# Patient Record
Sex: FEMALE | Race: BLACK OR AFRICAN AMERICAN | Employment: UNEMPLOYED | URBAN - METROPOLITAN AREA
[De-identification: names, ages, dates, MRNs, and addresses within clinical notes are randomized per-mention and may not be internally consistent; named-entity substitution may affect disease eponyms.]

---

## 2017-01-19 ENCOUNTER — CLINICAL SUPPORT (OUTPATIENT)
Dept: PULMONOLOGY | Age: 57
End: 2017-01-19

## 2017-01-19 VITALS — HEIGHT: 62 IN | BODY MASS INDEX: 26.68 KG/M2 | WEIGHT: 145 LBS

## 2017-01-19 DIAGNOSIS — R06.00 DYSPNEA, UNSPECIFIED TYPE: Primary | ICD-10-CM

## 2017-01-20 ENCOUNTER — OFFICE VISIT (OUTPATIENT)
Dept: CARDIOLOGY CLINIC | Age: 57
End: 2017-01-20

## 2017-01-20 VITALS
BODY MASS INDEX: 26.5 KG/M2 | WEIGHT: 144 LBS | HEART RATE: 58 BPM | SYSTOLIC BLOOD PRESSURE: 120 MMHG | HEIGHT: 62 IN | DIASTOLIC BLOOD PRESSURE: 80 MMHG | OXYGEN SATURATION: 98 %

## 2017-01-20 DIAGNOSIS — R00.2 PALPITATIONS: Primary | ICD-10-CM

## 2017-01-20 DIAGNOSIS — I11.9 BENIGN HYPERTENSIVE HEART DISEASE WITHOUT HEART FAILURE: ICD-10-CM

## 2017-01-20 DIAGNOSIS — R06.02 SOB (SHORTNESS OF BREATH): ICD-10-CM

## 2017-01-20 DIAGNOSIS — E78.00 PURE HYPERCHOLESTEROLEMIA: ICD-10-CM

## 2017-01-20 RX ORDER — POTASSIUM CHLORIDE 20MEQ/15ML
LIQUID (ML) ORAL
COMMUNITY
Start: 2017-01-14 | End: 2021-10-01 | Stop reason: SDUPTHER

## 2017-01-20 NOTE — MR AVS SNAPSHOT
Visit Information Date & Time Provider Department Dept. Phone Encounter #  
 1/20/2017  2:20 PM [facility] [id number] Follow-up Instructions Return in about 6 months (around 7/20/2017), or if symptoms worsen or fail to improve. Your Appointments 1/26/2017  1:30 PM  
New Patient with Sebastien Anthony MD  
[address] ([facility]) Appt Note: DR Jr Oakley for [address] [place] [address], [address] [address]  
[phone]  
  
   
 [address] [place] [address], [address] [facility] [place] [address]  
  
    
 1/27/2017 11:20 AM  
Follow Up with Austin Villa DO [facility] ([facility]) Appt Note: pt requested appt for SOB. [place] [id number] [address] [address] [phone] [address] [place] [address] [address] Upcoming Health Maintenance Date Due DTaP/Tdap/Td series (1 - Tdap) 4/5/1983 PAP AKA CERVICAL CYTOLOGY 2/4/2016 BREAST CANCER SCRN MAMMOGRAM 11/22/2017 COLONOSCOPY 11/14/2019 Allergies as of 1/20/2017  Review Complete On: 1/20/2017 By: Austin Villa DO Severity Noted Reaction Type Reactions Ibuprofen High 11/10/2016    Other (comments) Nsaids (Non-steroidal Anti-inflammatory Drug) High 11/10/2016    Other (comments) Amoxicillin  12/12/2011    Unable to Obtain Lidocaine  01/08/2013    Swelling Oral Solution Lipitor [Atorvastatin]  07/15/2014    Myalgia Lopressor [Metoprolol Tartrate]  02/28/2012    Other (comments) Lightheaded and cp Percocet [Oxycodone-acetaminophen]  12/12/2011    Unable to Obtain Vicodin [Hydrocodone-acetaminophen]  12/12/2011    Unable to Obtain Current Immunizations  Reviewed on 11/13/2013 Name Date  
 TB Skin Test (PPD) Intradermal 11/13/2013 Not reviewed this visit You Were Diagnosed With   
  
 Codes Comments Palpitations    -  Primary ICD-10-CM: R00.2 ICD-9-CM: 785.1 Benign hypertensive heart disease without heart failure     ICD-10-CM: I11.9 ICD-9-CM: 402.10 Pure hypercholesterolemia     ICD-10-CM: E78.00 ICD-9-CM: 272.0 Vitals BP Pulse Height(growth percentile) Weight(growth percentile) SpO2 BMI  
 120/80 (!) 58 5' 2\" (1.575 m) 144 lb (65.3 kg) 98% 26.34 kg/m2 OB Status Smoking Status Postmenopausal Former Smoker Vitals History BMI and BSA Data Body Mass Index Body Surface Area  
 26.34 kg/m 2 1.69 m 2 Preferred Pharmacy Pharmacy Name Phone Cara Pizarro [address], [address] [place] [address] Road [phone] Your Updated Medication List  
  
   
This list is accurate as of: 1/20/17  4:07 PM.  Always use your most recent med list. amLODIPine 5 mg tablet Commonly known as:  Arlyss Jefferson TAKE ONE TABLET BY MOUTH DAILY  
  
 atenolol 25 mg tablet Commonly known as:  TENORMIN Take 1 Tab by mouth daily. CARAFATE 1 gram tablet Generic drug:  sucralfate Take 1 g by mouth daily as needed. CENTRUM SILVER PO Take 1 Tab by mouth daily. co-enzyme Q-10 100 mg capsule Commonly known as:  CO Q-10 Take 100 mg by mouth two (2) times a day. DEXILANT 60 mg Cpdb Generic drug:  Dexlansoprazole Take 1 Cap by mouth every Monday, Wednesday, Friday. ECOTRIN LOW STRENGTH 81 mg tablet Generic drug:  aspirin delayed-release Take 81 mg by mouth daily. fexofenadine 180 mg tablet Commonly known as:  Luke Jose Take 1 Tab by mouth daily. FISH OIL 1,000 mg Cap Generic drug:  omega-3 fatty acids-vitamin e Take 2 Caps by mouth daily. flunisolide 25 mcg (0.025 %) Spry Commonly known as:  NASAREL  
2 Sprays by Both Nostrils route two (2) times a day. Indications: ALLERGIC RHINITIS  
  
 GARLIC PO Take 2 Caps by mouth daily. glucose blood VI test strips strip Commonly known as:  blood glucose test  
Use to check glucose daily  
  
 melatonin 3 mg tablet Take 3 mg by mouth nightly. OTHER  
sample Biofreeze cold therapy pain relief  
  
 potassium chloride 20 mEq/15 mL solution Commonly known as:  KAON 10% Patient states she take 1 tablespoon daily  
  
 pravastatin 80 mg tablet Commonly known as:  PRAVACHOL Take 1 Tab by mouth nightly. spironolactone 25 mg tablet Commonly known as:  ALDACTONE Take 1 Tab by mouth daily. We Performed the Following AMB POC EKG ROUTINE W/ 12 LEADS, INTER & REP [51912 CPT(R)] Follow-up Instructions Return in about 6 months (around 7/20/2017), or if symptoms worsen or fail to improve. To-Do List   
 01/20/2017 ECG:  ECG HOLTER MONITOR, UP TO 48 HRS   
  
 01/24/2017 3:30 PM  
  Appointment with [facility] HOLTER TECH at [facility] NON-INVASIVE CARD ([phone]) Age Limit for ALL Heart procedures @ [facility] is 18 yrs and older only. Under the age of 25, refer to Nhung Schwartz [phone]). PATIENTS REPORT TO: Nabor Wells ([facility]) - 2nd Floor [address]  This study requires a physician's written prescription. Patients may bring the order or it may also be faxed to Central Scheduling at [phone]. Please wear a shirt with buttons down the front. Bring list of medications. Do not have a bath/shower during your 24 hour recording. Please remind patient they will need to return 24 hours, 48 hours, or 72 hours after monitor is in place to have it removed by the technician.  (example: If patient is scheduled for a 24 hour holter, return 24 hours after monitor is in place to have it removed. If patient is scheduled for a 48 hour holter monitor, then they would return 48 hours after holter is in place, etc.)  Staff is NOT available on Saturdays to remove holter monitor.   IMPORTANT:  Once patient has been fitted with a Lozano Nj, they should not have any other exams performed until the Holter has been removed. Introducing [facility] & HEALTH SERVICES! Yanet Roberts introduces [employer] patient portal. Now you can access parts of your medical record, email your doctor's office, and request medication refills online. 1. In your internet browser, go to [email or web address] 2. Click on the First Time User? Click Here link in the Sign In box. You will see the New Member Sign Up page. 3. Enter your [employer] Access Code exactly as it appears below. You will not need to use this code after youve completed the sign-up process. If you do not sign up before the expiration date, you must request a new code. · [employer] Access Code: [email or web address] Expires: 2/16/2017  9:42 AM 
 
4. Enter the last four digits of your Social Security Number (xxxx) and Date of Birth (mm/dd/yyyy) as indicated and click Submit. You will be taken to the next sign-up page. 5. Create a [employer] ID. This will be your [employer] login ID and cannot be changed, so think of one that is secure and easy to remember. 6. Create a [employer] password. You can change your password at any time. 7. Enter your Password Reset Question and Answer. This can be used at a later time if you forget your password. 8. Enter your e-mail address. You will receive e-mail notification when new information is available in [address] 9. Click Sign Up. You can now view and download portions of your medical record. 10. Click the Download Summary menu link to download a portable copy of your medical information. If you have questions, please visit the Frequently Asked Questions section of the [employer] website. Remember, [employer] is NOT to be used for urgent needs. For medical emergencies, dial 911. Now available from your iPhone and Android! Please provide this summary of care documentation to your next provider. Your primary care clinician is listed as Silver Casas. If you have any questions after today's visit, please call [phone].

## 2017-01-20 NOTE — PROGRESS NOTES
Review of Systems   Constitutional: Negative. Respiratory: Positive for shortness of breath. Cardiovascular: Positive for chest pain and palpitations. Gastrointestinal: Positive for abdominal pain, constipation and heartburn. Musculoskeletal: Negative.

## 2017-01-20 NOTE — PROGRESS NOTES
1. Have you been to the ER, urgent care clinic since your last visit? Hospitalized since your last visit? NO  2. Have you seen or consulted any other health care providers outside of the [address] [place] [address] since your last visit? Include any pap smears or colon screening.   No

## 2017-01-20 NOTE — PROGRESS NOTES
HPI:  I saw Oz Kirkpatrick in my office today in cardiovascular evaluation regarding her past problems with palpitations. Ms. Lindsay Barrett is a very pleasant, but anxious 53-year-old -American female with history of non-anginal sounding chest pain and palpitations over the years. She has had Holter's in the past which have shown no significant ectopy except for some rare PACs but due to symptoms of palpitations she has been on atenolol 25 mg daily or as needed and Norvasc 5 mg along with Aldactone 25 mg daily for blood pressure. He comes in today and again is complaining of palpitations. In discussion with her however her palpitations seem to occur with minimal activity, but her heart rate only goes to a little over 100 and I suspect that this is just normal sinus rhythm. She has been having some shortness of breath issues and had some recent pulmonary testing and will be following up with Dr. Jaxon Payne that regard. He continues to have her episodic noncardiac chest discomfort. Encounter Diagnoses   Name Primary?  Palpitations Yes    Hypertensive heart disease      Pure hypercholesterolemia     SOB (shortness of breath)        Discussion: This lady is very pleasant but quite anxious about her heart though clinically she has never had any significant cardiac issues. She is quite concerned about her palpitations and although I doubt that there is any serious problem in that regard I am going to go ahead and do a 24-hour Holter monitor study see if we can document what she is feeling when she feels her palpitations and to be sure that this is just sinus rhythm or sinus tachycardia.   She did have a coronary calcium score back in 2014 of 0 and I do not think there is any likelihood that any of her chest discomforts are cardiac and with completely normal EKG today I am simply going plan to follow her up in several months or as needed if any new cardiovascular symptoms surface.     PCP: Urbano Castrejon Leyla Zapata MD       Past Medical History   Diagnosis Date    Cardiac Agatston CAC score, <100 04/30/2014     Coronary calcium score 0.    Cardiac Holter monitoring 05/07/2014     Normal Holter study w/patient's symptoms correlating w/sinus tachycardia, rates 100-135 bpm.    Cardiac nuclear imaging test 01/11/2013     No convincing evidence for ischemia or infarction in the setting of significant chest wall artifact. EF 62%. No RWMA. Neg EKG on max EST. Ex time 9 min 50 sec.  Cardiac stress echo, normal 05/05/2016     Normal maximal stress echo w/reproduction of atypical chest discomfort. Ex time 11 min 3 sec. EF 55%.  Cardiovascular LLE venous duplex 09/28/2016     Left leg:  No DVT.  Chest pain     GERD (gastroesophageal reflux disease)     Heel pain, bilateral     Hiatal hernia      denies this    HTN (hypertension)     Hyperlipidemia     Insulin resistance      follows with endocrinology for this    Night sweats     PVC (premature ventricular contraction)     Right low back pain     S/P colonoscopy 2009, 2014     normal per patient    Unsteady gait      due to heel pain         Past Surgical History   Procedure Laterality Date    Hx other surgical  2014     colonscopy    Hx other surgical       endoscopy    Hx other surgical       wisdom teeth removal x1    D/c suction  2011         Current Outpatient Rx   Name  Route  Sig  Dispense  Refill    OTHER        sample Biofreeze cold therapy pain relief              flunisolide (NASAREL) 25 mcg (0.025 %) spry    Both Nostrils    2 Sprays by Both Nostrils route two (2) times a day. Indications: ALLERGIC RHINITIS    25 mL    1      fexofenadine (ALLEGRA) 180 mg tablet    Oral    Take 1 Tab by mouth daily. 30 Tab    3      spironolactone (ALDACTONE) 25 mg tablet    Oral    Take 1 Tab by mouth daily. 30 Tab    3      amLODIPine (NORVASC) 5 mg tablet    Oral    Take 1 Tab by mouth daily.     30 Tab    6      glucose blood VI test strips (BLOOD GLUCOSE TEST) strip        Use to check glucose daily    1 Package    11      atenolol (TENORMIN) 25 mg tablet    Oral    Take 1 tablet by mouth daily. 90 tablet    3      co-enzyme Q-10 (CO Q-10) 100 mg capsule    Oral    Take 100 mg by mouth two (2) times a day.  pravastatin (PRAVACHOL) 80 mg tablet    Oral    Take 1 tablet by mouth nightly. 90 tablet    3      potassium chloride (KAON 10%) 20 mEq/15 mL solution    Oral    Take 15 mL by mouth two (2) times a day. 1 mL    0       Being followed by Dr. Katia Colin 2 Caps by mouth daily.  aspirin delayed-release (ECOTRIN LOW STRENGTH) 81 mg tablet    Oral    Take 81 mg by mouth daily.  omega-3 fatty acids-vitamin e (FISH OIL) 1,000 mg cap    Oral    Take 1 Cap by mouth daily.  melatonin 3 mg tablet    Oral    Take 3 mg by mouth nightly.  Dexlansoprazole (DEXILANT) 60 mg CpDM    Oral    Take 1 capsule by mouth as needed.  sucralfate (CARAFATE) 1 gram tablet    Oral    Take 1 g by mouth daily.  MULTIVITAMIN W-MINERALS/LUTEIN (CENTRUM SILVER PO)    Oral    Take 1 Tab by mouth daily.                    Allergies   Allergen Reactions    Ibuprofen Other (comments)    Nsaids (Non-Steroidal Anti-Inflammatory Drug) Other (comments)    Amoxicillin Unable to Obtain    Lidocaine Swelling     Oral Solution    Lipitor [Atorvastatin] Myalgia           Lopressor [Metoprolol Tartrate] Other (comments)     Lightheaded and cp    Percocet [Oxycodone-Acetaminophen] Unable to Obtain    Vicodin [Hydrocodone-Acetaminophen] Unable to Obtain         Social History :  Social History   Substance Use Topics    Smoking status: Former Smoker     Packs/day: 0.50     Years: 11.00     Types: Cigarettes     Quit date: 12/31/2006    Smokeless tobacco: Former User     Quit date: 1/10/1993      Comment: 6-7 cigs per day    Alcohol use No        Family History: family history includes Asthma in her father; Heart Attack in her maternal uncle, maternal uncle, and mother; Heart Disease in her brother; Heart defect in her mother; Hypertension in her brother and mother. Review of Systems:   Constitutional: Negative. Respiratory: Positive for shortness of breath. Cardiovascular: Positive for chest pain and palpitations. Gastrointestinal: Positive for abdominal pain, constipation and heartburn. Musculoskeletal: Negative. Physical Exam:    The patient is a cooperative, alert, well developed, well nourished 49 y.o.  female who is in no acute distress at the time of the examination. Visit Vitals    /80    Pulse (!) 58    Ht 5' 2\" (1.575 m)    Wt 65.3 kg (144 lb)    SpO2 98%    BMI 26.34 kg/m2     HEENT: Conjuctiva white, mucosa moist, no pallor or cyanosis. NECK: Supple without masses, tenderness or thyromegaly. There was no jugular venous distention. Carotid are full bilaterally without bruits. CHEST: Symmetrical with good excursion. LUNGS: Clear to auscultation in all fields. HEART: The apex is not displaced. There were no lifts, thrills or heaves. There is a normal S1 and S2 without appreciable murmurs rubs clicks or gallops auscultated. ABDOMEN: Soft without masses or tenderness. The liver is palpated 2-3 finger breadths below the right costal margin. EXTREMITIES: Full peripheral pulses without peripheral edema.     Review of Data: See PMH and Cardiology and Imaging sections for cardiac testing  Lab Results   Component Value Date/Time    Cholesterol, total 138 08/15/2016 04:43 PM    HDL Cholesterol 65 08/15/2016 04:43 PM    LDL, calculated 64.8 08/15/2016 04:43 PM    Triglyceride 41 08/15/2016 04:43 PM    CHOL/HDL Ratio 2.1 08/15/2016 04:43 PM         Results for orders placed or performed in visit on 01/20/17   AMB POC EKG ROUTINE W/ 12 LEADS, INTER & REP     Status: None    Narrative    Sinus bradycardia rate 57. This EKG is within normal limits and similar to the EKG of December 1, 2016. Dick Smith D.O., F.TYREE.C.C. [facility]  [facility] [place]  [address] [place] [address]. [address]    PLEASE NOTE:  This document has been produced using voice recognition software. Unrecognized errors in transcription may be present.

## 2017-01-24 ENCOUNTER — HOSPITAL ENCOUNTER (OUTPATIENT)
Dept: NON INVASIVE DIAGNOSTICS | Age: 57
Discharge: HOME | End: 2017-01-24
Attending: INTERNAL MEDICINE
Payer: COMMERCIAL

## 2017-01-24 DIAGNOSIS — I11.9 BENIGN HYPERTENSIVE HEART DISEASE WITHOUT HEART FAILURE: ICD-10-CM

## 2017-01-24 DIAGNOSIS — R00.2 PALPITATIONS: ICD-10-CM

## 2017-01-24 PROCEDURE — 93225 XTRNL ECG REC<48 HRS REC: CPT

## 2017-01-25 ENCOUNTER — HOSPITAL ENCOUNTER (OUTPATIENT)
Dept: LAB | Age: 57
Discharge: HOME | End: 2017-01-25
Payer: SUBSIDIZED

## 2017-01-25 LAB
25(OH)D3 SERPL-MCNC: 28.4 NG/ML (ref 30–100)
ALBUMIN SERPL BCP-MCNC: 3.5 G/DL (ref 3.4–5)
ALBUMIN/GLOB SERPL: 1.1 {RATIO} (ref 0.8–1.7)
ALP SERPL-CCNC: 34 U/L (ref 45–117)
ALT SERPL-CCNC: 26 U/L (ref 13–56)
AST SERPL W P-5'-P-CCNC: 16 U/L (ref 15–37)
BILIRUB DIRECT SERPL-MCNC: 0.1 MG/DL (ref 0–0.2)
BILIRUB SERPL-MCNC: 0.3 MG/DL (ref 0.2–1)
CHOLEST SERPL-MCNC: 163 MG/DL
GLOBULIN SER CALC-MCNC: 3.2 G/DL (ref 2–4)
HDLC SERPL-MCNC: 79 MG/DL (ref 40–60)
HDLC SERPL: 2.1 {RATIO} (ref 0–5)
LDLC SERPL CALC-MCNC: 77.6 MG/DL (ref 0–100)
LIPID PROFILE,FLP: ABNORMAL
PROT SERPL-MCNC: 6.7 G/DL (ref 6.4–8.2)
TRIGL SERPL-MCNC: 32 MG/DL (ref ?–150)
VLDLC SERPL CALC-MCNC: 6.4 MG/DL

## 2017-01-25 PROCEDURE — 82306 VITAMIN D 25 HYDROXY: CPT | Performed by: INTERNAL MEDICINE

## 2017-01-25 PROCEDURE — 80076 HEPATIC FUNCTION PANEL: CPT | Performed by: INTERNAL MEDICINE

## 2017-01-25 PROCEDURE — 80061 LIPID PANEL: CPT | Performed by: INTERNAL MEDICINE

## 2017-01-25 PROCEDURE — 36415 COLL VENOUS BLD VENIPUNCTURE: CPT | Performed by: INTERNAL MEDICINE

## 2017-01-26 ENCOUNTER — OFFICE VISIT (OUTPATIENT)
Dept: PULMONOLOGY | Age: 57
End: 2017-01-26

## 2017-01-26 VITALS
SYSTOLIC BLOOD PRESSURE: 88 MMHG | HEART RATE: 71 BPM | RESPIRATION RATE: 14 BRPM | HEIGHT: 62 IN | DIASTOLIC BLOOD PRESSURE: 56 MMHG | BODY MASS INDEX: 27.23 KG/M2 | WEIGHT: 148 LBS | TEMPERATURE: 98.5 F | OXYGEN SATURATION: 99 %

## 2017-01-26 DIAGNOSIS — R41.3 MEMORY DIFFICULTIES: ICD-10-CM

## 2017-01-26 DIAGNOSIS — R07.9 CHEST PAIN, UNSPECIFIED TYPE: ICD-10-CM

## 2017-01-26 DIAGNOSIS — G47.00 INSOMNIA, UNSPECIFIED TYPE: Primary | ICD-10-CM

## 2017-01-26 DIAGNOSIS — R06.02 SOB (SHORTNESS OF BREATH): ICD-10-CM

## 2017-01-26 NOTE — PROGRESS NOTES
PATIENT'S O2 SATS:   99% Room Air Rest, 62 Heart Rate                                          99% Room Air Activity, 58 Heart Rate - Patient Walked 660 Ft

## 2017-01-26 NOTE — MR AVS SNAPSHOT
Visit Information Date & Time Provider Department Dept. Phone Encounter #  
 1/26/2017  1:30 PM Bon Vieira MD [facility] Tammi Blum [id number] Follow-up Instructions Routing History Follow-up and Disposition History Upcoming Health Maintenance Date Due DTaP/Tdap/Td series (1 - Tdap) 4/5/1983 PAP AKA CERVICAL CYTOLOGY 2/4/2016 BREAST CANCER SCRN MAMMOGRAM 11/22/2017 COLONOSCOPY 11/14/2019 Allergies as of 1/26/2017  Review Complete On: 1/26/2017 By: Bon Vieira MD  
  
 Severity Noted Reaction Type Reactions Ibuprofen High 11/10/2016    Other (comments) Nsaids (Non-steroidal Anti-inflammatory Drug) High 11/10/2016    Other (comments) Amoxicillin  12/12/2011    Unable to Obtain Lidocaine  01/08/2013    Swelling Oral Solution Lipitor [Atorvastatin]  07/15/2014    Myalgia Lopressor [Metoprolol Tartrate]  02/28/2012    Other (comments) Lightheaded and cp Percocet [Oxycodone-acetaminophen]  12/12/2011    Unable to Obtain Vicodin [Hydrocodone-acetaminophen]  12/12/2011    Unable to Obtain Current Immunizations  Reviewed on 11/13/2013 Name Date  
 TB Skin Test (PPD) Intradermal 11/13/2013 Not reviewed this visit You Were Diagnosed With   
  
 Codes Comments Insomnia, unspecified type    -  Primary ICD-10-CM: G47.00 ICD-9-CM: 780.52 SOB (shortness of breath)     ICD-10-CM: R06.02 
ICD-9-CM: 786.05 Chest pain, unspecified type     ICD-10-CM: R07.9 ICD-9-CM: 786.50 Memory difficulties     ICD-10-CM: R41.3 ICD-9-CM: 780.93 Vitals BP Pulse Temp Resp Height(growth percentile) Weight(growth percentile) (!) 88/56 (BP 1 Location: Left arm, BP Patient Position: Sitting) 71 98.5 °F (36.9 °C) (Oral) 14 5' 2\" (1.575 m) 148 lb (67.1 kg) SpO2 BMI OB Status Smoking Status 99% 27.07 kg/m2 Postmenopausal Former Smoker Vitals History BMI and BSA Data Body Mass Index Body Surface Area  
 27.07 kg/m 2 1.71 m 2 Preferred Pharmacy Pharmacy Name Phone Lakshmi Dean, [address] [phone] Your Updated Medication List  
  
   
This list is accurate as of: 1/26/17  2:30 PM.  Always use your most recent med list. amLODIPine 5 mg tablet Commonly known as:  Mumford Blade TAKE ONE TABLET BY MOUTH DAILY  
  
 atenolol 25 mg tablet Commonly known as:  TENORMIN Take 1 Tab by mouth daily. CARAFATE 1 gram tablet Generic drug:  sucralfate Take 1 g by mouth daily as needed. CENTRUM SILVER PO Take 1 Tab by mouth daily. co-enzyme Q-10 100 mg capsule Commonly known as:  CO Q-10 Take 100 mg by mouth two (2) times a day. DEXILANT 60 mg Cpdb Generic drug:  Dexlansoprazole Take 1 Cap by mouth every Monday, Wednesday, Friday. ECOTRIN LOW STRENGTH 81 mg tablet Generic drug:  aspirin delayed-release Take 81 mg by mouth daily. fexofenadine 180 mg tablet Commonly known as:  Lizbeth Neighbors Take 1 Tab by mouth daily. FISH OIL 1,000 mg Cap Generic drug:  omega-3 fatty acids-vitamin e Take 2 Caps by mouth daily. flunisolide 25 mcg (0.025 %) Spry Commonly known as:  NASAREL  
2 Sprays by Both Nostrils route two (2) times a day. Indications: ALLERGIC RHINITIS  
  
 GARLIC PO Take 2 Caps by mouth daily. glucose blood VI test strips strip Commonly known as:  blood glucose test  
Use to check glucose daily  
  
 melatonin 3 mg tablet Take 3 mg by mouth nightly. OTHER  
sample Biofreeze cold therapy pain relief  
  
 potassium chloride 20 mEq/15 mL solution Commonly known as:  KAON 10% Patient states she take 1 tablespoon daily  
  
 pravastatin 80 mg tablet Commonly known as:  PRAVACHOL Take 1 Tab by mouth nightly. spironolactone 25 mg tablet Commonly known as:  ALDACTONE Take 1 Tab by mouth daily. To-Do List   
 01/27/2017 Respiratory Care:  RT--METHACHOLINE [facility] CHALLENGE   
  
 01/27/2017 Sleep Center:  SPLIT CPAP/PSG Introducing \[facility]\"" & [facility] SERVICES! [employer] introduces [employer] patient portal. Now you can access parts of your medical record, email your doctor's office, and request medication refills online. 1. In your internet browser, go to [email or web address] 2. Click on the First Time User? Click Here link in the Sign In box. You will see the New Member Sign Up page. 3. Enter your [employer] Access Code exactly as it appears below. You will not need to use this code after youve completed the sign-up process. If you do not sign up before the expiration date, you must request a new code. · [employer] Access Code: [email or web address] Expires: 2/16/2017  9:42 AM 
 
4. Enter the last four digits of your Social Security Number (xxxx) and Date of Birth (mm/dd/yyyy) as indicated and click Submit. You will be taken to the next sign-up page. 5. Create a [employer] ID. This will be your [employer] login ID and cannot be changed, so think of one that is secure and easy to remember. 6. Create a [employer] password. You can change your password at any time. 7. Enter your Password Reset Question and Answer. This can be used at a later time if you forget your password. 8. Enter your e-mail address. You will receive e-mail notification when new information is available in [address] 9. Click Sign Up. You can now view and download portions of your medical record. 10. Click the Download Summary menu link to download a portable copy of your medical information. If you have questions, please visit the Frequently Asked Questions section of the [employer] website. Remember, [employer] is NOT to be used for urgent needs. For medical emergencies, dial 911. Now available from your iPhone and Android! Please provide this summary of care documentation to your next provider. Your primary care clinician is listed as Elizabeth Chung. If you have any questions after today's visit, please call [phone].

## 2017-01-26 NOTE — PROGRESS NOTES
HISTORY OF PRESENT ILLNESS  Jimbo Onofre is a 49 y.o. female. HPI Comments: Referred by Dr. Jennifer Seth for episodic shortness of breath. Pt does not recall any consistent triggers as she notes SOB sometimes walking up stairs but would have no problem with exercising at the gym the next day. Pt had a stress Echo which was reportedly within normal limits. Pt diagnosed with LUCY in 2012 but did not have CPAP titration done. Reports her fitness monitor shows several episodes of restlessness and awakening at night. Pt reports she wakes to use the BR 4-5-x per night. In 2014, pt had PSG which showed decreased sleep efficiency but no significant LUCY. ? RLS. Shortness of Breath   The history is provided by the patient. This is a chronic problem. The problem occurs intermittently. Episode onset: several months. The problem has not changed since onset. Associated symptoms include chest pain. Pertinent negatives include no fever, no headaches, no coryza, no rhinorrhea, no sore throat, no swollen glands, no ear pain, no neck pain, no cough, no sputum production, no hemoptysis, no wheezing, no PND, no orthopnea, no syncope, no vomiting, no abdominal pain, no rash, no leg pain, no leg swelling and no claudication. It is unknown what precipitated the problem. She has tried nothing for the symptoms. Chest Pain    The history is provided by the patient. This is a recurrent problem. Episode onset: months. The problem has not changed since onset. The problem occurs every several days. The pain is associated with exertion. The pain is present in the substernal region. The pain is at a severity of 4/10. The pain is moderate. The quality of the pain is described as sharp. The pain does not radiate. The symptoms are aggravated by exertion. Associated symptoms include palpitations and shortness of breath.  Pertinent negatives include no abdominal pain, no back pain, no claudication, no cough, no diaphoresis, no dizziness, no fever, no headaches, no hemoptysis, no irregular heartbeat, no leg pain, no lower extremity edema, no malaise/fatigue, no nausea, no near-syncope, no numbness, no orthopnea, no PND, no sputum production, no syncope, no vomiting and no weakness. She has tried nothing for the symptoms. Procedural history includes stress echo. Review of Systems   Constitutional: Negative for chills, diaphoresis, fever, malaise/fatigue and weight loss. HENT: Negative for congestion, ear discharge, ear pain, hearing loss, nosebleeds, rhinorrhea, sore throat and tinnitus. Eyes: Negative for blurred vision, double vision, photophobia, pain, discharge and redness. Respiratory: Positive for shortness of breath. Negative for cough, hemoptysis, sputum production, wheezing and stridor. Cardiovascular: Positive for chest pain and palpitations. Negative for orthopnea, claudication, leg swelling, syncope, PND and near-syncope. Gastrointestinal: Negative for abdominal pain, blood in stool, constipation, diarrhea, heartburn, nausea and vomiting. Genitourinary: Negative for dysuria, flank pain, frequency, hematuria and urgency. Musculoskeletal: Negative for back pain, falls, joint pain, myalgias and neck pain. Skin: Negative for itching and rash. Neurological: Negative for dizziness, tingling, tremors, sensory change, speech change, focal weakness, seizures, loss of consciousness, weakness, numbness and headaches. Endo/Heme/Allergies: Negative for environmental allergies and polydipsia. Does not bruise/bleed easily. Psychiatric/Behavioral: Positive for memory loss. Negative for depression, hallucinations, substance abuse and suicidal ideas. The patient has insomnia. The patient is not nervous/anxious.       Past Medical History   Diagnosis Date    Cardiac Agatston CAC score, <100 04/30/2014     Coronary calcium score 0.    Cardiac Holter monitoring 05/07/2014     Normal Holter study w/patient's symptoms correlating w/sinus tachycardia, rates 100-135 bpm.    Cardiac nuclear imaging test 01/11/2013     No convincing evidence for ischemia or infarction in the setting of significant chest wall artifact. EF 62%. No RWMA. Neg EKG on max EST. Ex time 9 min 50 sec.  Cardiac stress echo, normal 05/05/2016     Normal maximal stress echo w/reproduction of atypical chest discomfort. Ex time 11 min 3 sec. EF 55%.  Cardiovascular LLE venous duplex 09/28/2016     Left leg:  No DVT.  Chest pain     GERD (gastroesophageal reflux disease)     Heel pain, bilateral     Hiatal hernia      denies this    HTN (hypertension)     Hyperlipidemia     Insulin resistance      follows with endocrinology for this    Night sweats     PVC (premature ventricular contraction)     Right low back pain     S/P colonoscopy 2009, 2014     normal per patient    Unsteady gait      due to heel pain     Past Surgical History   Procedure Laterality Date    Hx other surgical  2014     colonscopy    Hx other surgical       endoscopy    Hx other surgical       wisdom teeth removal x1    D/c suction  2011     Current Outpatient Prescriptions on File Prior to Visit   Medication Sig Dispense Refill    potassium chloride (KAON 10%) 20 mEq/15 mL solution Patient states she take 1 tablespoon daily      spironolactone (ALDACTONE) 25 mg tablet Take 1 Tab by mouth daily. 30 Tab 3    amLODIPine (NORVASC) 5 mg tablet TAKE ONE TABLET BY MOUTH DAILY (Patient taking differently: TAKE 0.5 TABLET BY MOUTH DAILY) 30 Tab 5    atenolol (TENORMIN) 25 mg tablet Take 1 Tab by mouth daily. (Patient taking differently: Take 12.5 mg by mouth daily.) 90 Tab 3    pravastatin (PRAVACHOL) 80 mg tablet Take 1 Tab by mouth nightly. 30 Tab 6    flunisolide (NASAREL) 25 mcg (0.025 %) spry 2 Sprays by Both Nostrils route two (2) times a day. Indications: ALLERGIC RHINITIS 25 mL 1    fexofenadine (ALLEGRA) 180 mg tablet Take 1 Tab by mouth daily.  30 Tab 3    co-enzyme Q-10 (CO Q-10) 100 mg capsule Take 100 mg by mouth two (2) times a day.  GARLIC PO Take 2 Caps by mouth daily.  aspirin delayed-release (ECOTRIN LOW STRENGTH) 81 mg tablet Take 81 mg by mouth daily.  omega-3 fatty acids-vitamin e (FISH OIL) 1,000 mg cap Take 2 Caps by mouth daily.  melatonin 3 mg tablet Take 3 mg by mouth nightly.  Dexlansoprazole (DEXILANT) 60 mg CpDM Take 1 Cap by mouth every Monday, Wednesday, Friday.  sucralfate (CARAFATE) 1 gram tablet Take 1 g by mouth daily as needed.  MULTIVITAMIN W-MINERALS/LUTEIN (CENTRUM SILVER PO) Take 1 Tab by mouth daily.  OTHER sample Biofreeze cold therapy pain relief      glucose blood VI test strips (BLOOD GLUCOSE TEST) strip Use to check glucose daily 1 Package 11     No current facility-administered medications on file prior to visit.       Allergies   Allergen Reactions    Ibuprofen Other (comments)    Nsaids (Non-Steroidal Anti-Inflammatory Drug) Other (comments)    Amoxicillin Unable to Obtain    Lidocaine Swelling     Oral Solution    Lipitor [Atorvastatin] Myalgia           Lopressor [Metoprolol Tartrate] Other (comments)     Lightheaded and cp    Percocet [Oxycodone-Acetaminophen] Unable to Obtain    Vicodin [Hydrocodone-Acetaminophen] Unable to Obtain     Family History   Problem Relation Age of Onset    Hypertension Mother     Heart defect Mother      anuerysm    Heart Attack Mother     Asthma Father     Heart Attack Maternal Uncle     Heart Attack Maternal Uncle     Heart Disease Brother     Hypertension Brother      Social History     Social History    Marital status: SINGLE     Spouse name: N/A    Number of children: N/A    Years of education: N/A     Occupational History    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 Blood pressure (!) 88/56, pulse 71, temperature 98.5 °F (36.9 °C), temperature source Oral, resp. rate 14, height 5' 2\" (1.575 m), weight 67.1 kg (148 lb), SpO2 99 %. Physical Exam   Constitutional: She is oriented to person, place, and time. She appears well-developed and well-nourished. No distress. HENT:   Head: Normocephalic. Nose: Nose normal.   Mouth/Throat: No oropharyngeal exudate. Old and well healed left temporal scar   Eyes: Conjunctivae and EOM are normal. Pupils are equal, round, and reactive to light. Right eye exhibits no discharge. Left eye exhibits no discharge. No scleral icterus. Neck: Normal range of motion. No JVD present. No tracheal deviation present. No thyromegaly present. Cardiovascular: Normal rate, regular rhythm, normal heart sounds and intact distal pulses. Exam reveals no gallop. No murmur heard. Pulmonary/Chest: Effort normal and breath sounds normal. No stridor. No respiratory distress. She has no wheezes. She has no rales. She exhibits no tenderness. Abdominal: Soft. She exhibits no mass. There is no tenderness. Musculoskeletal: She exhibits no edema or tenderness. Lymphadenopathy:     She has no cervical adenopathy. Neurological: She is alert and oriented to person, place, and time. Skin: Skin is warm and dry. No rash noted. She is not diaphoretic. No erythema. Psychiatric: She has a normal mood and affect.  Her behavior is normal. Judgment and thought content normal.     Spirometry: normal flows    XR Results (most recent):    Results from Hospital Encounter encounter on 12/20/16   XR CHEST AP LAT   Narrative EXAM: Chest Radiographs    INDICATION: Cough and shortness of breath    TECHNIQUE: PA and lateral views of the chest    COMPARISON: 4/23/2016, 2/4/2016 and 5/10/2015    FINDINGS: No pneumothorax identified. The lungs are clear. No infiltrates  identified. No effusions appreciated. The cardiomediastinal silhouette is  unremarkable. The pulmonary vascularity is unremarkable. The osseous structures  are unremarkable. Impression IMPRESSION:  1. No acute cardiopulmonary process. ASSESSMENT and PLAN  Encounter Diagnoses   Name Primary?  Insomnia, unspecified type Yes    SOB (shortness of breath)     Chest pain, unspecified type     Memory difficulties      Shortness of breath of unknown origin, ? Related to sleep disturbance vs undiagnosed Asthma makayla as pt with family history of this. Will schedule pt for split night study and Metacholine challenge testing. Continue current medications. Pt to return after above tests.

## 2017-01-27 ENCOUNTER — TELEPHONE (OUTPATIENT)
Dept: SLEEP MEDICINE | Age: 57
End: 2017-01-27

## 2017-01-28 NOTE — PROGRESS NOTES
Her HDL is higher than her LDL at 79 versus 77, so even though her total cholesterol is 163 and everything is higher than 5 months ago I would still leave her on the same dose of pravastatin and just encourage diet. She does have a low vitamin D. I do not know if I really ordered this or she just asked for but clearly she needs to be taking vitamin D supplementation at least 2000 mg per day and Dr. Rain Keller may wish to have a lawnmower. She should check with Dr. Rain Keller for recommendations in that regard.  ES

## 2017-01-30 NOTE — PROGRESS NOTES
This was a very benign Holter monitor study demonstrating very few early beats from the top of the heart and very few from the bottom of the heart with a normal overall heart rate of 73 bpm and no pauses. This looks very good and there is nothing to do at this time. Please let the patient know.  ES

## 2017-02-02 ENCOUNTER — TELEPHONE (OUTPATIENT)
Dept: SLEEP MEDICINE | Age: 57
End: 2017-02-02

## 2017-02-02 NOTE — TELEPHONE ENCOUNTER
Called pt line rings busshruthi called patient contact Venecia Ventura [phone] lmom to return call to [facility] sleep study

## 2017-02-09 ENCOUNTER — TELEPHONE (OUTPATIENT)
Dept: SLEEP MEDICINE | Age: 57
End: 2017-02-09

## 2017-02-09 ENCOUNTER — HOSPITAL ENCOUNTER (OUTPATIENT)
Dept: SLEEP MEDICINE | Age: 57
Discharge: HOME | End: 2017-02-09
Payer: COMMERCIAL

## 2017-02-09 DIAGNOSIS — G47.00 INSOMNIA, UNSPECIFIED TYPE: ICD-10-CM

## 2017-02-09 PROCEDURE — 95810 POLYSOM 6/> YRS 4/> PARAM: CPT

## 2017-02-10 NOTE — PROGRESS NOTES
· Patient arrived for sleep study. · Physician orders were reviewed. · Patient education to include initiation of PAP therapy per protocol. · Patient questions were answered. · The patient demonstrated good understanding of the positive airway pressure device.

## 2017-02-10 NOTE — PROGRESS NOTES
 Sleep study completed per physician order.  Patient discharged from sleep center.  Patient awake, alert and able to leave the facility under their own power. Instructed to follow up with their sleep physician for results.

## 2017-02-13 ENCOUNTER — DOCUMENTATION ONLY (OUTPATIENT)
Dept: SLEEP MEDICINE | Age: 57
End: 2017-02-13

## 2017-02-13 NOTE — PROGRESS NOTES
Sleep study scored per the American Academy of Sleep Medicine (AASM) Manual for the Scoring of Sleep and Associated Events, Rules, Terminology and Technical Specifications Visual and Cardiac Rules, 2007. V2.2    Preliminary diagnostic sleep study report scanned in to patient's chart and routed to Dr. Gardenia Villavicencio for review.

## 2017-02-20 RX ORDER — PRAVASTATIN SODIUM 80 MG/1
TABLET ORAL
Qty: 30 TAB | Refills: 5 | Status: SHIPPED | OUTPATIENT
Start: 2017-02-20 | End: 2017-10-25 | Stop reason: SDUPTHER

## 2017-02-22 NOTE — PROCEDURES
[facility] -POLYSOMNOGRAM    Name:  Yanely Tristan  MR#:  [id number]  :  1960  Account #:  [de-identified]  Date of Adm:  2017  Date of Service:  2017      REFERRING PHYSICIAN: Nazario Menjivar MD.    INTERPRETING PHYSICIAN: Jessica Gray. Junior Neha MD.    MEDICATIONS: The patient reports being on  1. Allegra. 2. Aldactone. 3. Norvasc. 4. Tenormin. 5. Pravachol. 6. Allegra. 7. Aspirin. 8. Melatonin. 9. Dexilant. 10. Carafate. 11. Multivitamin. RESULTS: Blood pressure prior to study was 117/69, post study  130/85. She reports no sedation prior to study, 6 hours of sleep the  night prior to study, no sleep hygiene protocol violations. Ama  sleepiness score of 17, Stop-BANG score of 3/8. The patient reports  sleeping worse than usual. No EKG or EEG abnormalities were noted. The sleep study was performed with standard parameter monitoring. Total recording time was 399.5 minutes. Total sleep time only 284.5  minutes for a decreased sleep efficiency at 71%. Arousal index was  elevated at 15, primary due to nonspecific arousals. On review of sleep  architecture, the patient entered all stages of sleep. She did have  prolonged wake time after sleep onset of 2 hours and sleep latency  was normal at 14.5 minutes. REM latency was prolonged at 148.5  minutes. On respiratory review, she had 2 central apneas, 3  hypopneas for overall apnea/hypopnea index in the normal range of 1. RDI was 1.1. REM RDI was 0. REM apnea/hypopnea index was 0. Supine apnea/hypopnea index was 1.2. Mild snoring was noted  primarily supine. Mean oxygen saturation in both REM and non-REM  was 94%, minimum was 85% in non-REM, 90 in REM. She spent only  2.2 minutes below 88% O2 saturation. Mean heart rate was 56 in non-  REM and 60 in REM. Minimum was 49 in non-REM and 54 in REM. Maximum was 76 in non-REM, 71 in REM. No EKG or EEG  abnormalities noted.  Periodic movements of sleep were noted with an  overall index of 11.4, however, periodic movements of sleep with  arousal index was normal at 1. CONCLUSION: The patient polysomnographic study is remarkable for  poor sleep efficiency, but neither obstructive sleep apnea or respiratory  disturbances or periodic movements of sleep to account for this  finding. She should not drive when drowsy.         MD Roque Marcus / Larissa Carreon  D:  02/22/2017   11:54  T:  02/22/2017   12:44  Job #:  [id number]

## 2017-02-28 ENCOUNTER — OFFICE VISIT (OUTPATIENT)
Dept: INTERNAL MEDICINE CLINIC | Age: 57
End: 2017-02-28

## 2017-02-28 VITALS
TEMPERATURE: 98.2 F | HEART RATE: 60 BPM | OXYGEN SATURATION: 98 % | HEIGHT: 62 IN | BODY MASS INDEX: 27.02 KG/M2 | DIASTOLIC BLOOD PRESSURE: 72 MMHG | RESPIRATION RATE: 12 BRPM | SYSTOLIC BLOOD PRESSURE: 107 MMHG | WEIGHT: 146.8 LBS

## 2017-02-28 DIAGNOSIS — R22.2 CHEST WALL MASS: Primary | ICD-10-CM

## 2017-02-28 DIAGNOSIS — R00.2 PALPITATIONS: ICD-10-CM

## 2017-02-28 NOTE — MR AVS SNAPSHOT
Visit Information Date & Time Provider Department Dept. Phone Encounter #  
 2/28/2017 11:30 AM Sharmila Mas MD Internist of [address] [id number] Upcoming Health Maintenance Date Due DTaP/Tdap/Td series (1 - Tdap) 4/5/1983 PAP AKA CERVICAL CYTOLOGY 2/4/2016 BREAST CANCER SCRN MAMMOGRAM 11/22/2017 COLONOSCOPY 11/14/2019 Allergies as of 2/28/2017  Review Complete On: 2/28/2017 By: Lori George Severity Noted Reaction Type Reactions Ibuprofen High 11/10/2016    Other (comments) Nsaids (Non-steroidal Anti-inflammatory Drug) High 11/10/2016    Other (comments) Amoxicillin  12/12/2011    Unable to Obtain Lidocaine  01/08/2013    Swelling Oral Solution Lipitor [Atorvastatin]  07/15/2014    Myalgia Lopressor [Metoprolol Tartrate]  02/28/2012    Other (comments) Lightheaded and cp Percocet [Oxycodone-acetaminophen]  12/12/2011    Unable to Obtain Vicodin [Hydrocodone-acetaminophen]  12/12/2011    Unable to Obtain Current Immunizations  Reviewed on 11/13/2013 Name Date  
 TB Skin Test (PPD) Intradermal 11/13/2013 Not reviewed this visit You Were Diagnosed With   
  
 Codes Comments Chest wall mass    -  Primary ICD-10-CM: R22.2 ICD-9-CM: 786.6 Palpitations     ICD-10-CM: R00.2 ICD-9-CM: 785.1 Vitals BP  
  
  
  
  
  
 107/72 (BP 1 Location: Left arm, BP Patient Position: Sitting) BMI and BSA Data Body Mass Index Body Surface Area  
 26.85 kg/m 2 1.71 m 2 Preferred Pharmacy Pharmacy Name Phone Poonam Reynoso [address], [address] [place] [address] [phone] Your Updated Medication List  
  
   
This list is accurate as of: 2/28/17 12:20 PM.  Always use your most recent med list. amLODIPine 5 mg tablet Commonly known as:  Melvina Johnston TAKE ONE TABLET BY MOUTH DAILY  
  
 atenolol 25 mg tablet Commonly known as:  TENORMIN  
 Take 1 Tab by mouth daily. CARAFATE 1 gram tablet Generic drug:  sucralfate Take 1 g by mouth daily as needed. CENTRUM SILVER PO Take 1 Tab by mouth daily. co-enzyme Q-10 100 mg capsule Commonly known as:  CO Q-10 Take 100 mg by mouth two (2) times a day. DEXILANT 60 mg Cpdb Generic drug:  Dexlansoprazole Take 1 Cap by mouth every Monday, Wednesday, Friday. ECOTRIN LOW STRENGTH 81 mg tablet Generic drug:  aspirin delayed-release Take 81 mg by mouth daily. fexofenadine 180 mg tablet Commonly known as:  [place] Take 1 Tab by mouth daily. FISH OIL 1,000 mg Cap Generic drug:  omega-3 fatty acids-vitamin e Take 2 Caps by mouth daily. flunisolide 25 mcg (0.025 %) Spry Commonly known as:  NASAREL  
2 Sprays by Both Nostrils route two (2) times a day. Indications: ALLERGIC RHINITIS  
  
 GARLIC PO Take 2 Caps by mouth daily. glucose blood VI test strips strip Commonly known as:  blood glucose test  
Use to check glucose daily  
  
 melatonin 3 mg tablet Take 3 mg by mouth nightly. OTHER  
sample Biofreeze cold therapy pain relief  
  
 potassium chloride 20 mEq/15 mL solution Commonly known as:  KAON 10% Patient states she take 1 tablespoon daily  
  
 pravastatin 80 mg tablet Commonly known as:  PRAVACHOL  
TAKE ONE TABLET BY MOUTH EVERY NIGHT AT BEDTIME  
  
 spironolactone 25 mg tablet Commonly known as:  ALDACTONE Take 1 Tab by mouth daily. We Performed the Following REFERRAL TO CARDIOLOGY [JWK32 Custom] REFERRAL TO GENERAL SURGERY [REF27 Custom] Referral Information Referral ID Referred By Referred To [id number] Juan Daniel Quigley Not Available Visits Status Start Date End Date 1 New Request 2/28/17 2/28/18 If your referral has a status of pending review or denied, additional information will be sent to support the outcome of this decision. Referral ID Referred By Referred To [id number] [facility] [address] Janneth Munoz Phone: [phone] Fax: [phone] Visits Status Start Date End Date 1 New Request 2/28/17 2/28/18 If your referral has a status of pending review or denied, additional information will be sent to support the outcome of this decision. Patient Instructions Health Maintenance Due Topic Date Due  
 DTaP/Tdap/Td series (1 - Tdap) 04/05/1983  PAP AKA CERVICAL CYTOLOGY  02/04/2016 PLAN: 
Key points we discussed today: 1. Call our office if symptoms worsen or if you have any questions 2. Referral to Cardiology team (Kaitlynn Flower) for a second opinion 3. Referral to General Surgery team for evaluation of that left chest wall mass. Dr. Wood Ards [facility] [address] [place] [address], [address] [place] [address] [address] Phone: [phone] Fax: [phone] Thank you for choosing [facility] for all of your health care needs. Cardiac Arrhythmia: Care Instructions Your Care Instructions A cardiac arrhythmia is a change in the normal rhythm of the heart. Your heart may beat too fast or too slow or beat with an irregular or skipping rhythm. A change in the heart's rhythm may feel like a really strong heartbeat or a fluttering in your chest. A severe heart rhythm problem can keep the body from getting the blood it needs. This can result in shortness of breath, lightheadedness, and fainting. You may take medicine to treat your condition. Your doctor may recommend a pacemaker or recommend catheter ablation to destroy small parts of the heart that are causing a rhythm problem. Another possible treatment is an implantable cardioverter-defibrillator (ICD). An ICD is a device that gives the heart a shock to return the heart to a normal rhythm. Follow-up care is a key part of your treatment and safety. Be sure to make and go to all appointments, and call your doctor if you are having problems. It's also a good idea to know your test results and keep a list of the medicines you take. How can you care for yourself at home? General care · Be safe with medicines. Take your medicines exactly as prescribed. Call your doctor if you think you are having a problem with your medicine. You will get more details on the specific medicines your doctor prescribes. · If you received a pacemaker or an ICD, you will get a fact sheet about it. · Wear medical alert jewelry that says you have an abnormal heart rhythm. You can buy this at most drugstores. Lifestyle changes · Eat a heart-healthy diet. · Stay at a healthy weight. Lose weight if you need to. · Avoid nicotine, too much alcohol, and illegal drugs (meth, speed, and cocaine). Also, get enough sleep and do not overeat. · Ask your doctor whether you can take over-the-counter medicines (such as decongestants). These can make your heart beat fast. 
· Talk to your doctor about any limits to activities, such as driving, or tasks where you use power tools or ladders. Activity · Start light exercise if your doctor says you can. Even a small amount will help you get stronger, have more energy, and manage your stress. · If your doctor recommends it, get more exercise. Walking is a good choice. Bit by bit, increase the amount you walk every day. Try for at least 30 minutes on most days of the week. You also may want to swim, bike, or do other activities. · When you exercise, watch for signs that your heart is working too hard. You are pushing too hard if you cannot talk while you exercise. If you become short of breath or dizzy or have chest pain, sit down and rest. 
· Check your pulse daily. Place two fingers on the artery at the palm side of your wrist, in line with your thumb.  If your heartbeat seems uneven, talk to your doctor. When should you call for help? Call 911 anytime you think you may need emergency care. For example, call if: 
· You passed out (lost consciousness). · You have symptoms of a heart attack. These may include: ¨ Chest pain or pressure, or a strange feeling in the chest. 
¨ Sweating. ¨ Shortness of breath. ¨ Nausea or vomiting. ¨ Pain, pressure, or a strange feeling in the back, neck, jaw, or upper belly or in one or both shoulders or arms. ¨ Lightheadedness or sudden weakness. ¨ A fast or irregular heartbeat. After you call 911, the  may tell you to chew 1 adult-strength or 2 to 4 low-dose aspirin. Wait for an ambulance. Do not try to drive yourself. · You have signs of a stroke. These include: 
¨ Sudden numbness, paralysis, or weakness in your face, arm, or leg, especially on only one side of your body. ¨ New problems with walking or balance. ¨ Sudden vision changes. ¨ Drooling or slurred speech. ¨ New problems speaking or understanding simple statements, or feeling confused. ¨ A sudden, severe headache that is different from past headaches. Call your doctor now or seek immediate medical care if: 
· You are dizzy or lightheaded, or you feel like you may faint. · You have new or increased shortness of breath. · You had surgery and you have signs of infection, such as: 
¨ Increased pain, swelling, warmth, or redness. ¨ Red streaks leading from the cut (incision). ¨ Pus draining from the incision. ¨ A fever. Watch closely for changes in your health, and be sure to contact your doctor if: 
· You do not get better as expected. Where can you learn more? Go to http://diana-pam.info/. Enter J305 in the search box to learn more about \"Cardiac Arrhythmia: Care Instructions. \" Current as of: May 5, 2016 Content Version: 11.1 © 7753-7997 [employer].  Care instructions adapted under license by [address] (which disclaims liability or warranty for this information). If you have questions about a medical condition or this instruction, always ask your healthcare professional. [address] any warranty or liability for your use of this information. Introducing [facility]! Ammon Carrillo introduces Rapid7 patient portal. Now you can access parts of your medical record, email your doctor's office, and request medication refills online. 1. In your internet browser, go to [email or web address] 2. Click on the First Time User? Click Here link in the Sign In box. You will see the New Member Sign Up page. 3. Enter your Rapid7 Access Code exactly as it appears below. You will not need to use this code after youve completed the sign-up process. If you do not sign up before the expiration date, you must request a new code. · Rapid7 Access Code: [email or web address] Expires: 5/29/2017 12:20 PM 
 
4. Enter the last four digits of your Social Security Number (xxxx) and Date of Birth (mm/dd/yyyy) as indicated and click Submit. You will be taken to the next sign-up page. 5. Create a Rapid7 ID. This will be your Rapid7 login ID and cannot be changed, so think of one that is secure and easy to remember. 6. Create a Rapid7 password. You can change your password at any time. 7. Enter your Password Reset Question and Answer. This can be used at a later time if you forget your password. 8. Enter your e-mail address. You will receive e-mail notification when new information is available in [address] 9. Click Sign Up. You can now view and download portions of your medical record. 10. Click the Download Summary menu link to download a portable copy of your medical information. If you have questions, please visit the Frequently Asked Questions section of the Rapid7 website.  Remember, Rapid7 is NOT to be used for urgent needs. For medical emergencies, dial 911. Now available from your iPhone and Android! Please provide this summary of care documentation to your next provider. Your primary care clinician is listed as Hyacinth Nichols. If you have any questions after today's visit, please call [phone].

## 2017-02-28 NOTE — PROGRESS NOTES
Chief Complaint   Patient presents with    Mass     Lump on Left Side of Upper Rib Cage     1. Have you been to the ER, urgent care clinic since your last visit? Hospitalized since your last visit? No    2. Have you seen or consulted any other health care providers outside of the [address] [place] [address] since your last visit? Include any pap smears or colon screening.  No

## 2017-02-28 NOTE — PROGRESS NOTES
INTERNISTS OF [facility]:  3/1/2017, MRN: [id number]      Neris Short is a 49 y.o. female and presents to clinic for Mass (Lump on Left Side of Upper Rib Cage)    Subjective:   Pt is a 50yo AAF with h/o chronic bronchitis, LUCY, porphyria (per EHR), HTN, HLD, constipation,gallbladder polyps (suggested by CT scan 10/13/16), and GERD. 1. Chest Wall Mass:   - Present for several yrs   - Unchanged in size   - Pt is concerned about this area and how this area is not symmetric with the left side of her chest   - She is up to date on her mammogram and her last mammogram is unremarkable for suspicious findings. - She had a CXR 12/20/16 which showed no masses or osseous abnormalities. - Pt had a CT scan of her abdomen/pelvis 10/13/16; her spleen was noted as being \"normal\" in appearance. - Area is not associated with pain    2. Palpitations:   - Has h/o Holter monitor and stress testing.   - Seen by [facility]   - Pt has palpitations off/on x >3 months   - She denies recent palpitations but is concerned that no etiology was found for her underlying palpitations   - Pt is requesting a referral to [facility] Cardiology team for a second opinion    Lab Results   Component Value Date/Time    TSH 1.15 08/15/2016 04:43 PM       Patient Active Problem List    Diagnosis Date Noted    Simple chronic bronchitis 12/22/2016    Porphyria ([address] 08/24/2015    Hypersomnia with sleep apnea 03/13/2014    Hypertensive heart disease  03/05/2012    Constipation 01/16/2012    Memory difficulties 01/16/2012    Hyperlipidemia     GERD (gastroesophageal reflux disease)        Current Outpatient Prescriptions   Medication Sig Dispense Refill    pravastatin (PRAVACHOL) 80 mg tablet TAKE ONE TABLET BY MOUTH EVERY NIGHT AT BEDTIME 30 Tab 5    potassium chloride (KAON 10%) 20 mEq/15 mL solution Patient states she take 1 tablespoon daily      spironolactone (ALDACTONE) 25 mg tablet Take 1 Tab by mouth daily.  30 Tab 3    amLODIPine (NORVASC) 5 mg tablet TAKE ONE TABLET BY MOUTH DAILY (Patient taking differently: TAKE 0.5 TABLET BY MOUTH DAILY) 30 Tab 5    atenolol (TENORMIN) 25 mg tablet Take 1 Tab by mouth daily. (Patient taking differently: Take 12.5 mg by mouth daily.) 90 Tab 3    OTHER sample Biofreeze cold therapy pain relief      flunisolide (NASAREL) 25 mcg (0.025 %) spry 2 Sprays by Both Nostrils route two (2) times a day. Indications: ALLERGIC RHINITIS 25 mL 1    glucose blood VI test strips (BLOOD GLUCOSE TEST) strip Use to check glucose daily 1 Package 11    co-enzyme Q-10 (CO Q-10) 100 mg capsule Take 100 mg by mouth two (2) times a day.  GARLIC PO Take 2 Caps by mouth daily.  aspirin delayed-release (ECOTRIN LOW STRENGTH) 81 mg tablet Take 81 mg by mouth daily.  omega-3 fatty acids-vitamin e (FISH OIL) 1,000 mg cap Take 2 Caps by mouth daily.  melatonin 3 mg tablet Take 3 mg by mouth nightly.  Dexlansoprazole (DEXILANT) 60 mg CpDM Take 1 Cap by mouth every Monday, Wednesday, Friday.  sucralfate (CARAFATE) 1 gram tablet Take 1 g by mouth daily as needed.  MULTIVITAMIN W-MINERALS/LUTEIN (CENTRUM SILVER PO) Take 1 Tab by mouth daily.          Allergies   Allergen Reactions    Ibuprofen Other (comments)    Nsaids (Non-Steroidal Anti-Inflammatory Drug) Other (comments)    Amoxicillin Unable to Obtain    Lidocaine Swelling     Oral Solution    Lipitor [Atorvastatin] Myalgia           Lopressor [Metoprolol Tartrate] Other (comments)     Lightheaded and cp    Percocet [Oxycodone-Acetaminophen] Unable to Obtain    Vicodin [Hydrocodone-Acetaminophen] Unable to Obtain       Past Medical History:   Diagnosis Date    Cardiac Agatston CAC score, <100 04/30/2014    Coronary calcium score 0.    Cardiac Holter monitoring 05/07/2014    Normal Holter study w/patient's symptoms correlating w/sinus tachycardia, rates 100-135 bpm.    Cardiac nuclear imaging test 01/11/2013    No convincing evidence for ischemia or infarction in the setting of significant chest wall artifact. EF 62%. No RWMA. Neg EKG on max EST. Ex time 9 min 50 sec.  Cardiac stress echo, normal 05/05/2016    Normal maximal stress echo w/reproduction of atypical chest discomfort. Ex time 11 min 3 sec. EF 55%.  Cardiovascular LLE venous duplex 09/28/2016    Left leg:  No DVT.  Chest pain     GERD (gastroesophageal reflux disease)     Heel pain, bilateral     Hiatal hernia     denies this    HTN (hypertension)     Hyperlipidemia     Insulin resistance     follows with endocrinology for this    Night sweats     PVC (premature ventricular contraction)     Right low back pain     S/P colonoscopy 2009, 2014    normal per patient    Unsteady gait     due to heel pain       Past Surgical History:   Procedure Laterality Date    D/C SUCTION  2011    HX OTHER SURGICAL  2014    colonscopy    HX OTHER SURGICAL      endoscopy    HX OTHER SURGICAL      wisdom teeth removal x1       Family History   Problem Relation Age of Onset    Hypertension Mother     Heart defect Mother      anuerysm    Heart Attack Mother     Asthma Father     Heart Attack Maternal Uncle     Heart Attack Maternal Uncle     Heart Disease Brother     Hypertension Brother        Social History   Substance Use Topics    Smoking status: Former Smoker     Packs/day: 0.50     Years: 11.00     Types: Cigarettes     Quit date: 12/31/2006    Smokeless tobacco: Former User     Quit date: 1/10/1993      Comment: 6-7 cigs per day    Alcohol use No       ROS   Review of Systems   Constitutional: Negative for chills and fever. HENT: Negative for ear pain and sore throat. Eyes: Negative for blurred vision and pain. Respiratory: Negative for cough and shortness of breath. Cardiovascular: Negative for chest pain. Gastrointestinal: Negative for abdominal pain. Genitourinary: Negative for dysuria.    Musculoskeletal: Negative for joint pain and myalgias. Skin: Negative for rash. Neurological: Negative for tingling, focal weakness and headaches. Endo/Heme/Allergies: Positive for environmental allergies (see allergy list to medications). Psychiatric/Behavioral: Negative for substance abuse. Objective     Vitals:    02/28/17 1156   BP: 107/72   Pulse: 60   Resp: 12   Temp: 98.2 °F (36.8 °C)   TempSrc: Oral   SpO2: 98%   Weight: 146 lb 12.8 oz (66.6 kg)   Height: 5' 2\" (1.575 m)   PainSc:   0 - No pain       Physical Exam   Constitutional: She is oriented to person, place, and time and well-developed, well-nourished, and in no distress. HENT:   Head: Normocephalic and atraumatic. Right Ear: External ear normal.   Left Ear: External ear normal.   Nose: Nose normal.   Mouth/Throat: Oropharynx is clear and moist. No oropharyngeal exudate. Eyes: Conjunctivae and EOM are normal. Pupils are equal, round, and reactive to light. Right eye exhibits no discharge. Left eye exhibits no discharge. No scleral icterus. Neck: Neck supple. Cardiovascular: Normal rate, regular rhythm, normal heart sounds and intact distal pulses. Exam reveals no gallop and no friction rub. No murmur heard. Pulmonary/Chest: Effort normal and breath sounds normal. No respiratory distress. She has no wheezes. She has no rales. She exhibits no tenderness. Left anterolateral rib margin is more prominent when compared to her corresponding right anterolateral rib margin. Abdominal: Soft. Bowel sounds are normal. She exhibits no distension and no mass. There is no tenderness. There is no rebound and no guarding. No hepatosplenomegaly   Musculoskeletal: She exhibits no edema or tenderness (BUE are NTTP). Lymphadenopathy:     She has no cervical adenopathy. Neurological: She is alert and oriented to person, place, and time. She exhibits normal muscle tone. Gait normal.   Skin: Skin is warm and dry. No erythema.    Psychiatric: Affect normal.   Nursing note and vitals reviewed. LABS   Data Review:   Lab Results   Component Value Date/Time    WBC 5.8 08/15/2016 04:43 PM    Hemoglobin (POC) 12.2 01/09/2013 08:19 PM    HGB 13.9 08/15/2016 04:43 PM    Hematocrit (POC) 36 01/09/2013 08:19 PM    HCT 41.9 08/15/2016 04:43 PM    PLATELET 699 77/95/0968 04:43 PM    MCV 88.2 08/15/2016 04:43 PM       Lab Results   Component Value Date/Time    Sodium 146 11/10/2016 01:00 PM    Potassium 4.3 11/10/2016 01:00 PM    Chloride 107 11/10/2016 01:00 PM    CO2 31 11/10/2016 01:00 PM    Anion gap 8 11/10/2016 01:00 PM    Glucose 85 11/10/2016 01:00 PM    BUN 19 11/10/2016 01:00 PM    Creatinine 1.17 11/10/2016 01:00 PM    BUN/Creatinine ratio 16 11/10/2016 01:00 PM    GFR est AA 58 11/10/2016 01:00 PM    GFR est non-AA 48 11/10/2016 01:00 PM    Calcium 9.6 11/10/2016 01:00 PM       Lab Results   Component Value Date/Time    Cholesterol, total 163 01/25/2017 11:20 AM    HDL Cholesterol 79 01/25/2017 11:20 AM    LDL, calculated 77.6 01/25/2017 11:20 AM    VLDL, calculated 6.4 01/25/2017 11:20 AM    Triglyceride 32 01/25/2017 11:20 AM    CHOL/HDL Ratio 2.1 01/25/2017 11:20 AM       Lab Results   Component Value Date/Time    Hemoglobin A1c (POC) 5.6 09/19/2013 02:30 PM    Hemoglobin A1c, External 5.8 10/08/2015       Assessment/Plan:   1. Chest wall mass: Given that this has remained unchanged for years and pt has had unremarkable last mammogram, CT of the abdomen/pelvis from 10/13/16, and unremarkable CXR from 12/20/16, would not recommend additional studies.    - Referral to General Surgery team for a second opinion regarding left sided chest wall mass - more prominent rib margin    ORDERS:  - REFERRAL TO GENERAL SURGERY    2. Palpitations  - Referral to [id number] [place] [address] DR APOLLO BARBER [facility]) per pt request for a second opinion regarding her sx    ORDERS:  - REFERRAL TO [address] Maintenance Due   Topic Date Due    DTaP/Tdap/Td series (1 - Tdap) 04/05/1983    PAP AKA CERVICAL CYTOLOGY 02/04/2016       Lab review: orders written for new lab studies as appropriate; see orders    I have discussed the diagnosis with the patient and the intended plan as seen in the above orders. The patient has received an after-visit summary and questions were answered concerning future plans. I have discussed medication side effects and warnings with the patient as well. I have reviewed the plan of care with the patient, accepted their input and they are in agreement with the treatment goals. All questions were answered. The patient understands the plan of care. Handouts provided today with above information. Pt instructed if symptoms worsen to call the office or report to the ED for continued care. Greater than 50% of the visit time was spent in counseling and/or coordination of care. Follow-up Disposition:  Return in about 6 months (around 8/28/2017), or if symptoms worsen or fail to improve, for BP check.     María Freitas MD

## 2017-03-01 ENCOUNTER — TELEPHONE (OUTPATIENT)
Dept: INTERNAL MEDICINE CLINIC | Age: 57
End: 2017-03-01

## 2017-03-01 PROBLEM — K82.4 GALLBLADDER POLYP: Status: ACTIVE | Noted: 2017-03-01

## 2017-03-01 NOTE — TELEPHONE ENCOUNTER
Pt states she is already aware of cardiology appt on March 22, 2017 @ 11:00am with Dr Edvin Richter.

## 2017-03-15 NOTE — PROGRESS NOTES
Tried to call patient with results and no answer. No answering machine to leave message.     Weston Feliciano, JESSICAN

## 2017-03-20 ENCOUNTER — TELEPHONE (OUTPATIENT)
Dept: PULMONOLOGY | Age: 57
End: 2017-03-20

## 2017-03-20 NOTE — TELEPHONE ENCOUNTER
Called pt, no answer, no machine for voice mail. pt needs to schedule f/u w/ LZ and methacholine challenge.      Angeline Osborn LPN

## 2017-03-22 ENCOUNTER — OFFICE VISIT (OUTPATIENT)
Dept: SURGERY | Age: 57
End: 2017-03-22

## 2017-03-22 VITALS
DIASTOLIC BLOOD PRESSURE: 70 MMHG | OXYGEN SATURATION: 99 % | TEMPERATURE: 97.7 F | HEIGHT: 62 IN | SYSTOLIC BLOOD PRESSURE: 112 MMHG | BODY MASS INDEX: 27.42 KG/M2 | WEIGHT: 149 LBS | RESPIRATION RATE: 16 BRPM | HEART RATE: 66 BPM

## 2017-03-22 DIAGNOSIS — D17.9 INTRAMUSCULAR LIPOMA: Primary | ICD-10-CM

## 2017-03-22 NOTE — PROGRESS NOTES
General Surgery Consult    Subjective:      HPI:  Ms. Jadon Ruffin is an extremely pleasant 50 yo AA female who was sent to us by her PCP, Dr. Stevenson Montero, for evaluation of a mass on her left chest wall. She reports first noticing an asymmetry of her lower left rib cage area about 6 months ago. She has never had any pain from this and does not remember any significant \"incident\" resulting in trauma or injury to the area. She had a CT of the abdomen and pelvis done in Oct of 2016 and a chest xray done in Dec 2016. She continues to deny pain, but has become more concerned about it with time. We reviewed her CT scans from 2012 and 2016 and determined that there was an unchanged lipoma in the intraabdominal muscles. We discussed this finding and the implications of treating with surgery vs non treatment at this time. 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    Cardiac Holter monitoring 05/07/2014    Normal Holter study w/patient's symptoms correlating w/sinus tachycardia, rates 100-135 bpm.    Cardiac nuclear imaging test 01/11/2013    No convincing evidence for ischemia or infarction in the setting of significant chest wall artifact. EF 62%. No RWMA. Neg EKG on max EST. Ex time 9 min 50 sec.  Cardiac stress echo, normal 05/05/2016    Normal maximal stress echo w/reproduction of atypical chest discomfort. Ex time 11 min 3 sec. EF 55%.  Cardiovascular LLE venous duplex 09/28/2016    Left leg:  No DVT.     Chest pain     GERD (gastroesophageal reflux disease)     Heel pain, bilateral     Hiatal hernia     denies this    HTN (hypertension)     Hyperlipidemia     Insulin resistance     follows with endocrinology for this    Night sweats     PVC (premature ventricular contraction)     Right low back pain     S/P colonoscopy 2009, 2014    normal per patient    Unsteady gait     due to heel pain      Past Surgical History:   Procedure Laterality Date    D/C SUCTION  2011    HX OTHER SURGICAL  2014    colonscopy    HX OTHER SURGICAL      endoscopy    HX OTHER SURGICAL      wisdom teeth removal x1      Family History   Problem Relation Age of Onset    Hypertension Mother     Heart defect Mother      anuerysm    Heart Attack Mother     Asthma Father     Heart Attack Maternal Uncle     Heart Attack Maternal Uncle     Heart Disease Brother     Hypertension Brother       Social History   Substance Use Topics    Smoking status: Former Smoker     Packs/day: 0.50     Years: 11.00     Types: Cigarettes     Quit date: 12/31/2006    Smokeless tobacco: Former User     Quit date: 1/10/1993      Comment: 6-7 cigs per day    Alcohol use No      Allergies   Allergen Reactions    Ibuprofen Other (comments)    Nsaids (Non-Steroidal Anti-Inflammatory Drug) Other (comments)    Amoxicillin Unable to Obtain    Lidocaine Swelling     Oral Solution    Lipitor [Atorvastatin] Myalgia           Lopressor [Metoprolol Tartrate] Other (comments)     Lightheaded and cp    Percocet [Oxycodone-Acetaminophen] Unable to Obtain    Vicodin [Hydrocodone-Acetaminophen] Unable to Obtain       Prior to Admission medications    Medication Sig Start Date End Date Taking?  Authorizing Provider   pravastatin (PRAVACHOL) 80 mg tablet TAKE ONE TABLET BY MOUTH EVERY NIGHT AT BEDTIME 2/20/17  Yes Raza Ferguson, DO   potassium chloride (KAON 10%) 20 mEq/15 mL solution Patient states she take 1 tablespoon daily 1/14/17  Yes Historical Provider spironolactone (ALDACTONE) 25 mg tablet Take 1 Tab by mouth daily. 11/10/16  Yes Roseanne Marshall,    amLODIPine (NORVASC) 5 mg tablet TAKE ONE TABLET BY MOUTH DAILY  Patient taking differently: TAKE 0.5 TABLET BY MOUTH DAILY 7/7/16  Yes Elroy Ball NP   atenolol (TENORMIN) 25 mg tablet Take 1 Tab by mouth daily. Patient taking differently: Take 12.5 mg by mouth daily. 4/25/16  Yes Elroy Ball NP   flunisolide (NASAREL) 25 mcg (0.025 %) spry 2 Sprays by Both Nostrils route two (2) times a day. Indications: ALLERGIC RHINITIS 9/24/15  Yes Hank Mccormack, DO   glucose blood VI test strips (BLOOD GLUCOSE TEST) strip Use to check glucose daily 4/20/15  Yes Hank Mccormack,    co-enzyme Q-10 (CO Q-10) 100 mg capsule Take 100 mg by mouth two (2) times a day. Yes Historical Provider   GARLIC PO Take 2 Caps by mouth daily. Yes Historical Provider   aspirin delayed-release (ECOTRIN LOW STRENGTH) 81 mg tablet Take 81 mg by mouth daily. Yes Historical Provider   omega-3 fatty acids-vitamin e (FISH OIL) 1,000 mg cap Take 2 Caps by mouth daily. Yes Historical Provider   melatonin 3 mg tablet Take 3 mg by mouth nightly. Yes Historical Provider   Dexlansoprazole (DEXILANT) 60 mg CpDM Take 1 Cap by mouth every Monday, Wednesday, Friday. Yes Historical Provider   sucralfate (CARAFATE) 1 gram tablet Take 1 g by mouth daily as needed. Yes Historical Provider   MULTIVITAMIN W-MINERALS/LUTEIN (CENTRUM SILVER PO) Take 1 Tab by mouth daily. Yes Historical Provider   OTHER sample Biofreeze cold therapy pain relief    Historical Provider       Review of Systems:    14 systems were reviewed. The results are as above in the HPI and otherwise negative.      Objective:     Vitals:    03/22/17 1431   BP: 112/70   Pulse: 66   Resp: 16   Temp: 97.7 °F (36.5 °C)   TempSrc: Oral   SpO2: 99%   Weight: 67.6 kg (149 lb)   Height: 5' 2\" (1.575 m)       Physical Exam:  GENERAL: alert, cooperative, no distress, appears stated age,   EYE: conjunctivae/corneas clear. PERRL, EOM's intact. THROAT & NECK: normal and no erythema or exudates noted. ,    LYMPHATIC: Cervical, supraclavicular, and axillary nodes normal. ,   LUNG: clear to auscultation bilaterally,   HEART: regular rate and rhythm  ABDOMEN: soft, non-tender. Bowel sounds normal. No masses,  no organomegaly. Left lower rib cage protrudes more than the right side when standing. Able to palpate a soft mass in this area. No pain with palpation. BACK: spine is straight and back/rib cage is symmetrical    EXTREMITIES:  extremities normal, atraumatic, no cyanosis or edema,   SKIN: Normal.,   NEUROLOGIC: AOx3. Cranial nerves 2-12 and sensation grossly intact. Data Review:  None at this time    Ms. Lindsay Barrett has a reminder for a \"due or due soon\" health maintenance. I have asked that she contact her primary care provider for follow-up on this health maintenance. Impression:     · Intramuscular lipoma    Plan:     · No plans made at this time. Surgical options were discussed and she agreed to call us if she decides to proceed.     Signed By: Celina Kirk NP     March 22, 2017

## 2017-03-22 NOTE — PATIENT INSTRUCTIONS
Surgical options discussed. Please call us if you decide to proceed with surgery. Lipoma: Care Instructions  Your Care Instructions  A lipoma is a growth of fat just below the skin. It may feel soft and rubbery. Lipomas can occur anywhere on the body. But they are most common on the torso, neck, upper thighs, upper arms, and armpits. A lipoma does not turn into cancer. Lipomas usually are not treated, because most of them don't hurt or cause problems. But your doctor may remove a lipoma if it is painful, gets infected, or bothers you. Follow-up care is a key part of your treatment and safety. Be sure to make and go to all appointments, and call your doctor if you are having problems. It's also a good idea to know your test results and keep a list of the medicines you take. How can you care for yourself at home? · Lipomas usually need no care at home. · If your doctor removes a lipoma, follow directions for taking care of the cut (incision). When should you call for help? Call your doctor now or seek immediate medical care if:  · You have signs of infection, such as:  ¨ Increased pain, swelling, warmth, or redness. ¨ Red streaks leading from the lipoma. ¨ Pus draining from the lipoma. ¨ A fever. Watch closely for changes in your health, and be sure to contact your doctor if:  · You want to discuss having a lipoma removed. Where can you learn more? Go to http://diana-pam.info/. Enter Y754 in the search box to learn more about \"Lipoma: Care Instructions. \"  Current as of: July 1, 2016  Content Version: 11.1  © 4302-2666 [employer]. Care instructions adapted under license by [employer] (which disclaims liability or warranty for this information). If you have questions about a medical condition or this instruction, always ask your healthcare professional. [address] any warranty or liability for your use of this information.

## 2017-04-12 ENCOUNTER — TELEPHONE (OUTPATIENT)
Dept: CARDIOLOGY CLINIC | Age: 57
End: 2017-04-12

## 2017-04-12 DIAGNOSIS — E78.00 PURE HYPERCHOLESTEROLEMIA: Primary | ICD-10-CM

## 2017-04-26 ENCOUNTER — HOSPITAL ENCOUNTER (OUTPATIENT)
Dept: LAB | Age: 57
Discharge: HOME | End: 2017-04-26
Payer: COMMERCIAL

## 2017-04-26 DIAGNOSIS — E78.00 PURE HYPERCHOLESTEROLEMIA: ICD-10-CM

## 2017-04-26 LAB
ANION GAP BLD CALC-SCNC: 5 MMOL/L (ref 3–18)
BUN SERPL-MCNC: 21 MG/DL (ref 7–18)
BUN/CREAT SERPL: 19 (ref 12–20)
CALCIUM SERPL-MCNC: 9.2 MG/DL (ref 8.5–10.1)
CHLORIDE SERPL-SCNC: 106 MMOL/L (ref 100–108)
CHOLEST SERPL-MCNC: 160 MG/DL
CO2 SERPL-SCNC: 29 MMOL/L (ref 21–32)
CREAT SERPL-MCNC: 1.12 MG/DL (ref 0.6–1.3)
GLUCOSE SERPL-MCNC: 89 MG/DL (ref 74–99)
HDLC SERPL-MCNC: 78 MG/DL (ref 40–60)
HDLC SERPL: 2.1 {RATIO} (ref 0–5)
LDLC SERPL CALC-MCNC: 70.2 MG/DL (ref 0–100)
LIPID PROFILE,FLP: ABNORMAL
POTASSIUM SERPL-SCNC: 4.1 MMOL/L (ref 3.5–5.5)
SODIUM SERPL-SCNC: 140 MMOL/L (ref 136–145)
TRIGL SERPL-MCNC: 59 MG/DL (ref ?–150)
VLDLC SERPL CALC-MCNC: 11.8 MG/DL

## 2017-04-26 PROCEDURE — 80061 LIPID PANEL: CPT | Performed by: INTERNAL MEDICINE

## 2017-04-26 PROCEDURE — 36415 COLL VENOUS BLD VENIPUNCTURE: CPT | Performed by: INTERNAL MEDICINE

## 2017-04-26 PROCEDURE — 80048 BASIC METABOLIC PNL TOTAL CA: CPT | Performed by: INTERNAL MEDICINE

## 2017-04-26 NOTE — LETTER
5/10/2017 10:34 AM 
 
Ms. Jc Magallanes [address] [address] Dear Jc Magallanes: 
 
Please find your most recent results below. Resulted Orders LIPID PANEL Result Value Ref Range LIPID PROFILE Cholesterol, total 160 <200 MG/DL Triglyceride 59 <150 MG/DL Comment: HDL Cholesterol 78 (H) 40 - 60 MG/DL  
 LDL, calculated 70.2 0 - 100 MG/DL VLDL, calculated 11.8 MG/DL  
 CHOL/HDL Ratio 2.1 0 - 5.0 METABOLIC PANEL, BASIC Result Value Ref Range Sodium 140 136 - 145 mmol/L Potassium 4.1 3.5 - 5.5 mmol/L Chloride 106 100 - 108 mmol/L  
 CO2 29 21 - 32 mmol/L Anion gap 5 3.0 - 18 mmol/L Glucose 89 74 - 99 mg/dL BUN 21 (H) 7.0 - 18 MG/DL Creatinine 1.12 0.6 - 1.3 MG/DL  
 BUN/Creatinine ratio 19 12 - 20 GFR est AA >60 >60 ml/min/1.73m2 GFR est non-AA 51 (L) >60 ml/min/1.73m2 Comment:  
   (NOTE) Estimated GFR is calculated using the Modification of Diet in Renal  
Disease (MDRD) Study equation, reported for both  Americans [facility] [place]) and non- Americans (GFRNA), and normalized to 1.73m2  
body surface area. The physician must decide which value applies to  
the patient. The MDRD study equation should only be used in  
individuals age 25 or older. It has not been validated for the  
following: pregnant women, patients with serious comorbid conditions,  
or on certain medications, or persons with extremes of body size,  
muscle mass, or nutritional status. Calcium 9.2 8.5 - 10.1 MG/DL  
 
 
RECOMMENDATIONS: 
Continue with current  Medications. Recheck labs in 6 months. Please call me if you have any questions: [phone] Sincerely, [facility] LAB/EKG/XRAY TESTING

## 2017-04-28 ENCOUNTER — HOSPITAL ENCOUNTER (OUTPATIENT)
Dept: RESPIRATORY THERAPY | Age: 57
Discharge: HOME | End: 2017-04-28
Attending: INTERNAL MEDICINE
Payer: COMMERCIAL

## 2017-04-28 DIAGNOSIS — R06.02 SOB (SHORTNESS OF BREATH): ICD-10-CM

## 2017-04-28 PROCEDURE — 94070 EVALUATION OF WHEEZING: CPT

## 2017-04-28 PROCEDURE — 74011250636 HC RX REV CODE- 250/636: Performed by: INTERNAL MEDICINE

## 2017-04-28 RX ADMIN — METHACHOLINE CHLORIDE 10 MG: 100 POWDER, FOR SOLUTION RESPIRATORY (INHALATION) at 10:32

## 2017-04-28 RX ADMIN — METHACHOLINE CHLORIDE 0.03 MG: 100 POWDER, FOR SOLUTION RESPIRATORY (INHALATION) at 10:17

## 2017-04-28 RX ADMIN — METHACHOLINE CHLORIDE 25 MG: 100 POWDER, FOR SOLUTION RESPIRATORY (INHALATION) at 10:36

## 2017-04-28 RX ADMIN — METHACHOLINE CHLORIDE 2.5 MG: 100 POWDER, FOR SOLUTION RESPIRATORY (INHALATION) at 10:27

## 2017-04-28 RX ADMIN — METHACHOLINE CHLORIDE 0.25 MG: 100 POWDER, FOR SOLUTION RESPIRATORY (INHALATION) at 10:23

## 2017-05-02 ENCOUNTER — TELEPHONE (OUTPATIENT)
Dept: CARDIOLOGY CLINIC | Age: 57
End: 2017-05-02

## 2017-05-02 DIAGNOSIS — E78.00 PURE HYPERCHOLESTEROLEMIA: Primary | ICD-10-CM

## 2017-05-02 DIAGNOSIS — I11.9 BENIGN HYPERTENSIVE HEART DISEASE WITHOUT HEART FAILURE: ICD-10-CM

## 2017-05-02 NOTE — PROGRESS NOTES
Per your last office note, \"Her latest lipid profile which was completed on August 52,016 showed total cholesterol 138 with an HDL of 65 and an LDL of 64.8 which I think is very good control of her cholesterol on Pravachol 80 mg daily. \"

## 2017-05-07 NOTE — PROGRESS NOTES
Her HDL at 78 is higher than her LDL at 70.2 so I think this is very good control and similar to 3 months ago. It was somewhat better several months ago, but is still adequate on Pravachol 80 mg daily.  ES

## 2017-05-10 ENCOUNTER — TELEPHONE (OUTPATIENT)
Dept: CARDIOLOGY CLINIC | Age: 57
End: 2017-05-10

## 2017-05-10 DIAGNOSIS — E78.5 HYPERLIPIDEMIA, UNSPECIFIED HYPERLIPIDEMIA TYPE: Primary | ICD-10-CM

## 2017-05-10 DIAGNOSIS — I11.9 BENIGN HYPERTENSIVE HEART DISEASE WITHOUT HEART FAILURE: ICD-10-CM

## 2017-05-10 NOTE — TELEPHONE ENCOUNTER
----- Message from Mustapha Slaughter DO sent at 5/7/2017  2:52 PM EDT -----  Her HDL at 78 is higher than her LDL at 70.2 so I think this is very good control and similar to 3 months ago. It was somewhat better several months ago, but is still adequate on Pravachol 80 mg daily.  ES

## 2017-05-10 NOTE — TELEPHONE ENCOUNTER
Called pt, informed pt of recent lab results. Pt wants to know if Dr. Altaf Andrade could give her an range that she should stay between do to family history of CAD. Pt verbalize understanding.

## 2017-06-01 ENCOUNTER — TELEPHONE (OUTPATIENT)
Dept: PULMONOLOGY | Age: 57
End: 2017-06-01

## 2017-06-01 NOTE — TELEPHONE ENCOUNTER
Pt phone busy ([phone]), unable to leave message. Pt had methacholine challenge 4/12/2017 and sleep study done 2/09/2017. She will need a f/u with Dr. Silvana Martinez.      Valdemar Silverman LPN

## 2017-06-02 ENCOUNTER — OFFICE VISIT (OUTPATIENT)
Dept: CARDIOLOGY CLINIC | Age: 57
End: 2017-06-02

## 2017-06-02 VITALS
DIASTOLIC BLOOD PRESSURE: 80 MMHG | BODY MASS INDEX: 26.87 KG/M2 | HEIGHT: 62 IN | SYSTOLIC BLOOD PRESSURE: 132 MMHG | HEART RATE: 63 BPM | OXYGEN SATURATION: 98 % | WEIGHT: 146 LBS

## 2017-06-02 DIAGNOSIS — I11.9 BENIGN HYPERTENSIVE HEART DISEASE WITHOUT HEART FAILURE: ICD-10-CM

## 2017-06-02 DIAGNOSIS — E78.5 HYPERLIPIDEMIA, UNSPECIFIED HYPERLIPIDEMIA TYPE: ICD-10-CM

## 2017-06-02 DIAGNOSIS — R00.2 PALPITATIONS: ICD-10-CM

## 2017-06-02 DIAGNOSIS — R06.02 SOB (SHORTNESS OF BREATH): Primary | ICD-10-CM

## 2017-06-02 NOTE — MR AVS SNAPSHOT
Visit Information Date & Time Provider Department Dept. Phone Encounter #  
 6/2/2017 12:40 PM Keyla Borjas, [address] [facility] [address] [id number] Follow-up Instructions Return in about 6 months (around 12/2/2017), or if symptoms worsen or fail to improve. Upcoming Health Maintenance Date Due DTaP/Tdap/Td series (1 - Tdap) 4/5/1983 PAP AKA CERVICAL CYTOLOGY 2/4/2016 BREAST CANCER SCRN MAMMOGRAM 11/22/2017 COLONOSCOPY 11/14/2019 Allergies as of 6/2/2017  Review Complete On: 6/2/2017 By: Keyla Borjas DO Severity Noted Reaction Type Reactions Ibuprofen High 11/10/2016    Other (comments) Nsaids (Non-steroidal Anti-inflammatory Drug) High 11/10/2016    Other (comments) Amoxicillin  12/12/2011    Unable to Obtain Lidocaine  01/08/2013    Swelling Oral Solution Lipitor [Atorvastatin]  07/15/2014    Myalgia Lopressor [Metoprolol Tartrate]  02/28/2012    Other (comments) Lightheaded and cp Percocet [Oxycodone-acetaminophen]  12/12/2011    Unable to Obtain Vicodin [Hydrocodone-acetaminophen]  12/12/2011    Unable to Obtain Current Immunizations  Reviewed on 11/13/2013 Name Date  
 TB Skin Test (PPD) Intradermal 11/13/2013 Not reviewed this visit You Were Diagnosed With   
  
 Codes Comments SOB (shortness of breath)    -  Primary ICD-10-CM: R06.02 
ICD-9-CM: 786.05 Benign hypertensive heart disease without heart failure     ICD-10-CM: I11.9 ICD-9-CM: 402.10 Hyperlipidemia, unspecified hyperlipidemia type     ICD-10-CM: E78.5 ICD-9-CM: 272.4 Vitals BP Pulse Height(growth percentile) Weight(growth percentile) SpO2 BMI  
 132/80 63 5' 2\" (1.575 m) 146 lb (66.2 kg) 98% 26.7 kg/m2 OB Status Smoking Status Postmenopausal Former Smoker Vitals History BMI and BSA Data  Body Mass Index Body Surface Area  
 26.7 kg/m 2 1.7 m 2  
  
  
 Preferred Pharmacy Pharmacy Name Phone [facility] [address], [address] [phone] Your Updated Medication List  
  
   
This list is accurate as of: 6/2/17  1:52 PM.  Always use your most recent med list. amLODIPine 5 mg tablet Commonly known as:  Will Ledesma TAKE ONE TABLET BY MOUTH DAILY  
  
 atenolol 25 mg tablet Commonly known as:  TENORMIN Take 1 Tab by mouth daily. CARAFATE 1 gram tablet Generic drug:  sucralfate Take 1 g by mouth daily as needed. CENTRUM SILVER PO Take 1 Tab by mouth daily. co-enzyme Q-10 100 mg capsule Commonly known as:  CO Q-10 Take 100 mg by mouth two (2) times a day. DEXILANT 60 mg Cpdb Generic drug:  Dexlansoprazole Take 1 Cap by mouth every Monday, Wednesday, Friday. ECOTRIN LOW STRENGTH 81 mg tablet Generic drug:  aspirin delayed-release Take 81 mg by mouth daily. FISH OIL 1,000 mg Cap Generic drug:  omega-3 fatty acids-vitamin e Take 2 Caps by mouth daily. flunisolide 25 mcg (0.025 %) Spry Commonly known as:  NASAREL  
2 Sprays by Both Nostrils route two (2) times a day. Indications: ALLERGIC RHINITIS  
  
 GARLIC PO Take 2 Caps by mouth daily. glucose blood VI test strips strip Commonly known as:  blood glucose test  
Use to check glucose daily  
  
 melatonin 3 mg tablet Take 3 mg by mouth nightly. OTHER  
sample Biofreeze cold therapy pain relief  
  
 potassium chloride 20 mEq/15 mL solution Commonly known as:  KAON 10% Patient states she take 1 tablespoon daily  
  
 pravastatin 80 mg tablet Commonly known as:  PRAVACHOL  
TAKE ONE TABLET BY MOUTH EVERY NIGHT AT BEDTIME  
  
 spironolactone 25 mg tablet Commonly known as:  ALDACTONE Take 1 Tab by mouth daily. We Performed the Following AMB POC EKG ROUTINE W/ 12 LEADS, INTER & REP [53805 CPT(R)] Follow-up Instructions Return in about 6 months (around 12/2/2017), or if symptoms worsen or fail to improve. Introducing [facility] [place] [facility]! [facility] introduces Thumbplay patient portal. Now you can access parts of your medical record, email your doctor's office, and request medication refills online. 1. In your internet browser, go to [email or web address] 2. Click on the First Time User? Click Here link in the Sign In box. You will see the New Member Sign Up page. 3. Enter your Thumbplay Access Code exactly as it appears below. You will not need to use this code after youve completed the sign-up process. If you do not sign up before the expiration date, you must request a new code. · Thumbplay Access Code: [email or web address] Expires: 8/31/2017 12:49 PM 
 
4. Enter the last four digits of your Social Security Number (xxxx) and Date of Birth (mm/dd/yyyy) as indicated and click Submit. You will be taken to the next sign-up page. 5. Create a Thumbplay ID. This will be your Thumbplay login ID and cannot be changed, so think of one that is secure and easy to remember. 6. Create a Thumbplay password. You can change your password at any time. 7. Enter your Password Reset Question and Answer. This can be used at a later time if you forget your password. 8. Enter your e-mail address. You will receive e-mail notification when new information is available in [address] 9. Click Sign Up. You can now view and download portions of your medical record. 10. Click the Download Summary menu link to download a portable copy of your medical information. If you have questions, please visit the Frequently Asked Questions section of the Thumbplay website. Remember, Thumbplay is NOT to be used for urgent needs. For medical emergencies, dial 911. Now available from your iPhone and Android! Please provide this summary of care documentation to your next provider. Your primary care clinician is listed as Thais Dick. If you have any questions after today's visit, please call [phone].

## 2017-06-02 NOTE — PROGRESS NOTES
Review of Systems   Constitutional: Negative. Respiratory: Positive for shortness of breath. Negative for cough, hemoptysis, sputum production and wheezing. Cardiovascular: Positive for chest pain, palpitations and claudication. Negative for orthopnea, leg swelling and PND. Gastrointestinal: Positive for abdominal pain and heartburn. Negative for blood in stool, constipation, diarrhea, melena, nausea and vomiting. Musculoskeletal: Negative.

## 2017-06-02 NOTE — PROGRESS NOTES
HPI: I saw Eddi Gomez in my office today in cardiovascular evaluation regarding her problems with palpitations. Ms. OMRAN [facility] is a very pleasant, but anxious 7-year-old -American female with history of non-anginal sounding chest pain and palpitations over the years. She has had Holter's in the past which have shown no significant ectopy except for some rare PAC's, but due to symptoms of palpitations she has been on atenolol 25 mg daily or as needed and Norvasc 5 mg along with Aldactone 25 mg daily for blood pressure. She comes into the office today and continues to complain of palpitations. We actually did an event monitor on her which ran from April 11, 2017 to May 10, 2017 during which she complained of a lot of problems with chest pain or pressure during which time she had predominantly sinus rhythm with a PAC may be half the time. She at times complained of rapid heart beating into her shortness of breath at which time she usually was in a sinus tachycardia although there was one episode of a tachycardia in the 155 range which could not clearly be demonstrated to be sinus and possibility of a supraventricular tachycardia cannot be excluded. Nevertheless it should be noted that most of the episodes she was complaining of chest pain or pressure associated with there is no arrhythmias or maybe one PAC. Should be noted that she is complaining of some shortness of breath but has paucity of findings to suggest any cardiac etiology. Encounter Diagnoses   Name Primary?  Palpitations     SOB (shortness of breath), multifactorial Yes    Hyperlipidemia, unspecified hyperlipidemia type     Hypertensive heart disease         Discussion: This very nice lady is very anxious about her health and has multiple complaints with a paucity of objective findings to warrant any further evaluation or change in her management other than continued to blocker therapy.   She tells me that she is taking her Tenormin 25 mg only a half a tablet a day and usually is taking that at night. I suggested that she switch to taking it in the morning and if she has palpitations occurring at night she could take the Tenormin 25 mg a half a tablet twice daily to see if that would help. Her latest lipid profile which was completed on April 26, 2017 showed total cholesterol of 160 with triglycerides 59, HDL 78, LDL of 70.2, and VLDL of 11.8 which I think is good control on Pravachol 80 mg daily. Her blood pressure appears to be well-controlled and her EKG is completely normal except for one PAC noted today and so I will simply plan to see her again in several months or as needed if any new symptoms surface in the interim.      PCP: Trae Lui MD       Past Medical History:   Diagnosis Date    Cardiac Agatston CAC score, <100 04/30/2014    Coronary calcium score 0.    Cardiac Holter monitoring 01/25/2017    Sinus rhythm, avg HR 73 bpm (range ). Benign Holter study.  Cardiac nuclear imaging test 01/11/2013    No convincing evidence for ischemia or infarction in the setting of significant chest wall artifact. EF 62%. No RWMA. Neg EKG on max EST. Ex time 9 min 50 sec.  Cardiac stress echo, normal 05/05/2016    Normal maximal stress echo w/reproduction of atypical chest discomfort. Ex time 11 min 3 sec. EF 55%.  Cardiovascular LLE venous duplex 09/28/2016    Left leg:  No DVT.     Chest pain     GERD (gastroesophageal reflux disease)     Heel pain, bilateral     Hiatal hernia     denies this    HTN (hypertension)     Hyperlipidemia     Insulin resistance     follows with endocrinology for this    Night sweats     PVC (premature ventricular contraction)     Right low back pain     S/P colonoscopy 2009, 2014    normal per patient    Unsteady gait     due to heel pain         Past Surgical History:   Procedure Laterality Date    D/C SUCTION  2011    HX OTHER SURGICAL  2014    colonscopy    HX OTHER SURGICAL      endoscopy    HX OTHER SURGICAL      wisdom teeth removal x1         Current Outpatient Rx   Name  Route  Sig  Dispense  Refill    OTHER        sample Biofreeze cold therapy pain relief              flunisolide (NASAREL) 25 mcg (0.025 %) spry    Both Nostrils    2 Sprays by Both Nostrils route two (2) times a day. Indications: ALLERGIC RHINITIS    25 mL    1      fexofenadine (ALLEGRA) 180 mg tablet    Oral    Take 1 Tab by mouth daily. 30 Tab    3      spironolactone (ALDACTONE) 25 mg tablet    Oral    Take 1 Tab by mouth daily. 30 Tab    3      amLODIPine (NORVASC) 5 mg tablet    Oral    Take 1 Tab by mouth daily. 30 Tab    6      glucose blood VI test strips (BLOOD GLUCOSE TEST) strip        Use to check glucose daily    1 Package    11      atenolol (TENORMIN) 25 mg tablet    Oral    Take 1 tablet by mouth daily. 90 tablet    3      co-enzyme Q-10 (CO Q-10) 100 mg capsule    Oral    Take 100 mg by mouth two (2) times a day.  pravastatin (PRAVACHOL) 80 mg tablet    Oral    Take 1 tablet by mouth nightly. 90 tablet    3      potassium chloride (KAON 10%) 20 mEq/15 mL solution    Oral    Take 15 mL by mouth two (2) times a day. 1 mL    0       Being followed by Dr. Radames Arriaga 2 Caps by mouth daily.  aspirin delayed-release (ECOTRIN LOW STRENGTH) 81 mg tablet    Oral    Take 81 mg by mouth daily.  omega-3 fatty acids-vitamin e (FISH OIL) 1,000 mg cap    Oral    Take 1 Cap by mouth daily.  melatonin 3 mg tablet    Oral    Take 3 mg by mouth nightly.  Dexlansoprazole (DEXILANT) 60 mg CpDM    Oral    Take 1 capsule by mouth as needed.  sucralfate (CARAFATE) 1 gram tablet    Oral    Take 1 g by mouth daily.  MULTIVITAMIN W-MINERALS/LUTEIN (CENTRUM SILVER PO)    Oral    Take 1 Tab by mouth daily.                    Allergies   Allergen Reactions    Ibuprofen Other (comments)    Nsaids (Non-Steroidal Anti-Inflammatory Drug) Other (comments)    Amoxicillin Unable to Obtain    Lidocaine Swelling     Oral Solution    Lipitor [Atorvastatin] Myalgia           Lopressor [Metoprolol Tartrate] Other (comments)     Lightheaded and cp    Percocet [Oxycodone-Acetaminophen] Unable to Obtain    Vicodin [Hydrocodone-Acetaminophen] Unable to Obtain         Social History :  Social History   Substance Use Topics    Smoking status: Former Smoker     Packs/day: 0.50     Years: 11.00     Types: Cigarettes     Quit date: 12/31/2006    Smokeless tobacco: Former User     Quit date: 1/10/1993      Comment: 6-7 cigs per day    Alcohol use No        Family History: family history includes Asthma in her father; Heart Attack in her maternal uncle, maternal uncle, and mother; Heart Disease in her brother; Heart defect in her mother; Hypertension in her brother and mother. Review of Systems:  Constitutional: Negative. Respiratory: Positive for shortness of breath. Negative for cough, hemoptysis, sputum production and wheezing. Cardiovascular: Positive for chest pain, palpitations and claudication. Negative for orthopnea, leg swelling and PND. Gastrointestinal: Positive for abdominal pain and heartburn. Negative for blood in stool, constipation, diarrhea, melena, nausea and vomiting. Musculoskeletal: Negative. Physical Exam:    The patient is a cooperative, alert, well developed, well nourished 56 y.o.  female who is in no acute distress at the time of the examination. Visit Vitals    /80    Pulse 63    Ht 5' 2\" (1.575 m)    Wt 66.2 kg (146 lb)    SpO2 98%    BMI 26.7 kg/m2     HEENT: Conjuctiva white, mucosa moist, no pallor or cyanosis. NECK: Supple without masses, tenderness or thyromegaly. There was no jugular venous distention. Carotid are full bilaterally without bruits. CHEST: Symmetrical with good excursion.   LUNGS: Clear to auscultation in all fields. HEART: The apex is not displaced. There were no lifts, thrills or heaves. There is a normal S1 and S2 without appreciable murmurs rubs clicks or gallops auscultated. ABDOMEN: Soft without masses or tenderness. The liver is palpated 2-3 finger breadths below the right costal margin. EXTREMITIES: Full peripheral pulses without peripheral edema. Review of Data: See PMH and Cardiology and Imaging sections for cardiac testing  Lab Results   Component Value Date/Time    Cholesterol, total 160 04/26/2017 08:20 AM    HDL Cholesterol 78 04/26/2017 08:20 AM    LDL, calculated 70.2 04/26/2017 08:20 AM    Triglyceride 59 04/26/2017 08:20 AM    CHOL/HDL Ratio 2.1 04/26/2017 08:20 AM         Results for orders placed or performed in visit on 06/02/17   AMB POC EKG ROUTINE W/ 12 LEADS, INTER & REP     Status: None    Narrative    Normal sinus rhythm rate 63 with one PAC on the tracing. This tracing is otherwise within normal limit and similar to the EKG of January 20, 2017 except at the [facility] is new. Luda Toney D.O., F.A.C.C. [facility]  [facility]  [address] [facility]. [address]    PLEASE NOTE:  This document has been produced using voice recognition software. Unrecognized errors in transcription may be present.

## 2017-06-02 NOTE — PROGRESS NOTES
1. Have you been to the ER, urgent care clinic since your last visit? Hospitalized since your last visit? No     2. Have you seen or consulted any other health care providers outside of the [address] [place] [address] since your last visit? Include any pap smears or colon screening.  No

## 2017-06-09 RX ORDER — ATENOLOL 25 MG/1
12.5 TABLET ORAL DAILY
Qty: 30 TAB | Refills: 3 | Status: SHIPPED | OUTPATIENT
Start: 2017-06-09 | End: 2018-01-23 | Stop reason: SDUPTHER

## 2017-06-27 ENCOUNTER — TELEPHONE (OUTPATIENT)
Dept: INTERNAL MEDICINE CLINIC | Age: 57
End: 2017-06-27

## 2017-06-28 RX ORDER — AMLODIPINE BESYLATE 2.5 MG/1
2.5 TABLET ORAL DAILY
Qty: 30 TAB | Refills: 6 | Status: SHIPPED | OUTPATIENT
Start: 2017-06-28 | End: 2018-02-26 | Stop reason: SDUPTHER

## 2017-08-02 ENCOUNTER — OFFICE VISIT (OUTPATIENT)
Dept: INTERNAL MEDICINE CLINIC | Age: 57
End: 2017-08-02

## 2017-08-02 VITALS
RESPIRATION RATE: 16 BRPM | BODY MASS INDEX: 27.2 KG/M2 | WEIGHT: 147.8 LBS | TEMPERATURE: 98 F | SYSTOLIC BLOOD PRESSURE: 113 MMHG | OXYGEN SATURATION: 98 % | DIASTOLIC BLOOD PRESSURE: 76 MMHG | HEART RATE: 60 BPM | HEIGHT: 62 IN

## 2017-08-02 DIAGNOSIS — R23.3 EASY BRUISING: Primary | ICD-10-CM

## 2017-08-02 NOTE — MR AVS SNAPSHOT
Visit Information Date & Time Provider Department Dept. Phone Encounter #  
 8/2/2017  3:45 PM Josephine Bateman MD [facility] [address] [id number] Upcoming Health Maintenance Date Due DTaP/Tdap/Td series (1 - Tdap) 4/5/1983 PAP AKA CERVICAL CYTOLOGY 2/4/2016 BREAST CANCER SCRN MAMMOGRAM 11/22/2017 COLONOSCOPY 11/14/2019 Allergies as of 8/2/2017  Review Complete On: 8/2/2017 By: Dell Knowles Severity Noted Reaction Type Reactions Ibuprofen High 11/10/2016    Other (comments) Nsaids (Non-steroidal Anti-inflammatory Drug) High 11/10/2016    Other (comments) Amoxicillin  12/12/2011    Unable to Obtain Lidocaine  01/08/2013    Swelling Oral Solution Lipitor [Atorvastatin]  07/15/2014    Myalgia Lopressor [Metoprolol Tartrate]  02/28/2012    Other (comments) Lightheaded and cp Percocet [Oxycodone-acetaminophen]  12/12/2011    Unable to Obtain Vicodin [Hydrocodone-acetaminophen]  12/12/2011    Unable to Obtain Current Immunizations  Reviewed on 11/13/2013 Name Date  
 TB Skin Test (PPD) Intradermal 11/13/2013 Not reviewed this visit You Were Diagnosed With   
  
 Codes Comments Easy bruising    -  Primary ICD-10-CM: R23.8 ICD-9-CM: 714. 9 Vitals BP Pulse Temp Resp Height(growth percentile) Weight(growth percentile) 113/76 (BP 1 Location: Right arm, BP Patient Position: Sitting) 60 98 °F (36.7 °C) (Oral) 16 5' 2\" (1.575 m) 147 lb 12.8 oz (67 kg) SpO2 BMI OB Status Smoking Status 98% 27.03 kg/m2 Postmenopausal Former Smoker Vitals History BMI and BSA Data Body Mass Index Body Surface Area  
 27.03 kg/m 2 1.71 m 2 Preferred Pharmacy Pharmacy Name Phone [facility] [address], [address] [place] [address] Road [phone] Your Updated Medication List  
  
   
This list is accurate as of: 8/2/17  4:27 PM.  Always use your most recent med list. amLODIPine 2.5 mg tablet Commonly known as:  Erenest Oliver Take 1 Tab by mouth daily. atenolol 25 mg tablet Commonly known as:  TENORMIN Take 0.5 Tabs by mouth daily. CARAFATE 1 gram tablet Generic drug:  sucralfate Take 1 g by mouth daily as needed. CENTRUM SILVER PO Take 1 Tab by mouth daily. co-enzyme Q-10 100 mg capsule Commonly known as:  CO Q-10 Take 100 mg by mouth two (2) times a day. DEXILANT 60 mg Cpdb Generic drug:  Dexlansoprazole Take 1 Cap by mouth every Monday, Wednesday, Friday. ECOTRIN LOW STRENGTH 81 mg tablet Generic drug:  aspirin delayed-release Take 81 mg by mouth daily. FISH OIL 1,000 mg Cap Generic drug:  omega-3 fatty acids-vitamin e Take 2 Caps by mouth daily. flunisolide 25 mcg (0.025 %) Spry Commonly known as:  NASAREL  
2 Sprays by Both Nostrils route two (2) times a day. Indications: ALLERGIC RHINITIS  
  
 GARLIC PO Take 2 Caps by mouth daily. glucose blood VI test strips strip Commonly known as:  blood glucose test  
Use to check glucose daily  
  
 melatonin 3 mg tablet Take 3 mg by mouth nightly. OTHER  
sample Biofreeze cold therapy pain relief  
  
 potassium chloride 20 mEq/15 mL solution Commonly known as:  KAON 10% Patient states she take 1 tablespoon daily  
  
 pravastatin 80 mg tablet Commonly known as:  PRAVACHOL  
TAKE ONE TABLET BY MOUTH EVERY NIGHT AT BEDTIME  
  
 spironolactone 25 mg tablet Commonly known as:  ALDACTONE Take 1 Tab by mouth daily. To-Do List   
 08/02/2017 Lab:  CBC WITH AUTOMATED DIFF Patient Instructions Body Mass Index: Care Instructions Your Care Instructions Body mass index (BMI) can help you see if your weight is raising your risk for health problems. It uses a formula to compare how much you weigh with how tall you are. · A BMI lower than 18.5 is considered underweight. · A BMI between 18.5 and 24.9 is considered healthy. · A BMI between 25 and 29.9 is considered overweight. A BMI of 30 or higher is considered obese. If your BMI is in the normal range, it means that you have a lower risk for weight-related health problems. If your BMI is in the overweight or obese range, you may be at increased risk for weight-related health problems, such as high blood pressure, heart disease, stroke, arthritis or joint pain, and diabetes. If your BMI is in the underweight range, you may be at increased risk for health problems such as fatigue, lower protection (immunity) against illness, muscle loss, bone loss, hair loss, and hormone problems. BMI is just one measure of your risk for weight-related health problems. You may be at higher risk for health problems if you are not active, you eat an unhealthy diet, or you drink too much alcohol or use tobacco products. Follow-up care is a key part of your treatment and safety. Be sure to make and go to all appointments, and call your doctor if you are having problems. It's also a good idea to know your test results and keep a list of the medicines you take. How can you care for yourself at home? · Practice healthy eating habits. This includes eating plenty of fruits, vegetables, whole grains, lean protein, and low-fat dairy. · If your doctor recommends it, get more exercise. Walking is a good choice. Bit by bit, increase the amount you walk every day. Try for at least 30 minutes on most days of the week. · Do not smoke. Smoking can increase your risk for health problems. If you need help quitting, talk to your doctor about stop-smoking programs and medicines. These can increase your chances of quitting for good. · Limit alcohol to 2 drinks a day for men and 1 drink a day for women. Too much alcohol can cause health problems. If you have a BMI higher than 25 · Your doctor may do other tests to check your risk for weight-related health problems. This may include measuring the distance around your waist. A waist measurement of more than 40 inches in men or 35 inches in women can increase the risk of weight-related health problems. · Talk with your doctor about steps you can take to stay healthy or improve your health. You may need to make lifestyle changes to lose weight and stay healthy, such as changing your diet and getting regular exercise. If you have a BMI lower than 18.5 · Your doctor may do other tests to check your risk for health problems. · Talk with your doctor about steps you can take to stay healthy or improve your health. You may need to make lifestyle changes to gain or maintain weight and stay healthy, such as getting more healthy foods in your diet and doing exercises to build muscle. Where can you learn more? Go to http://diana-pam.info/. Enter S176 in the search box to learn more about \"Body Mass Index: Care Instructions. \" Current as of: January 23, 2017 Content Version: 11.3 © 8114-5200 [employer]. Care instructions adapted under license by [employer] (which disclaims liability or warranty for this information). If you have questions about a medical condition or this instruction, always ask your healthcare professional. [place] [address] any warranty or liability for your use of this information. Introducing [facility]! Mela Cote introduces [employer]ria patient portal. Now you can access parts of your medical record, email your doctor's office, and request medication refills online. 1. In your internet browser, go to [email or web address] 2. Click on the First Time User? Click Here link in the Sign In box. You will see the New Member Sign Up page. 3. Enter your [employer] Access Code exactly as it appears below. You will not need to use this code after youve completed the sign-up process.  If you do not sign up before the expiration date, you must request a new code. · A[employer]on Access Code: [email or web address] Expires: 8/31/2017 12:49 PM 
 
4. Enter the last four digits of your Social Security Number (xxxx) and Date of Birth (mm/dd/yyyy) as indicated and click Submit. You will be taken to the next sign-up page. 5. Create a Aethon ID. This will be your Aethon login ID and cannot be changed, so think of one that is secure and easy to remember. 6. Create a Aethon password. You can change your password at any time. 7. Enter your Password Reset Question and Answer. This can be used at a later time if you forget your password. 8. Enter your e-mail address. You will receive e-mail notification when new information is available in [address]. 9. Click Sign Up. You can now view and download portions of your medical record. 10. Click the Download Summary menu link to download a portable copy of your medical information. If you have questions, please visit the Frequently Asked Questions section of the [employer] website. Remember, [employer] is NOT to be used for urgent needs. For medical emergencies, dial 911. Now available from your iPhone and Android! Please provide this summary of care documentation to your next provider. Your primary care clinician is listed as Jocelyn Corral. If you have any questions after today's visit, please call [phone].

## 2017-08-02 NOTE — PROGRESS NOTES
INTERNISTS OF [facility]:  8/2/2017, MRN: [id number]      Saeid Cabrera is a 56 y.o. female and presents to clinic for Mass (patient stated she is here for a follow up for a lump on the left side and bruising on bilateral legs and right thigh)    Subjective:   Pt is a 51yo AAF with h/o chronic bronchitis, LUCY, porphyria (per EHR), HTN, HLD, constipation, gallbladder polyps (suggested by CT scan 10/13/16), and GERD. 1. Chest Wall Mass: Present x several yrs. She has previous imaging that does not show any suspicious findings along the affected area along her left rib margin. She was sent to the General Surgery team for a second opinion after no suspicious findings along the affected area were detectable on physical exam.Per their exam, the pt does not have any significant abnormality along the affected area and no additional evaluation is warranted. 2. Increased Bruising: No h/o trauma. No gingival bleeding. No hematuria. She has a h/o rectal bleeding but had a thorough evaluation per the GI team including colonoscopy - that was unremarkable for significant pathology. She has no changes in her symptoms. She has 2 regions along her right thigh and shin bone. She had a bruise along her left lower extremity that has healed. The bruises along her right lower extremity has been present for at least 2 weeks. She has no epistaxis. Patient Active Problem List    Diagnosis Date Noted    Gallbladder polyp - suggested by findings on CT scan from 10/13/16 03/01/2017    Simple chronic bronchitis 12/22/2016    Porphyria ([facility] [address] 08/24/2015    Hypersomnia with sleep apnea 03/13/2014    Hypertensive heart disease  03/05/2012    Constipation 01/16/2012    Memory difficulties 01/16/2012    Hyperlipidemia     GERD (gastroesophageal reflux disease)        Current Outpatient Prescriptions   Medication Sig Dispense Refill    amLODIPine (NORVASC) 2.5 mg tablet Take 1 Tab by mouth daily.  30 Tab 6    atenolol (TENORMIN) 25 mg tablet Take 0.5 Tabs by mouth daily. 30 Tab 3    pravastatin (PRAVACHOL) 80 mg tablet TAKE ONE TABLET BY MOUTH EVERY NIGHT AT BEDTIME 30 Tab 5    potassium chloride (KAON 10%) 20 mEq/15 mL solution Patient states she take 1 tablespoon daily      spironolactone (ALDACTONE) 25 mg tablet Take 1 Tab by mouth daily. 30 Tab 3    OTHER sample Biofreeze cold therapy pain relief      flunisolide (NASAREL) 25 mcg (0.025 %) spry 2 Sprays by Both Nostrils route two (2) times a day. Indications: ALLERGIC RHINITIS 25 mL 1    co-enzyme Q-10 (CO Q-10) 100 mg capsule Take 100 mg by mouth two (2) times a day.  GARLIC PO Take 2 Caps by mouth daily.  aspirin delayed-release (ECOTRIN LOW STRENGTH) 81 mg tablet Take 81 mg by mouth daily.  omega-3 fatty acids-vitamin e (FISH OIL) 1,000 mg cap Take 2 Caps by mouth daily.  melatonin 3 mg tablet Take 3 mg by mouth nightly.  Dexlansoprazole (DEXILANT) 60 mg CpDM Take 1 Cap by mouth every Monday, Wednesday, Friday.  sucralfate (CARAFATE) 1 gram tablet Take 1 g by mouth daily as needed.  MULTIVITAMIN W-MINERALS/LUTEIN (CENTRUM SILVER PO) Take 1 Tab by mouth daily.  glucose blood VI test strips (BLOOD GLUCOSE TEST) strip Use to check glucose daily 1 Package 11       Allergies   Allergen Reactions    Ibuprofen Other (comments)    Nsaids (Non-Steroidal Anti-Inflammatory Drug) Other (comments)    Amoxicillin Unable to Obtain    Lidocaine Swelling     Oral Solution    Lipitor [Atorvastatin] Myalgia           Lopressor [Metoprolol Tartrate] Other (comments)     Lightheaded and cp    Percocet [Oxycodone-Acetaminophen] Unable to Obtain    Vicodin [Hydrocodone-Acetaminophen] Unable to Obtain       Past Medical History:   Diagnosis Date    Cardiac Agatston CAC score, <100 04/30/2014    Coronary calcium score 0.    Cardiac Holter monitoring 01/25/2017    Sinus rhythm, avg HR 73 bpm (range ). Benign Holter study.     Cardiac nuclear imaging test 01/11/2013    No convincing evidence for ischemia or infarction in the setting of significant chest wall artifact. EF 62%. No RWMA. Neg EKG on max EST. Ex time 9 min 50 sec.  Cardiac stress echo, normal 05/05/2016    Normal maximal stress echo w/reproduction of atypical chest discomfort. Ex time 11 min 3 sec. EF 55%.  Cardiovascular LLE venous duplex 09/28/2016    Left leg:  No DVT.  Chest pain     GERD (gastroesophageal reflux disease)     Heel pain, bilateral     Hiatal hernia     denies this    HTN (hypertension)     Hyperlipidemia     Insulin resistance     follows with endocrinology for this    Night sweats     PVC (premature ventricular contraction)     Right low back pain     S/P colonoscopy 2009, 2014    normal per patient    Unsteady gait     due to heel pain       Past Surgical History:   Procedure Laterality Date    D/C SUCTION  2011    HX OTHER SURGICAL  2014    colonscopy    HX OTHER SURGICAL      endoscopy    HX OTHER SURGICAL      wisdom teeth removal x1       Family History   Problem Relation Age of Onset    Hypertension Mother     Heart defect Mother      anuerysm    Heart Attack Mother     Asthma Father     Heart Attack Maternal Uncle     Heart Attack Maternal Uncle     Heart Disease Brother     Hypertension Brother        Social History   Substance Use Topics    Smoking status: Former Smoker     Packs/day: 0.50     Years: 11.00     Types: Cigarettes     Quit date: 12/31/2006    Smokeless tobacco: Former User     Quit date: 1/10/1993      Comment: 6-7 cigs per day    Alcohol use No       ROS   Review of Systems   Constitutional: Negative for chills and fever. HENT: Negative for ear pain and sore throat. Eyes: Negative for blurred vision and pain. Respiratory: Negative for shortness of breath. Cardiovascular: Negative for chest pain. Gastrointestinal: Negative for abdominal pain and melena.    Genitourinary: Negative for dysuria and hematuria. Musculoskeletal: Negative for joint pain and myalgias. Skin:        +Ecchymosis - see HPI   Neurological: Negative for tingling, focal weakness and headaches. Endo/Heme/Allergies: Does not bruise/bleed easily. Psychiatric/Behavioral: Negative for substance abuse. Objective     Vitals:    08/02/17 1547   BP: 113/76   Pulse: 60   Resp: 16   Temp: 98 °F (36.7 °C)   TempSrc: Oral   SpO2: 98%   Weight: 147 lb 12.8 oz (67 kg)   Height: 5' 2\" (1.575 m)   PainSc:   0 - No pain       Physical Exam   Constitutional: She is oriented to person, place, and time and well-developed, well-nourished, and in no distress. HENT:   Head: Normocephalic and atraumatic. Right Ear: External ear normal.   Left Ear: External ear normal.   Nose: Nose normal.   Mouth/Throat: No oropharyngeal exudate. Eyes: Conjunctivae and EOM are normal. Right eye exhibits no discharge. Left eye exhibits no discharge. No scleral icterus. Neck: Neck supple. Cardiovascular: Normal rate, regular rhythm, normal heart sounds and intact distal pulses. Exam reveals no gallop and no friction rub. No murmur heard. Pulmonary/Chest: Effort normal and breath sounds normal. No respiratory distress. She has no wheezes. She has no rales. Abdominal: Soft. Bowel sounds are normal. She exhibits no distension. There is no tenderness. There is no rebound and no guarding. Musculoskeletal: She exhibits no edema or tenderness (BUE are NTTP). She has a quarter size ecchymosis along her right anterolateral tibia area, TTP. She has a NTTP ecchymosis along her right anterior thigh area. Lymphadenopathy:     She has no cervical adenopathy. Neurological: She is alert and oriented to person, place, and time. She exhibits normal muscle tone. Gait normal.   Skin: Skin is warm and dry. No erythema. Psychiatric: Affect normal.   Nursing note and vitals reviewed.       LABS   Data Review:   Lab Results   Component Value Date/Time WBC 5.8 08/15/2016 04:43 PM    Hemoglobin, POC 12.2 01/09/2013 08:19 PM    HGB 13.9 08/15/2016 04:43 PM    Hematocrit, POC 36 01/09/2013 08:19 PM    HCT 41.9 08/15/2016 04:43 PM    PLATELET 780 50/14/3726 04:43 PM    MCV 88.2 08/15/2016 04:43 PM       Lab Results   Component Value Date/Time    Sodium 140 04/26/2017 08:20 AM    Potassium 4.1 04/26/2017 08:20 AM    Chloride 106 04/26/2017 08:20 AM    CO2 29 04/26/2017 08:20 AM    Anion gap 5 04/26/2017 08:20 AM    Glucose 89 04/26/2017 08:20 AM    BUN 21 04/26/2017 08:20 AM    Creatinine 1.12 04/26/2017 08:20 AM    BUN/Creatinine ratio 19 04/26/2017 08:20 AM    GFR est AA >60 04/26/2017 08:20 AM    GFR est non-AA 51 04/26/2017 08:20 AM    Calcium 9.2 04/26/2017 08:20 AM       Lab Results   Component Value Date/Time    Cholesterol, total 160 04/26/2017 08:20 AM    HDL Cholesterol 78 04/26/2017 08:20 AM    LDL, calculated 70.2 04/26/2017 08:20 AM    VLDL, calculated 11.8 04/26/2017 08:20 AM    Triglyceride 59 04/26/2017 08:20 AM    CHOL/HDL Ratio 2.1 04/26/2017 08:20 AM       Lab Results   Component Value Date/Time    Hemoglobin A1c (POC) 5.6 09/19/2013 02:30 PM    Hemoglobin A1c, External 5.8 10/08/2015       Assessment/Plan:   1. Easy bruising:  - Checking a CBC. Observation. ORDERS:  - CBC WITH AUTOMATED DIFF; Future    2. Health maintenance:   - Will need to inquire about her last PAP smear at her f/u apt. Health Maintenance Due   Topic Date Due    DTaP/Tdap/Td series (1 - Tdap) 04/05/1983    PAP AKA CERVICAL CYTOLOGY  02/04/2016    BREAST CANCER SCRN MAMMOGRAM  11/22/2017     Lab review: labs are reviewed in the EHR    I have discussed the diagnosis with the patient and the intended plan as seen in the above orders. The patient has received an after-visit summary and questions were answered concerning future plans. I have discussed medication side effects and warnings with the patient as well.  I have reviewed the plan of care with the patient, accepted their input and they are in agreement with the treatment goals. All questions were answered. The patient understands the plan of care. Handouts provided today with above information. Pt instructed if symptoms worsen to call the office or report to the ED for continued care. Greater than 50% of the visit time was spent in counseling and/or coordination of care.       Follow-up Disposition: Not on File    Gregorio Carrizales MD

## 2017-08-02 NOTE — PROGRESS NOTES
1. Have you been to the ER, urgent care clinic or hospitalized since your last visit? NO.     2. Have you seen or consulted any other health care providers outside of the [address] [place] [address] since your last visit (Include any pap smears or colon screening)? NO      Do you have an Advanced Directive? NO    Would you like information on Advanced Directives? NO    Patient stated she already has information.        Health Maintenance Due   Topic Date Due    DTaP/Tdap/Td series (1 - Tdap) 04/05/1983    PAP AKA CERVICAL CYTOLOGY  02/04/2016    BREAST CANCER SCRN MAMMOGRAM  11/22/2017

## 2017-08-02 NOTE — PATIENT INSTRUCTIONS
Body Mass Index: Care Instructions  Your Care Instructions    Body mass index (BMI) can help you see if your weight is raising your risk for health problems. It uses a formula to compare how much you weigh with how tall you are. · A BMI lower than 18.5 is considered underweight. · A BMI between 18.5 and 24.9 is considered healthy. · A BMI between 25 and 29.9 is considered overweight. A BMI of 30 or higher is considered obese. If your BMI is in the normal range, it means that you have a lower risk for weight-related health problems. If your BMI is in the overweight or obese range, you may be at increased risk for weight-related health problems, such as high blood pressure, heart disease, stroke, arthritis or joint pain, and diabetes. If your BMI is in the underweight range, you may be at increased risk for health problems such as fatigue, lower protection (immunity) against illness, muscle loss, bone loss, hair loss, and hormone problems. BMI is just one measure of your risk for weight-related health problems. You may be at higher risk for health problems if you are not active, you eat an unhealthy diet, or you drink too much alcohol or use tobacco products. Follow-up care is a key part of your treatment and safety. Be sure to make and go to all appointments, and call your doctor if you are having problems. It's also a good idea to know your test results and keep a list of the medicines you take. How can you care for yourself at home? · Practice healthy eating habits. This includes eating plenty of fruits, vegetables, whole grains, lean protein, and low-fat dairy. · If your doctor recommends it, get more exercise. Walking is a good choice. Bit by bit, increase the amount you walk every day. Try for at least 30 minutes on most days of the week. · Do not smoke. Smoking can increase your risk for health problems. If you need help quitting, talk to your doctor about stop-smoking programs and medicines. These can increase your chances of quitting for good. · Limit alcohol to 2 drinks a day for men and 1 drink a day for women. Too much alcohol can cause health problems. If you have a BMI higher than 25  · Your doctor may do other tests to check your risk for weight-related health problems. This may include measuring the distance around your waist. A waist measurement of more than 40 inches in men or 35 inches in women can increase the risk of weight-related health problems. · Talk with your doctor about steps you can take to stay healthy or improve your health. You may need to make lifestyle changes to lose weight and stay healthy, such as changing your diet and getting regular exercise. If you have a BMI lower than 18.5  · Your doctor may do other tests to check your risk for health problems. · Talk with your doctor about steps you can take to stay healthy or improve your health. You may need to make lifestyle changes to gain or maintain weight and stay healthy, such as getting more healthy foods in your diet and doing exercises to build muscle. Where can you learn more? Go to http://diana-pam.info/. Enter S176 in the search box to learn more about \"Body Mass Index: Care Instructions. \"  Current as of: January 23, 2017  Content Version: 11.3  © 7263-0668 [employer]. Care instructions adapted under license by [employer] (which disclaims liability or warranty for this information). If you have questions about a medical condition or this instruction, always ask your healthcare professional. [place] [address] any warranty or liability for your use of this information.

## 2017-08-31 ENCOUNTER — TELEPHONE (OUTPATIENT)
Dept: INTERNAL MEDICINE CLINIC | Age: 57
End: 2017-08-31

## 2017-08-31 ENCOUNTER — HOSPITAL ENCOUNTER (OUTPATIENT)
Dept: LAB | Age: 57
Discharge: HOME | End: 2017-08-31
Payer: COMMERCIAL

## 2017-08-31 DIAGNOSIS — R31.29 MICROSCOPIC HEMATURIA: Primary | ICD-10-CM

## 2017-08-31 LAB
APPEARANCE UR: CLEAR
BASOPHILS # BLD: 0 K/UL (ref 0–0.1)
BASOPHILS NFR BLD: 0 % (ref 0–2)
BILIRUB UR QL: NEGATIVE
COLOR UR: YELLOW
DIFFERENTIAL METHOD BLD: NORMAL
EOSINOPHIL # BLD: 0.1 K/UL (ref 0–0.4)
EOSINOPHIL NFR BLD: 2 % (ref 0–5)
ERYTHROCYTE [DISTWIDTH] IN BLOOD BY AUTOMATED COUNT: 13.8 % (ref 11.6–14.5)
GLUCOSE UR STRIP.AUTO-MCNC: NEGATIVE MG/DL
HCT VFR BLD AUTO: 40.5 % (ref 35–45)
HGB BLD-MCNC: 13.8 G/DL (ref 12–16)
HGB UR QL STRIP: NEGATIVE
KETONES UR QL STRIP.AUTO: NEGATIVE MG/DL
LEUKOCYTE ESTERASE UR QL STRIP.AUTO: NEGATIVE
LYMPHOCYTES # BLD: 1.3 K/UL (ref 0.9–3.6)
LYMPHOCYTES NFR BLD: 28 % (ref 21–52)
MCH RBC QN AUTO: 30.1 PG (ref 24–34)
MCHC RBC AUTO-ENTMCNC: 34.1 G/DL (ref 31–37)
MCV RBC AUTO: 88.2 FL (ref 74–97)
MONOCYTES # BLD: 0.2 K/UL (ref 0.05–1.2)
MONOCYTES NFR BLD: 5 % (ref 3–10)
NEUTS SEG # BLD: 3.1 K/UL (ref 1.8–8)
NEUTS SEG NFR BLD: 65 % (ref 40–73)
NITRITE UR QL STRIP.AUTO: NEGATIVE
PH UR STRIP: 6.5 [PH] (ref 5–8)
PLATELET # BLD AUTO: 256 K/UL (ref 135–420)
PMV BLD AUTO: 11.2 FL (ref 9.2–11.8)
PROT UR STRIP-MCNC: NEGATIVE MG/DL
RBC # BLD AUTO: 4.59 M/UL (ref 4.2–5.3)
SP GR UR REFRACTOMETRY: 1.01 (ref 1–1.03)
UROBILINOGEN UR QL STRIP.AUTO: 0.2 EU/DL (ref 0.2–1)
WBC # BLD AUTO: 4.8 K/UL (ref 4.6–13.2)

## 2017-08-31 PROCEDURE — 81003 URINALYSIS AUTO W/O SCOPE: CPT | Performed by: INTERNAL MEDICINE

## 2017-08-31 PROCEDURE — 85025 COMPLETE CBC W/AUTO DIFF WBC: CPT | Performed by: INTERNAL MEDICINE

## 2017-08-31 PROCEDURE — 36415 COLL VENOUS BLD VENIPUNCTURE: CPT | Performed by: INTERNAL MEDICINE

## 2017-08-31 NOTE — TELEPHONE ENCOUNTER
Please let the pt know that I ordered a UA.     Dr. Radha Strong  [facility] of [facility], [address] [facility], [facility] [address]  Phone: [phone]  Fax: [phone]

## 2017-08-31 NOTE — TELEPHONE ENCOUNTER
Incoming page from pt #[phone]. Pt reports urine may have had a reddish tint x 1 around time of call. No kenneth blood. Trying to drink fluids to urinate again. No other sxs, specifically no other signs suggesting UTI, urinary stones, etc. No fevers or other constitutional sxs. No other complaints. Pt will call office later this morning. Will leave further planning/eval to Dr. Soraya Damico (labs, etc.) Pt understands and happily agrees with plan.

## 2017-08-31 NOTE — TELEPHONE ENCOUNTER
Pt calling again to see if Dr. Dario Parkinson has ordered a ua. Says she is on her way now to lab to get labs done.  Please enter the urine test.

## 2017-09-01 ENCOUNTER — HOSPITAL ENCOUNTER (OUTPATIENT)
Dept: LAB | Age: 57
Discharge: HOME | End: 2017-09-01
Payer: COMMERCIAL

## 2017-09-01 ENCOUNTER — TELEPHONE (OUTPATIENT)
Dept: INTERNAL MEDICINE CLINIC | Age: 57
End: 2017-09-01

## 2017-09-01 LAB
HCT VFR BLD AUTO: 41.5 % (ref 35–45)
HGB BLD-MCNC: 14 G/DL (ref 12–16)

## 2017-09-01 PROCEDURE — 85018 HEMOGLOBIN: CPT | Performed by: INTERNAL MEDICINE

## 2017-09-01 PROCEDURE — 36415 COLL VENOUS BLD VENIPUNCTURE: CPT | Performed by: INTERNAL MEDICINE

## 2017-09-01 NOTE — PROGRESS NOTES
Please let the pt know that her urinalysis is normal. Her CBC is normal. No additional evaluation is warranted at this time.     Dr. Ray Manzano  [facility] [place] [address] [address]  Phone: [phone]  Fax: [phone]

## 2017-09-02 ENCOUNTER — HOSPITAL ENCOUNTER (EMERGENCY)
Age: 57
Discharge: HOME | End: 2017-09-03
Attending: EMERGENCY MEDICINE | Admitting: EMERGENCY MEDICINE
Payer: COMMERCIAL

## 2017-09-02 ENCOUNTER — HOSPITAL ENCOUNTER (OUTPATIENT)
Dept: LAB | Age: 57
Discharge: HOME | End: 2017-09-02
Payer: COMMERCIAL

## 2017-09-02 VITALS
RESPIRATION RATE: 18 BRPM | SYSTOLIC BLOOD PRESSURE: 121 MMHG | DIASTOLIC BLOOD PRESSURE: 89 MMHG | HEART RATE: 66 BPM | BODY MASS INDEX: 26.87 KG/M2 | TEMPERATURE: 97.9 F | OXYGEN SATURATION: 99 % | HEIGHT: 62 IN | WEIGHT: 146 LBS

## 2017-09-02 DIAGNOSIS — R10.84 ABDOMINAL PAIN, CHRONIC, GENERALIZED: ICD-10-CM

## 2017-09-02 DIAGNOSIS — E86.0 DEHYDRATION: Primary | ICD-10-CM

## 2017-09-02 DIAGNOSIS — G89.29 ABDOMINAL PAIN, CHRONIC, GENERALIZED: ICD-10-CM

## 2017-09-02 LAB
ALBUMIN SERPL-MCNC: 3.9 G/DL (ref 3.4–5)
ALBUMIN/GLOB SERPL: 1.1 {RATIO} (ref 0.8–1.7)
ALP SERPL-CCNC: 40 U/L (ref 45–117)
ALT SERPL-CCNC: 23 U/L (ref 13–56)
ANION GAP SERPL CALC-SCNC: 6 MMOL/L (ref 3–18)
AST SERPL-CCNC: 23 U/L (ref 15–37)
BASOPHILS # BLD: 0 K/UL (ref 0–0.06)
BASOPHILS NFR BLD: 1 % (ref 0–2)
BILIRUB SERPL-MCNC: 0.4 MG/DL (ref 0.2–1)
BUN SERPL-MCNC: 20 MG/DL (ref 7–18)
BUN/CREAT SERPL: 15 (ref 12–20)
CALCIUM SERPL-MCNC: 9.5 MG/DL (ref 8.5–10.1)
CHLORIDE SERPL-SCNC: 107 MMOL/L (ref 100–108)
CO2 SERPL-SCNC: 30 MMOL/L (ref 21–32)
CREAT SERPL-MCNC: 1.31 MG/DL (ref 0.6–1.3)
DIFFERENTIAL METHOD BLD: NORMAL
EOSINOPHIL # BLD: 0.1 K/UL (ref 0–0.4)
EOSINOPHIL NFR BLD: 2 % (ref 0–5)
ERYTHROCYTE [DISTWIDTH] IN BLOOD BY AUTOMATED COUNT: 13.8 % (ref 11.6–14.5)
GLOBULIN SER CALC-MCNC: 3.6 G/DL (ref 2–4)
GLUCOSE SERPL-MCNC: 110 MG/DL (ref 74–99)
HCT VFR BLD AUTO: 42.2 % (ref 35–45)
HEMOCCULT STL QL: NEGATIVE
HGB BLD-MCNC: 13.8 G/DL (ref 12–16)
LIPASE SERPL-CCNC: 260 U/L (ref 73–393)
LYMPHOCYTES # BLD: 1.6 K/UL (ref 0.9–3.6)
LYMPHOCYTES NFR BLD: 28 % (ref 21–52)
MCH RBC QN AUTO: 28.8 PG (ref 24–34)
MCHC RBC AUTO-ENTMCNC: 32.7 G/DL (ref 31–37)
MCV RBC AUTO: 87.9 FL (ref 74–97)
MONOCYTES # BLD: 0.3 K/UL (ref 0.05–1.2)
MONOCYTES NFR BLD: 6 % (ref 3–10)
NEUTS SEG # BLD: 3.4 K/UL (ref 1.8–8)
NEUTS SEG NFR BLD: 63 % (ref 40–73)
PLATELET # BLD AUTO: 255 K/UL (ref 135–420)
PMV BLD AUTO: 10.3 FL (ref 9.2–11.8)
POTASSIUM SERPL-SCNC: 3.8 MMOL/L (ref 3.5–5.5)
PROT SERPL-MCNC: 7.5 G/DL (ref 6.4–8.2)
RBC # BLD AUTO: 4.8 M/UL (ref 4.2–5.3)
SODIUM SERPL-SCNC: 143 MMOL/L (ref 136–145)
WBC # BLD AUTO: 5.5 K/UL (ref 4.6–13.2)

## 2017-09-02 PROCEDURE — 99282 EMERGENCY DEPT VISIT SF MDM: CPT

## 2017-09-02 PROCEDURE — 86900 BLOOD TYPING SEROLOGIC ABO: CPT | Performed by: EMERGENCY MEDICINE

## 2017-09-02 PROCEDURE — 80053 COMPREHEN METABOLIC PANEL: CPT | Performed by: EMERGENCY MEDICINE

## 2017-09-02 PROCEDURE — 83690 ASSAY OF LIPASE: CPT | Performed by: EMERGENCY MEDICINE

## 2017-09-02 PROCEDURE — 96360 HYDRATION IV INFUSION INIT: CPT

## 2017-09-02 PROCEDURE — 82272 OCCULT BLD FECES 1-3 TESTS: CPT | Performed by: INTERNAL MEDICINE

## 2017-09-02 PROCEDURE — 85025 COMPLETE CBC W/AUTO DIFF WBC: CPT | Performed by: EMERGENCY MEDICINE

## 2017-09-03 LAB
ABO + RH BLD: NORMAL
BLOOD GROUP ANTIBODIES SERPL: NORMAL
SPECIMEN EXP DATE BLD: NORMAL

## 2017-09-03 PROCEDURE — 74011250636 HC RX REV CODE- 250/636: Performed by: EMERGENCY MEDICINE

## 2017-09-03 RX ADMIN — SODIUM CHLORIDE 250 ML: 900 INJECTION, SOLUTION INTRAVENOUS at 01:07

## 2017-09-03 NOTE — DISCHARGE INSTRUCTIONS
Abdominal Pain: Care Instructions  Your Care Instructions    Abdominal pain has many possible causes. Some aren't serious and get better on their own in a few days. Others need more testing and treatment. If your pain continues or gets worse, you need to be rechecked and may need more tests to find out what is wrong. You may need surgery to correct the problem. Don't ignore new symptoms, such as fever, nausea and vomiting, urination problems, pain that gets worse, and dizziness. These may be signs of a more serious problem. Your doctor may have recommended a follow-up visit in the next 8 to 12 hours. If you are not getting better, you may need more tests or treatment. The doctor has checked you carefully, but problems can develop later. If you notice any problems or new symptoms, get medical treatment right away. Follow-up care is a key part of your treatment and safety. Be sure to make and go to all appointments, and call your doctor if you are having problems. It's also a good idea to know your test results and keep a list of the medicines you take. How can you care for yourself at home? · Rest until you feel better. · To prevent dehydration, drink plenty of fluids, enough so that your urine is light yellow or clear like water. Choose water and other caffeine-free clear liquids until you feel better. If you have kidney, heart, or liver disease and have to limit fluids, talk with your doctor before you increase the amount of fluids you drink. · If your stomach is upset, eat mild foods, such as rice, dry toast or crackers, bananas, and applesauce. Try eating several small meals instead of two or three large ones. · Wait until 48 hours after all symptoms have gone away before you have spicy foods, alcohol, and drinks that contain caffeine. · Do not eat foods that are high in fat. · Avoid anti-inflammatory medicines such as aspirin, ibuprofen (Advil, Motrin), and naproxen (Aleve).  These can cause stomach upset. Talk to your doctor if you take daily aspirin for another health problem. When should you call for help? Call 911 anytime you think you may need emergency care. For example, call if:  · You passed out (lost consciousness). · You pass maroon or very bloody stools. · You vomit blood or what looks like coffee grounds. · You have new, severe belly pain. Call your doctor now or seek immediate medical care if:  · Your pain gets worse, especially if it becomes focused in one area of your belly. · You have a new or higher fever. · Your stools are black and look like tar, or they have streaks of blood. · You have unexpected vaginal bleeding. · You have symptoms of a urinary tract infection. These may include:  ¨ Pain when you urinate. ¨ Urinating more often than usual.  ¨ Blood in your urine. · You are dizzy or lightheaded, or you feel like you may faint. Watch closely for changes in your health, and be sure to contact your doctor if:  · You are not getting better after 1 day (24 hours). Where can you learn more? Go to http://dianaAppevo Studiopam.info/. Enter F817 in the search box to learn more about \"Abdominal Pain: Care Instructions. \"  Current as of: March 20, 2017  Content Version: 11.3  © 7092-1170 [employer]. Care instructions adapted under license by [employer] (which disclaims liability or warranty for this information). If you have questions about a medical condition or this instruction, always ask your healthcare professional. [place] [address] any warranty or liability for your use of this information. Dehydration: Care Instructions  Your Care Instructions  Dehydration happens when your body loses too much fluid. This might happen when you do not drink enough water or you lose large amounts of fluids from your body because of diarrhea, vomiting, or sweating. Severe dehydration can be life-threatening.   Water and minerals called electrolytes help put your body fluids back in balance. Learn the early signs of fluid loss, and drink more fluids to prevent dehydration. Follow-up care is a key part of your treatment and safety. Be sure to make and go to all appointments, and call your doctor if you are having problems. It's also a good idea to know your test results and keep a list of the medicines you take. How can you care for yourself at home? · To prevent dehydration, drink plenty of fluids, enough so that your urine is light yellow or clear like water. Choose water and other caffeine-free clear liquids until you feel better. If you have kidney, heart, or liver disease and have to limit fluids, talk with your doctor before you increase the amount of fluids you drink. · If you do not feel like eating or drinking, try taking small sips of water, sports drinks, or other rehydration drinks. · Get plenty of rest.  To prevent dehydration  · Add more fluids to your diet and daily routine, unless your doctor has told you not to. · During hot weather, drink more fluids. Drink even more fluids if you exercise a lot. Stay away from drinks with alcohol or caffeine. · Watch for the symptoms of dehydration. These include:  ¨ A dry, sticky mouth. ¨ Dark yellow urine, and not much of it. ¨ Dry and sunken eyes. ¨ Feeling very tired. · Learn what problems can lead to dehydration. These include:  ¨ Diarrhea, fever, and vomiting. ¨ Any illness with a fever, such as pneumonia or the flu. ¨ Activities that cause heavy sweating, such as endurance races and heavy outdoor work in hot or humid weather. ¨ Alcohol or drug abuse or withdrawal.  ¨ Certain medicines, such as cold and allergy pills (antihistamines), diet pills (diuretics), and laxatives. ¨ Certain diseases, such as diabetes, cancer, and heart or kidney disease. When should you call for help? Call 911 anytime you think you may need emergency care.  For example, call if:  · You passed out (lost consciousness). Call your doctor now or seek immediate medical care if:  · You are confused and cannot think clearly. · You are dizzy or lightheaded, or you feel like you may faint. · You have signs of needing more fluids. You have sunken eyes and a dry mouth, and you pass only a little dark urine. · You cannot keep fluids down. Watch closely for changes in your health, and be sure to contact your doctor if:  · You are not making tears. · Your skin is very dry and sags slowly back into place after you pinch it. · Your mouth and eyes are very dry. Where can you learn more? Go to http://diana-pam.info/. Enter Q936 in the search box to learn more about \"Dehydration: Care Instructions. \"  Current as of: March 20, 2017  Content Version: 11.3  © 6808-6119 [employer]. Care instructions adapted under license by [employer] (which disclaims liability or warranty for this information). If you have questions about a medical condition or this instruction, always ask your healthcare professional. [address] any warranty or liability for your use of this information.

## 2017-09-03 NOTE — ED PROVIDER NOTES
HPI Comments: Norberto Velasco is a 56 y.o. female presenting to the ED c/o dark colored stool 5 days ago and bright red blood in stool today. States that she called her GI doctor, Dr. Zulay Power, and told her to get stat lab work and stool check, which she did yesterday. States that she normally has a BM once a day. Reports light-headedness and dizziness yesterday but none today. Pt also states that she has experienced abdominal pain \"for a while. \" States she is unsure of hemorrhoids. PMHx includes hypertension and  GERD. Denies any other symptoms or complaints. PCP is Dr. Perez. Patient is a 56 y.o. female presenting with abdominal pain and anal bleeding. Abdominal Pain    Pertinent negatives include no fever, no nausea, no dysuria and no headaches. Rectal Bleeding    Associated symptoms include abdominal pain. Pertinent negatives include no dysuria, no fever and no nausea. Past Medical History:   Diagnosis Date    Cardiac Agatston CAC score, <100 04/30/2014    Coronary calcium score 0.    Cardiac Holter monitoring 01/25/2017    Sinus rhythm, avg HR 73 bpm (range ). Benign Holter study.  Cardiac nuclear imaging test 01/11/2013    No convincing evidence for ischemia or infarction in the setting of significant chest wall artifact. EF 62%. No RWMA. Neg EKG on max EST. Ex time 9 min 50 sec.  Cardiac stress echo, normal 05/05/2016    Normal maximal stress echo w/reproduction of atypical chest discomfort. Ex time 11 min 3 sec. EF 55%.  Cardiovascular LLE venous duplex 09/28/2016    Left leg:  No DVT.     Chest pain     GERD (gastroesophageal reflux disease)     Heel pain, bilateral     Hiatal hernia     denies this    HTN (hypertension)     Hyperlipidemia     Insulin resistance     follows with endocrinology for this    Night sweats     PVC (premature ventricular contraction)     Right low back pain     S/P colonoscopy 2009, 2014    normal per patient    Unsteady gait     due to heel pain       Past Surgical History:   Procedure Laterality Date    D/C SUCTION  2011    HX OTHER SURGICAL  2014    colonscopy    HX OTHER SURGICAL      endoscopy    HX OTHER SURGICAL      wisdom teeth removal x1         Family History:   Problem Relation Age of Onset    Hypertension Mother     Heart defect Mother      anuerysm    Heart Attack Mother     Asthma Father     Heart Attack Maternal Uncle     Heart Attack Maternal Uncle     Heart Disease Brother     Hypertension Brother        Social History     Social History    Marital status: SINGLE     Spouse name: N/A    Number of children: N/A    Years of education: N/A     Occupational History    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 ALLERGIES: Ibuprofen; Nsaids (non-steroidal anti-inflammatory drug); Amoxicillin; Lidocaine; Lipitor [atorvastatin]; Lopressor [metoprolol tartrate]; Percocet [oxycodone-acetaminophen]; and Vicodin [hydrocodone-acetaminophen]    Review of Systems   Constitutional: Negative for fever. HENT: Negative for sore throat. Eyes: Negative for redness. Respiratory: Negative for shortness of breath and wheezing. Gastrointestinal: Positive for abdominal pain, anal bleeding and blood in stool. Negative for nausea. Genitourinary: Negative for dysuria. Musculoskeletal: Negative for neck stiffness. Skin: Negative for pallor. Neurological: Positive for dizziness and light-headedness. Negative for headaches. Hematological: Does not bruise/bleed easily.    All other systems reviewed and are negative. Vitals:    09/02/17 2246   BP: 121/89   Pulse: 66   Resp: 18   Temp: 97.9 °F (36.6 °C)   SpO2: 99%   Weight: 66.2 kg (146 lb)   Height: 5' 2\" (1.575 m)            Physical Exam   Constitutional: She is oriented to person, place, and time. She appears well-developed and well-nourished. No distress. HENT:   Head: Normocephalic and atraumatic. Mouth/Throat: Oropharynx is clear and moist.   Eyes: Conjunctivae are normal. Pupils are equal, round, and reactive to light. No scleral icterus. Neck: Normal range of motion. Neck supple. Cardiovascular: Intact distal pulses. Capillary refill < 3 seconds   Pulmonary/Chest: Effort normal and breath sounds normal. No respiratory distress. She has no wheezes. Abdominal: Soft. Bowel sounds are normal. She exhibits no distension and no mass. There is tenderness (Minimal left sided ). There is no rebound and no guarding. Genitourinary: Rectal exam shows anal tone normal and guaiac negative stool. Genitourinary Comments: Female RN Isabelle Bloch assisted with rectal exam.   No hemorrhoids, has good rectal tone, (-)guaiac   Musculoskeletal: Normal range of motion. She exhibits no edema. Lymphadenopathy:     She has no cervical adenopathy. Neurological: She is alert and oriented to person, place, and time. No cranial nerve deficit. Skin: Skin is warm and dry. She is not diaphoretic. Psychiatric: Her behavior is normal.   Nursing note and vitals reviewed. MDM  Number of Diagnoses or Management Options  Abdominal pain, chronic, generalized:   Dehydration:   Diagnosis management comments: ddx gi bleed, hemorrhoids, chronic abd pain    Pt with only minimal discomfort on abd exam, almost none. No guarding or rebound. Pt states this is her chronic abd pain, nothing new. Consider imaging, will reevaluate. WBC wnl  Bun 20, Cr 1.31, gave IVF bolus  Reassessed, no abd pain, abd soft NT. I dont feel any further w/u needed at this time.      (-) guaiac    I have reassessed the patient. I have discussed the workup, results and plan with the patient and patient is in agreement. Patient is feeling better. Patient was discharge in stable condition. Patient was given outpatient follow up. Patient is to return to emergency department if any new or worsening condition. Amount and/or Complexity of Data Reviewed  Clinical lab tests: ordered and reviewed  Tests in the medicine section of CPT®: ordered and reviewed      ED Course       Procedures      Vitals:  Patient Vitals for the past 12 hrs:   Temp Pulse Resp BP SpO2   09/02/17 2246 97.9 °F (36.6 °C) 66 18 121/89 99 %       Medications Ordered:  Medications   sodium chloride 0.9 % bolus infusion 250 mL (not administered)       Lab Findings:  Recent Results (from the past 12 hour(s))   OCCULT BLOOD, STOOL    Collection Time: 09/02/17 11:06 PM   Result Value Ref Range    Occult blood, stool NEGATIVE  NEG     CBC WITH AUTOMATED DIFF    Collection Time: 09/02/17 11:15 PM   Result Value Ref Range    WBC 5.5 4.6 - 13.2 K/uL    RBC 4.80 4.20 - 5.30 M/uL    HGB 13.8 12.0 - 16.0 g/dL    HCT 42.2 35.0 - 45.0 %    MCV 87.9 74.0 - 97.0 FL    MCH 28.8 24.0 - 34.0 PG    MCHC 32.7 31.0 - 37.0 g/dL    RDW 13.8 11.6 - 14.5 %    PLATELET 564 653 - 263 K/uL    MPV 10.3 9.2 - 11.8 FL    NEUTROPHILS 63 40 - 73 %    LYMPHOCYTES 28 21 - 52 %    MONOCYTES 6 3 - 10 %    EOSINOPHILS 2 0 - 5 %    BASOPHILS 1 0 - 2 %    ABS. NEUTROPHILS 3.4 1.8 - 8.0 K/UL    ABS. LYMPHOCYTES 1.6 0.9 - 3.6 K/UL    ABS. MONOCYTES 0.3 0.05 - 1.2 K/UL    ABS. EOSINOPHILS 0.1 0.0 - 0.4 K/UL    ABS.  BASOPHILS 0.0 0.0 - 0.06 K/UL    DF AUTOMATED     METABOLIC PANEL, COMPREHENSIVE    Collection Time: 09/02/17 11:15 PM   Result Value Ref Range    Sodium 143 136 - 145 mmol/L    Potassium 3.8 3.5 - 5.5 mmol/L    Chloride 107 100 - 108 mmol/L    CO2 30 21 - 32 mmol/L    Anion gap 6 3.0 - 18 mmol/L    Glucose 110 (H) 74 - 99 mg/dL    BUN 20 (H) 7.0 - 18 MG/DL Creatinine 1.31 (H) 0.6 - 1.3 MG/DL    BUN/Creatinine ratio 15 12 - 20      GFR est AA 51 (L) >60 ml/min/1.73m2    GFR est non-AA 42 (L) >60 ml/min/1.73m2    Calcium 9.5 8.5 - 10.1 MG/DL    Bilirubin, total 0.4 0.2 - 1.0 MG/DL    ALT (SGPT) 23 13 - 56 U/L    AST (SGOT) 23 15 - 37 U/L    Alk. phosphatase 40 (L) 45 - 117 U/L    Protein, total 7.5 6.4 - 8.2 g/dL    Albumin 3.9 3.4 - 5.0 g/dL    Globulin 3.6 2.0 - 4.0 g/dL    A-G Ratio 1.1 0.8 - 1.7     LIPASE    Collection Time: 09/02/17 11:15 PM   Result Value Ref Range    Lipase 260 73 - 393 U/L   TYPE & SCREEN    Collection Time: 09/02/17 11:15 PM   Result Value Ref Range    Crossmatch Expiration 09/05/2017     ABO/Rh(D) A POSITIVE     Antibody screen NEG            Diagnosis:   1. Dehydration    2. Abdominal pain, chronic, generalized        Disposition: discharged    Follow-up Information     Follow up With Details Comments Contact Info    Mikael Rojas MD Call in 2 days  [address] [facility] [address] [place] [address] EMERGENCY DEPT  As needed, If symptoms worsen [address] [facility]  [phone]           Patient's Medications   Start Taking    No medications on file   Continue Taking    AMLODIPINE (NORVASC) 2.5 MG TABLET    Take 1 Tab by mouth daily. ASPIRIN DELAYED-RELEASE (ECOTRIN LOW STRENGTH) 81 MG TABLET    Take 81 mg by mouth daily. ATENOLOL (TENORMIN) 25 MG TABLET    Take 0.5 Tabs by mouth daily. CO-ENZYME Q-10 (CO Q-10) 100 MG CAPSULE    Take 100 mg by mouth two (2) times a day. DEXLANSOPRAZOLE (DEXILANT) 60 MG CPDM    Take 1 Cap by mouth every Monday, Wednesday, Friday. FLUNISOLIDE (NASAREL) 25 MCG (0.025 %) SPRY    2 Sprays by Both Nostrils route two (2) times a day. Indications: ALLERGIC RHINITIS    GARLIC PO    Take 2 Caps by mouth daily.     GLUCOSE BLOOD VI TEST STRIPS (BLOOD GLUCOSE TEST) STRIP    Use to check glucose daily    MELATONIN 3 MG TABLET    Take 3 mg by mouth nightly. MULTIVITAMIN W-MINERALS/LUTEIN (CENTRUM SILVER PO)    Take 1 Tab by mouth daily. OMEGA-3 FATTY ACIDS-VITAMIN E (FISH OIL) 1,000 MG CAP    Take 2 Caps by mouth daily. OTHER    sample Biofreeze cold therapy pain relief    POTASSIUM CHLORIDE (KAON 10%) 20 MEQ/15 ML SOLUTION    Patient states she take 1 tablespoon daily    PRAVASTATIN (PRAVACHOL) 80 MG TABLET    TAKE ONE TABLET BY MOUTH EVERY NIGHT AT BEDTIME    SPIRONOLACTONE (ALDACTONE) 25 MG TABLET    Take 1 Tab by mouth daily. SUCRALFATE (CARAFATE) 1 GRAM TABLET    Take 1 g by mouth daily as needed. These Medications have changed    No medications on file   Stop Taking    No medications on file       Scribe Attestation     Shyam Sheffield acting as a scribe for and in the presence of Guy Muro DO      September 03, 2017 at 1:00 AM       Provider Attestation:      I personally performed the services described in the documentation, reviewed the documentation, as recorded by the scribe in my presence, and it accurately and completely records my words and actions.  September 03, 2017 at 1:00 AM - Guy Muro DO

## 2017-09-03 NOTE — ED TRIAGE NOTES
Patient c/o black stool x 2 days then had blood in her stool today. She states she sent stool specimen to [facility] lab today and had blood done also. She c/o abdominal pain.

## 2017-09-19 ENCOUNTER — OFFICE VISIT (OUTPATIENT)
Dept: PULMONOLOGY | Age: 57
End: 2017-09-19

## 2017-09-19 VITALS
TEMPERATURE: 98.3 F | SYSTOLIC BLOOD PRESSURE: 130 MMHG | BODY MASS INDEX: 27.79 KG/M2 | WEIGHT: 151 LBS | OXYGEN SATURATION: 98 % | DIASTOLIC BLOOD PRESSURE: 80 MMHG | HEIGHT: 62 IN | HEART RATE: 59 BPM | RESPIRATION RATE: 20 BRPM

## 2017-09-19 DIAGNOSIS — R06.02 SHORTNESS OF BREATH: ICD-10-CM

## 2017-09-19 DIAGNOSIS — E80.29 OTHER PORPHYRIA (HCC): ICD-10-CM

## 2017-09-19 DIAGNOSIS — G47.10 HYPERSOMNOLENCE: Primary | ICD-10-CM

## 2017-09-19 NOTE — MR AVS SNAPSHOT
Visit Information Date & Time Provider Department Dept. Phone Encounter #  
 9/19/2017  2:15 PM Terri Cruz MD [facility] [id number] Your Appointments 10/3/2017  2:30 PM  
Office Visit with Tanvi Paula MD  
Internist of [address] [place] [address] CTR-[facility]) Appt Note: f/u from endo [address], [address] [place] [address] [address] [place]  
  
   
 [address], [address] Upcoming Health Maintenance Date Due DTaP/Tdap/Td series (1 - Tdap) 4/5/1983 PAP AKA CERVICAL CYTOLOGY 2/4/2016 BREAST CANCER SCRN MAMMOGRAM 11/22/2017 COLONOSCOPY 11/14/2019 Allergies as of 9/19/2017  Review Complete On: 9/19/2017 By: Terri Cruz MD  
  
 Severity Noted Reaction Type Reactions Ibuprofen High 11/10/2016    Other (comments) Nsaids (Non-steroidal Anti-inflammatory Drug) High 11/10/2016    Other (comments) Amoxicillin  12/12/2011    Unable to Obtain Lidocaine  01/08/2013    Swelling Oral Solution Lipitor [Atorvastatin]  07/15/2014    Myalgia Lopressor [Metoprolol Tartrate]  02/28/2012    Other (comments) Lightheaded and cp Percocet [Oxycodone-acetaminophen]  12/12/2011    Unable to Obtain Vicodin [Hydrocodone-acetaminophen]  12/12/2011    Unable to Obtain Current Immunizations  Reviewed on 11/13/2013 Name Date  
 TB Skin Test (PPD) Intradermal 11/13/2013 Not reviewed this visit You Were Diagnosed With   
  
 Codes Comments Hypersomnolence    -  Primary ICD-10-CM: G47.10 ICD-9-CM: 780.54 Shortness of breath     ICD-10-CM: R06.02 
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  
 27.62 kg/m 2 1.73 m 2 Preferred Pharmacy Pharmacy Name Phone [facility] [address], [address] [place] [address] Road [phone] Your Updated Medication List  
  
   
This list is accurate as of: 9/19/17  3:00 PM.  Always use your most recent med list. amLODIPine 2.5 mg tablet Commonly known as:  Achilles Laurens Take 1 Tab by mouth daily. atenolol 25 mg tablet Commonly known as:  TENORMIN Take 0.5 Tabs by mouth daily. CARAFATE 1 gram tablet Generic drug:  sucralfate Take 1 g by mouth daily as needed. CENTRUM SILVER PO Take 1 Tab by mouth daily. co-enzyme Q-10 100 mg capsule Commonly known as:  CO Q-10 Take 100 mg by mouth two (2) times a day. DEXILANT 60 mg Cpdb Generic drug:  Dexlansoprazole Take 1 Cap by mouth every Monday, Wednesday, Friday. ECOTRIN LOW STRENGTH 81 mg tablet Generic drug:  aspirin delayed-release Take 81 mg by mouth daily. FISH OIL 1,000 mg Cap Generic drug:  omega-3 fatty acids-vitamin e Take 2 Caps by mouth daily. flunisolide 25 mcg (0.025 %) Spry Commonly known as:  NASAREL  
2 Sprays by Both Nostrils route two (2) times a day. Indications: ALLERGIC RHINITIS  
  
 GARLIC PO Take 2 Caps by mouth daily. glucose blood VI test strips strip Commonly known as:  blood glucose test  
Use to check glucose daily  
  
 melatonin 3 mg tablet Take 3 mg by mouth nightly. OTHER  
sample Biofreeze cold therapy pain relief  
  
 potassium chloride 20 mEq/15 mL solution Commonly known as:  KAON 10% Patient states she take 1 tablespoon daily  
  
 pravastatin 80 mg tablet Commonly known as:  PRAVACHOL  
 TAKE ONE TABLET BY MOUTH EVERY NIGHT AT BEDTIME  
  
 spironolactone 25 mg tablet Commonly known as:  ALDACTONE Take 1 Tab by mouth daily. To-Do List   
 09/20/2017 Sleep Center:  POLYSOMNOGRAPHY (MSLT) Introducing [facility] SERVICES! [employer] introduces [email or web address] patient portal. Now you can access parts of your medical record, email your doctor's office, and request medication refills online. 1. In your internet browser, go to [email or web address] 2. Click on the First Time User? Click Here link in the Sign In box. You will see the New Member Sign Up page. 3. Enter your [email or web address] Access Code exactly as it appears below. You will not need to use this code after youve completed the sign-up process. If you do not sign up before the expiration date, you must request a new code. · [email or web address] Access Code: [email or web address] Expires: 11/29/2017  1:01 PM 
 
4. Enter the last four digits of your Social Security Number (xxxx) and Date of Birth (mm/dd/yyyy) as indicated and click Submit. You will be taken to the next sign-up page. 5. Create a [email or web address] ID. This will be your [email or web address] login ID and cannot be changed, so think of one that is secure and easy to remember. 6. Create a [email or web address] password. You can change your password at any time. 7. Enter your Password Reset Question and Answer. This can be used at a later time if you forget your password. 8. Enter your e-mail address. You will receive e-mail notification when new information is available in [address] 9. Click Sign Up. You can now view and download portions of your medical record. 10. Click the Download Summary menu link to download a portable copy of your medical information. If you have questions, please visit the Frequently Asked Questions section of the [email or web address] website. Remember, [email or web address] is NOT to be used for urgent needs. For medical emergencies, dial 911. Now available from your iPhone and Android! Please provide this summary of care documentation to your next provider. Your primary care clinician is listed as Nuno Schaefer. If you have any questions after today's visit, please call [phone].

## 2017-09-19 NOTE — PROGRESS NOTES
HISTORY OF PRESENT ILLNESS  Sonya Watson is a 56 y.o. female. HPI Comments: Referred by Dr. Derick Mcadams for episodic shortness of breath. Pt does not recall any consistent triggers as she notes SOB sometimes walking up stairs but would have no problem with exercising at the gym the next day. Pt had a stress Echo which was  within normal limits. Pt had a Metacholine challenge test which did not show bronchial hyperreactivity. She does report SOB is improving. Pt diagnosed with LUCY in 2012 but did not have CPAP titration done. Reports her fitness monitor shows several episodes of restlessness and awakening at night. Pt reports she wakes to use the BR 4-5-x per night. Recently, pt had PSG which showed decreased sleep efficiency but no significant LUCY. or RLS. Pt still complains of sudden onset hypersomnolence, including while working out in the gym and while driving. Shortness of Breath   The history is provided by the patient. This is a chronic problem. The problem occurs intermittently. Episode onset: several months. The problem has been gradually improving. Pertinent negatives include no fever, no headaches, no coryza, no rhinorrhea, no sore throat, no swollen glands, no ear pain, no neck pain, no cough, no sputum production, no hemoptysis, no wheezing, no PND, no orthopnea, no chest pain, no syncope, no vomiting, no abdominal pain, no rash, no leg swelling and no claudication. It is unknown what precipitated the problem. She has tried nothing for the symptoms. Review of Systems   Constitutional: Negative for chills, diaphoresis, fever, malaise/fatigue and weight loss. HENT: Negative for congestion, ear discharge, ear pain, hearing loss, nosebleeds, rhinorrhea, sinus pain, sore throat and tinnitus. Eyes: Negative for blurred vision, double vision, photophobia, pain, discharge and redness. Respiratory: Negative for cough, hemoptysis, sputum production, wheezing and stridor.  Shortness of breath: occasional.    Cardiovascular: Negative for chest pain, palpitations, orthopnea, claudication, leg swelling, syncope and PND. Gastrointestinal: Negative for abdominal pain, blood in stool, constipation, diarrhea, heartburn, melena, nausea and vomiting. Genitourinary: Negative for dysuria, flank pain, frequency, hematuria and urgency. Musculoskeletal: Negative for falls, joint pain, myalgias and neck pain. Skin: Negative for itching and rash. Neurological: Negative for dizziness, tingling, tremors, sensory change, speech change, focal weakness, seizures, loss of consciousness, weakness and headaches. Endo/Heme/Allergies: Negative for environmental allergies and polydipsia. Does not bruise/bleed easily. Psychiatric/Behavioral: Positive for memory loss. Negative for depression, hallucinations, substance abuse and suicidal ideas. The patient has insomnia. The patient is not nervous/anxious. Past Medical History:   Diagnosis Date    Cardiac Agatston CAC score, <100 04/30/2014    Coronary calcium score 0.    Cardiac Holter monitoring 01/25/2017    Sinus rhythm, avg HR 73 bpm (range ). Benign Holter study.  Cardiac nuclear imaging test 01/11/2013    No convincing evidence for ischemia or infarction in the setting of significant chest wall artifact. EF 62%. No RWMA. Neg EKG on max EST. Ex time 9 min 50 sec.  Cardiac stress echo, normal 05/05/2016    Normal maximal stress echo w/reproduction of atypical chest discomfort. Ex time 11 min 3 sec. EF 55%.  Cardiovascular LLE venous duplex 09/28/2016    Left leg:  No DVT.     Chest pain     GERD (gastroesophageal reflux disease)     Heel pain, bilateral     Hiatal hernia     denies this    HTN (hypertension)     Hyperlipidemia     Insulin resistance     follows with endocrinology for this    Night sweats     PVC (premature ventricular contraction)     Right low back pain     S/P colonoscopy 2009, 2014    normal per patient    Unsteady gait     due to heel pain     Past Surgical History:   Procedure Laterality Date    D/C SUCTION  2011    HX OTHER SURGICAL  2014    colonscopy    HX OTHER SURGICAL      endoscopy    HX OTHER SURGICAL      wisdom teeth removal x1     Current Outpatient Prescriptions on File Prior to Visit   Medication Sig Dispense Refill    amLODIPine (NORVASC) 2.5 mg tablet Take 1 Tab by mouth daily. 30 Tab 6    pravastatin (PRAVACHOL) 80 mg tablet TAKE ONE TABLET BY MOUTH EVERY NIGHT AT BEDTIME 30 Tab 5    potassium chloride (KAON 10%) 20 mEq/15 mL solution Patient states she take 1 tablespoon daily      spironolactone (ALDACTONE) 25 mg tablet Take 1 Tab by mouth daily. 30 Tab 3    OTHER sample Biofreeze cold therapy pain relief      flunisolide (NASAREL) 25 mcg (0.025 %) spry 2 Sprays by Both Nostrils route two (2) times a day. Indications: ALLERGIC RHINITIS 25 mL 1    co-enzyme Q-10 (CO Q-10) 100 mg capsule Take 100 mg by mouth two (2) times a day.  GARLIC PO Take 2 Caps by mouth daily.  aspirin delayed-release (ECOTRIN LOW STRENGTH) 81 mg tablet Take 81 mg by mouth daily.  omega-3 fatty acids-vitamin e (FISH OIL) 1,000 mg cap Take 2 Caps by mouth daily.  melatonin 3 mg tablet Take 3 mg by mouth nightly.  Dexlansoprazole (DEXILANT) 60 mg CpDM Take 1 Cap by mouth every Monday, Wednesday, Friday.  sucralfate (CARAFATE) 1 gram tablet Take 1 g by mouth daily as needed.  MULTIVITAMIN W-MINERALS/LUTEIN (CENTRUM SILVER PO) Take 1 Tab by mouth daily.  atenolol (TENORMIN) 25 mg tablet Take 0.5 Tabs by mouth daily. 30 Tab 3    glucose blood VI test strips (BLOOD GLUCOSE TEST) strip Use to check glucose daily 1 Package 11     No current facility-administered medications on file prior to visit.       Allergies   Allergen Reactions    Ibuprofen Other (comments)    Nsaids (Non-Steroidal Anti-Inflammatory Drug) Other (comments)    Amoxicillin Unable to Obtain    Lidocaine Swelling Oral Solution    Lipitor [Atorvastatin] Myalgia           Lopressor [Metoprolol Tartrate] Other (comments)     Lightheaded and cp    Percocet [Oxycodone-Acetaminophen] Unable to Obtain    Vicodin [Hydrocodone-Acetaminophen] Unable to Obtain     Family History   Problem Relation Age of Onset    Hypertension Mother     Heart defect Mother      anuerysm    Heart Attack Mother     Asthma Father     Heart Attack Maternal Uncle     Heart Attack Maternal Uncle     Heart Disease Brother     Hypertension Brother      Social History     Social History    Marital status: SINGLE     Spouse name: N/A    Number of children: N/A    Years of education: N/A     Occupational History    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. Blood pressure 130/80, pulse (!) 59, temperature 98.3 °F (36.8 °C), temperature source Oral, resp. rate 20, height 5' 2\" (1.575 m), weight 68.5 kg (151 lb), SpO2 98 %. Physical Exam   Constitutional: She is oriented to person, place, and time. She appears well-developed and well-nourished. No distress. HENT:   Head: Normocephalic. Nose: Nose normal.   Mouth/Throat: No oropharyngeal exudate. Old and well healed left temporal scar   Eyes: Conjunctivae and EOM are normal. Pupils are equal, round, and reactive to light. Right eye exhibits no discharge. Left eye exhibits no discharge. No scleral icterus. Neck: Normal range of motion. No JVD present. No tracheal deviation present. No thyromegaly present.    Cardiovascular: Normal rate, regular rhythm, normal heart sounds and intact distal pulses. Exam reveals no gallop. No murmur heard. Pulmonary/Chest: Effort normal and breath sounds normal. No stridor. No respiratory distress. She has no wheezes. She has no rales. She exhibits no tenderness. Abdominal: Soft. She exhibits no mass. There is no tenderness. Musculoskeletal: She exhibits no edema or tenderness. Lymphadenopathy:     She has no cervical adenopathy. Neurological: She is alert and oriented to person, place, and time. Skin: Skin is warm and dry. No rash noted. She is not diaphoretic. No erythema. Psychiatric: She has a normal mood and affect. Her behavior is normal. Judgment and thought content normal.     Spirometry: normal flows    XR Results (most recent):    Results from Hospital Encounter encounter on 12/20/16   XR CHEST AP LAT   Narrative EXAM: Chest Radiographs    INDICATION: Cough and shortness of breath    TECHNIQUE: PA and lateral views of the chest    COMPARISON: 4/23/2016, 2/4/2016 and 5/10/2015    FINDINGS: No pneumothorax identified. The lungs are clear. No infiltrates  identified. No effusions appreciated. The cardiomediastinal silhouette is  unremarkable. The pulmonary vascularity is unremarkable. The osseous structures  are unremarkable. Impression IMPRESSION:  1. No acute cardiopulmonary process. ASSESSMENT and PLAN  Encounter Diagnoses   Name Primary?  Hypersomnolence Yes    Shortness of breath     Other porphyria ([address]      Shortness of breath of unknown origin, now much improved. If this recurs would consider CPET. Sleep studies and MCT reviewed with pt. Schedule MSLT to rule out Narcolepsy vs idiopathic hypersomnia or other sleep disturbance. Continue current medications for now. Pt to return after testing.

## 2017-10-02 RX ORDER — SPIRONOLACTONE 25 MG/1
25 TABLET ORAL DAILY
Qty: 30 TAB | Refills: 3 | Status: CANCELLED | OUTPATIENT
Start: 2017-10-02

## 2017-10-03 ENCOUNTER — OFFICE VISIT (OUTPATIENT)
Dept: INTERNAL MEDICINE CLINIC | Age: 57
End: 2017-10-03

## 2017-10-03 VITALS
HEIGHT: 62 IN | BODY MASS INDEX: 27.97 KG/M2 | SYSTOLIC BLOOD PRESSURE: 128 MMHG | DIASTOLIC BLOOD PRESSURE: 80 MMHG | OXYGEN SATURATION: 97 % | RESPIRATION RATE: 20 BRPM | HEART RATE: 73 BPM | TEMPERATURE: 97.6 F | WEIGHT: 152 LBS

## 2017-10-03 DIAGNOSIS — J30.9 CHRONIC ALLERGIC RHINITIS, UNSPECIFIED SEASONALITY, UNSPECIFIED TRIGGER: ICD-10-CM

## 2017-10-03 DIAGNOSIS — I11.9 BENIGN HYPERTENSIVE HEART DISEASE WITHOUT HEART FAILURE: Primary | ICD-10-CM

## 2017-10-03 DIAGNOSIS — G44.89 FOOD SENSITIVITY HEADACHE: ICD-10-CM

## 2017-10-03 DIAGNOSIS — N17.9 AKI (ACUTE KIDNEY INJURY) (HCC): ICD-10-CM

## 2017-10-03 RX ORDER — SPIRONOLACTONE 25 MG/1
25 TABLET ORAL DAILY
Qty: 30 TAB | Refills: 11 | Status: SHIPPED | OUTPATIENT
Start: 2017-10-03 | End: 2018-10-16 | Stop reason: SDUPTHER

## 2017-10-03 NOTE — MR AVS SNAPSHOT
Visit Information Date & Time Provider Department Dept. Phone Encounter #  
 10/3/2017  2:30 PM Windle Babinski, MD Internist of [address] [id number] Follow-up Instructions Return in about 6 months (around 4/3/2018), or if symptoms worsen or fail to improve, for BP check. Upcoming Health Maintenance Date Due DTaP/Tdap/Td series (1 - Tdap) 4/5/1983 PAP AKA CERVICAL CYTOLOGY 2/4/2016 BREAST CANCER SCRN MAMMOGRAM 11/22/2017 COLONOSCOPY 11/14/2019 Allergies as of 10/3/2017  Review Complete On: 10/3/2017 By: Jaylene Padilla LPN Severity Noted Reaction Type Reactions Ibuprofen High 11/10/2016    Other (comments) Nsaids (Non-steroidal Anti-inflammatory Drug) High 11/10/2016    Other (comments) Amoxicillin  12/12/2011    Unable to Obtain Lidocaine  01/08/2013    Swelling Oral Solution Lipitor [Atorvastatin]  07/15/2014    Myalgia Lopressor [Metoprolol Tartrate]  02/28/2012    Other (comments) Lightheaded and cp Percocet [Oxycodone-acetaminophen]  12/12/2011    Unable to Obtain Vicodin [Hydrocodone-acetaminophen]  12/12/2011    Unable to Obtain Current Immunizations  Reviewed on 11/13/2013 Name Date  
 TB Skin Test (PPD) Intradermal 11/13/2013 Not reviewed this visit You Were Diagnosed With   
  
 Codes Comments Benign hypertensive heart disease without heart failure    -  Primary ICD-10-CM: I11.9 ICD-9-CM: 968. 10 WESTLEY (acute kidney injury) ([facility] [address]     ICD-10-CM: N17.9 ICD-9-CM: 692. 9 Food sensitivity headache     ICD-10-CM: G44.89 ICD-9-CM: 868. 0 Chronic allergic rhinitis, unspecified seasonality, unspecified trigger     ICD-10-CM: J30.9 ICD-9-CM: 477.9 Vitals BP Pulse Temp Resp Height(growth percentile) Weight(growth percentile) 128/80 73 97.6 °F (36.4 °C) 20 5' 2\" (1.575 m) 152 lb (68.9 kg) SpO2 BMI OB Status Smoking Status 97% 27.8 kg/m2 Postmenopausal Former Smoker BMI and BSA Data Body Mass Index Body Surface Area  
 27.8 kg/m 2 1.74 m 2 Preferred Pharmacy Pharmacy Name Phone [facility], [address] [phone] Your Updated Medication List  
  
   
This list is accurate as of: 10/3/17  3:20 PM.  Always use your most recent med list. amLODIPine 2.5 mg tablet Commonly known as:  [place] Will Take 1 Tab by mouth daily. atenolol 25 mg tablet Commonly known as:  TENORMIN Take 0.5 Tabs by mouth daily. CARAFATE 1 gram tablet Generic drug:  sucralfate Take 1 g by mouth daily as needed. CENTRUM SILVER PO Take 1 Tab by mouth daily. co-enzyme Q-10 100 mg capsule Commonly known as:  CO Q-10 Take 100 mg by mouth two (2) times a day. DEXILANT 60 mg Cpdb Generic drug:  Dexlansoprazole Take 1 Cap by mouth every Monday, Wednesday, Friday. ECOTRIN LOW STRENGTH 81 mg tablet Generic drug:  aspirin delayed-release Take 81 mg by mouth daily. FISH OIL 1,000 mg Cap Generic drug:  omega-3 fatty acids-vitamin e Take 2 Caps by mouth daily. flunisolide 25 mcg (0.025 %) Spry Commonly known as:  NASAREL  
2 Sprays by Both Nostrils route two (2) times a day. Indications: ALLERGIC RHINITIS  
  
 GARLIC PO Take 2 Caps by mouth daily. glucose blood VI test strips strip Commonly known as:  blood glucose test  
Use to check glucose daily  
  
 melatonin 3 mg tablet Take 3 mg by mouth nightly. OTHER  
sample Biofreeze cold therapy pain relief  
  
 potassium chloride 20 mEq/15 mL solution Commonly known as:  KAON 10% Patient states she take 1 tablespoon daily  
  
 pravastatin 80 mg tablet Commonly known as:  PRAVACHOL  
TAKE ONE TABLET BY MOUTH EVERY NIGHT AT BEDTIME  
  
 spironolactone 25 mg tablet Commonly known as:  ALDACTONE Take 1 Tab by mouth daily. Prescriptions Sent to Pharmacy Refills  
 spironolactone (ALDACTONE) 25 mg tablet 11 Sig: Take 1 Tab by mouth daily. Class: Normal  
 Pharmacy: [facility] [address], [address] [place] [address] #: [phone] Route: Oral  
  
Follow-up Instructions Return in about 6 months (around 4/3/2018), or if symptoms worsen or fail to improve, for BP check. To-Do List   
 10/03/2017 Lab:  FOOD ALLERGY PROFILE Around 10/03/2017 Lab:  METABOLIC PANEL, BASIC   
  
 10/03/2017 Lab:  MICROALBUMIN, UR, RAND W/ MICROALBUMIN/CREA RATIO Introducing [facility]! Geraldine Norman introduces [employer] patient portal. Now you can access parts of your medical record, email your doctor's office, and request medication refills online. 1. In your internet browser, go to [email or web address] 2. Click on the First Time User? Click Here link in the Sign In box. You will see the New Member Sign Up page. 3. Enter your [employer] Access Code exactly as it appears below. You will not need to use this code after youve completed the sign-up process. If you do not sign up before the expiration date, you must request a new code. · [employer] Access Code: [email or web address] Expires: 11/29/2017  1:01 PM 
 
4. Enter the last four digits of your Social Security Number (xxxx) and Date of Birth (mm/dd/yyyy) as indicated and click Submit. You will be taken to the next sign-up page. 5. Create a [employer] ID. This will be your [employer] login ID and cannot be changed, so think of one that is secure and easy to remember. 6. Create a [employer] password. You can change your password at any time. 7. Enter your Password Reset Question and Answer. This can be used at a later time if you forget your password. 8. Enter your e-mail address. You will receive e-mail notification when new information is available in [address] 9. Click Sign Up. You can now view and download portions of your medical record. 10. Click the Download Summary menu link to download a portable copy of your medical information. If you have questions, please visit the Frequently Asked Questions section of the [employer] website. Remember, [employer] is NOT to be used for urgent needs. For medical emergencies, dial 911. Now available from your iPhone and Android! Please provide this summary of care documentation to your next provider. Your primary care clinician is listed as Chiquita Griffin. If you have any questions after today's visit, please call [phone].

## 2017-10-03 NOTE — PROGRESS NOTES
INTERNISTS [facility]:  10/4/2017, MRN: [id number]      Lucia Merritt is a 56 y.o. female and presents to clinic for Medication Refill and Referral Follow Up (endocrine and pulmonology and GI )    Subjective:   Pt is a 53yo AAF with h/o chronic bronchitis, LUCY, porphyria (per EHR), allergic rhinitis, HTN, HLD, constipation, gallbladder polyps (suggested by CT scan 10/13/16), and GERD. 1. WESTLEY: The patient was seen in the emergency department on September 2, 2017. She was diagnosed with dehydration. Her creatinine during that visit was checked and measured 1.31. Her BUN was 20. She admits to not drinking plenty of water every day. No urinary symptoms. The patient is on spironolactone. 2.  Food sensitivity: The patient reports increased urination, bloating, and HAs when eating certain foods. She is concerned that she may have underlying food sensitivities. No abdominal pain. No melena. High carb foods typically tends to cause the symptoms mentioned above. Protein does not because any of the symptoms mentioned above. Note the patient has a long-standing history of allergic rhinitis, present for over 3 months. 3.  Hypertension: This is a chronic condition, present over 3 months. She is on spironolactone, amlodipine, and atenolol with potassium sedimentation. She reports no adverse side effects with taking these medication. No refills are needed except for spironolactone. She is overdue for a urine protein screen.         Patient Active Problem List    Diagnosis Date Noted    Gallbladder polyp - suggested by findings on CT scan from 10/13/16 03/01/2017    Simple chronic bronchitis 12/22/2016    Porphyria ([facility] [address] 08/24/2015    Hypersomnia with sleep apnea 03/13/2014    Hypertensive heart disease  03/05/2012    Constipation 01/16/2012    Memory difficulties 01/16/2012    Hyperlipidemia     GERD (gastroesophageal reflux disease)        Current Outpatient Prescriptions   Medication Sig Dispense Refill    spironolactone (ALDACTONE) 25 mg tablet Take 1 Tab by mouth daily. 30 Tab 11    amLODIPine (NORVASC) 2.5 mg tablet Take 1 Tab by mouth daily. 30 Tab 6    atenolol (TENORMIN) 25 mg tablet Take 0.5 Tabs by mouth daily. 30 Tab 3    pravastatin (PRAVACHOL) 80 mg tablet TAKE ONE TABLET BY MOUTH EVERY NIGHT AT BEDTIME 30 Tab 5    potassium chloride (KAON 10%) 20 mEq/15 mL solution Patient states she take 1 tablespoon daily      OTHER sample Biofreeze cold therapy pain relief      flunisolide (NASAREL) 25 mcg (0.025 %) spry 2 Sprays by Both Nostrils route two (2) times a day. Indications: ALLERGIC RHINITIS 25 mL 1    glucose blood VI test strips (BLOOD GLUCOSE TEST) strip Use to check glucose daily 1 Package 11    co-enzyme Q-10 (CO Q-10) 100 mg capsule Take 100 mg by mouth two (2) times a day.  GARLIC PO Take 2 Caps by mouth daily.  aspirin delayed-release (ECOTRIN LOW STRENGTH) 81 mg tablet Take 81 mg by mouth daily.  omega-3 fatty acids-vitamin e (FISH OIL) 1,000 mg cap Take 2 Caps by mouth daily.  melatonin 3 mg tablet Take 3 mg by mouth nightly.  Dexlansoprazole (DEXILANT) 60 mg CpDM Take 1 Cap by mouth every Monday, Wednesday, Friday.  sucralfate (CARAFATE) 1 gram tablet Take 1 g by mouth daily as needed.  MULTIVITAMIN W-MINERALS/LUTEIN (CENTRUM SILVER PO) Take 1 Tab by mouth daily.          Allergies   Allergen Reactions    Ibuprofen Other (comments)    Nsaids (Non-Steroidal Anti-Inflammatory Drug) Other (comments)    Amoxicillin Unable to Obtain    Lidocaine Swelling     Oral Solution    Lipitor [Atorvastatin] Myalgia           Lopressor [Metoprolol Tartrate] Other (comments)     Lightheaded and cp    Percocet [Oxycodone-Acetaminophen] Unable to Obtain    Vicodin [Hydrocodone-Acetaminophen] Unable to Obtain       Past Medical History:   Diagnosis Date    Cardiac Agatston CAC score, <100 04/30/2014    Coronary calcium score 0.    Cardiac Holter monitoring 01/25/2017    Sinus rhythm, avg HR 73 bpm (range ). Benign Holter study.  Cardiac nuclear imaging test 01/11/2013    No convincing evidence for ischemia or infarction in the setting of significant chest wall artifact. EF 62%. No RWMA. Neg EKG on max EST. Ex time 9 min 50 sec.  Cardiac stress echo, normal 05/05/2016    Normal maximal stress echo w/reproduction of atypical chest discomfort. Ex time 11 min 3 sec. EF 55%.  Cardiovascular LLE venous duplex 09/28/2016    Left leg:  No DVT.  Chest pain     GERD (gastroesophageal reflux disease)     Heel pain, bilateral     Hiatal hernia     denies this    HTN (hypertension)     Hyperlipidemia     Insulin resistance     follows with endocrinology for this    Night sweats     PVC (premature ventricular contraction)     Right low back pain     S/P colonoscopy 2009, 2014    normal per patient    Unsteady gait     due to heel pain       Past Surgical History:   Procedure Laterality Date    D/C SUCTION  2011    HX OTHER SURGICAL  2014    colonscopy    HX OTHER SURGICAL      endoscopy    HX OTHER SURGICAL      wisdom teeth removal x1       Family History   Problem Relation Age of Onset    Hypertension Mother     Heart defect Mother      anuerysm    Heart Attack Mother     Asthma Father     Heart Attack Maternal Uncle     Heart Attack Maternal Uncle     Heart Disease Brother     Hypertension Brother        Social History   Substance Use Topics    Smoking status: Former Smoker     Packs/day: 0.50     Years: 11.00     Types: Cigarettes     Quit date: 12/31/2006    Smokeless tobacco: Former User     Quit date: 1/10/1993      Comment: 6-7 cigs per day    Alcohol use No       ROS   Review of Systems   Constitutional: Negative for chills and fever. HENT: Negative for ear pain and sore throat. Eyes: Negative for blurred vision and pain. Respiratory: Negative for cough and shortness of breath.     Cardiovascular: Negative for chest pain. Gastrointestinal: Negative for blood in stool, melena and vomiting. Genitourinary: Negative for dysuria and hematuria. Musculoskeletal: Negative for joint pain and myalgias. Skin: Negative for rash. Neurological: Negative for tingling, focal weakness and headaches. Endo/Heme/Allergies: Does not bruise/bleed easily. Psychiatric/Behavioral: Negative for substance abuse. Objective     Vitals:    10/03/17 1439   BP: 128/80   Pulse: 73   Resp: 20   Temp: 97.6 °F (36.4 °C)   SpO2: 97%   Weight: 152 lb (68.9 kg)   Height: 5' 2\" (1.575 m)   PainSc:   0 - No pain       Physical Exam   Constitutional: She is oriented to person, place, and time and well-developed, well-nourished, and in no distress. HENT:   Head: Normocephalic and atraumatic. Right Ear: External ear normal.   Left Ear: External ear normal.   Nose: Nose normal.   Mouth/Throat: Oropharynx is clear and moist. No oropharyngeal exudate. Clear TMs   Eyes: Conjunctivae and EOM are normal. Pupils are equal, round, and reactive to light. Right eye exhibits no discharge. Left eye exhibits no discharge. No scleral icterus. Neck: Neck supple. Cardiovascular: Normal rate, regular rhythm, normal heart sounds and intact distal pulses. Exam reveals no gallop and no friction rub. No murmur heard. Pulmonary/Chest: Effort normal and breath sounds normal. No respiratory distress. She has no wheezes. She has no rales. Abdominal: Soft. Bowel sounds are normal. She exhibits no distension. There is no tenderness. There is no rebound and no guarding. Musculoskeletal: She exhibits no edema or tenderness (BUE are NTTP). Lymphadenopathy:     She has no cervical adenopathy. Neurological: She is alert and oriented to person, place, and time. She exhibits normal muscle tone. Gait normal.   Skin: Skin is warm and dry. No erythema. Psychiatric: Affect normal.   Nursing note and vitals reviewed.       LABS   Data Review:   Lab Results Component Value Date/Time    WBC 5.5 09/02/2017 11:15 PM    Hemoglobin, POC 12.2 01/09/2013 08:19 PM    HGB 13.8 09/02/2017 11:15 PM    Hematocrit, POC 36 01/09/2013 08:19 PM    HCT 42.2 09/02/2017 11:15 PM    PLATELET 669 27/01/6969 11:15 PM    MCV 87.9 09/02/2017 11:15 PM       Lab Results   Component Value Date/Time    Sodium 143 09/02/2017 11:15 PM    Potassium 3.8 09/02/2017 11:15 PM    Chloride 107 09/02/2017 11:15 PM    CO2 30 09/02/2017 11:15 PM    Anion gap 6 09/02/2017 11:15 PM    Glucose 110 09/02/2017 11:15 PM    BUN 20 09/02/2017 11:15 PM    Creatinine 1.31 09/02/2017 11:15 PM    BUN/Creatinine ratio 15 09/02/2017 11:15 PM    GFR est AA 51 09/02/2017 11:15 PM    GFR est non-AA 42 09/02/2017 11:15 PM    Calcium 9.5 09/02/2017 11:15 PM       Lab Results   Component Value Date/Time    Cholesterol, total 160 04/26/2017 08:20 AM    HDL Cholesterol 78 04/26/2017 08:20 AM    LDL, calculated 70.2 04/26/2017 08:20 AM    VLDL, calculated 11.8 04/26/2017 08:20 AM    Triglyceride 59 04/26/2017 08:20 AM    CHOL/HDL Ratio 2.1 04/26/2017 08:20 AM       Lab Results   Component Value Date/Time    Hemoglobin A1c (POC) 5.6 09/19/2013 02:30 PM    Hemoglobin A1c, External 5.8 10/08/2015       Assessment/Plan:   1. Hypertensive heart disease: BP is stable. Continue with medication as prescribed. I am refilling her Spironolactone. Ordering a BMP and a urine protein screen. ORDERS:  - spironolactone (ALDACTONE) 25 mg tablet; Take 1 Tab by mouth daily. Dispense: 30 Tab; Refill: 11  - METABOLIC PANEL, BASIC; Future  - MICROALBUMIN, UR, RAND W/ MICROALBUMIN/CREA RATIO; Future    2. WESTLEY (acute kidney injury:   I encourage patient to stay hydrated with water especially since she is on spironolactone. We will check a follow-up BMP for completeness. ORDERS:  - METABOLIC PANEL, BASIC; Future    3. Food sensitivity headache and Chronic Allergic Rhinitis:   Ordering a food allergy profile and a BMP.   I encourage patient to take an over-the-counter antihistamine or nasal steroid    ORDERS:  - FOOD ALLERGY PROFILE; Future    Health Maintenance Due   Topic Date Due    DTaP/Tdap/Td series (1 - Tdap) 04/05/1983    PAP AKA CERVICAL CYTOLOGY  02/04/2016    BREAST CANCER SCRN MAMMOGRAM  11/22/2017       Lab review: labs are reviewed in the EHR    I have discussed the diagnosis with the patient and the intended plan as seen in the above orders. The patient has received an after-visit summary and questions were answered concerning future plans. I have discussed medication side effects and warnings with the patient as well. I have reviewed the plan of care with the patient, accepted their input and they are in agreement with the treatment goals. All questions were answered. The patient understands the plan of care. Handouts provided today with above information. Pt instructed if symptoms worsen to call the office or report to the ED for continued care. Greater than 50% of the visit time was spent in counseling and/or coordination of care. Follow-up Disposition:  Return in about 6 months (around 4/3/2018), or if symptoms worsen or fail to improve, for BP check.     Dorothy Baer MD

## 2017-10-03 NOTE — PROGRESS NOTES
1. Have you been to the ER, urgent care clinic since your last visit? Hospitalized since your last visit? Yes Reason for visit: [facility]    2. Have you seen or consulted any other health care providers outside of the [address] [place] [address] since your last visit? Include any pap smears or colon screening.  Yes Reason for visit: pulmonology

## 2017-10-04 DIAGNOSIS — G47.30 HYPERSOMNIA WITH SLEEP APNEA: Primary | ICD-10-CM

## 2017-10-04 DIAGNOSIS — G47.10 HYPERSOMNIA WITH SLEEP APNEA: Primary | ICD-10-CM

## 2017-10-04 PROBLEM — J30.9 ALLERGIC RHINITIS: Status: ACTIVE | Noted: 2017-10-04

## 2017-10-12 ENCOUNTER — HOSPITAL ENCOUNTER (OUTPATIENT)
Dept: LAB | Age: 57
Discharge: HOME | End: 2017-10-12
Payer: COMMERCIAL

## 2017-10-12 DIAGNOSIS — N17.9 AKI (ACUTE KIDNEY INJURY) (HCC): ICD-10-CM

## 2017-10-12 DIAGNOSIS — I11.9 BENIGN HYPERTENSIVE HEART DISEASE WITHOUT HEART FAILURE: ICD-10-CM

## 2017-10-12 DIAGNOSIS — J30.9 CHRONIC ALLERGIC RHINITIS, UNSPECIFIED SEASONALITY, UNSPECIFIED TRIGGER: ICD-10-CM

## 2017-10-12 DIAGNOSIS — G44.89 FOOD SENSITIVITY HEADACHE: ICD-10-CM

## 2017-10-12 LAB
ANION GAP SERPL CALC-SCNC: 6 MMOL/L (ref 3–18)
BUN SERPL-MCNC: 14 MG/DL (ref 7–18)
BUN/CREAT SERPL: 9 (ref 12–20)
CALCIUM SERPL-MCNC: 8.8 MG/DL (ref 8.5–10.1)
CHLORIDE SERPL-SCNC: 108 MMOL/L (ref 100–108)
CO2 SERPL-SCNC: 29 MMOL/L (ref 21–32)
CREAT SERPL-MCNC: 1.52 MG/DL (ref 0.6–1.3)
CREAT UR-MCNC: 40.7 MG/DL (ref 30–125)
GLUCOSE SERPL-MCNC: 69 MG/DL (ref 74–99)
MICROALBUMIN UR-MCNC: <0.5 MG/DL (ref 0–3)
MICROALBUMIN/CREAT UR-RTO: NORMAL MG/G (ref 0–30)
POTASSIUM SERPL-SCNC: 4.4 MMOL/L (ref 3.5–5.5)
SODIUM SERPL-SCNC: 143 MMOL/L (ref 136–145)

## 2017-10-12 PROCEDURE — 82043 UR ALBUMIN QUANTITATIVE: CPT | Performed by: INTERNAL MEDICINE

## 2017-10-12 PROCEDURE — 80048 BASIC METABOLIC PNL TOTAL CA: CPT | Performed by: INTERNAL MEDICINE

## 2017-10-12 PROCEDURE — 86003 ALLG SPEC IGE CRUDE XTRC EA: CPT | Performed by: INTERNAL MEDICINE

## 2017-10-12 PROCEDURE — 36415 COLL VENOUS BLD VENIPUNCTURE: CPT | Performed by: INTERNAL MEDICINE

## 2017-10-15 DIAGNOSIS — N17.9 AKI (ACUTE KIDNEY INJURY) (HCC): Primary | ICD-10-CM

## 2017-10-16 ENCOUNTER — TELEPHONE (OUTPATIENT)
Dept: INTERNAL MEDICINE CLINIC | Age: 57
End: 2017-10-16

## 2017-10-16 LAB
CLAM IGE QN: <0.1 KU/L
CLASS DESCRIPTION, 600268: ABNORMAL
CODFISH IGE QN: <0.1 KU/L
CORN IGE QN: 0.3 KU/L
COW MILK IGE QN: <0.1 KU/L
EGG WHITE IGE QN: 0.13 KU/L
PEANUT IGE QN: 2.06 KU/L
SCALLOP IGE QN: 0.12 KU/L
SESAME SEED IGE QN: 0.62 KU/L
SHRIMP IGE QN: <0.1 KU/L
SOYBEAN IGE QN: 0.24 KU/L
WALNUT IGE QN: 0.15 KU/L
WHEAT IGE QN: 0.88 KU/L

## 2017-10-16 NOTE — TELEPHONE ENCOUNTER
----- Message from María Freitas MD sent at 10/15/2017  9:12 PM EDT -----  Please let the pt know that her labs show evidence of acute kidney injury per the blood lab test. Her urine kidney test is normal. I am placing a referral to Nephrology for additional evaluation.      Dr. Rudy Arndt  [facility] [place] [facility] [address]  Phone: [phone]  Fax: [phone]

## 2017-10-16 NOTE — PROGRESS NOTES
Please let the pt know that her labs show evidence of acute kidney injury per the blood lab test. Her urine kidney test is normal. I am placing a referral to Nephrology for additional evaluation.      Dr. Rudy Arndt  [facility] [place] [address]  Phone: [phone]  Fax: [phone]

## 2017-10-17 NOTE — TELEPHONE ENCOUNTER
Since yesterday, 10-16-17 I have attempted to reach the patient, however the number given is either busy or rings and No Answer.

## 2017-10-25 ENCOUNTER — TELEPHONE (OUTPATIENT)
Dept: SLEEP MEDICINE | Age: 57
End: 2017-10-25

## 2017-10-26 ENCOUNTER — TELEPHONE (OUTPATIENT)
Dept: INTERNAL MEDICINE CLINIC | Age: 57
End: 2017-10-26

## 2017-10-26 RX ORDER — PRAVASTATIN SODIUM 80 MG/1
TABLET ORAL
Qty: 30 TAB | Refills: 6 | Status: SHIPPED | OUTPATIENT
Start: 2017-10-26 | End: 2018-09-02 | Stop reason: SDUPTHER

## 2017-10-26 RX ORDER — PRAVASTATIN SODIUM 80 MG/1
TABLET ORAL
Qty: 30 TAB | Refills: 5 | Status: SHIPPED | OUTPATIENT
Start: 2017-10-26 | End: 2017-10-26 | Stop reason: SDUPTHER

## 2017-11-14 ENCOUNTER — HOSPITAL ENCOUNTER (OUTPATIENT)
Dept: LAB | Age: 57
Discharge: HOME | End: 2017-11-14
Payer: COMMERCIAL

## 2017-11-14 LAB
ALBUMIN SERPL-MCNC: 3.7 G/DL (ref 3.4–5)
ANION GAP SERPL CALC-SCNC: 7 MMOL/L (ref 3–18)
APPEARANCE UR: CLEAR
BILIRUB UR QL: NEGATIVE
BUN SERPL-MCNC: 23 MG/DL (ref 7–18)
BUN/CREAT SERPL: 23 (ref 12–20)
CALCIUM SERPL-MCNC: 9 MG/DL (ref 8.5–10.1)
CHLORIDE SERPL-SCNC: 100 MMOL/L (ref 100–108)
CO2 SERPL-SCNC: 27 MMOL/L (ref 21–32)
COLOR UR: YELLOW
CREAT SERPL-MCNC: 1.02 MG/DL (ref 0.6–1.3)
GLUCOSE SERPL-MCNC: 81 MG/DL (ref 74–99)
GLUCOSE UR STRIP.AUTO-MCNC: NEGATIVE MG/DL
HGB UR QL STRIP: NEGATIVE
KETONES UR QL STRIP.AUTO: NEGATIVE MG/DL
LEUKOCYTE ESTERASE UR QL STRIP.AUTO: NEGATIVE
MAGNESIUM SERPL-MCNC: 2.3 MG/DL (ref 1.6–2.6)
NITRITE UR QL STRIP.AUTO: NEGATIVE
PH UR STRIP: 7.5 [PH] (ref 5–8)
PHOSPHATE SERPL-MCNC: 3.2 MG/DL (ref 2.5–4.9)
POTASSIUM SERPL-SCNC: 3.9 MMOL/L (ref 3.5–5.5)
PROT UR STRIP-MCNC: NEGATIVE MG/DL
SODIUM SERPL-SCNC: 134 MMOL/L (ref 136–145)
SP GR UR REFRACTOMETRY: 1 (ref 1–1.03)
UROBILINOGEN UR QL STRIP.AUTO: 0.2 EU/DL (ref 0.2–1)

## 2017-11-14 PROCEDURE — 36415 COLL VENOUS BLD VENIPUNCTURE: CPT | Performed by: INTERNAL MEDICINE

## 2017-11-14 PROCEDURE — 80069 RENAL FUNCTION PANEL: CPT | Performed by: INTERNAL MEDICINE

## 2017-11-14 PROCEDURE — 83735 ASSAY OF MAGNESIUM: CPT | Performed by: INTERNAL MEDICINE

## 2017-11-14 PROCEDURE — 81003 URINALYSIS AUTO W/O SCOPE: CPT | Performed by: INTERNAL MEDICINE

## 2017-11-21 ENCOUNTER — OFFICE VISIT (OUTPATIENT)
Dept: INTERNAL MEDICINE CLINIC | Age: 57
End: 2017-11-21

## 2017-11-21 VITALS
RESPIRATION RATE: 14 BRPM | SYSTOLIC BLOOD PRESSURE: 111 MMHG | WEIGHT: 145.4 LBS | HEART RATE: 67 BPM | DIASTOLIC BLOOD PRESSURE: 72 MMHG | BODY MASS INDEX: 26.76 KG/M2 | OXYGEN SATURATION: 98 % | HEIGHT: 62 IN | TEMPERATURE: 97.6 F

## 2017-11-21 DIAGNOSIS — J20.9 ACUTE BRONCHITIS, UNSPECIFIED ORGANISM: Primary | ICD-10-CM

## 2017-11-21 RX ORDER — AZITHROMYCIN 250 MG/1
TABLET, FILM COATED ORAL
Qty: 6 TAB | Refills: 0 | Status: SHIPPED | OUTPATIENT
Start: 2017-11-21 | End: 2018-06-05 | Stop reason: ALTCHOICE

## 2017-11-21 NOTE — MR AVS SNAPSHOT
Visit Information Date & Time Provider Department Dept. Phone Encounter #  
 11/21/2017 11:45 AM Rachna France MD [facility]  Follow-up Instructions Return if symptoms worsen or fail to improve. Your Appointments 1/12/2018  2:40 PM  
Follow Up with Giovanna Carver DO [facility] ([address]) Appt Note: f/u; r/s appt. ..sb  
 [place] [phone]  
[phone] [place] [address] Upcoming Health Maintenance Date Due DTaP/Tdap/Td series (1 - Tdap) 4/5/1983 PAP AKA CERVICAL CYTOLOGY 2/4/2016 BREAST CANCER SCRN MAMMOGRAM 11/22/2017 COLONOSCOPY 11/14/2019 Allergies as of 11/21/2017  Review Complete On: 10/4/2017 By: Rachna France MD  
  
 Severity Noted Reaction Type Reactions Ibuprofen High 11/10/2016    Other (comments) Nsaids (Non-steroidal Anti-inflammatory Drug) High 11/10/2016    Other (comments) Amoxicillin  12/12/2011    Unable to Obtain Lidocaine  01/08/2013    Swelling Oral Solution Lipitor [Atorvastatin]  07/15/2014    Myalgia Lopressor [Metoprolol Tartrate]  02/28/2012    Other (comments) Lightheaded and cp Percocet [Oxycodone-acetaminophen]  12/12/2011    Unable to Obtain Vicodin [Hydrocodone-acetaminophen]  12/12/2011    Unable to Obtain Current Immunizations  Reviewed on 11/13/2013 Name Date  
 TB Skin Test (PPD) Intradermal 11/13/2013 Not reviewed this visit You Were Diagnosed With   
  
 Codes Comments Acute bronchitis, unspecified organism    -  Primary ICD-10-CM: J20.9 ICD-9-CM: 466.0 Vitals BP Pulse Temp Resp Height(growth percentile) Weight(growth percentile) 111/72 (BP 1 Location: Left arm, BP Patient Position: Sitting) 67 97.6 °F (36.4 °C) (Oral) 14 5' 2\" (1.575 m) 145 lb 6.4 oz (66 kg) SpO2 BMI OB Status Smoking Status 98% 26.59 kg/m2 Postmenopausal Former Smoker BMI and BSA Data Body Mass Index Body Surface Area  
 26.59 kg/m 2 1.7 m 2 Preferred Pharmacy Pharmacy Name Phone [facility], [address] [phone] Your Updated Medication List  
  
   
This list is accurate as of: 11/21/17 12:39 PM.  Always use your most recent med list. amLODIPine 2.5 mg tablet Commonly known as:  Lincoln Conine Take 1 Tab by mouth daily. atenolol 25 mg tablet Commonly known as:  TENORMIN Take 0.5 Tabs by mouth daily. azithromycin 250 mg tablet Commonly known as:  Melissa [place] Take 500mg (2 tabs) on Day 1 and then 250mg (1 tab) daily on Days 2-5. CARAFATE 1 gram tablet Generic drug:  sucralfate Take 1 g by mouth daily as needed. CENTRUM SILVER PO Take 1 Tab by mouth daily. co-enzyme Q-10 100 mg capsule Commonly known as:  CO Q-10 Take 100 mg by mouth two (2) times a day. DEXILANT 60 mg Cpdb Generic drug:  Dexlansoprazole Take 1 Cap by mouth every Monday, Wednesday, Friday. ECOTRIN LOW STRENGTH 81 mg tablet Generic drug:  aspirin delayed-release Take 81 mg by mouth daily. FISH OIL 1,000 mg Cap Generic drug:  omega-3 fatty acids-vitamin e Take 2 Caps by mouth daily. flunisolide 25 mcg (0.025 %) Spry Commonly known as:  NASAREL  
2 Sprays by Both Nostrils route two (2) times a day. Indications: ALLERGIC RHINITIS  
  
 GARLIC PO Take 2 Caps by mouth daily. glucose blood VI test strips strip Commonly known as:  blood glucose test  
Use to check glucose daily  
  
 melatonin 3 mg tablet Take 3 mg by mouth nightly. OTHER  
sample Biofreeze cold therapy pain relief  
  
 potassium chloride 20 mEq/15 mL solution Commonly known as:  JEISON 10% Patient states she take 1 tablespoon daily  
  
 pravastatin 80 mg tablet Commonly known as:  PRAVACHOL  
TAKE ONE TABLET BY MOUTH EVERY NIGHT AT BEDTIME  
  
 spironolactone 25 mg tablet Commonly known as:  ALDACTONE Take 1 Tab by mouth daily. Prescriptions Sent to Pharmacy Refills  
 azithromycin (ZITHROMAX) 250 mg tablet 0 Sig: Take 500mg (2 tabs) on Day 1 and then 250mg (1 tab) daily on Days 2-5. Class: Normal  
 Pharmacy: [facility] [address] [place] [address] #: [phone] Follow-up Instructions Return if symptoms worsen or fail to improve. Patient Instructions Body Mass Index: Care Instructions Your Care Instructions Body mass index (BMI) can help you see if your weight is raising your risk for health problems. It uses a formula to compare how much you weigh with how tall you are. · A BMI lower than 18.5 is considered underweight. · A BMI between 18.5 and 24.9 is considered healthy. · A BMI between 25 and 29.9 is considered overweight. A BMI of 30 or higher is considered obese. If your BMI is in the normal range, it means that you have a lower risk for weight-related health problems. If your BMI is in the overweight or obese range, you may be at increased risk for weight-related health problems, such as high blood pressure, heart disease, stroke, arthritis or joint pain, and diabetes. If your BMI is in the underweight range, you may be at increased risk for health problems such as fatigue, lower protection (immunity) against illness, muscle loss, bone loss, hair loss, and hormone problems. BMI is just one measure of your risk for weight-related health problems. You may be at higher risk for health problems if you are not active, you eat an unhealthy diet, or you drink too much alcohol or use tobacco products. Follow-up care is a key part of your treatment and safety.  Be sure to make and go to all appointments, and call your doctor if you are having problems. It's also a good idea to know your test results and keep a list of the medicines you take. How can you care for yourself at home? · Practice healthy eating habits. This includes eating plenty of fruits, vegetables, whole grains, lean protein, and low-fat dairy. · If your doctor recommends it, get more exercise. Walking is a good choice. Bit by bit, increase the amount you walk every day. Try for at least 30 minutes on most days of the week. · Do not smoke. Smoking can increase your risk for health problems. If you need help quitting, talk to your doctor about stop-smoking programs and medicines. These can increase your chances of quitting for good. · Limit alcohol to 2 drinks a day for men and 1 drink a day for women. Too much alcohol can cause health problems. If you have a BMI higher than 25 · Your doctor may do other tests to check your risk for weight-related health problems. This may include measuring the distance around your waist. A waist measurement of more than 40 inches in men or 35 inches in women can increase the risk of weight-related health problems. · Talk with your doctor about steps you can take to stay healthy or improve your health. You may need to make lifestyle changes to lose weight and stay healthy, such as changing your diet and getting regular exercise. If you have a BMI lower than 18.5 · Your doctor may do other tests to check your risk for health problems. · Talk with your doctor about steps you can take to stay healthy or improve your health. You may need to make lifestyle changes to gain or maintain weight and stay healthy, such as getting more healthy foods in your diet and doing exercises to build muscle. Where can you learn more? Go to http://diana-pam.info/. Enter S176 in the search box to learn more about \"Body Mass Index: Care Instructions. \" Current as of: October 13, 2016 Content Version: 11.4 © 1353-7842 [employer]wise[employer]. Care instructions adapted under license by [employer] (which disclaims liability or warranty for this information). If you have questions about a medical condition or this instruction, always ask your healthcare professional. [address] any warranty or liability for your use of this information. Patient still has a copy of the Advanced Directive form and understands to bring it in once completed. Health Maintenance Due Topic Date Due  
 DTaP/Tdap/Td series (1 - Tdap) 04/05/1983  PAP AKA CERVICAL CYTOLOGY  02/04/2016  BREAST CANCER SCRN MAMMOGRAM  11/22/2017 Introducing \[facility] [place] [facility]\"" & HEALTH SERVICES! Yessenia Rea introduces [employer] patient portal. Now you can access parts of your medical record, email your doctor's office, and request medication refills online. 1. In your internet browser, go to [email or web address] 2. Click on the First Time User? Click Here link in the Sign In box. You will see the New Member Sign Up page. 3. Enter your [employer] Access Code exactly as it appears below. You will not need to use this code after youve completed the sign-up process. If you do not sign up before the expiration date, you must request a new code. · [employer] Access Code: [email or web address] Expires: 11/29/2017 12:01 PM 
 
4. Enter the last four digits of your Social Security Number (xxxx) and Date of Birth (mm/dd/yyyy) as indicated and click Submit. You will be taken to the next sign-up page. 5. Create a [employer] ID. This will be your [employer] login ID and cannot be changed, so think of one that is secure and easy to remember. 6. Create a [employer] password. You can change your password at any time. 7. Enter your Password Reset Question and Answer. This can be used at a later time if you forget your password. 8. Enter your e-mail address.  You will receive e-mail notification when new information is available in [employer]. 9. Click Sign Up. You can now view and download portions of your medical record. 10. Click the Download Summary menu link to download a portable copy of your medical information. If you have questions, please visit the Frequently Asked Questions section of the [employer] website. Remember, [employer] is NOT to be used for urgent needs. For medical emergencies, dial 911. Now available from your iPhone and Android! Please provide this summary of care documentation to your next provider. Your primary care clinician is listed as Bobby Escobar. If you have any questions after today's visit, please call [phone].

## 2017-11-21 NOTE — PROGRESS NOTES
INTERNISTS OF [facility]– [place]:  11/21/2017, MRN: [id number]      Fahad Wood is a 56 y.o. female and presents to clinic for Cold Symptoms (7-10 days); Cough (coughing up clear to yellow phlegm); and Hoarse    Subjective:   Pt is a 51yo AAF with h/o chronic bronchitis, LUCY, porphyria (per EHR), allergic rhinitis, multiple food allergies (per lab work), HTN, HLD, constipation, gallbladder polyps (suggested by CT scan 10/13/16), and GERD. Acute bronchitis: She reports a 7-10 day h/o a productive cough with yellow sputum. She has pleuritic CP. No relief with use of mucinex DM and coricidin. No sick contacts. +Scratchy sore throat. No fever/chills. SOB was present but resolved. +Associated hoarseness. Sx are worsening. Patient Active Problem List    Diagnosis Date Noted    Allergic rhinitis 10/04/2017    Gallbladder polyp - suggested by findings on CT scan from 10/13/16 03/01/2017    Hypersomnia with sleep apnea 03/13/2014    Hypertensive heart disease  03/05/2012    Constipation 01/16/2012    Hyperlipidemia     GERD (gastroesophageal reflux disease)        Current Outpatient Prescriptions   Medication Sig Dispense Refill    azithromycin (ZITHROMAX) 250 mg tablet Take 500mg (2 tabs) on Day 1 and then 250mg (1 tab) daily on Days 2-5. 6 Tab 0    pravastatin (PRAVACHOL) 80 mg tablet TAKE ONE TABLET BY MOUTH EVERY NIGHT AT BEDTIME 30 Tab 6    spironolactone (ALDACTONE) 25 mg tablet Take 1 Tab by mouth daily. 30 Tab 11    amLODIPine (NORVASC) 2.5 mg tablet Take 1 Tab by mouth daily. 30 Tab 6    atenolol (TENORMIN) 25 mg tablet Take 0.5 Tabs by mouth daily. 30 Tab 3    potassium chloride (KAON 10%) 20 mEq/15 mL solution Patient states she take 1 tablespoon daily      OTHER sample Biofreeze cold therapy pain relief      glucose blood VI test strips (BLOOD GLUCOSE TEST) strip Use to check glucose daily 1 Package 11    co-enzyme Q-10 (CO Q-10) 100 mg capsule Take 100 mg by mouth two (2) times a day.       GARLIC PO Take 2 Caps by mouth daily.  aspirin delayed-release (ECOTRIN LOW STRENGTH) 81 mg tablet Take 81 mg by mouth daily.  omega-3 fatty acids-vitamin e (FISH OIL) 1,000 mg cap Take 2 Caps by mouth daily.  melatonin 3 mg tablet Take 3 mg by mouth nightly.  Dexlansoprazole (DEXILANT) 60 mg CpDM Take 1 Cap by mouth every Monday, Wednesday, Friday.  sucralfate (CARAFATE) 1 gram tablet Take 1 g by mouth daily as needed.  MULTIVITAMIN W-MINERALS/LUTEIN (CENTRUM SILVER PO) Take 1 Tab by mouth daily.  flunisolide (NASAREL) 25 mcg (0.025 %) spry 2 Sprays by Both Nostrils route two (2) times a day. Indications: ALLERGIC RHINITIS 25 mL 1       Allergies   Allergen Reactions    Ibuprofen Other (comments)    Nsaids (Non-Steroidal Anti-Inflammatory Drug) Other (comments)    Amoxicillin Unable to Obtain    Lidocaine Swelling     Oral Solution    Lipitor [Atorvastatin] Myalgia           Lopressor [Metoprolol Tartrate] Other (comments)     Lightheaded and cp    Percocet [Oxycodone-Acetaminophen] Unable to Obtain    Vicodin [Hydrocodone-Acetaminophen] Unable to Obtain       Past Medical History:   Diagnosis Date    Cardiac Agatston CAC score, <100 04/30/2014    Coronary calcium score 0.    Cardiac Holter monitoring 01/25/2017    Sinus rhythm, avg HR 73 bpm (range ). Benign Holter study.  Cardiac nuclear imaging test 01/11/2013    No convincing evidence for ischemia or infarction in the setting of significant chest wall artifact. EF 62%. No RWMA. Neg EKG on max EST. Ex time 9 min 50 sec.  Cardiac stress echo, normal 05/05/2016    Normal maximal stress echo w/reproduction of atypical chest discomfort. Ex time 11 min 3 sec. EF 55%.  Cardiovascular LLE venous duplex 09/28/2016    Left leg:  No DVT.     Chest pain     GERD (gastroesophageal reflux disease)     Heel pain, bilateral     Hiatal hernia     denies this    HTN (hypertension)     Hyperlipidemia     Insulin resistance     follows with endocrinology for this    Night sweats     PVC (premature ventricular contraction)     Right low back pain     S/P colonoscopy 2009, 2014    normal per patient    Unsteady gait     due to heel pain       Past Surgical History:   Procedure Laterality Date    D/C SUCTION  2011    HX OTHER SURGICAL  2014    colonscopy    HX OTHER SURGICAL      endoscopy    HX OTHER SURGICAL      wisdom teeth removal x1       Family History   Problem Relation Age of Onset    Hypertension Mother     Heart defect Mother      anuerysm    Heart Attack Mother     Asthma Father     Heart Attack Maternal Uncle     Heart Attack Maternal Uncle     Heart Disease Brother     Hypertension Brother        Social History   Substance Use Topics    Smoking status: Former Smoker     Packs/day: 0.50     Years: 11.00     Types: Cigarettes     Quit date: 12/31/2006    Smokeless tobacco: Former User     Quit date: 1/10/1993      Comment: 6-7 cigs per day    Alcohol use No       ROS   Review of Systems   Constitutional: Positive for malaise/fatigue. Negative for chills and fever. HENT: Positive for sore throat. Negative for ear pain. Eyes: Negative for blurred vision and pain. Respiratory: Positive for cough and sputum production. Negative for shortness of breath. Cardiovascular: Negative for chest pain. Gastrointestinal: Negative for abdominal pain, blood in stool and melena. Genitourinary: Negative for dysuria and hematuria. Musculoskeletal: Negative for joint pain and myalgias. Skin: Negative for rash. Neurological: Negative for tingling, focal weakness and headaches. Endo/Heme/Allergies: Does not bruise/bleed easily. Psychiatric/Behavioral: Negative for substance abuse.        Objective     Vitals:    11/21/17 1219   BP: 111/72   Pulse: 67   Resp: 14   Temp: 97.6 °F (36.4 °C)   TempSrc: Oral   SpO2: 98%   Weight: 145 lb 6.4 oz (66 kg)   Height: 5' 2\" (1.575 m)   PainSc:  10 - Worst pain ever   PainLoc: Back       Physical Exam   Constitutional: She is oriented to person, place, and time and well-developed, well-nourished, and in no distress. HENT:   Head: Normocephalic and atraumatic. Right Ear: External ear normal.   Left Ear: External ear normal.   Nose: Nose normal.   Mild rim of erythema in posterior oropharynx. Clear TMs   Eyes: Conjunctivae and EOM are normal. Pupils are equal, round, and reactive to light. Right eye exhibits no discharge. Left eye exhibits no discharge. No scleral icterus. Neck: Neck supple. Cardiovascular: Normal rate, regular rhythm, normal heart sounds and intact distal pulses. Exam reveals no gallop and no friction rub. No murmur heard. Pulmonary/Chest: Effort normal. No respiratory distress. She has no wheezes. Scattered coarse breath sounds in b/l lung fields. Abdominal: Soft. Bowel sounds are normal. She exhibits no distension. There is no tenderness. There is no rebound and no guarding. No hepatomegaly   Musculoskeletal: She exhibits no edema or tenderness (BUE are NTTP). Lymphadenopathy:     She has no cervical adenopathy. Neurological: She is alert and oriented to person, place, and time. She exhibits normal muscle tone. Gait normal.   Skin: Skin is warm and dry. No erythema. Psychiatric: Affect normal.   Nursing note and vitals reviewed.       LABS   Data Review:   Lab Results   Component Value Date/Time    WBC 5.5 09/02/2017 11:15 PM    Hemoglobin, POC 12.2 01/09/2013 08:19 PM    HGB 13.8 09/02/2017 11:15 PM    Hematocrit, POC 36 01/09/2013 08:19 PM    HCT 42.2 09/02/2017 11:15 PM    PLATELET 289 46/92/5876 11:15 PM    MCV 87.9 09/02/2017 11:15 PM       Lab Results   Component Value Date/Time    Sodium 134 11/14/2017 01:30 PM    Potassium 3.9 11/14/2017 01:30 PM    Chloride 100 11/14/2017 01:30 PM    CO2 27 11/14/2017 01:30 PM    Anion gap 7 11/14/2017 01:30 PM    Glucose 81 11/14/2017 01:30 PM    BUN 23 11/14/2017 01:30 PM Creatinine 1.02 11/14/2017 01:30 PM    BUN/Creatinine ratio 23 11/14/2017 01:30 PM    GFR est AA >60 11/14/2017 01:30 PM    GFR est non-AA 56 11/14/2017 01:30 PM    Calcium 9.0 11/14/2017 01:30 PM       Lab Results   Component Value Date/Time    Cholesterol, total 160 04/26/2017 08:20 AM    HDL Cholesterol 78 04/26/2017 08:20 AM    LDL, calculated 70.2 04/26/2017 08:20 AM    VLDL, calculated 11.8 04/26/2017 08:20 AM    Triglyceride 59 04/26/2017 08:20 AM    CHOL/HDL Ratio 2.1 04/26/2017 08:20 AM       Lab Results   Component Value Date/Time    Hemoglobin A1c (POC) 5.6 09/19/2013 02:30 PM    Hemoglobin A1c, External 5.8 10/08/2015       Assessment/Plan:   Acute bronchitis:  Prescribing azithromycin. If her symptoms do not improve, I will order a follow-up chest x-ray and lab work to further investigate her symptoms. Health Maintenance Due   Topic Date Due    DTaP/Tdap/Td series (1 - Tdap) 04/05/1983    PAP AKA CERVICAL CYTOLOGY  02/04/2016    BREAST CANCER SCRN MAMMOGRAM  11/22/2017     Lab review: labs are reviewed in the EHR    I have discussed the diagnosis with the patient and the intended plan as seen in the above orders. The patient has received an after-visit summary and questions were answered concerning future plans. I have discussed medication side effects and warnings with the patient as well. I have reviewed the plan of care with the patient, accepted their input and they are in agreement with the treatment goals. All questions were answered. The patient understands the plan of care. Handouts provided today with above information. Pt instructed if symptoms worsen to call the office or report to the ED for continued care. Greater than 50% of the visit time was spent in counseling and/or coordination of care. Follow-up Disposition:  Return if symptoms worsen or fail to improve.     Deepthi Ventura MD

## 2017-11-21 NOTE — PROGRESS NOTES
Chief Complaint   Patient presents with    Cold Symptoms     7-10 days    Cough     coughing up clear to yellow phlegm    Hoarse     Patient still has a copy of the Advanced Directive form and understands to bring it in once completed. 1. Have you been to the ER, urgent care clinic since your last visit? Hospitalized since your last visit? No    2. Have you seen or consulted any other health care providers outside of the [address] [place] [address] since your last visit? Include any pap smears or colon screening.  No

## 2017-11-21 NOTE — ACP (ADVANCE CARE PLANNING)
Patient still has a copy of the Advanced Directive form and understands to bring it in once completed.

## 2017-11-21 NOTE — PATIENT INSTRUCTIONS
Body Mass Index: Care Instructions  Your Care Instructions    Body mass index (BMI) can help you see if your weight is raising your risk for health problems. It uses a formula to compare how much you weigh with how tall you are. · A BMI lower than 18.5 is considered underweight. · A BMI between 18.5 and 24.9 is considered healthy. · A BMI between 25 and 29.9 is considered overweight. A BMI of 30 or higher is considered obese. If your BMI is in the normal range, it means that you have a lower risk for weight-related health problems. If your BMI is in the overweight or obese range, you may be at increased risk for weight-related health problems, such as high blood pressure, heart disease, stroke, arthritis or joint pain, and diabetes. If your BMI is in the underweight range, you may be at increased risk for health problems such as fatigue, lower protection (immunity) against illness, muscle loss, bone loss, hair loss, and hormone problems. BMI is just one measure of your risk for weight-related health problems. You may be at higher risk for health problems if you are not active, you eat an unhealthy diet, or you drink too much alcohol or use tobacco products. Follow-up care is a key part of your treatment and safety. Be sure to make and go to all appointments, and call your doctor if you are having problems. It's also a good idea to know your test results and keep a list of the medicines you take. How can you care for yourself at home? · Practice healthy eating habits. This includes eating plenty of fruits, vegetables, whole grains, lean protein, and low-fat dairy. · If your doctor recommends it, get more exercise. Walking is a good choice. Bit by bit, increase the amount you walk every day. Try for at least 30 minutes on most days of the week. · Do not smoke. Smoking can increase your risk for health problems. If you need help quitting, talk to your doctor about stop-smoking programs and medicines. These can increase your chances of quitting for good. · Limit alcohol to 2 drinks a day for men and 1 drink a day for women. Too much alcohol can cause health problems. If you have a BMI higher than 25  · Your doctor may do other tests to check your risk for weight-related health problems. This may include measuring the distance around your waist. A waist measurement of more than 40 inches in men or 35 inches in women can increase the risk of weight-related health problems. · Talk with your doctor about steps you can take to stay healthy or improve your health. You may need to make lifestyle changes to lose weight and stay healthy, such as changing your diet and getting regular exercise. If you have a BMI lower than 18.5  · Your doctor may do other tests to check your risk for health problems. · Talk with your doctor about steps you can take to stay healthy or improve your health. You may need to make lifestyle changes to gain or maintain weight and stay healthy, such as getting more healthy foods in your diet and doing exercises to build muscle. Where can you learn more? Go to http://diana-pam.info/. Enter S176 in the search box to learn more about \"Body Mass Index: Care Instructions. \"  Current as of: October 13, 2016  Content Version: 11.4  © 0286-1081 [employer]. Care instructions adapted under license by [employer] (which disclaims liability or warranty for this information). If you have questions about a medical condition or this instruction, always ask your healthcare professional. [place] [address] any warranty or liability for your use of this information. Patient still has a copy of the Advanced Directive form and understands to bring it in once completed.   Health Maintenance Due   Topic Date Due    DTaP/Tdap/Td series (1 - Tdap) 04/05/1983    PAP AKA CERVICAL CYTOLOGY  02/04/2016    BREAST CANCER SCRN MAMMOGRAM  11/22/2017

## 2018-01-23 RX ORDER — ATENOLOL 25 MG/1
TABLET ORAL
Qty: 30 TAB | Refills: 3 | Status: SHIPPED | OUTPATIENT
Start: 2018-01-23 | End: 2019-02-03 | Stop reason: SDUPTHER

## 2018-02-02 DIAGNOSIS — E78.5 HYPERLIPIDEMIA, UNSPECIFIED HYPERLIPIDEMIA TYPE: ICD-10-CM

## 2018-02-02 DIAGNOSIS — I11.9 BENIGN HYPERTENSIVE HEART DISEASE WITHOUT HEART FAILURE: Primary | ICD-10-CM

## 2018-02-05 ENCOUNTER — HOSPITAL ENCOUNTER (OUTPATIENT)
Dept: LAB | Age: 58
Discharge: HOME | End: 2018-02-05
Payer: COMMERCIAL

## 2018-02-05 DIAGNOSIS — I11.9 BENIGN HYPERTENSIVE HEART DISEASE WITHOUT HEART FAILURE: ICD-10-CM

## 2018-02-05 DIAGNOSIS — E78.5 HYPERLIPIDEMIA, UNSPECIFIED HYPERLIPIDEMIA TYPE: ICD-10-CM

## 2018-02-05 LAB
ALBUMIN SERPL-MCNC: 3.7 G/DL (ref 3.4–5)
ALBUMIN/GLOB SERPL: 1.1 {RATIO} (ref 0.8–1.7)
ALP SERPL-CCNC: 35 U/L (ref 45–117)
ALT SERPL-CCNC: 25 U/L (ref 13–56)
ANION GAP SERPL CALC-SCNC: 9 MMOL/L (ref 3–18)
AST SERPL-CCNC: 23 U/L (ref 15–37)
BILIRUB DIRECT SERPL-MCNC: 0.1 MG/DL (ref 0–0.2)
BILIRUB SERPL-MCNC: 0.5 MG/DL (ref 0.2–1)
BUN SERPL-MCNC: 17 MG/DL (ref 7–18)
BUN/CREAT SERPL: 16 (ref 12–20)
CALCIUM SERPL-MCNC: 8.8 MG/DL (ref 8.5–10.1)
CHLORIDE SERPL-SCNC: 105 MMOL/L (ref 100–108)
CHOLEST SERPL-MCNC: 141 MG/DL
CO2 SERPL-SCNC: 27 MMOL/L (ref 21–32)
CREAT SERPL-MCNC: 1.05 MG/DL (ref 0.6–1.3)
GLOBULIN SER CALC-MCNC: 3.3 G/DL (ref 2–4)
GLUCOSE SERPL-MCNC: 75 MG/DL (ref 74–99)
HDLC SERPL-MCNC: 74 MG/DL (ref 40–60)
HDLC SERPL: 1.9 {RATIO} (ref 0–5)
LDLC SERPL CALC-MCNC: 56 MG/DL (ref 0–100)
LIPID PROFILE,FLP: ABNORMAL
POTASSIUM SERPL-SCNC: 3.8 MMOL/L (ref 3.5–5.5)
PROT SERPL-MCNC: 7 G/DL (ref 6.4–8.2)
SODIUM SERPL-SCNC: 141 MMOL/L (ref 136–145)
TRIGL SERPL-MCNC: 55 MG/DL (ref ?–150)
VLDLC SERPL CALC-MCNC: 11 MG/DL

## 2018-02-05 PROCEDURE — 80048 BASIC METABOLIC PNL TOTAL CA: CPT | Performed by: INTERNAL MEDICINE

## 2018-02-05 PROCEDURE — 80061 LIPID PANEL: CPT | Performed by: INTERNAL MEDICINE

## 2018-02-05 PROCEDURE — 36415 COLL VENOUS BLD VENIPUNCTURE: CPT | Performed by: INTERNAL MEDICINE

## 2018-02-05 PROCEDURE — 80076 HEPATIC FUNCTION PANEL: CPT | Performed by: INTERNAL MEDICINE

## 2018-02-06 NOTE — PROGRESS NOTES
Was ordered as routine lipid, hepatic, kidney function after 6 months. The last telephone encounter, \"----- Message from Pedro Camara DO sent at 5/7/2017  2:52 PM EDT -----  Her HDL at 78 is higher than her LDL at 70.2 so I think this is very good control and similar to 3 months ago. It was somewhat better several months ago, but is still adequate on Pravachol 80 mg daily.  ES\"

## 2018-02-07 ENCOUNTER — OFFICE VISIT (OUTPATIENT)
Dept: CARDIOLOGY CLINIC | Age: 58
End: 2018-02-07

## 2018-02-07 VITALS
DIASTOLIC BLOOD PRESSURE: 80 MMHG | OXYGEN SATURATION: 98 % | SYSTOLIC BLOOD PRESSURE: 130 MMHG | WEIGHT: 148 LBS | HEART RATE: 56 BPM | HEIGHT: 62 IN | BODY MASS INDEX: 27.23 KG/M2

## 2018-02-07 DIAGNOSIS — R00.2 PALPITATIONS: ICD-10-CM

## 2018-02-07 DIAGNOSIS — I11.9 BENIGN HYPERTENSIVE HEART DISEASE WITHOUT HEART FAILURE: Primary | ICD-10-CM

## 2018-02-07 DIAGNOSIS — E78.5 HYPERLIPIDEMIA, UNSPECIFIED HYPERLIPIDEMIA TYPE: ICD-10-CM

## 2018-02-07 DIAGNOSIS — R07.89 OTHER CHEST PAIN: ICD-10-CM

## 2018-02-07 NOTE — PROGRESS NOTES
1. Have you been to the ER, urgent care clinic since your last visit? Hospitalized since your last visit? Yes, 9-02-17 abdominal pain    2. Have you seen or consulted any other health care providers outside of the [address] [place] [address] since your last visit? Include any pap smears or colon screening.  no

## 2018-02-07 NOTE — PROGRESS NOTES
HPI:  I saw Carolann Stoll in my office today in cardiovascular evaluation regarding her problems with palpitations. Ms. Sepideh Mark is a very pleasant, but anxious 51-year-old -American female with history of non-anginal sounding chest pain and palpitations over the years. She has had Holter's in the past which have shown no significant ectopy except for some rare PAC's, but due to symptoms of palpitations she has been on atenolol 25 mg daily or as needed and Norvasc 5 mg along with Aldactone 25 mg daily for blood pressure. She comes in today relates that she is doing quite well. She has had some vague chest pain from time to time which really sounds nonanginal in occasional palpitations but denies any other cardiovascular symptomatology. Encounter Diagnoses   Name Primary?  Hypertensive heart disease  Yes    Hyperlipidemia, unspecified hyperlipidemia type     Palpitations     Other chest pain        Discussion: This patient appears to be doing about as well as we could expect and really of no recommendations for change at this time. She is not having any symptoms to suggest the development of symptomatic obstructive coronary artery disease and she really has no significant risk factors for coronary artery disease with with a coronary calcium score of 0 a few years ago and extremely low cholesterol on medical therapy with only non-anginal sounding chest discomfort. Her latest lipid profile which was just completed on February 5, 2018 showed a total cholesterol of 141 with triglycerides of 55, HDL of 74, LDL of 56, and VLDL of 11 which I think is excellent control on Pravachol 80 mg daily. Her blood pressure is well-controlled today and her EKG is normal without any ectopy so I am simply get a plan to see her again in several months or sooner if any new cardiovascular symptoms surface.     PCP: Caroline Hunter MD       Past Medical History:   Diagnosis Date    Cardiac Agatston CAC score, <100 04/30/2014    Coronary calcium score 0.    Cardiac Holter monitoring 01/25/2017    Sinus rhythm, avg HR 73 bpm (range ). Benign Holter study.  Cardiac nuclear imaging test 01/11/2013    No convincing evidence for ischemia or infarction in the setting of significant chest wall artifact. EF 62%. No RWMA. Neg EKG on max EST. Ex time 9 min 50 sec.  Cardiac stress echo, normal 05/05/2016    Normal maximal stress echo w/reproduction of atypical chest discomfort. Ex time 11 min 3 sec. EF 55%.  Cardiovascular LLE venous duplex 09/28/2016    Left leg:  No DVT.  Chest pain     GERD (gastroesophageal reflux disease)     Heel pain, bilateral     Hiatal hernia     denies this    HTN (hypertension)     Hyperlipidemia     Insulin resistance     follows with endocrinology for this    Night sweats     PVC (premature ventricular contraction)     Right low back pain     S/P colonoscopy 2009, 2014    normal per patient    Unsteady gait     due to heel pain         Past Surgical History:   Procedure Laterality Date    D/C SUCTION  2011    HX OTHER SURGICAL  2014    colonscopy    HX OTHER SURGICAL      endoscopy    HX OTHER SURGICAL      wisdom teeth removal x1         Current Outpatient Rx   Name  Route  Sig  Dispense  Refill    OTHER        sample Biofreeze cold therapy pain relief              flunisolide (NASAREL) 25 mcg (0.025 %) spry    Both Nostrils    2 Sprays by Both Nostrils route two (2) times a day. Indications: ALLERGIC RHINITIS    25 mL    1      fexofenadine (ALLEGRA) 180 mg tablet    Oral    Take 1 Tab by mouth daily. 30 Tab    3      spironolactone (ALDACTONE) 25 mg tablet    Oral    Take 1 Tab by mouth daily. 30 Tab    3      amLODIPine (NORVASC) 5 mg tablet    Oral    Take 1 Tab by mouth daily.     30 Tab    6      glucose blood VI test strips (BLOOD GLUCOSE TEST) strip        Use to check glucose daily    1 Package    11      atenolol (TENORMIN) 25 mg tablet Oral    Take 1 tablet by mouth daily. 90 tablet    3      co-enzyme Q-10 (CO Q-10) 100 mg capsule    Oral    Take 100 mg by mouth two (2) times a day.  pravastatin (PRAVACHOL) 80 mg tablet    Oral    Take 1 tablet by mouth nightly. 90 tablet    3      potassium chloride (KAON 10%) 20 mEq/15 mL solution    Oral    Take 15 mL by mouth two (2) times a day. 1 mL    0       Being followed by Dr. Jose Haynes 2 Caps by mouth daily.  aspirin delayed-release (ECOTRIN LOW STRENGTH) 81 mg tablet    Oral    Take 81 mg by mouth daily.  omega-3 fatty acids-vitamin e (FISH OIL) 1,000 mg cap    Oral    Take 1 Cap by mouth daily.  melatonin 3 mg tablet    Oral    Take 3 mg by mouth nightly.  Dexlansoprazole (DEXILANT) 60 mg CpDM    Oral    Take 1 capsule by mouth as needed.  sucralfate (CARAFATE) 1 gram tablet    Oral    Take 1 g by mouth daily.  MULTIVITAMIN W-MINERALS/LUTEIN (CENTRUM SILVER PO)    Oral    Take 1 Tab by mouth daily.                    Allergies   Allergen Reactions    Ibuprofen Other (comments)    Nsaids (Non-Steroidal Anti-Inflammatory Drug) Other (comments)    Amoxicillin Unable to Obtain    Lidocaine Swelling     Oral Solution    Lipitor [Atorvastatin] Myalgia           Lopressor [Metoprolol Tartrate] Other (comments)     Lightheaded and cp    Percocet [Oxycodone-Acetaminophen] Unable to Obtain    Vicodin [Hydrocodone-Acetaminophen] Unable to Obtain         Social History :  Social History   Substance Use Topics    Smoking status: Former Smoker     Packs/day: 0.50     Years: 11.00     Types: Cigarettes     Quit date: 12/31/2006    Smokeless tobacco: Former User     Quit date: 1/10/1993      Comment: 6-7 cigs per day    Alcohol use No        Family History: family history includes Asthma in her father; Heart Attack in her maternal uncle, maternal uncle, and mother; Heart Disease in her brother; Heart defect in her mother; Hypertension in her brother and mother. Review of Systems:  Constitutional: Negative for chills, fever, malaise/fatigue and weight loss. Respiratory: Positive for shortness of breath. Negative for cough, hemoptysis and wheezing. Cardiovascular: Positive for chest pain and palpitations. Negative for orthopnea and leg swelling. Gastrointestinal: Positive for abdominal pain and heartburn. Negative for blood in stool, constipation, diarrhea, melena, nausea and vomiting. Musculoskeletal: Positive for joint pain and myalgias. Negative for falls. Neurological: Positive for dizziness. Physical Exam:    The patient is a cooperative, alert, well developed, well nourished 56 y.o.  female who is in no acute distress at the time of the examination. Visit Vitals    /80    Pulse (!) 56    Ht 5' 2\" (1.575 m)    Wt 67.1 kg (148 lb)    SpO2 98%    BMI 27.07 kg/m2     HEENT: Conjuctiva white, mucosa moist, no pallor or cyanosis. NECK: Supple without masses, tenderness or thyromegaly. There was no jugular venous distention. Carotid are full bilaterally without bruits. CHEST: Symmetrical with good excursion. LUNGS: Clear to auscultation in all fields. HEART: The apex is not displaced. There were no lifts, thrills or heaves. There is a normal S1 and S2 without appreciable murmurs rubs clicks or gallops auscultated. ABDOMEN: Soft without masses or tenderness. The liver is palpated 2-3 finger breadths below the right costal margin. EXTREMITIES: Full peripheral pulses without peripheral edema.     Review of Data: See PMH and Cardiology and Imaging sections for cardiac testing  Lab Results   Component Value Date/Time    Cholesterol, total 141 02/05/2018 04:27 PM    HDL Cholesterol 74 (H) 02/05/2018 04:27 PM    LDL, calculated 56 02/05/2018 04:27 PM    Triglyceride 55 02/05/2018 04:27 PM    CHOL/HDL Ratio 1.9 02/05/2018 04:27 PM Results for orders placed or performed in visit on 02/07/18   AMB POC EKG ROUTINE W/ 12 LEADS, INTER & REP     Status: None    Narrative    Sinus bradycardia rate 56. This EKG is within normal limits and similar to the EKG of June 2, 2017 except at that time the patient was having some PACs and has no ectopy on the current tracing. Pedro Camara D.O., [email or web address]  [facility]  [address] [place] [address]. [address] Cindy Dianne    PLEASE NOTE:  This document has been produced using voice recognition software. Unrecognized errors in transcription may be present.

## 2018-02-07 NOTE — MR AVS SNAPSHOT
[address] [place] [address] [place] [address] 
[phone] Patient: Disha Henderson MRN: [id number] Visit Information Date & Time Provider Department Dept. Phone Encounter #  
 2018  3:00 PM Ryan Burciaga, [address] [place] [address] [facility] [address] [id number] Follow-up Instructions Return in about 6 months (around 2018), or if symptoms worsen or fail to improve. Upcoming Health Maintenance Date Due DTaP/Tdap/Td series (1 - Tdap) 1983 PAP AKA CERVICAL CYTOLOGY 2016 BREAST CANCER SCRN MAMMOGRAM 2017 COLONOSCOPY 2019 Allergies as of 2018  Review Complete On: 2018 By: Ryan Burciaga,  Severity Noted Reaction Type Reactions Ibuprofen High 11/10/2016    Other (comments) Nsaids (Non-steroidal Anti-inflammatory Drug) High 11/10/2016    Other (comments) Amoxicillin  2011    Unable to Obtain Lidocaine  2013    Swelling Oral Solution Lipitor [Atorvastatin]  07/15/2014    Myalgia Lopressor [Metoprolol Tartrate]  2012    Other (comments) Lightheaded and cp Percocet [Oxycodone-acetaminophen]  2011    Unable to Obtain Vicodin [Hydrocodone-acetaminophen]  2011    Unable to Obtain Current Immunizations  Reviewed on 2013 Name Date  
 TB Skin Test (PPD) Intradermal 2013 Not reviewed this visit You Were Diagnosed With   
  
 Codes Comments Benign hypertensive heart disease without heart failure    -  Primary ICD-10-CM: I11.9 ICD-9-CM: 402.10 Hyperlipidemia, unspecified hyperlipidemia type     ICD-10-CM: E78.5 ICD-9-CM: 272.4 Palpitations     ICD-10-CM: R00.2 ICD-9-CM: 785.1 Other chest pain     ICD-10-CM: R07.89 ICD-9-CM: 786.59 Vitals BP Pulse Height(growth percentile) Weight(growth percentile) SpO2 BMI 130/80 (!) 56 5' 2\" (1.575 m) 148 lb (67.1 kg) 98% 27.07 kg/m2 OB Status Smoking Status Postmenopausal Former Smoker BMI and BSA Data Body Mass Index Body Surface Area  
 27.07 kg/m 2 1.71 m 2 Preferred Pharmacy Pharmacy Name Phone [facility] [address], [address] [phone] Your Updated Medication List  
  
   
This list is accurate as of: 2/7/18  4:07 PM.  Always use your most recent med list. amLODIPine 2.5 mg tablet Commonly known as:  Abbymallory Light Take 1 Tab by mouth daily. atenolol 25 mg tablet Commonly known as:  TENORMIN Take 1/2 to 1 tablet daily. Not to exceed one tablet per day. azithromycin 250 mg tablet Commonly known as:  Kenna Tiwari Take 500mg (2 tabs) on Day 1 and then 250mg (1 tab) daily on Days 2-5. CARAFATE 1 gram tablet Generic drug:  sucralfate Take 1 g by mouth daily as needed. CENTRUM SILVER PO Take 1 Tab by mouth daily. co-enzyme Q-10 100 mg capsule Commonly known as:  CO Q-10 Take 100 mg by mouth two (2) times a day. DEXILANT 60 mg Cpdb Generic drug:  Dexlansoprazole Take 1 Cap by mouth every Monday, Wednesday, Friday. ECOTRIN LOW STRENGTH 81 mg tablet Generic drug:  aspirin delayed-release Take 81 mg by mouth daily. FISH OIL 1,000 mg Cap Generic drug:  omega-3 fatty acids-vitamin e Take 2 Caps by mouth daily. flunisolide 25 mcg (0.025 %) Spry Commonly known as:  NASAREL  
2 Sprays by Both Nostrils route two (2) times a day. Indications: ALLERGIC RHINITIS  
  
 GARLIC PO Take 2 Caps by mouth daily. glucose blood VI test strips strip Commonly known as:  blood glucose test  
Use to check glucose daily  
  
 melatonin 3 mg tablet Take 3 mg by mouth nightly. OTHER  
sample Biofreeze cold therapy pain relief  
  
 potassium chloride 20 mEq/15 mL solution Commonly known as:  KAON 10% Patient states she take 1 tablespoon daily  
  
 pravastatin 80 mg tablet Commonly known as:  PRAVACHOL  
TAKE ONE TABLET BY MOUTH EVERY NIGHT AT BEDTIME  
  
 spironolactone 25 mg tablet Commonly known as:  ALDACTONE Take 1 Tab by mouth daily. We Performed the Following AMB POC EKG ROUTINE W/ 12 LEADS, INTER & REP [78722 CPT(R)] Follow-up Instructions Return in about 6 months (around 8/7/2018), or if symptoms worsen or fail to improve. Introducing [facility] & HEALTH SERVICES! [facility] introduces [employer] patient portal. Now you can access parts of your medical record, email your doctor's office, and request medication refills online. 1. In your internet browser, go to [email or web address] 2. Click on the First Time User? Click Here link in the Sign In box. You will see the New Member Sign Up page. 3. Enter your [employer] Access Code exactly as it appears below. You will not need to use this code after youve completed the sign-up process. If you do not sign up before the expiration date, you must request a new code. · [employer] Access Code: [email or web address] Expires: 3/20/2018  1:45 PM 
 
4. Enter the last four digits of your Social Security Number (xxxx) and Date of Birth (mm/dd/yyyy) as indicated and click Submit. You will be taken to the next sign-up page. 5. Create a [employer] ID. This will be your [employer] login ID and cannot be changed, so think of one that is secure and easy to remember. 6. Create a [employer] password. You can change your password at any time. 7. Enter your Password Reset Question and Answer. This can be used at a later time if you forget your password. 8. Enter your e-mail address. You will receive e-mail notification when new information is available in [address] 9. Click Sign Up. You can now view and download portions of your medical record.  
10. Click the Download Summary menu link to download a portable copy of your medical information. If you have questions, please visit the Frequently Asked Questions section of the Omn[employer] website. Remember, [employer] is NOT to be used for urgent needs. For medical emergencies, dial 911. Now available from your iPhone and Android! Please provide this summary of care documentation to your next provider. Your primary care clinician is listed as Keitha Goldberg. If you have any questions after today's visit, please call [phone].

## 2018-02-07 NOTE — PROGRESS NOTES
Review of Systems   Constitutional: Negative for chills, fever, malaise/fatigue and weight loss. Respiratory: Positive for shortness of breath. Negative for cough, hemoptysis and wheezing. Cardiovascular: Positive for chest pain and palpitations. Negative for orthopnea and leg swelling. Gastrointestinal: Positive for abdominal pain and heartburn. Negative for blood in stool, constipation, diarrhea, melena, nausea and vomiting. Musculoskeletal: Positive for joint pain and myalgias. Negative for falls. Neurological: Positive for dizziness.

## 2018-02-10 ENCOUNTER — HOSPITAL ENCOUNTER (OUTPATIENT)
Dept: LAB | Age: 58
Discharge: HOME | End: 2018-02-10
Payer: COMMERCIAL

## 2018-02-10 LAB
ALBUMIN SERPL-MCNC: 3.9 G/DL (ref 3.4–5)
ANION GAP SERPL CALC-SCNC: 4 MMOL/L (ref 3–18)
BUN SERPL-MCNC: 17 MG/DL (ref 7–18)
BUN/CREAT SERPL: 14 (ref 12–20)
CALCIUM SERPL-MCNC: 9 MG/DL (ref 8.5–10.1)
CHLORIDE SERPL-SCNC: 104 MMOL/L (ref 100–108)
CO2 SERPL-SCNC: 31 MMOL/L (ref 21–32)
CREAT SERPL-MCNC: 1.18 MG/DL (ref 0.6–1.3)
GLUCOSE SERPL-MCNC: 91 MG/DL (ref 74–99)
MAGNESIUM SERPL-MCNC: 2.4 MG/DL (ref 1.6–2.6)
PHOSPHATE SERPL-MCNC: 4.3 MG/DL (ref 2.5–4.9)
POTASSIUM SERPL-SCNC: 4 MMOL/L (ref 3.5–5.5)
SODIUM SERPL-SCNC: 139 MMOL/L (ref 136–145)

## 2018-02-10 PROCEDURE — 83735 ASSAY OF MAGNESIUM: CPT | Performed by: INTERNAL MEDICINE

## 2018-02-10 PROCEDURE — 80069 RENAL FUNCTION PANEL: CPT | Performed by: INTERNAL MEDICINE

## 2018-02-10 PROCEDURE — 36415 COLL VENOUS BLD VENIPUNCTURE: CPT | Performed by: INTERNAL MEDICINE

## 2018-02-14 NOTE — PROGRESS NOTES
This lady's cholesterol was very good and I reviewed with her at the time of her appointment with me the other day.  ES

## 2018-02-26 RX ORDER — AMLODIPINE BESYLATE 2.5 MG/1
2.5 TABLET ORAL DAILY
Qty: 30 TAB | Refills: 11 | Status: SHIPPED | OUTPATIENT
Start: 2018-02-26 | End: 2018-06-05 | Stop reason: ALTCHOICE

## 2018-03-09 ENCOUNTER — TELEPHONE (OUTPATIENT)
Dept: INTERNAL MEDICINE CLINIC | Age: 58
End: 2018-03-09

## 2018-03-09 NOTE — TELEPHONE ENCOUNTER
Pt stated having a dry sometimes productive cough for 7 days with clear phlegm. Pt wants to know if she needs to got to urgent care or try something OTC.

## 2018-06-05 ENCOUNTER — HOSPITAL ENCOUNTER (OUTPATIENT)
Dept: LAB | Age: 58
Discharge: HOME | End: 2018-06-05
Payer: COMMERCIAL

## 2018-06-05 ENCOUNTER — OFFICE VISIT (OUTPATIENT)
Dept: INTERNAL MEDICINE CLINIC | Age: 58
End: 2018-06-05

## 2018-06-05 VITALS
WEIGHT: 152.4 LBS | RESPIRATION RATE: 14 BRPM | HEART RATE: 66 BPM | SYSTOLIC BLOOD PRESSURE: 104 MMHG | OXYGEN SATURATION: 100 % | DIASTOLIC BLOOD PRESSURE: 76 MMHG | BODY MASS INDEX: 28.05 KG/M2 | TEMPERATURE: 97.3 F | HEIGHT: 62 IN

## 2018-06-05 DIAGNOSIS — Z12.31 ENCOUNTER FOR SCREENING MAMMOGRAM FOR BREAST CANCER: ICD-10-CM

## 2018-06-05 DIAGNOSIS — E78.5 HYPERLIPIDEMIA, UNSPECIFIED HYPERLIPIDEMIA TYPE: ICD-10-CM

## 2018-06-05 DIAGNOSIS — I11.9 BENIGN HYPERTENSIVE HEART DISEASE WITHOUT HEART FAILURE: Primary | ICD-10-CM

## 2018-06-05 DIAGNOSIS — E66.3 OVERWEIGHT: ICD-10-CM

## 2018-06-05 DIAGNOSIS — J30.9 ALLERGIC RHINITIS, UNSPECIFIED SEASONALITY, UNSPECIFIED TRIGGER: ICD-10-CM

## 2018-06-05 DIAGNOSIS — I11.9 BENIGN HYPERTENSIVE HEART DISEASE WITHOUT HEART FAILURE: ICD-10-CM

## 2018-06-05 PROCEDURE — 83036 HEMOGLOBIN GLYCOSYLATED A1C: CPT | Performed by: INTERNAL MEDICINE

## 2018-06-05 PROCEDURE — 36415 COLL VENOUS BLD VENIPUNCTURE: CPT | Performed by: INTERNAL MEDICINE

## 2018-06-05 RX ORDER — FLUTICASONE PROPIONATE 50 MCG
2 SPRAY, SUSPENSION (ML) NASAL DAILY
Qty: 1 BOTTLE | Refills: 11 | Status: SHIPPED | OUTPATIENT
Start: 2018-06-05 | End: 2018-07-05 | Stop reason: SDUPTHER

## 2018-06-05 NOTE — PROGRESS NOTES
Chief Complaint   Patient presents with    Hypertension     follow up     Patient has refused a copy of the Advanced Medical Directive at this time. 1. Have you been to the ER, urgent care clinic since your last visit? Hospitalized since your last visit? No    2. Have you seen or consulted any other health care providers outside of the [address] [place] [address] since your last visit? Include any pap smears or colon screening.  No

## 2018-06-05 NOTE — MR AVS SNAPSHOT
[facility] 
 
 
 [address] [facility], [place], [facility], [facility] [address] [facility] 
[phone] Patient: Nick Rivera MRN: [id number] BBB:[birth year] Visit Information Date & Time Provider Department Dept. Phone Encounter #  
 6/5/2018  1:30 PM Rosita Osler, MD [facility] [phone] [id number] Follow-up Instructions Return in about 6 months (around 12/5/2018) for BP check. Your Appointments 7/11/2018  3:40 PM  
Follow Up with Areli Argueta DO [facility] [address] ([address]) [facility] [address] [address] [address]  
[phone] [facility] [place] [address] [address] [address]  
  
    
 8/10/2018  2:40 PM  
Follow Up with Areli Argueta DO [facility] 1 ([facility]) Appt Note: 6 month [place] [address] [address] [address]  
[phone]  
  
    
 12/5/2018  1:30 PM  
Office Visit with Rosita Osler, MD  
[facility] [address]) Appt Note: ov 6mo. s Spray Cease [address] [facility] [place], [facility] [address] [address]  
  
   
 [address], [address] Upcoming Health Maintenance Date Due DTaP/Tdap/Td series (1 - Tdap) 4/5/1983 PAP AKA CERVICAL CYTOLOGY 2/4/2016 BREAST CANCER SCRN MAMMOGRAM 11/22/2017 COLONOSCOPY 11/14/2019 Allergies as of 6/5/2018  Review Complete On: 6/5/2018 By: Umm Olivares Severity Noted Reaction Type Reactions Ibuprofen High 11/10/2016    Other (comments) Nsaids (Non-steroidal Anti-inflammatory Drug) High 11/10/2016    Other (comments) Amoxicillin  12/12/2011    Unable to Obtain, Other (comments) Lidocaine  01/08/2013    Swelling Oral Solution Lipitor [Atorvastatin]  07/15/2014    Myalgia Lopressor [Metoprolol Tartrate]  02/28/2012    Other (comments) Lightheaded and cp Percocet [Oxycodone-acetaminophen]  12/12/2011    Unable to Obtain, Other (comments) Vicodin [Hydrocodone-acetaminophen]  12/12/2011    Unable to Obtain, Other (comments) Current Immunizations  Reviewed on 11/13/2013 Name Date  
 TB Skin Test (PPD) Intradermal 11/13/2013 Not reviewed this visit You Were Diagnosed With   
  
 Codes Comments Allergic rhinitis, unspecified seasonality, unspecified trigger    -  Primary ICD-10-CM: J30.9 ICD-9-CM: 477.9 Encounter for screening mammogram for breast cancer     ICD-10-CM: Z12.31 
ICD-9-CM: V76.12 Overweight     ICD-10-CM: G14.7 ICD-9-CM: 278.02 Benign hypertensive heart disease without heart failure     ICD-10-CM: I11.9 ICD-9-CM: 402.10 Hyperlipidemia, unspecified hyperlipidemia type     ICD-10-CM: E78.5 ICD-9-CM: 272.4 Vitals BP Pulse Temp Resp Height(growth percentile) Weight(growth percentile) 104/76 (BP 1 Location: Left arm, BP Patient Position: Sitting) 66 97.3 °F (36.3 °C) (Oral) 14 5' 2\" (1.575 m) 152 lb 6.4 oz (69.1 kg) SpO2 BMI OB Status Smoking Status 100% 27.87 kg/m2 Postmenopausal Former Smoker BMI and BSA Data Body Mass Index Body Surface Area  
 27.87 kg/m 2 1.74 m 2 Preferred Pharmacy Pharmacy Name Phone Radha Parra E [facility], [address] [place] [address] Road [phone] Your Updated Medication List  
  
   
This list is accurate as of 6/5/18  2:42 PM.  Always use your most recent med list.  
  
  
  
  
 atenolol 25 mg tablet Commonly known as:  TENORMIN Take 1/2 to 1 tablet daily. Not to exceed one tablet per day. CARAFATE 1 gram tablet Generic drug:  sucralfate Take 1 g by mouth daily as needed. CENTRUM SILVER PO Take 1 Tab by mouth daily. co-enzyme Q-10 100 mg capsule Commonly known as:  CO Q-10 Take 100 mg by mouth two (2) times a day. DEXILANT 60 mg Cpdb Generic drug:  Dexlansoprazole Take 1 Cap by mouth every Monday, Wednesday, Friday. ECOTRIN LOW STRENGTH 81 mg tablet Generic drug:  aspirin delayed-release Take 81 mg by mouth daily. FISH OIL 1,000 mg Cap Generic drug:  omega-3 fatty acids-vitamin e Take 2 Caps by mouth daily. flunisolide 25 mcg (0.025 %) Spry Commonly known as:  NASAREL  
2 Sprays by Both Nostrils route two (2) times a day. Indications: ALLERGIC RHINITIS  
  
 fluticasone 50 mcg/actuation nasal spray Commonly known as:  Francella Lacks 2 Sprays by Both Nostrils route daily. GARLIC PO Take 2 Caps by mouth daily. glucose blood VI test strips strip Commonly known as:  blood glucose test  
Use to check glucose daily  
  
 melatonin 3 mg tablet Take 3 mg by mouth nightly as needed. OTHER  
sample Biofreeze cold therapy pain relief  
  
 potassium chloride 20 mEq/15 mL solution Commonly known as:  KAON 10% Patient states she take 1 tablespoon daily  
  
 pravastatin 80 mg tablet Commonly known as:  PRAVACHOL  
TAKE ONE TABLET BY MOUTH EVERY NIGHT AT BEDTIME  
  
 spironolactone 25 mg tablet Commonly known as:  ALDACTONE Take 1 Tab by mouth daily. Prescriptions Printed Refills  
 fluticasone (FLONASE) 50 mcg/actuation nasal spray 11 Si Sprays by Both Nostrils route daily. Class: Print Route: Both Nostrils Follow-up Instructions Return in about 6 months (around 2018) for BP check. To-Do List   
 Around 2018 Lab:  HEMOGLOBIN A1C W/O EAG   
  
 2018 Imaging:  HUBER MAMMO BI SCREENING INCL CAD   
  
 07/10/2018 8:30 PM  
  Appointment with [address] at [address] [place] [place] [address] ([phone] [address] [facility] [address] Sleep Disorders Centers:      [facility] [phone]: [facility], 4th floor, [place], [address]      DR. GLYNNS [facility] [phone]; [address] [facility], [address], [place], [address]    Patient instructions ·  Please do not arrive prior to your scheduled appointment time as your room may not be ready. ·  Avoid afternoon naps, caffeine and alcoholic beverages the day of your study. ·  Please bring pajamas men bottoms, women tops and bottoms. We   ask that you do not bring a one piece nightgown to sleep in. ·  Please do not apply lotion after shower the day of your appointment  ·  Please do not apply leave in hair products, such as, oils, conditioners or hairspray. ·  Remove any hairpieces, such as, extensions, weaves & sewn in wigs prior to your appointment. If you arrive with sewn in hairpieces, we will   reschedule your procedure. The [facility] Centers are outpatient testing department. ·  We encourage you to bring a non-alcoholic/ non-caffeinated beverage and snack, if desired. The cafeteria is closed at night. ·  Please bring any medications that are routinely taken prior to bed. If you have been given a sedative for the study,  DO NOT TAKE THE SEDATIVE BEFORE ARRIVAL. Be advised that if the sedative is taken, we recommend that you not drive for 10 hours after taking it. ·  Diabetic patients should bring testing device, snack and any medications that may be needed. ·  Patients who require breathing treatments should bring the unit with them. ·  The person having the sleep study is the only person allowed in the testing room. If another individual needs to be present throughout the night to assist in the patients care, arrangements must be made prior to the scheduled study date. ·  During the study, we encourage a time free environment. Please refrain from checking the time. ·  The technologist will ask you to turn off your cell phone. ·  Televisions are available in each room but cannot remain on during the study; it interferes with monitoring equipment.  ·  During the study, the technologist will ask you to sleep on your back for a portion of the night.  ·  Showers are available following your sleep study, please bring any toiletry items. We will provide washcloths and towels. Thank you for choosing the [address]. If you have any questions prior to your appointment, please do not hesitate to contact us at [phone]. Patient Instructions Patient has refused a copy of the Advanced Medical Directive at this time. Health Maintenance Due Topic Date Due  
 DTaP/Tdap/Td series (1 - Tdap) 04/05/1983  PAP AKA CERVICAL CYTOLOGY  02/04/2016  BREAST CANCER SCRN MAMMOGRAM  11/22/2017 DASH Diet: Care Instructions Your Care Instructions The DASH diet is an eating plan that can help lower your blood pressure. DASH stands for Dietary Approaches to Stop Hypertension. Hypertension is high blood pressure. The DASH diet focuses on eating foods that are high in calcium, potassium, and magnesium. These nutrients can lower blood pressure. The foods that are highest in these nutrients are fruits, vegetables, low-fat dairy products, nuts, seeds, and legumes. But taking calcium, potassium, and magnesium supplements instead of eating foods that are high in those nutrients does not have the same effect. The DASH diet also includes whole grains, fish, and poultry. The DASH diet is one of several lifestyle changes your doctor may recommend to lower your high blood pressure. Your doctor may also want you to decrease the amount of sodium in your diet. Lowering sodium while following the DASH diet can lower blood pressure even further than just the DASH diet alone. Follow-up care is a key part of your treatment and safety. Be sure to make and go to all appointments, and call your doctor if you are having problems. It's also a good idea to know your test results and keep a list of the medicines you take. How can you care for yourself at home? Following the DASH diet · Eat 4 to 5 servings of fruit each day. A serving is 1 medium-sized piece of fruit, ½ cup chopped or canned fruit, 1/4 cup dried fruit, or 4 ounces (½ cup) of fruit juice. Choose fruit more often than fruit juice. · Eat 4 to 5 servings of vegetables each day. A serving is 1 cup of lettuce or raw leafy vegetables, ½ cup of chopped or cooked vegetables, or 4 ounces (½ cup) of vegetable juice. Choose vegetables more often than vegetable juice. · Get 2 to 3 servings of low-fat and fat-free dairy each day. A serving is 8 ounces of milk, 1 cup of yogurt, or 1 ½ ounces of cheese. · Eat 6 to 8 servings of grains each day. A serving is 1 slice of bread, 1 ounce of dry cereal, or ½ cup of cooked rice, pasta, or cooked cereal. Try to choose whole-grain products as much as possible. · Limit lean meat, poultry, and fish to 2 servings each day. A serving is 3 ounces, about the size of a deck of cards. · Eat 4 to 5 servings of nuts, seeds, and legumes (cooked dried beans, lentils, and split peas) each week. A serving is 1/3 cup of nuts, 2 tablespoons of seeds, or ½ cup of cooked beans or peas. · Limit fats and oils to 2 to 3 servings each day. A serving is 1 teaspoon of vegetable oil or 2 tablespoons of salad dressing. · Limit sweets and added sugars to 5 servings or less a week. A serving is 1 tablespoon jelly or jam, ½ cup sorbet, or 1 cup of lemonade. · Eat less than 2,300 milligrams (mg) of sodium a day. If you limit your sodium to 1,500 mg a day, you can lower your blood pressure even more. Tips for success · Start small. Do not try to make dramatic changes to your diet all at once. You might feel that you are missing out on your favorite foods and then be more likely to not follow the plan. Make small changes, and stick with them. Once those changes become habit, add a few more changes. · Try some of the following: ¨ Make it a goal to eat a fruit or vegetable at every meal and at snacks. This will make it easy to get the recommended amount of fruits and vegetables each day. ¨ Try yogurt topped with fruit and nuts for a snack or healthy dessert. ¨ Add lettuce, tomato, cucumber, and onion to sandwiches. ¨ Combine a ready-made pizza crust with low-fat mozzarella cheese and lots of vegetable toppings. Try using tomatoes, squash, spinach, broccoli, carrots, cauliflower, and onions. ¨ Have a variety of cut-up vegetables with a low-fat dip as an appetizer instead of chips and dip. ¨ Sprinkle sunflower seeds or chopped almonds over salads. Or try adding chopped walnuts or almonds to cooked vegetables. ¨ Try some vegetarian meals using beans and peas. Add garbanzo or kidney beans to salads. Make burritos and tacos with mashed isaac beans or black beans. Where can you learn more? Go to http://diana-pam.info/. Enter E494 in the search box to learn more about \"DASH Diet: Care Instructions. \" Current as of: September 21, 2016 Content Version: 11.4 © 6561-6476 [employer]. Care instructions adapted under license by [employer] (which disclaims liability or warranty for this information). If you have questions about a medical condition or this instruction, always ask your healthcare professional. [place] [address] any warranty or liability for your use of this information. Introducing [facility] & HEALTH SERVICES! [employer] introduces [employer] patient portal. Now you can access parts of your medical record, email your doctor's office, and request medication refills online. 1. In your internet browser, go to [email or web address] 2. Click on the First Time User? Click Here link in the Sign In box. You will see the New Member Sign Up page. 3. Enter your En[employer] Access Code exactly as it appears below. You will not need to use this code after youve completed the sign-up process.  If you do not sign up before the expiration date, you must request a new code. · [email or web address] Access Code: [facility] Expires: 9/3/2018 12:13 PM 
 
4. Enter the last four digits of your Social Security Number (xxxx) and Date of Birth (mm/dd/yyyy) as indicated and click Submit. You will be taken to the next sign-up page. 5. Create a [email or web address] ID. This will be your [email or web address] login ID and cannot be changed, so think of one that is secure and easy to remember. 6. Create a [email or web address] password. You can change your password at any time. 7. Enter your Password Reset Question and Answer. This can be used at a later time if you forget your password. 8. Enter your e-mail address. You will receive e-mail notification when new information is available in [address] 9. Click Sign Up. You can now view and download portions of your medical record. 10. Click the Download Summary menu link to download a portable copy of your medical information. If you have questions, please visit the Frequently Asked Questions section of the [email or web address] website. Remember, [email or web address] is NOT to be used for urgent needs. For medical emergencies, dial 911. Now available from your iPhone and Android! Please provide this summary of care documentation to your next provider. Your primary care clinician is listed as Mary Ramos. If you have any questions after today's visit, please call [phone].

## 2018-06-05 NOTE — PROGRESS NOTES
INTERNISTS [facility]:  6/5/2018, MRN: [id number]      Norberto Velasco is a 66 y.o. female and presents to clinic for Hypertension (follow up)    Subjective:   Pt is a 64yo AAF with h/o chronic bronchitis, LUYC, porphyria (per EHR), allergic rhinitis, multiple food allergies (per lab work), HTN, HLD, constipation, gallbladder polyps (suggested by CT scan 10/13/16), and GERD. 1. Health Maintenance:  - She is scheduled on 7/11/18 for a PAP with her GYN team.  - Overdue for a mammogram.  She reports no breast masses or pain. 2. HTN, Overweight, and HLD: These conditions are present for at least 6 months. Her blood pressure today is 104/76. She takes atenolol, pain, and Aldactone. She has had some dizzy episodes off and on. She recently saw the cardiology team who cleared her. She was instructed to return as needed. No tobacco use. No drug use. No excessive alcohol beverage consumption. She occasionally will have 1-2 glasses of wine if that. The patient has not been regularly exercising. Her weight is up 252 pounds. She is asking to be screened for diabetes with an A1c. Her most recent labs from February of this year did not show any hyperglycemia. Her BMI is in the overweight category. She takes Pravastatin and she reports taking this medication. Wt Readings from Last 3 Encounters:   06/05/18 152 lb 6.4 oz (69.1 kg)   02/07/18 148 lb (67.1 kg)   11/21/17 145 lb 6.4 oz (66 kg)     BP Readings from Last 3 Encounters:   06/05/18 104/76   02/07/18 130/80   11/21/17 111/72       3. Allergic Rhinitis: Present for >6 months  +Responds well to Flonase. She is asking for a refill on this medication. She reports no adverse side effects from this rx.       Patient Active Problem List    Diagnosis Date Noted    Allergic rhinitis 10/04/2017    Gallbladder polyp - suggested by findings on CT scan from 10/13/16 03/01/2017    Hypersomnia with sleep apnea 03/13/2014    Hypertensive heart disease  03/05/2012  Constipation 01/16/2012    Hyperlipidemia     GERD (gastroesophageal reflux disease)        Current Outpatient Prescriptions   Medication Sig Dispense Refill    fluticasone (FLONASE) 50 mcg/actuation nasal spray 2 Sprays by Both Nostrils route daily. 1 Bottle 11    atenolol (TENORMIN) 25 mg tablet Take 1/2 to 1 tablet daily. Not to exceed one tablet per day. 30 Tab 3    pravastatin (PRAVACHOL) 80 mg tablet TAKE ONE TABLET BY MOUTH EVERY NIGHT AT BEDTIME 30 Tab 6    spironolactone (ALDACTONE) 25 mg tablet Take 1 Tab by mouth daily. 30 Tab 11    potassium chloride (KAON 10%) 20 mEq/15 mL solution Patient states she take 1 tablespoon daily      OTHER sample Biofreeze cold therapy pain relief      glucose blood VI test strips (BLOOD GLUCOSE TEST) strip Use to check glucose daily 1 Package 11    co-enzyme Q-10 (CO Q-10) 100 mg capsule Take 100 mg by mouth two (2) times a day.  GARLIC PO Take 2 Caps by mouth daily.  aspirin delayed-release (ECOTRIN LOW STRENGTH) 81 mg tablet Take 81 mg by mouth daily.  omega-3 fatty acids-vitamin e (FISH OIL) 1,000 mg cap Take 2 Caps by mouth daily.  melatonin 3 mg tablet Take 3 mg by mouth nightly as needed.  Dexlansoprazole (DEXILANT) 60 mg CpDM Take 1 Cap by mouth every Monday, Wednesday, Friday.  sucralfate (CARAFATE) 1 gram tablet Take 1 g by mouth daily as needed.  MULTIVITAMIN W-MINERALS/LUTEIN (CENTRUM SILVER PO) Take 1 Tab by mouth daily.  flunisolide (NASAREL) 25 mcg (0.025 %) spry 2 Sprays by Both Nostrils route two (2) times a day.  Indications: ALLERGIC RHINITIS 25 mL 1       Allergies   Allergen Reactions    Ibuprofen Other (comments)    Nsaids (Non-Steroidal Anti-Inflammatory Drug) Other (comments)    Amoxicillin Unable to Obtain and Other (comments)    Lidocaine Swelling     Oral Solution    Lipitor [Atorvastatin] Myalgia           Lopressor [Metoprolol Tartrate] Other (comments)     Lightheaded and cp    Percocet [Oxycodone-Acetaminophen] Unable to Obtain and Other (comments)    Vicodin [Hydrocodone-Acetaminophen] Unable to Obtain and Other (comments)       Past Medical History:   Diagnosis Date    Cardiac Agatston CAC score, <100 04/30/2014    Coronary calcium score 0.    Cardiac Holter monitoring 01/25/2017    Sinus rhythm, avg HR 73 bpm (range ). Benign Holter study.  Cardiac nuclear imaging test 01/11/2013    No convincing evidence for ischemia or infarction in the setting of significant chest wall artifact. EF 62%. No RWMA. Neg EKG on max EST. Ex time 9 min 50 sec.  Cardiac stress echo, normal 05/05/2016    Normal maximal stress echo w/reproduction of atypical chest discomfort. Ex time 11 min 3 sec. EF 55%.  Cardiovascular LLE venous duplex 09/28/2016    Left leg:  No DVT.     Chest pain     GERD (gastroesophageal reflux disease)     Heel pain, bilateral     Hiatal hernia     denies this    HTN (hypertension)     Hyperlipidemia     Insulin resistance     follows with endocrinology for this    Night sweats     PVC (premature ventricular contraction)     Right low back pain     S/P colonoscopy 2009, 2014    normal per patient    Unsteady gait     due to heel pain       Past Surgical History:   Procedure Laterality Date    D/C SUCTION  2011    HX OTHER SURGICAL  2014    colonscopy    HX OTHER SURGICAL      endoscopy    HX OTHER SURGICAL      wisdom teeth removal x1       Family History   Problem Relation Age of Onset    Hypertension Mother     Heart defect Mother      anuerysm    Heart Attack Mother     Asthma Father     Heart Attack Maternal Uncle     Heart Attack Maternal Uncle     Heart Disease Brother     Hypertension Brother        Social History   Substance Use Topics    Smoking status: Former Smoker     Packs/day: 0.50     Years: 11.00     Types: Cigarettes     Quit date: 12/31/2006    Smokeless tobacco: Former User     Quit date: 1/10/1993      Comment: 6-7 cigs per day    Alcohol use 0.0 oz/week     0 Standard drinks or equivalent per week      Comment: very rare       ROS   Review of Systems   Constitutional: Negative for chills and fever. HENT: Negative for ear pain and sore throat. Eyes: Negative for blurred vision and pain. Respiratory: Negative for cough and shortness of breath. Cardiovascular: Negative for chest pain. Gastrointestinal: Negative for abdominal pain, blood in stool and melena. Genitourinary: Negative for dysuria and hematuria. Musculoskeletal: Negative for joint pain and myalgias. Skin: Negative for rash. Neurological: Negative for tingling, focal weakness and headaches. Endo/Heme/Allergies: Does not bruise/bleed easily. Psychiatric/Behavioral: Negative for substance abuse. Objective     Vitals:    06/05/18 1340   BP: 104/76   Pulse: 66   Resp: 14   Temp: 97.3 °F (36.3 °C)   TempSrc: Oral   SpO2: 100%   Weight: 152 lb 6.4 oz (69.1 kg)   Height: 5' 2\" (1.575 m)   PainSc:   0 - No pain       Physical Exam   Constitutional: She is oriented to person, place, and time and well-developed, well-nourished, and in no distress. HENT:   Head: Normocephalic and atraumatic. Right Ear: External ear normal.   Left Ear: External ear normal.   Nose: Nose normal.   Mouth/Throat: Oropharynx is clear and moist. No oropharyngeal exudate. Clear TMs   Eyes: Conjunctivae and EOM are normal. Pupils are equal, round, and reactive to light. Right eye exhibits no discharge. Left eye exhibits no discharge. No scleral icterus. Neck: Neck supple. Cardiovascular: Normal rate, regular rhythm, normal heart sounds and intact distal pulses. Exam reveals no gallop and no friction rub. No murmur heard. Pulmonary/Chest: Effort normal and breath sounds normal. No respiratory distress. She has no wheezes. She has no rales. Abdominal: Soft. Bowel sounds are normal. She exhibits no distension. There is no tenderness.  There is no rebound and no guarding. No organomegaly   Musculoskeletal: She exhibits no edema or tenderness (BUE). Lymphadenopathy:     She has no cervical adenopathy. Neurological: She is alert and oriented to person, place, and time. She exhibits normal muscle tone. Gait normal.   Skin: Skin is warm and dry. No erythema. Psychiatric: Affect normal.   Nursing note and vitals reviewed. LABS   Data Review:   Lab Results   Component Value Date/Time    WBC 5.5 09/02/2017 11:15 PM    Hemoglobin, POC 12.2 01/09/2013 08:19 PM    HGB 13.8 09/02/2017 11:15 PM    Hematocrit, POC 36 01/09/2013 08:19 PM    HCT 42.2 09/02/2017 11:15 PM    PLATELET 373 27/01/7885 11:15 PM    MCV 87.9 09/02/2017 11:15 PM       Lab Results   Component Value Date/Time    Sodium 139 02/10/2018 11:40 AM    Potassium 4.0 02/10/2018 11:40 AM    Chloride 104 02/10/2018 11:40 AM    CO2 31 02/10/2018 11:40 AM    Anion gap 4 02/10/2018 11:40 AM    Glucose 91 02/10/2018 11:40 AM    BUN 17 02/10/2018 11:40 AM    Creatinine 1.18 02/10/2018 11:40 AM    BUN/Creatinine ratio 14 02/10/2018 11:40 AM    GFR est AA 58 (L) 02/10/2018 11:40 AM    GFR est non-AA 48 (L) 02/10/2018 11:40 AM    Calcium 9.0 02/10/2018 11:40 AM       Lab Results   Component Value Date/Time    Cholesterol, total 141 02/05/2018 04:27 PM    HDL Cholesterol 74 (H) 02/05/2018 04:27 PM    LDL, calculated 56 02/05/2018 04:27 PM    VLDL, calculated 11 02/05/2018 04:27 PM    Triglyceride 55 02/05/2018 04:27 PM    CHOL/HDL Ratio 1.9 02/05/2018 04:27 PM       Lab Results   Component Value Date/Time    Hemoglobin A1c (POC) 5.6 09/19/2013 02:30 PM    Hemoglobin A1c, External 5.8 10/08/2015       Assessment/Plan:   1. Allergic rhinitis:   Refilling her Flonase     ORDERS:  - fluticasone (FLONASE) 50 mcg/actuation nasal spray; 2 Sprays by Both Nostrils route daily. Dispense: 1 Bottle; Refill: 11    2. Health Maintenance:  I encouraged her to get her Pap smear.   Ordering a mammogram to screen for breast cancer. Ordering an A1c to screen for diabetes. I encouraged her to exercise regularly as a means of reducing her cardiovascular risk. I will recheck her weight at her follow-up. ORDERS:  - HUBER MAMMO BI SCREENING INCL CAD; Future  - HEMOGLOBIN A1C W/O EAG; Future    3. HTN: Having some hypotension/dizziness. Continue with medications and discontinuing her amlodipine. I instructed her to notify me if her blood pressure is consistently greater than 140/90, at which time I discussed the need to adjust her blood pressure medications appropriately. ORDERS:  - HEMOGLOBIN A1C W/O EAG; Future    4. Hyperlipidemia: Stable. Continue statin therapy. Health Maintenance Due   Topic Date Due    DTaP/Tdap/Td series (1 - Tdap) 04/05/1983    PAP AKA CERVICAL CYTOLOGY  02/04/2016    BREAST CANCER SCRN MAMMOGRAM  11/22/2017     Lab review: labs are reviewed in the EHR    I have discussed the diagnosis with the patient and the intended plan as seen in the above orders. The patient has received an after-visit summary and questions were answered concerning future plans. I have discussed medication side effects and warnings with the patient as well. I have reviewed the plan of care with the patient, accepted their input and they are in agreement with the treatment goals. All questions were answered. The patient understands the plan of care. Handouts provided today with above information. Pt instructed if symptoms worsen to call the office or report to the ED for continued care. Greater than 50% of the visit time was spent in counseling and/or coordination of care. Follow-up Disposition:  Return in about 6 months (around 12/5/2018) for BP check.     Yemi Elkins MD

## 2018-06-05 NOTE — PATIENT INSTRUCTIONS
Patient has refused a copy of the Advanced Medical Directive at this time. Health Maintenance Due   Topic Date Due    DTaP/Tdap/Td series (1 - Tdap) 04/05/1983    PAP AKA CERVICAL CYTOLOGY  02/04/2016    BREAST CANCER SCRN MAMMOGRAM  11/22/2017          DASH Diet: Care Instructions  Your Care Instructions    The DASH diet is an eating plan that can help lower your blood pressure. DASH stands for Dietary Approaches to Stop Hypertension. Hypertension is high blood pressure. The DASH diet focuses on eating foods that are high in calcium, potassium, and magnesium. These nutrients can lower blood pressure. The foods that are highest in these nutrients are fruits, vegetables, low-fat dairy products, nuts, seeds, and legumes. But taking calcium, potassium, and magnesium supplements instead of eating foods that are high in those nutrients does not have the same effect. The DASH diet also includes whole grains, fish, and poultry. The DASH diet is one of several lifestyle changes your doctor may recommend to lower your high blood pressure. Your doctor may also want you to decrease the amount of sodium in your diet. Lowering sodium while following the DASH diet can lower blood pressure even further than just the DASH diet alone. Follow-up care is a key part of your treatment and safety. Be sure to make and go to all appointments, and call your doctor if you are having problems. It's also a good idea to know your test results and keep a list of the medicines you take. How can you care for yourself at home? Following the DASH diet  · Eat 4 to 5 servings of fruit each day. A serving is 1 medium-sized piece of fruit, ½ cup chopped or canned fruit, 1/4 cup dried fruit, or 4 ounces (½ cup) of fruit juice. Choose fruit more often than fruit juice. · Eat 4 to 5 servings of vegetables each day.  A serving is 1 cup of lettuce or raw leafy vegetables, ½ cup of chopped or cooked vegetables, or 4 ounces (½ cup) of vegetable juice. Choose vegetables more often than vegetable juice. · Get 2 to 3 servings of low-fat and fat-free dairy each day. A serving is 8 ounces of milk, 1 cup of yogurt, or 1 ½ ounces of cheese. · Eat 6 to 8 servings of grains each day. A serving is 1 slice of bread, 1 ounce of dry cereal, or ½ cup of cooked rice, pasta, or cooked cereal. Try to choose whole-grain products as much as possible. · Limit lean meat, poultry, and fish to 2 servings each day. A serving is 3 ounces, about the size of a deck of cards. · Eat 4 to 5 servings of nuts, seeds, and legumes (cooked dried beans, lentils, and split peas) each week. A serving is 1/3 cup of nuts, 2 tablespoons of seeds, or ½ cup of cooked beans or peas. · Limit fats and oils to 2 to 3 servings each day. A serving is 1 teaspoon of vegetable oil or 2 tablespoons of salad dressing. · Limit sweets and added sugars to 5 servings or less a week. A serving is 1 tablespoon jelly or jam, ½ cup sorbet, or 1 cup of lemonade. · Eat less than 2,300 milligrams (mg) of sodium a day. If you limit your sodium to 1,500 mg a day, you can lower your blood pressure even more. Tips for success  · Start small. Do not try to make dramatic changes to your diet all at once. You might feel that you are missing out on your favorite foods and then be more likely to not follow the plan. Make small changes, and stick with them. Once those changes become habit, add a few more changes. · Try some of the following:  ¨ Make it a goal to eat a fruit or vegetable at every meal and at snacks. This will make it easy to get the recommended amount of fruits and vegetables each day. ¨ Try yogurt topped with fruit and nuts for a snack or healthy dessert. ¨ Add lettuce, tomato, cucumber, and onion to sandwiches. ¨ Combine a ready-made pizza crust with low-fat mozzarella cheese and lots of vegetable toppings. Try using tomatoes, squash, spinach, broccoli, carrots, cauliflower, and onions.   ¨ Have a variety of cut-up vegetables with a low-fat dip as an appetizer instead of chips and dip. ¨ Sprinkle sunflower seeds or chopped almonds over salads. Or try adding chopped walnuts or almonds to cooked vegetables. ¨ Try some vegetarian meals using beans and peas. Add garbanzo or kidney beans to salads. Make burritos and tacos with mashed isaac beans or black beans. Where can you learn more? Go to http://diana-pam.info/. Enter T537 in the search box to learn more about \"DASH Diet: Care Instructions. \"  Current as of: September 21, 2016  Content Version: 11.4  © 8391-3925 [employer], [employer]. Care instructions adapted under license by [employer] (which disclaims liability or warranty for this information). If you have questions about a medical condition or this instruction, always ask your healthcare professional. [address] any warranty or liability for your use of this information.

## 2018-06-06 LAB — HBA1C MFR BLD: 5.4 % (ref 4.2–5.6)

## 2018-06-06 NOTE — PROGRESS NOTES
Please let her know that her A1C is normal.    Dr. Esme Hernandez  [facility], [address] [facility], [facility] [address]  Phone: [phone]  Fax: [phone]

## 2018-06-07 ENCOUNTER — TELEPHONE (OUTPATIENT)
Dept: INTERNAL MEDICINE CLINIC | Age: 58
End: 2018-06-07

## 2018-06-07 NOTE — TELEPHONE ENCOUNTER
----- Message from Hoda Dickerson MD sent at 6/6/2018 12:29 PM EDT -----  Please let her know that her A1C is normal.    Dr. Bob Doty  [facility] of [address], [address] [place] [address].  Phone: [phone]  Fax: [phone]

## 2018-07-05 ENCOUNTER — OFFICE VISIT (OUTPATIENT)
Dept: INTERNAL MEDICINE CLINIC | Age: 58
End: 2018-07-05

## 2018-07-05 VITALS
RESPIRATION RATE: 14 BRPM | TEMPERATURE: 99 F | DIASTOLIC BLOOD PRESSURE: 72 MMHG | SYSTOLIC BLOOD PRESSURE: 110 MMHG | HEIGHT: 62 IN | BODY MASS INDEX: 28.05 KG/M2 | OXYGEN SATURATION: 98 % | WEIGHT: 152.4 LBS | HEART RATE: 75 BPM

## 2018-07-05 DIAGNOSIS — J02.9 PHARYNGITIS, UNSPECIFIED ETIOLOGY: Primary | ICD-10-CM

## 2018-07-05 DIAGNOSIS — J02.8 ACUTE BACTERIAL PHARYNGITIS: ICD-10-CM

## 2018-07-05 DIAGNOSIS — J30.9 ALLERGIC RHINITIS, UNSPECIFIED SEASONALITY, UNSPECIFIED TRIGGER: ICD-10-CM

## 2018-07-05 DIAGNOSIS — B96.89 ACUTE BACTERIAL PHARYNGITIS: ICD-10-CM

## 2018-07-05 LAB
S PYO AG THROAT QL: POSITIVE
VALID INTERNAL CONTROL?: YES

## 2018-07-05 RX ORDER — AZITHROMYCIN 250 MG/1
TABLET, FILM COATED ORAL
Qty: 6 TAB | Refills: 0 | Status: SHIPPED | OUTPATIENT
Start: 2018-07-05 | End: 2018-07-10

## 2018-07-05 RX ORDER — FLUTICASONE PROPIONATE 50 MCG
2 SPRAY, SUSPENSION (ML) NASAL DAILY
Qty: 1 BOTTLE | Refills: 11 | Status: SHIPPED | OUTPATIENT
Start: 2018-07-05 | End: 2018-07-05 | Stop reason: SDUPTHER

## 2018-07-05 RX ORDER — FLUTICASONE PROPIONATE 50 MCG
2 SPRAY, SUSPENSION (ML) NASAL DAILY
Qty: 1 BOTTLE | Refills: 11 | Status: SHIPPED | OUTPATIENT
Start: 2018-07-05 | End: 2021-10-08

## 2018-07-05 NOTE — PROGRESS NOTES
1. Have you been to the ER, urgent care clinic or hospitalized since your last visit? NO.     2. Have you seen or consulted any other health care providers outside of the [address] [place] [address] since your last visit (Include any pap smears or colon screening)? NO      Do you have an Advanced Directive? NO    Would you like information on Advanced Directives?  NO

## 2018-07-05 NOTE — PROGRESS NOTES
Irais Sawyer is a 66 y.o.  female and presents with    Chief Complaint   Patient presents with    Sore Throat     Patient here for right side swollen gland with pain into throat when swollowing. Patient reports still slight pain in right ear and feels like something is stabing it/ear ache. x 1 day       Subjective:  HPI   Mrs. Wolfgang Carlos presents today with complaints of right ear pain that started yesterday that seems to be resolving however with pain into throat with swallowing. The ear pain is described as a stabbing pain/ache. She denies sick contacts. She tried Tylenol without relief. She has a history of allergic rhinitis, she has been out of her Flonase as lost last printed script provided by PCP. Additional Concerns: none     ROS   Review of Systems   Constitutional: Negative for chills, fever and malaise/fatigue. HENT: Positive for ear pain and sore throat. Negative for congestion, hearing loss, sinus pain and tinnitus. Respiratory: Negative. Cardiovascular: Negative. Gastrointestinal: Positive for heartburn. Genitourinary: Negative. Musculoskeletal: Negative. Skin: Negative. Neurological: Negative for dizziness and headaches. Allergies   Allergen Reactions    Ibuprofen Other (comments)    Nsaids (Non-Steroidal Anti-Inflammatory Drug) Other (comments)    Amoxicillin Unable to Obtain and Other (comments)    Lidocaine Swelling     Oral Solution    Lipitor [Atorvastatin] Myalgia           Lopressor [Metoprolol Tartrate] Other (comments)     Lightheaded and cp    Percocet [Oxycodone-Acetaminophen] Unable to Obtain and Other (comments)    Vicodin [Hydrocodone-Acetaminophen] Unable to Obtain and Other (comments)       Current Outpatient Prescriptions   Medication Sig Dispense Refill    fluticasone (FLONASE) 50 mcg/actuation nasal spray 2 Sprays by Both Nostrils route daily. Indications:  Allergic Rhinitis 1 Bottle 11    azithromycin (ZITHROMAX) 250 mg tablet Take 2 tablets today, then take 1 tablet daily 6 Tab 0    atenolol (TENORMIN) 25 mg tablet Take 1/2 to 1 tablet daily. Not to exceed one tablet per day. 30 Tab 3    pravastatin (PRAVACHOL) 80 mg tablet TAKE ONE TABLET BY MOUTH EVERY NIGHT AT BEDTIME 30 Tab 6    spironolactone (ALDACTONE) 25 mg tablet Take 1 Tab by mouth daily. 30 Tab 11    potassium chloride (KAON 10%) 20 mEq/15 mL solution Patient states she take 1 tablespoon daily      OTHER sample Biofreeze cold therapy pain relief      co-enzyme Q-10 (CO Q-10) 100 mg capsule Take 100 mg by mouth two (2) times a day.  GARLIC PO Take 2 Caps by mouth daily.  aspirin delayed-release (ECOTRIN LOW STRENGTH) 81 mg tablet Take 81 mg by mouth daily.  omega-3 fatty acids-vitamin e (FISH OIL) 1,000 mg cap Take 2 Caps by mouth daily.  melatonin 3 mg tablet Take 3 mg by mouth nightly as needed.  Dexlansoprazole (DEXILANT) 60 mg CpDM Take 1 Cap by mouth every Monday, Wednesday, Friday.  sucralfate (CARAFATE) 1 gram tablet Take 1 g by mouth daily as needed.  MULTIVITAMIN W-MINERALS/LUTEIN (CENTRUM SILVER PO) Take 1 Tab by mouth daily. 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 Past Medical History:   Diagnosis Date    Cardiac Agatston CAC score, <100 04/30/2014    Coronary calcium score 0.    Cardiac Holter monitoring 01/25/2017    Sinus rhythm, avg HR 73 bpm (range ). Benign Holter study.  Cardiac nuclear imaging test 01/11/2013    No convincing evidence for ischemia or infarction in the setting of significant chest wall artifact. EF 62%. No RWMA. Neg EKG on max EST. Ex time 9 min 50 sec.  Cardiac stress echo, normal 05/05/2016    Normal maximal stress echo w/reproduction of atypical chest discomfort. Ex time 11 min 3 sec. EF 55%.  Cardiovascular LLE venous duplex 09/28/2016    Left leg:  No DVT.     Chest pain     GERD (gastroesophageal reflux disease)     Heel pain, bilateral     Hiatal hernia     denies this    HTN (hypertension)     Hyperlipidemia     Insulin resistance     follows with endocrinology for this    Night sweats     PVC (premature ventricular contraction)     Right low back pain     S/P colonoscopy 2009, 2014    normal per patient    Unsteady gait     due to heel pain       Past Surgical History:   Procedure Laterality Date    D/C SUCTION  2011    HX OTHER SURGICAL  2014    colonscopy    HX OTHER SURGICAL      endoscopy    HX OTHER SURGICAL      wisdom teeth removal x1       Family History   Problem Relation Age of Onset    Hypertension Mother     Heart defect Mother      anuerysm    Heart Attack Mother     Asthma Father     Heart Attack Maternal Uncle     Heart Attack Maternal Uncle     Heart Disease Brother     Hypertension Brother        Objective:  Vitals:    07/05/18 0933   BP: 110/72   Pulse: 75   Resp: 14   Temp: 99 °F (37.2 °C)   TempSrc: Oral   SpO2: 98%   Weight: 152 lb 6.4 oz (69.1 kg)   Height: 5' 2\" (1.575 m)   PainSc:   7   PainLoc: Throat       LABS   Results for orders placed or performed during the hospital encounter of 06/05/18   HEMOGLOBIN A1C W/O EAG   Result Value Ref Range    Hemoglobin A1c 5.4 4.2 - 5.6 %       TESTS  none    PE  Physical Exam   Constitutional: She is oriented to person, place, and time. She appears well-developed and well-nourished. HENT:   Head: Normocephalic and atraumatic. Left Ear: External ear normal.   Nose: Nose normal.   Mouth/Throat: Oropharyngeal exudate present. OME right ear  Erythematous posterior oropharynx   Eyes: EOM are normal. Pupils are equal, round, and reactive to light. Right eye exhibits no discharge. Left eye exhibits no discharge. Injected conjunctiva   Neck: Normal range of motion. Neck supple. Cardiovascular: Normal rate, regular rhythm, normal heart sounds and intact distal pulses. No murmur heard. Pulmonary/Chest: Effort normal and breath sounds normal. No respiratory distress. She has no wheezes. She has no rales. She exhibits no tenderness. Abdominal: Soft. Bowel sounds are normal.   Musculoskeletal: Normal range of motion. Lymphadenopathy:     She has cervical adenopathy. Neurological: She is alert and oriented to person, place, and time. Skin: Skin is warm and dry. Psychiatric: She has a normal mood and affect. Her behavior is normal. Judgment and thought content normal.     Assessment/Plan:    1. Acute bacterial pharyngitis-  Low grade oral temperature today, cervical lymphadenopathy, no cough, sore throat, posterior erythema and exudate oropharynx, Centor score-2. Rapid strep positive. Prescribed Azithromycin. Reprinted Flonase script as patient reports lost script. Instructed to use Nasal Saline rinse prior to Flonase QHS for allergic rhinitis. Report worsening symptoms. Change out toothbrush. Lab review: no lab studies available for review at time of visit    Today's Visit:   Diagnoses and all orders for this visit:    1. Pharyngitis, unspecified etiology  -     AMB POC RAPID STREP A    2.  Allergic rhinitis, unspecified seasonality, unspecified trigger  -     fluticasone (FLONASE) 50 mcg/actuation nasal spray; 2 Sprays by Both Nostrils route daily. Indications: Allergic Rhinitis    3. Acute bacterial pharyngitis  -     azithromycin (ZITHROMAX) 250 mg tablet; Take 2 tablets today, then take 1 tablet daily      Health Maintenance: Deferred to PCP. I have discussed the diagnosis with the patient and the intended plan as seen in the above orders. The patient has received an after-visit summary and questions were answered concerning future plans. I have discussed medication side effects and warnings with the patient as well. I have reviewed the plan of care with the patient, accepted their input and they are in agreement with the treatment goals. Follow-up Disposition: Not on File   More than 1/2 of this 15 minute visit was spent in counseling and coordination of care, as described above.     ZEESHAN Gibbs  Internist of [address].  Phone: [phone]  Fax: [phone]

## 2018-07-05 NOTE — MR AVS SNAPSHOT
[facility] 
 
 
 [address] [facility], [facility] [address] [facility] 
[phone] Patient: Felecia Alvarez MRN: [id number] HKM:[id number] Visit Information Date & Time Provider Department Dept. Phone Encounter #  
 7/5/2018  9:30 AM Yfn Mix NP [facility] [phone] Your Appointments 7/11/2018  3:40 PM  
Follow Up with Oz Chawla DO [facility] ([facility]) [place] [id number]  
[phone] [address] [place] [address] [address] [address]  
  
    
 8/10/2018  2:40 PM  
Follow Up with Oz Chawla DO [facility] ([facility]) Appt Note: 6 month [place] [id number]  
[phone]  
  
    
 12/5/2018  1:30 PM  
Office Visit with Arielle Mejia MD  
[facility] [address]) Appt Note: ov 6mo. s Fabienne James [address] [facility], [facility] Beth Ozuna [address] [place]  
  
   
 [address] [facility] Upcoming Health Maintenance Date Due DTaP/Tdap/Td series (1 - Tdap) 4/5/1983 PAP AKA CERVICAL CYTOLOGY 2/4/2016 BREAST CANCER SCRN MAMMOGRAM 11/22/2017 COLONOSCOPY 11/14/2019 Allergies as of 7/5/2018  Review Complete On: 6/5/2018 By: Arielle Mejia MD  
  
 Severity Noted Reaction Type Reactions Ibuprofen High 11/10/2016    Other (comments) Nsaids (Non-steroidal Anti-inflammatory Drug) High 11/10/2016    Other (comments) Amoxicillin  12/12/2011    Unable to Obtain, Other (comments) Lidocaine  01/08/2013    Swelling Oral Solution Lipitor [Atorvastatin]  07/15/2014    Myalgia Lopressor [Metoprolol Tartrate]  02/28/2012    Other (comments) Lightheaded and cp  Percocet [Oxycodone-acetaminophen]  12/12/2011    Unable to Obtain, Other (comments) Vicodin [Hydrocodone-acetaminophen]  12/12/2011    Unable to Obtain, Other (comments) Current Immunizations  Reviewed on 11/13/2013 Name Date  
 TB Skin Test (PPD) Intradermal 11/13/2013 Not reviewed this visit You Were Diagnosed With   
  
 Codes Comments Pharyngitis, unspecified etiology    -  Primary ICD-10-CM: J02.9 ICD-9-CM: 617 Allergic rhinitis, unspecified seasonality, unspecified trigger     ICD-10-CM: J30.9 ICD-9-CM: 477.9 Acute bacterial pharyngitis     ICD-10-CM: J02.8, B96.89 
ICD-9-CM: 724 Vitals BP Pulse Temp Resp Height(growth percentile) Weight(growth percentile) 110/72 (BP 1 Location: Left arm, BP Patient Position: Sitting) 75 99 °F (37.2 °C) (Oral) 14 5' 2\" (1.575 m) 152 lb 6.4 oz (69.1 kg) SpO2 BMI OB Status Smoking Status 98% 27.87 kg/m2 Postmenopausal Former Smoker Vitals History BMI and BSA Data Body Mass Index Body Surface Area  
 27.87 kg/m 2 1.74 m 2 Preferred Pharmacy Pharmacy Name Phone [facility] [address], [address] [phone] Your Updated Medication List  
  
   
This list is accurate as of 7/5/18 10:27 AM.  Always use your most recent med list.  
  
  
  
  
 atenolol 25 mg tablet Commonly known as:  TENORMIN Take 1/2 to 1 tablet daily. Not to exceed one tablet per day. CARAFATE 1 gram tablet Generic drug:  sucralfate Take 1 g by mouth daily as needed. CENTRUM SILVER PO Take 1 Tab by mouth daily. co-enzyme Q-10 100 mg capsule Commonly known as:  CO Q-10 Take 100 mg by mouth two (2) times a day. DEXILANT 60 mg Cpdb Generic drug:  Dexlansoprazole Take 1 Cap by mouth every Monday, Wednesday, Friday. ECOTRIN LOW STRENGTH 81 mg tablet Generic drug:  aspirin delayed-release Take 81 mg by mouth daily. FISH OIL 1,000 mg Cap Generic drug:  omega-3 fatty acids-vitamin e Take 2 Caps by mouth daily. fluticasone 50 mcg/actuation nasal spray Commonly known as:  Lamont Rosalesd 2 Sprays by Both Nostrils route daily. Indications: Allergic Rhinitis GARLIC PO Take 2 Caps by mouth daily. glucose blood VI test strips strip Commonly known as:  blood glucose test  
Use to check glucose daily  
  
 melatonin 3 mg tablet Take 3 mg by mouth nightly as needed. OTHER  
sample Biofreeze cold therapy pain relief  
  
 potassium chloride 20 mEq/15 mL solution Commonly known as:  KAON 10% Patient states she take 1 tablespoon daily  
  
 pravastatin 80 mg tablet Commonly known as:  PRAVACHOL  
TAKE ONE TABLET BY MOUTH EVERY NIGHT AT BEDTIME  
  
 spironolactone 25 mg tablet Commonly known as:  ALDACTONE Take 1 Tab by mouth daily. Prescriptions Printed Refills  
 fluticasone (FLONASE) 50 mcg/actuation nasal spray 11 Si Sprays by Both Nostrils route daily. Indications: Allergic Rhinitis Class: Print Route: Both Nostrils We Performed the Following AMB POC RAPID STREP A [32615 CPT(R)] To-Do List   
 07/10/2018  8:30 PM  
  Appointment with [address] at [address] ([phone] [address] [facility]:      [facility] [phone]: [facility], 4th floor, [place], [address]      [facility] [phone]; [address] [place] [address], [address]    Patient instructions ·  Please do not arrive prior to your scheduled appointment time as your room may not be ready. ·  Avoid afternoon naps, caffeine and alcoholic beverages the day of your study. ·  Please bring pajamas men bottoms, women tops and bottoms. We   ask that you do not bring a one piece nightgown to sleep in. ·  Please do not apply lotion after shower the day of your appointment  ·  Please do not apply leave in hair products, such as, oils, conditioners or hairspray.  · Remove any hairpieces, such as, extensions, weaves & sewn in wigs prior to your appointment. If you arrive with sewn in hairpieces, we will   reschedule your procedure. The [facility] are outpatient testing department. ·  We encourage you to bring a non-alcoholic/ non-caffeinated beverage and snack, if desired. The cafeteria is closed at night. ·  Please bring any medications that are routinely taken prior to bed. If you have been given a sedative for the study,  DO NOT TAKE THE SEDATIVE BEFORE ARRIVAL. Be advised that if the sedative is taken, we recommend that you not drive for 10 hours after taking it. ·  Diabetic patients should bring testing device, snack and any medications that may be needed. ·  Patients who require breathing treatments should bring the unit with them. ·  The person having the sleep study is the only person allowed in the testing room. If another individual needs to be present throughout the night to assist in the patients care, arrangements must be made prior to the scheduled study date. ·  During the study, we encourage a time free environment. Please refrain from checking the time. ·  The technologist will ask you to turn off your cell phone. ·  Televisions are available in each room but cannot remain on during the study; it interferes with monitoring equipment. ·  During the study, the technologist will ask you to sleep on your back for a portion of the night. ·  Showers are available following your sleep study, please bring any toiletry items. We will provide washcloths and towels. Thank you for choosing the [address] If you have any questions prior to your appointment, please do not hesitate to contact us at [phone]. Introducing [facility]! Estela Quiles introduces [employer] patient portal. Now you can access parts of your medical record, email your doctor's office, and request medication refills online. 1. In your internet browser, go to [email or web address] 2. Click on the First Time User? Click Here link in the Sign In box. You will see the New Member Sign Up page. 3. Enter your [employer] Access Code exactly as it appears below. You will not need to use this code after youve completed the sign-up process. If you do not sign up before the expiration date, you must request a new code. · [facility] [employer] Access Code: [facility] Expires: 9/3/2018 12:13 PM 
 
4. Enter the last four digits of your Social Security Number (xxxx) and Date of Birth (mm/dd/yyyy) as indicated and click Submit. You will be taken to the next sign-up page. 5. Create a E[employer]vot ID. This will be your [employer] login ID and cannot be changed, so think of one that is secure and easy to remember. 6. Create a [employer] password. You can change your password at any time. 7. Enter your Password Reset Question and Answer. This can be used at a later time if you forget your password. 8. Enter your e-mail address. You will receive e-mail notification when new information is available in [address] 9. Click Sign Up. You can now view and download portions of your medical record. 10. Click the Download Summary menu link to download a portable copy of your medical information. If you have questions, please visit the Frequently Asked Questions section of the [employer] website. Remember, [employer] is NOT to be used for urgent needs. For medical emergencies, dial 911. Now available from your iPhone and Android! Please provide this summary of care documentation to your next provider. Your primary care clinician is listed as Kalie Nettles. If you have any questions after today's visit, please call [phone].

## 2018-07-10 ENCOUNTER — HOSPITAL ENCOUNTER (OUTPATIENT)
Dept: SLEEP MEDICINE | Age: 58
Discharge: HOME | End: 2018-07-10
Payer: COMMERCIAL

## 2018-07-10 DIAGNOSIS — E78.5 HYPERLIPIDEMIA, UNSPECIFIED HYPERLIPIDEMIA TYPE: Primary | ICD-10-CM

## 2018-07-10 DIAGNOSIS — G47.30 HYPERSOMNIA WITH SLEEP APNEA: ICD-10-CM

## 2018-07-10 DIAGNOSIS — G47.10 HYPERSOMNIA WITH SLEEP APNEA: ICD-10-CM

## 2018-07-10 PROCEDURE — 95811 POLYSOM 6/>YRS CPAP 4/> PARM: CPT

## 2018-07-11 ENCOUNTER — HOSPITAL ENCOUNTER (OUTPATIENT)
Dept: LAB | Age: 58
Discharge: HOME | End: 2018-07-11
Attending: INTERNAL MEDICINE
Payer: COMMERCIAL

## 2018-07-11 ENCOUNTER — HOSPITAL ENCOUNTER (OUTPATIENT)
Dept: MAMMOGRAPHY | Age: 58
Discharge: HOME | End: 2018-07-11
Attending: INTERNAL MEDICINE
Payer: COMMERCIAL

## 2018-07-11 ENCOUNTER — OFFICE VISIT (OUTPATIENT)
Dept: CARDIOLOGY CLINIC | Age: 58
End: 2018-07-11

## 2018-07-11 VITALS — DIASTOLIC BLOOD PRESSURE: 66 MMHG | HEART RATE: 56 BPM | SYSTOLIC BLOOD PRESSURE: 113 MMHG

## 2018-07-11 VITALS
OXYGEN SATURATION: 98 % | WEIGHT: 151 LBS | HEIGHT: 62 IN | BODY MASS INDEX: 27.79 KG/M2 | DIASTOLIC BLOOD PRESSURE: 70 MMHG | SYSTOLIC BLOOD PRESSURE: 100 MMHG | HEART RATE: 63 BPM

## 2018-07-11 DIAGNOSIS — R00.2 PALPITATIONS: ICD-10-CM

## 2018-07-11 DIAGNOSIS — E78.5 HYPERLIPIDEMIA, UNSPECIFIED HYPERLIPIDEMIA TYPE: ICD-10-CM

## 2018-07-11 DIAGNOSIS — R07.9 CHEST PAIN, UNSPECIFIED TYPE: Primary | ICD-10-CM

## 2018-07-11 DIAGNOSIS — I11.9 BENIGN HYPERTENSIVE HEART DISEASE WITHOUT HEART FAILURE: ICD-10-CM

## 2018-07-11 DIAGNOSIS — Z12.31 ENCOUNTER FOR SCREENING MAMMOGRAM FOR BREAST CANCER: ICD-10-CM

## 2018-07-11 LAB
ALBUMIN SERPL-MCNC: 3.9 G/DL (ref 3.4–5)
ALBUMIN/GLOB SERPL: 1.2 {RATIO} (ref 0.8–1.7)
ALP SERPL-CCNC: 37 U/L (ref 45–117)
ALT SERPL-CCNC: 29 U/L (ref 13–56)
AST SERPL-CCNC: 21 U/L (ref 15–37)
BILIRUB DIRECT SERPL-MCNC: 0.2 MG/DL (ref 0–0.2)
BILIRUB SERPL-MCNC: 0.8 MG/DL (ref 0.2–1)
CHOLEST SERPL-MCNC: 165 MG/DL
GLOBULIN SER CALC-MCNC: 3.3 G/DL (ref 2–4)
HDLC SERPL-MCNC: 75 MG/DL (ref 40–60)
HDLC SERPL: 2.2 {RATIO} (ref 0–5)
LDLC SERPL CALC-MCNC: 81.2 MG/DL (ref 0–100)
LIPID PROFILE,FLP: ABNORMAL
PROT SERPL-MCNC: 7.2 G/DL (ref 6.4–8.2)
TRIGL SERPL-MCNC: 44 MG/DL (ref ?–150)
VLDLC SERPL CALC-MCNC: 8.8 MG/DL

## 2018-07-11 PROCEDURE — 80061 LIPID PANEL: CPT | Performed by: INTERNAL MEDICINE

## 2018-07-11 PROCEDURE — 80076 HEPATIC FUNCTION PANEL: CPT | Performed by: INTERNAL MEDICINE

## 2018-07-11 PROCEDURE — 36415 COLL VENOUS BLD VENIPUNCTURE: CPT | Performed by: INTERNAL MEDICINE

## 2018-07-11 PROCEDURE — 77067 SCR MAMMO BI INCL CAD: CPT

## 2018-07-11 NOTE — PROGRESS NOTES
HPI:   I saw Lesly Doyle in my office today in cardiovascular evaluation regarding her problems with palpitations. Ms. Rodrigo Chacon is a very pleasant, but anxious 51-year-old -American female with history of non-anginal sounding chest pain and palpitations over the years. She has had Holter's in the past which have shown no significant ectopy except for some rare PAC's, but due to symptoms of palpitations she has been on atenolol 25 mg daily or as needed and Norvasc 5 mg along with Aldactone 25 mg daily for blood pressure. She comes in today relates that she is doing reasonably well. She does have some palpitations nearly daily but when she takes her atenolol 25 mg half tablet twice a day she seems to do reasonably well. She continues have some vague left-sided chest pain which is a stabbing sensation every other day lasting for 15 minutes occurring at rest, but she is not having any symptoms that really sound cardiac in with a 0 coronary calcium score a few years back in the low cholesterol I do not feel that there is any likelihood that this lady is developing any significant obstructive coronary disease. Encounter Diagnoses   Name Primary?  Chest pain, unspecified type Yes    Hypertensive heart disease      Hyperlipidemia, unspecified hyperlipidemia type     Palpitations        Discussion: This patient appears to be doing about the same as she has in the past and really of no recommendations for change at this time. She does not have any symptoms to suggest the development of symptomatic obstructive coronary disease which I think would be extremely unlikely in view of her low coronary calcium score and low overall cholesterol. Her EKG is stable as is her blood pressure and with an excellent cholesterol as indicated below I do not have any recommendations for any further cardiovascular workup at this time and will simply plan to see her again in several months.     PCP: Florida Rojas, MD       Past Medical History:   Diagnosis Date    Cardiac Agatston CAC score, <100 04/30/2014    Coronary calcium score 0.    Cardiac Holter monitoring 01/25/2017    Sinus rhythm, avg HR 73 bpm (range ). Benign Holter study.  Cardiac nuclear imaging test 01/11/2013    No convincing evidence for ischemia or infarction in the setting of significant chest wall artifact. EF 62%. No RWMA. Neg EKG on max EST. Ex time 9 min 50 sec.  Cardiac stress echo, normal 05/05/2016    Normal maximal stress echo w/reproduction of atypical chest discomfort. Ex time 11 min 3 sec. EF 55%.  Cardiovascular LLE venous duplex 09/28/2016    Left leg:  No DVT.  Chest pain     GERD (gastroesophageal reflux disease)     Heel pain, bilateral     Hiatal hernia     denies this    HTN (hypertension)     Hyperlipidemia     Insulin resistance     follows with endocrinology for this    Night sweats     PVC (premature ventricular contraction)     Right low back pain     S/P colonoscopy 2009, 2014    normal per patient    Unsteady gait     due to heel pain         Past Surgical History:   Procedure Laterality Date    D/C SUCTION  2011    HX OTHER SURGICAL  2014    colonscopy    HX OTHER SURGICAL      endoscopy    HX OTHER SURGICAL      wisdom teeth removal x1         Current Outpatient Rx   Name  Route  Sig  Dispense  Refill    OTHER        sample Biofreeze cold therapy pain relief              flunisolide (NASAREL) 25 mcg (0.025 %) spry    Both Nostrils    2 Sprays by Both Nostrils route two (2) times a day. Indications: ALLERGIC RHINITIS    25 mL    1      fexofenadine (ALLEGRA) 180 mg tablet    Oral    Take 1 Tab by mouth daily. 30 Tab    3      spironolactone (ALDACTONE) 25 mg tablet    Oral    Take 1 Tab by mouth daily. 30 Tab    3      amLODIPine (NORVASC) 5 mg tablet    Oral    Take 1 Tab by mouth daily.     30 Tab    6      glucose blood VI test strips (BLOOD GLUCOSE TEST) strip        Use to check glucose daily    1 Package    11      atenolol (TENORMIN) 25 mg tablet    Oral    Take 1 tablet by mouth daily. 90 tablet    3      co-enzyme Q-10 (CO Q-10) 100 mg capsule    Oral    Take 100 mg by mouth two (2) times a day.  pravastatin (PRAVACHOL) 80 mg tablet    Oral    Take 1 tablet by mouth nightly. 90 tablet    3      potassium chloride (KAON 10%) 20 mEq/15 mL solution    Oral    Take 15 mL by mouth two (2) times a day. 1 mL    0       Being followed by Dr. Daniela Philip 2 Caps by mouth daily.  aspirin delayed-release (ECOTRIN LOW STRENGTH) 81 mg tablet    Oral    Take 81 mg by mouth daily.  omega-3 fatty acids-vitamin e (FISH OIL) 1,000 mg cap    Oral    Take 1 Cap by mouth daily.  melatonin 3 mg tablet    Oral    Take 3 mg by mouth nightly.  Dexlansoprazole (DEXILANT) 60 mg CpDM    Oral    Take 1 capsule by mouth as needed.  sucralfate (CARAFATE) 1 gram tablet    Oral    Take 1 g by mouth daily.  MULTIVITAMIN W-MINERALS/LUTEIN (CENTRUM SILVER PO)    Oral    Take 1 Tab by mouth daily.                    Allergies   Allergen Reactions    Ibuprofen Other (comments)    Nsaids (Non-Steroidal Anti-Inflammatory Drug) Other (comments)    Amoxicillin Unable to Obtain and Other (comments)    Lidocaine Swelling     Oral Solution    Lipitor [Atorvastatin] Myalgia           Lopressor [Metoprolol Tartrate] Other (comments)     Lightheaded and cp    Percocet [Oxycodone-Acetaminophen] Unable to Obtain and Other (comments)    Vicodin [Hydrocodone-Acetaminophen] Unable to Obtain and Other (comments)         Social History :  Social History   Substance Use Topics    Smoking status: Former Smoker     Packs/day: 0.50     Years: 11.00     Types: Cigarettes     Quit date: 12/31/2006    Smokeless tobacco: Former User     Quit date: 1/10/1993      Comment: 6-7 cigs per day    Alcohol use 0.0 oz/week     0 Standard drinks or equivalent per week      Comment: very rare        Family History: family history includes Asthma in her father; Heart Attack in her maternal uncle, maternal uncle, and mother; Heart Disease in her brother; Heart defect in her mother; Hypertension in her brother and mother. Review of Systems:    Constitutional: Negative. Respiratory: Positive for cough and shortness of breath. Negative for hemoptysis and wheezing. Cardiovascular: Positive for chest pain and palpitations. Negative for orthopnea and leg swelling. Gastrointestinal: Positive for abdominal pain and heartburn. Negative for blood in stool, constipation, diarrhea, melena, nausea and vomiting. Musculoskeletal: Positive for myalgias. Negative for falls and joint pain. Neurological: Positive for dizziness. Physical Exam:    The patient is a cooperative, alert, well developed, well nourished 66 y.o.  female who is in no acute distress at the time of the examination. Visit Vitals    /70    Pulse 63    Ht 5' 2\" (1.575 m)    Wt 68.5 kg (151 lb)    SpO2 98%    BMI 27.62 kg/m2     HEENT: Conjuctiva white, mucosa moist, no pallor or cyanosis. NECK: Supple without masses, tenderness or thyromegaly. There was no jugular venous distention. Carotid are full bilaterally without bruits. CHEST: Symmetrical with good excursion. LUNGS: Clear to auscultation in all fields. HEART: The apex is not displaced. There were no lifts, thrills or heaves. There is a normal S1 and S2 without appreciable murmurs rubs clicks or gallops auscultated. ABDOMEN: Soft without masses or tenderness. The liver is palpated 2-3 finger breadths below the right costal margin. EXTREMITIES: Full peripheral pulses without peripheral edema.     Review of Data: See PMH and Cardiology and Imaging sections for cardiac testing  Lab Results   Component Value Date/Time    Cholesterol, total 141 02/05/2018 04:27 PM HDL Cholesterol 74 (H) 02/05/2018 04:27 PM    LDL, calculated 56 02/05/2018 04:27 PM    Triglyceride 55 02/05/2018 04:27 PM    CHOL/HDL Ratio 1.9 02/05/2018 04:27 PM         Results for orders placed or performed in visit on 07/11/18   AMB POC EKG ROUTINE W/ 12 LEADS, INTER & REP     Status: None    Narrative    Normal sinus rhythm, rate 63. This EKG is within normal limits and similar to the EKG of February 7, 2018. Giovanna Carver D.O., F.A.C.C. [facility]  [address] [place] [address] and [facility]  [address] [place] [address]. [address] [address]  [address]    PLEASE NOTE:  This document has been produced using voice recognition software. Unrecognized errors in transcription may be present.

## 2018-07-11 NOTE — MR AVS SNAPSHOT
[address] [address] [address] [id number] [phone] Patient: Cain Rubio MRN: [id number] NGJ:2/9/1940 Visit Information Date & Time Provider Department Dept. Phone Encounter #  
 7/11/2018  3:40 PM Emily Lopez, [address] [facility] [address] [id number] Follow-up Instructions Return in about 6 months (around 1/11/2019), or if symptoms worsen or fail to improve. Your Appointments 8/10/2018  2:40 PM  
Follow Up with Emily Lopez DO [facility] [place] [facility] ([facility]) Appt Note: 6 month [place] [address] [address] [address] [phone] [address]  
  
    
 12/5/2018  1:30 PM  
Office Visit with Anais Arroyo MD  
[facility]) Appt Note: ov 6mo. s [place] [address], [address] [place] [address] [address] [place]  
  
   
 [address], [address] Upcoming Health Maintenance Date Due DTaP/Tdap/Td series (1 - Tdap) 4/5/1983 PAP AKA CERVICAL CYTOLOGY 2/4/2016 BREAST CANCER SCRN MAMMOGRAM 11/22/2017 COLONOSCOPY 11/14/2019 Allergies as of 7/11/2018  Review Complete On: 7/11/2018 By: Emily Lopez DO Severity Noted Reaction Type Reactions Ibuprofen High 11/10/2016    Other (comments) Nsaids (Non-steroidal Anti-inflammatory Drug) High 11/10/2016    Other (comments) Amoxicillin  12/12/2011    Unable to Obtain, Other (comments) Lidocaine  01/08/2013    Swelling Oral Solution Lipitor [Atorvastatin]  07/15/2014    Myalgia Lopressor [Metoprolol Tartrate]  02/28/2012    Other (comments) Lightheaded and cp Percocet [Oxycodone-acetaminophen]  12/12/2011    Unable to Obtain, Other (comments) Vicodin [Hydrocodone-acetaminophen]  12/12/2011    Unable to Obtain, Other (comments) Current Immunizations  Reviewed on 11/13/2013 Name Date  
 TB Skin Test (PPD) Intradermal 11/13/2013 Not reviewed this visit You Were Diagnosed With   
  
 Codes Comments Chest pain, unspecified type    -  Primary ICD-10-CM: R07.9 ICD-9-CM: 786.50 Benign hypertensive heart disease without heart failure     ICD-10-CM: I11.9 ICD-9-CM: 402.10 Hyperlipidemia, unspecified hyperlipidemia type     ICD-10-CM: E78.5 ICD-9-CM: 272.4 Palpitations     ICD-10-CM: R00.2 ICD-9-CM: 785.1 Vitals BP Pulse Height(growth percentile) Weight(growth percentile) SpO2 BMI  
 100/70 63 5' 2\" (1.575 m) 151 lb (68.5 kg) 98% 27.62 kg/m2 OB Status Smoking Status Postmenopausal Former Smoker Vitals History BMI and BSA Data Body Mass Index Body Surface Area  
 27.62 kg/m 2 1.73 m 2 Preferred Pharmacy Pharmacy Name Phone Conner Parra [facility], [address] [place] [address] [phone] Your Updated Medication List  
  
   
This list is accurate as of 7/11/18  4:30 PM.  Always use your most recent med list.  
  
  
  
  
 atenolol 25 mg tablet Commonly known as:  TENORMIN Take 1/2 to 1 tablet daily. Not to exceed one tablet per day. CARAFATE 1 gram tablet Generic drug:  sucralfate Take 1 g by mouth daily as needed. CENTRUM SILVER PO Take 1 Tab by mouth daily. co-enzyme Q-10 100 mg capsule Commonly known as:  CO Q-10 Take 100 mg by mouth two (2) times a day. DEXILANT 60 mg Cpdb Generic drug:  Dexlansoprazole Take 1 Cap by mouth every Monday, Wednesday, Friday. ECOTRIN LOW STRENGTH 81 mg tablet Generic drug:  aspirin delayed-release Take 81 mg by mouth daily. FISH OIL 1,000 mg Cap Generic drug:  omega-3 fatty acids-vitamin e Take 2 Caps by mouth daily. fluticasone 50 mcg/actuation nasal spray Commonly known as:  Michaelyn Drought 2 Sprays by Both Nostrils route daily. Indications: Allergic Rhinitis GARLIC PO Take 2 Caps by mouth daily. glucose blood VI test strips strip Commonly known as:  blood glucose test  
Use to check glucose daily  
  
 melatonin 3 mg tablet Take 3 mg by mouth nightly as needed. OTHER  
sample Biofreeze cold therapy pain relief  
  
 potassium chloride 20 mEq/15 mL solution Commonly known as:  KAON 10% Patient states she take 1 tablespoon daily  
  
 pravastatin 80 mg tablet Commonly known as:  PRAVACHOL  
TAKE ONE TABLET BY MOUTH EVERY NIGHT AT BEDTIME  
  
 spironolactone 25 mg tablet Commonly known as:  ALDACTONE Take 1 Tab by mouth daily. We Performed the Following AMB POC EKG ROUTINE W/ 12 LEADS, INTER & REP [22221 CPT(R)] Follow-up Instructions Return in about 6 months (around 1/11/2019), or if symptoms worsen or fail to improve. Introducing [facility]! Marie Esparza introduces [employer] patient portal. Now you can access parts of your medical record, email your doctor's office, and request medication refills online. 1. In your internet browser, go to [email or web address] 2. Click on the First Time User? Click Here link in the Sign In box. You will see the New Member Sign Up page. 3. Enter your [employer] Access Code exactly as it appears below. You will not need to use this code after youve completed the sign-up process. If you do not sign up before the expiration date, you must request a new code. · [employer] Access Code: [facility] Expires: 9/3/2018 12:13 PM 
 
4. Enter the last four digits of your Social Security Number (xxxx) and Date of Birth (mm/dd/yyyy) as indicated and click Submit. You will be taken to the next sign-up page. 5. Create a [employer] ID.  This will be your [employer] login ID and cannot be changed, so think of one that is secure and easy to remember. 6. Create a [employer] password. You can change your password at any time. 7. Enter your Password Reset Question and Answer. This can be used at a later time if you forget your password. 8. Enter your e-mail address. You will receive e-mail notification when new information is available in [address] 9. Click Sign Up. You can now view and download portions of your medical record. 10. Click the Download Summary menu link to download a portable copy of your medical information. If you have questions, please visit the Frequently Asked Questions section of the [employer] website. Remember, [employer] is NOT to be used for urgent needs. For medical emergencies, dial 911. Now available from your iPhone and Android! Please provide this summary of care documentation to your next provider. Your primary care clinician is listed as Earney Guard. If you have any questions after today's visit, please call [phone].

## 2018-07-11 NOTE — LETTER
7/12/2018 9:46 AM 
 
Ms. Terry Matthew [address] [address] Dear Terry Matthew: 
 
Please find your most recent results below. Your mammogram is normal. 
 
Resulted Orders [facility]BER MAMMO BI SCREENING INCL CAD Narrative Bilateral Screening mammogram 
CPT code: Georgi Reveles 155, [address] History:  Screening. Comparison: 2228-9060 Digital mammography was performed in both projections. Interpretation performed 
in conjunction with computed aided detection system (iCAD software version 7.2-H 
high setting). Any areas marked were correlated with the mammogram. 
 
Findings: CC and MLO views were obtained of each breast. There are scattered 
areas of fibroglandular density. No developing masses or suspicious calcifications are seen. Impression Impression: No evidence for malignancy. Follow-up mammography in one year is recommended. BIRADS 1: Negative Thank you for this referral.  
 
 
  
 
Please call me if you have any questions: [phone] Sincerely, Located within [facility] 2

## 2018-07-12 NOTE — PROGRESS NOTES
Please let her know that her mammogram is unremarkable.      Dr. Amrita Quiroga  [facility], [address] [facility], [address] [place] [address] Str.  Phone: [phone]  Fax: [phone]

## 2018-07-15 NOTE — PROGRESS NOTES
I don't think we had this the day I saw her since the test was done on the same day. The level is up slightly and I would encourage as much of a whole foods plant based diet as possible and taking her Pravastatin daily, but with high HDL would not make any other changes and I still think this is pretty good. Please let the patient know.  ES

## 2018-07-17 ENCOUNTER — HOSPITAL ENCOUNTER (OUTPATIENT)
Dept: LAB | Age: 58
Discharge: HOME | End: 2018-07-17
Payer: SUBSIDIZED

## 2018-07-17 ENCOUNTER — OFFICE VISIT (OUTPATIENT)
Dept: INTERNAL MEDICINE CLINIC | Age: 58
End: 2018-07-17

## 2018-07-17 VITALS
HEART RATE: 61 BPM | TEMPERATURE: 98.4 F | BODY MASS INDEX: 28.41 KG/M2 | WEIGHT: 154.4 LBS | RESPIRATION RATE: 14 BRPM | SYSTOLIC BLOOD PRESSURE: 110 MMHG | OXYGEN SATURATION: 97 % | HEIGHT: 62 IN | DIASTOLIC BLOOD PRESSURE: 64 MMHG

## 2018-07-17 DIAGNOSIS — J02.9 SORE THROAT: ICD-10-CM

## 2018-07-17 DIAGNOSIS — J02.9 SORE THROAT: Primary | ICD-10-CM

## 2018-07-17 LAB
S PYO AG THROAT QL: NEGATIVE
VALID INTERNAL CONTROL?: YES

## 2018-07-17 PROCEDURE — 87070 CULTURE OTHR SPECIMN AEROBIC: CPT | Performed by: NURSE PRACTITIONER

## 2018-07-17 RX ORDER — AZITHROMYCIN 250 MG/1
TABLET, FILM COATED ORAL
Qty: 6 TAB | Refills: 0 | Status: SHIPPED | OUTPATIENT
Start: 2018-07-17 | End: 2018-07-17

## 2018-07-17 RX ORDER — LORATADINE 10 MG/1
10 TABLET ORAL DAILY
Qty: 90 TAB | Refills: 3 | Status: SHIPPED | OUTPATIENT
Start: 2018-07-17 | End: 2019-08-14 | Stop reason: ALTCHOICE

## 2018-07-17 RX ORDER — HYDROCORTISONE 1 %
CREAM (GRAM) TOPICAL 2 TIMES DAILY
Qty: 30 G | Refills: 1 | Status: SHIPPED | OUTPATIENT
Start: 2018-07-17 | End: 2019-08-15 | Stop reason: ALTCHOICE

## 2018-07-17 NOTE — PROGRESS NOTES
Angela Ngo is a 66 y.o.  female and presents with    Chief Complaint   Patient presents with    Sore Throat     Patient here to follow up with sore throat and left side ear pain. Patient stated that she still has discomfort to sore throat and ear. Subjective:  HPI   Ms. Gen Romero presents today with sore throat and left sided ear pain that improved from last visit some but still present. She reports took all of Azithromycin and states changed out her tooth brush but then used a tooth brush that may have been contaminated and was drinking out of same water bottle while sick. Additional Concerns: none     ROS   Review of Systems   Constitutional: Negative for chills, fever and malaise/fatigue. HENT: Negative. Respiratory: Negative. Cardiovascular: Negative. Gastrointestinal: Negative. Genitourinary: Negative. Skin: Negative. Neurological: Negative for dizziness and headaches. Allergies   Allergen Reactions    Ibuprofen Other (comments)    Nsaids (Non-Steroidal Anti-Inflammatory Drug) Other (comments)    Amoxicillin Unable to Obtain and Other (comments)    Lidocaine Swelling     Oral Solution    Lipitor [Atorvastatin] Myalgia           Lopressor [Metoprolol Tartrate] Other (comments)     Lightheaded and cp    Percocet [Oxycodone-Acetaminophen] Unable to Obtain and Other (comments)    Vicodin [Hydrocodone-Acetaminophen] Unable to Obtain and Other (comments)       Current Outpatient Prescriptions   Medication Sig Dispense Refill    loratadine (CLARITIN) 10 mg tablet Take 1 Tab by mouth daily. Indications: Allergic Rhinitis, Urticaria 90 Tab 3    hydrocortisone (CORTAID) 1 % topical cream Apply  to affected area two (2) times a day. use thin layer 30 g 1    fluticasone (FLONASE) 50 mcg/actuation nasal spray 2 Sprays by Both Nostrils route daily. Indications: Allergic Rhinitis 1 Bottle 11    atenolol (TENORMIN) 25 mg tablet Take 1/2 to 1 tablet daily. Not to exceed one tablet per day. 30 Tab 3    pravastatin (PRAVACHOL) 80 mg tablet TAKE ONE TABLET BY MOUTH EVERY NIGHT AT BEDTIME 30 Tab 6    spironolactone (ALDACTONE) 25 mg tablet Take 1 Tab by mouth daily. 30 Tab 11    potassium chloride (KAON 10%) 20 mEq/15 mL solution Patient states she take 1 tablespoon daily      OTHER sample Biofreeze cold therapy pain relief      co-enzyme Q-10 (CO Q-10) 100 mg capsule Take 100 mg by mouth two (2) times a day.  GARLIC PO Take 2 Caps by mouth daily.  aspirin delayed-release (ECOTRIN LOW STRENGTH) 81 mg tablet Take 81 mg by mouth daily.  omega-3 fatty acids-vitamin e (FISH OIL) 1,000 mg cap Take 2 Caps by mouth daily.  melatonin 3 mg tablet Take 3 mg by mouth nightly as needed.  Dexlansoprazole (DEXILANT) 60 mg CpDM Take 1 Cap by mouth every Monday, Wednesday, Friday.  sucralfate (CARAFATE) 1 gram tablet Take 1 g by mouth daily as needed.  MULTIVITAMIN W-MINERALS/LUTEIN (CENTRUM SILVER PO) Take 1 Tab by mouth daily. 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        Past Medical History:   Diagnosis Date    Cardiac Agatston CAC score, <100 04/30/2014    Coronary calcium score 0.    Cardiac Holter monitoring 01/25/2017    Sinus rhythm, avg HR 73 bpm (range ). Benign Holter study.  Cardiac nuclear imaging test 01/11/2013    No convincing evidence for ischemia or infarction in the setting of significant chest wall artifact. EF 62%. No RWMA. Neg EKG on max EST. Ex time 9 min 50 sec.  Cardiac stress echo, normal 05/05/2016    Normal maximal stress echo w/reproduction of atypical chest discomfort. Ex time 11 min 3 sec. EF 55%.  Cardiovascular LLE venous duplex 09/28/2016    Left leg:  No DVT.     Chest pain     GERD (gastroesophageal reflux disease)     Heel pain, bilateral     Hiatal hernia     denies this    HTN (hypertension)     Hyperlipidemia     Insulin resistance     follows with endocrinology for this    Night sweats     PVC (premature ventricular contraction)     Right low back pain     S/P colonoscopy 2009, 2014    normal per patient    Unsteady gait     due to heel pain       Past Surgical History:   Procedure Laterality Date    D/C SUCTION  2011    HX OTHER SURGICAL  2014    colonscopy    HX OTHER SURGICAL      endoscopy    HX OTHER SURGICAL      wisdom teeth removal x1       Family History   Problem Relation Age of Onset    Hypertension Mother     Heart defect Mother      anuerysm    Heart Attack Mother     Asthma Father     Heart Attack Maternal Uncle     Heart Attack Maternal Uncle     Heart Disease Brother     Hypertension Brother        Objective:  Vitals:    07/17/18 0847   BP: 110/64   Pulse: 61   Resp: 14   Temp: 98.4 °F (36.9 °C)   TempSrc: Oral   SpO2: 97%   Weight: 154 lb 6.4 oz (70 kg)   Height: 5' 2\" (1.575 m)   PainSc:   7   PainLoc: Throat       LABS   Results for orders placed or performed during the hospital encounter of 07/11/18   LIPID PANEL   Result Value Ref Range    LIPID PROFILE          Cholesterol, total 165 <200 MG/DL    Triglyceride 44 <150 MG/DL    HDL Cholesterol 75 (H) 40 - 60 MG/DL    LDL, calculated 81.2 0 - 100 MG/DL VLDL, calculated 8.8 MG/DL    CHOL/HDL Ratio 2.2 0 - 5.0     HEPATIC FUNCTION PANEL   Result Value Ref Range    Protein, total 7.2 6.4 - 8.2 g/dL    Albumin 3.9 3.4 - 5.0 g/dL    Globulin 3.3 2.0 - 4.0 g/dL    A-G Ratio 1.2 0.8 - 1.7      Bilirubin, total 0.8 0.2 - 1.0 MG/DL    Bilirubin, direct 0.2 0.0 - 0.2 MG/DL    Alk. phosphatase 37 (L) 45 - 117 U/L    AST (SGOT) 21 15 - 37 U/L    ALT (SGPT) 29 13 - 56 U/L       TESTS  none    PE  Physical Exam   Constitutional: She is oriented to person, place, and time. She appears well-developed and well-nourished. No distress. HENT:   Head: Normocephalic and atraumatic. Right Ear: External ear normal.   Left Ear: External ear normal.   Mouth/Throat: No oropharyngeal exudate. Erythematous posterior oropharynx and left nare is edematous   Pulmonary/Chest: Effort normal and breath sounds normal. No respiratory distress. She has no wheezes. She has no rales. She exhibits no tenderness. Abdominal: Soft. Bowel sounds are normal.   Neurological: She is alert and oriented to person, place, and time. Skin: Skin is warm and dry. She is not diaphoretic. Psychiatric: She has a normal mood and affect. Her behavior is normal. Judgment and thought content normal.   Vitals reviewed. Assessment/Plan:    1. Sore throat- Rapid strep- negative. Throat culture sent. Possible reinfection, if throat culture is positive then will treat however for now will have her do symptomatic care. Target seasonal allergies for better control as seems to be postnasal drip. Claritin prescribed, use Flonase and nasal saline rinse. She requested Hydrocortisone cream for bug flare ups, script given to the patient. Lab review: no lab studies available for review at time of visit    Today's Visit:   Diagnoses and all orders for this visit:    1. Sore throat  -     AMB POC RAPID STREP A  -     THROAT CULTURE; Future    Other orders  -     loratadine (CLARITIN) 10 mg tablet;  Take 1 Tab by mouth daily. Indications: Allergic Rhinitis, Urticaria  -     hydrocortisone (CORTAID) 1 % topical cream; Apply  to affected area two (2) times a day. use thin layer      Health Maintenance: deferred to PCP. I have discussed the diagnosis with the patient and the intended plan as seen in the above orders. The patient has received an after-visit summary and questions were answered concerning future plans. I have discussed medication side effects and warnings with the patient as well. I have reviewed the plan of care with the patient, accepted their input and they are in agreement with the treatment goals. Follow-up Disposition: Not on File   More than 1/2 of this 15 minute visit was spent in counseling and coordination of care, as described above.     ZEESHAN Flowers  Internist of [address], [address] [place] [address].  Phone: [phone]  Fax: [phone]

## 2018-07-17 NOTE — PROGRESS NOTES
1. Have you been to the ER, urgent care clinic or hospitalized since your last visit? NO.     2. Have you seen or consulted any other health care providers outside of the [facility] since your last visit (Include any pap smears or colon screening)? NO      Do you have an Advanced Directive? NO    Would you like information on Advanced Directives?  NO

## 2018-07-18 DIAGNOSIS — E78.5 HYPERLIPIDEMIA, UNSPECIFIED HYPERLIPIDEMIA TYPE: Primary | ICD-10-CM

## 2018-07-19 LAB
BACTERIA SPEC CULT: NORMAL
BACTERIA SPEC CULT: NORMAL
SERVICE CMNT-IMP: NORMAL

## 2018-07-20 ENCOUNTER — TELEPHONE (OUTPATIENT)
Dept: INTERNAL MEDICINE CLINIC | Age: 58
End: 2018-07-20

## 2018-07-22 NOTE — TELEPHONE ENCOUNTER
The throat culture was negative. We attempted to call her with results on 7/18 at 3pm however her phone was not accepting incoming calls.  Please remind her/give option of Mychart

## 2018-07-26 ENCOUNTER — OFFICE VISIT (OUTPATIENT)
Dept: PULMONOLOGY | Age: 58
End: 2018-07-26

## 2018-07-26 VITALS
WEIGHT: 152 LBS | SYSTOLIC BLOOD PRESSURE: 110 MMHG | HEART RATE: 65 BPM | TEMPERATURE: 97.4 F | OXYGEN SATURATION: 98 % | DIASTOLIC BLOOD PRESSURE: 72 MMHG | HEIGHT: 62 IN | RESPIRATION RATE: 16 BRPM | BODY MASS INDEX: 27.97 KG/M2

## 2018-07-26 DIAGNOSIS — Z72.821 POOR SLEEP HYGIENE: ICD-10-CM

## 2018-07-26 DIAGNOSIS — G47.19 EXCESSIVE DAYTIME SLEEPINESS: Primary | ICD-10-CM

## 2018-07-26 DIAGNOSIS — R35.1 NOCTURIA: ICD-10-CM

## 2018-07-26 DIAGNOSIS — K21.9 GASTROESOPHAGEAL REFLUX DISEASE, ESOPHAGITIS PRESENCE NOT SPECIFIED: ICD-10-CM

## 2018-07-26 RX ORDER — AMLODIPINE BESYLATE 2.5 MG/1
2.5 TABLET ORAL DAILY
COMMUNITY
Start: 2018-07-10 | End: 2019-03-05 | Stop reason: SDUPTHER

## 2018-07-26 RX ORDER — AZITHROMYCIN 250 MG/1
TABLET, FILM COATED ORAL
COMMUNITY
Start: 2018-07-17 | End: 2018-08-03 | Stop reason: ALTCHOICE

## 2018-07-26 NOTE — MR AVS SNAPSHOT
[address] [facility], [facility] [address] [address] 
[phone] Patient: Jimbo Onofre MRN: [id number] HAZ:[id number][birth year]3/[id number] Visit Information Date & Time Provider Department Dept. Phone Encounter #  
 7/26/2018 11:45 AM Soraya [place]ira, [address] [facility] Tammi Blum [id number] Follow-up Instructions Return in about 3 months (around 10/26/2018). Your Appointments 8/1/2018  8:15 AM  
Nurse Visit with Preston Rob MD  
Internists of [address] [place] [address]) Appt Note: throat and ribs [address], [address] [place]  
  
   
 [address], [address] [place] [address]  
  
    
 12/5/2018  1:30 PM  
Office Visit with Preston Rob MD  
Internists of [address] [place] [address]) Appt Note: ov 6mo. s Dara Ismael [address], [address] [facility] [place] [address] [place]  
  
   
 [address], [address]  
  
    
 1/21/2019  3:00 PM  
Follow Up with Grace Goss DO [facility] ([address]) Appt Note: 6 month follow up Steffen Jensen [phone]  
[phone] [address] [place] [address] [address] Upcoming Health Maintenance Date Due DTaP/Tdap/Td series (1 - Tdap) 4/5/1983 PAP AKA CERVICAL CYTOLOGY 2/4/2016 BREAST CANCER SCRN MAMMOGRAM 7/11/2019 COLONOSCOPY 11/14/2019 Allergies as of 7/26/2018  Review Complete On: 7/26/2018 By: Kasey Gutiérrez LPN Severity Noted Reaction Type Reactions Ibuprofen High 11/10/2016    Other (comments) Nsaids (Non-steroidal Anti-inflammatory Drug) High 11/10/2016    Other (comments) Amoxicillin  12/12/2011    Unable to Obtain, Other (comments) Lidocaine  01/08/2013    Swelling Oral Solution Lipitor [Atorvastatin]  07/15/2014    Myalgia Lopressor [Metoprolol Tartrate]  02/28/2012    Other (comments) Lightheaded and cp Percocet [Oxycodone-acetaminophen]  12/12/2011    Unable to Obtain, Other (comments) Vicodin [Hydrocodone-acetaminophen]  12/12/2011    Unable to Obtain, Other (comments) Current Immunizations  Reviewed on 11/13/2013 Name Date  
 TB Skin Test (PPD) Intradermal 11/13/2013 Not reviewed this visit Vitals BP Pulse Temp Resp Height(growth percentile) Weight(growth percentile) 110/72 (BP 1 Location: Left arm, BP Patient Position: Sitting) 65 97.4 °F (36.3 °C) (Oral) 16 5' 2\" (1.575 m) 152 lb (68.9 kg) SpO2 BMI OB Status Smoking Status 98% 27.8 kg/m2 Postmenopausal Former Smoker Vitals History BMI and BSA Data Body Mass Index Body Surface Area  
 27.8 kg/m 2 1.74 m 2 [facility] [address] [facility] [place] [facility], [address] [place] [address] [phone] Your Updated Medication List  
  
   
This list is accurate as of 7/26/18 12:39 PM.  Always use your most recent med list. amLODIPine 2.5 mg tablet Commonly known as:  NORVASC  
  
 atenolol 25 mg tablet Commonly known as:  TENORMIN Take 1/2 to 1 tablet daily. Not to exceed one tablet per day. azithromycin 250 mg tablet Commonly known as:  Elsworth Heaps CARAFATE 1 gram tablet Generic drug:  sucralfate Take 1 g by mouth daily as needed. CENTRUM SILVER PO Take 1 Tab by mouth daily. co-enzyme Q-10 100 mg capsule Commonly known as:  CO Q-10 Take 100 mg by mouth two (2) times a day. DEXILANT 60 mg Cpdb Generic drug:  Dexlansoprazole Take 1 Cap by mouth every Monday, Wednesday, Friday. ECOTRIN LOW STRENGTH 81 mg tablet Generic drug:  aspirin delayed-release Take 81 mg by mouth daily. FISH OIL 1,000 mg Cap Generic drug:  omega-3 fatty acids-vitamin e  
 Take 2 Caps by mouth daily. fluticasone 50 mcg/actuation nasal spray Commonly known as:  Stacy Jumper 2 Sprays by Both Nostrils route daily. Indications: Allergic Rhinitis GARLIC PO Take 2 Caps by mouth daily. glucose blood VI test strips strip Commonly known as:  blood glucose test  
Use to check glucose daily  
  
 hydrocortisone 1 % topical cream  
Commonly known as:  CORTAID Apply  to affected area two (2) times a day. use thin layer  
  
 loratadine 10 mg tablet Commonly known as:  Sally Lizarraga Take 1 Tab by mouth daily. Indications: Allergic Rhinitis, Urticaria  
  
 melatonin 3 mg tablet Take 3 mg by mouth nightly as needed. OTHER  
sample Biofreeze cold therapy pain relief  
  
 potassium chloride 20 mEq/15 mL solution Commonly known as:  KAON 10% Patient states she take 1 tablespoon daily  
  
 pravastatin 80 mg tablet Commonly known as:  PRAVACHOL  
TAKE ONE TABLET BY MOUTH EVERY NIGHT AT BEDTIME  
  
 spironolactone 25 mg tablet Commonly known as:  ALDACTONE Take 1 Tab by mouth daily. Follow-up Instructions Return in about 3 months (around 10/26/2018). Introducing [facility]! Carlos Gaytan introduces [employer] patient portal. Now you can access parts of your medical record, email your doctor's office, and request medication refills online. 1. In your internet browser, go to [email or web address] 2. Click on the First Time User? Click Here link in the Sign In box. You will see the New Member Sign Up page. 3. Enter your [employer] Access Code exactly as it appears below. You will not need to use this code after youve completed the sign-up process. If you do not sign up before the expiration date, you must request a new code. · [employer] Access Code: [facility] Expires: 9/3/2018 12:13 PM 
 
4.  Enter the last four digits of your Social Security Number (xxxx) and Date of Birth (mm/dd/yyyy) as indicated and click Submit. You will be taken to the next sign-up page. 5. Create a [employer] ID. This will be your [employer] login ID and cannot be changed, so think of one that is secure and easy to remember. 6. Create a [employer] password. You can change your password at any time. 7. Enter your Password Reset Question and Answer. This can be used at a later time if you forget your password. 8. Enter your e-mail address. You will receive e-mail notification when new information is available in [address] 9. Click Sign Up. You can now view and download portions of your medical record. 10. Click the Download Summary menu link to download a portable copy of your medical information. If you have questions, please visit the Frequently Asked Questions section of the [employer] website. Remember, [employer] is NOT to be used for urgent needs. For medical emergencies, dial 911. Now available from your iPhone and Android! Please provide this summary of care documentation to your next provider. Your primary care clinician is listed as Sona Bee. If you have any questions after today's visit, please call [phone].

## 2018-07-26 NOTE — PROGRESS NOTES
Chief Complaint   Patient presents with    Sleep Problem       1. Have you been to the ER, urgent care clinic since your last visit? Hospitalized since your last visit? No    2. Have you seen or consulted any other health care providers outside of the [address] [place] [address] since your last visit? Include any pap smears or colon screening. No     Patients titration done 7/10/2018.

## 2018-07-26 NOTE — PROGRESS NOTES
[facility] Pulmonary, Critical Care, and Sleep Medicine Office Progress Note- Initial Evaluation Primary Care Physician: Arielle Mejia MD  
 
Reason for Visit:  Evaluation for possible sleep disorder Assessment: 
 
1. Excessive Daytime Sleepiness (EDS)- Aspers:  
2. Poor Sleep Hygiene 3. Sleep paralysis 4. Nocturia- suspect Alejo Curry is related to excessive water drinking. 5. Dyspnea - resolved 6. Partial hair loss Discussion: She also reports vivid dreams, sleep paralysis and EDS. Her  Aspers is notably elevated. This also raises the question about a possible underlying primary hypersomnia condition such as narcolepsy. However, she does not endorse symptoms suggestive of cataplexy or hypnagogic and/or hypnopompic hallucinations. Per chart review she was evaluated by a sleep -neurologist (Dr. Tanvi Bolaños) in 2014 and 2015. Snoring, sleep paralysis was also endorses at that time. A PSG was also performed in 2014 that noted an AHI of 1, Sleep efficiency of 87% and sleep latency of 38.5 minutes Her most recent diagnostic sleep study was performed on 2/9/17. Her sleep efficiency was 71% and her AHI again was 1. REM sleep latency was prolonged at 1. A CPAP titration was inadvertently ordered for the patient but without a diagnosis of LUCY if has not validity. The patient does reports varied sleep times and excessive water intake with her getting up to use the bathroom 5-6 times per night. At this time, the patient does not have data to support a diagnosis of sleep breathing disorder such as LUCY or a primary hypersomnia such as narcolepsy. She does endorse isolated sleep paralysis which can occur in isolation. It is often aggravated by eratic sleep patterns and/or periods of sleep deprivation. Strict sleep hygiene should be implemented to see if the patient's EDS improves. Additional testing with an MSLT is not indicated at this time.  Other causes of EDS should be explored: nocturia, depression, and/or  anxiety. Thyroids studies have been check multiple times and noted to be normal. 
 
If the patient's sleep paralysis continue to persist despite improving sleep times, a trial or Prozac may be considered. Plan: 
 
 
· Healthy sleep habits were reviewed and encouraged. ·  and workplace safety reviewed and discussed as appropriate. Drowsy and/or inattentive driving should be avoided. · Possible hypersomnia workup in the future pending clinical course · Follow up with her PCP for her hair loss and other health care maintenance. · Dyspnea resolved so does not need to follow up with Dr. Montserrat Fuentes at this time. · Follow-up in 3 months, sooner should new symptoms or problems arise. History of Present Illness: Ms Vasiliy Islas is a 66 y.o. female patient who was referred by my colleague, Dr. Montserrat Fuentes for a possible sleep disorder. The history was provided by the patient and chart review. The patient was initially evaluated by Dr. Montserrat Fuentes in September 2017 for a report of dyspnea and hypersomnia. Her dyspnea has since resolved. A sleep study was performed on 2/9/17 which was notable for a sleep efficiency of 71% and an AHI of 1. The patient reports a long history of excessive daytime sleepiness. She does not endorse abrupt sleep onsets however. Occupation:  IT Work Schedule: 6038-5758 M-F Shift work: No 
 
Driving: Yes Drowsy Driving: Yes Motor vehicle accident(s) associated with drowsy driving:is denied by the patient Snoring: This is a Chronic problem which has been ongoing for. She has been told that she snores but she does not hear it. Fatigue: This is a Chronic problem which has been ongoing for years. Forrest today is 12/24 Dental: Teeth clenching or grindingis not reported. Naps: are not reported. However, if she could, she would.  
 
Leg Symptoms/Pain: She does not have unpleasant or crawling sensation in legs or strong urge to move when inactive. GERD: is reported. Sometimes she gets up to take medication. Mood: Feels happy. Denies depression or anxiety Sleep-Wake History:  
 
Estimates sleeping approximately 7 hours per night/day. She gets into bed at approximately 2400. Once in bed, watches iPAD until she falls asleep. It usually takes up to 30 minutes or less  to fall asleep after going to bed. Sometimes she will take OTC Melatonin but she has not take for several month. Once asleep she awakens throughout the night. She gets up to use the bathroom 5-6 times nightly. She drinks water throughout the day and night. Endorses vivid dreams and rare nightmares. Pain normally does not interfere with her sleep. She has been experiencing some throat and ear discomfort lately. She awakens to an alarm clock in the morning. The alarm goes off at 0800. She hits the snooze button multiple times until 0900 and then she will get out of bed (Monday- Friday). On the weekends she will wake up at 0800 but stay in bed until 1400. Denies  waking up with a morning headache. Denies awakening with a dry mouth. Denies symptoms suggestive of cataplexy. Reports sleep paralysis. She reports that she will wake up and she is aware that she is awake but she can't move and she can't open her eyes. Currently occurs once monthly but use to be more frequent. She she fell asleep in a recliner chair and she had this happen over and over again. Denies hypnagogic and/or hypnopompic hallucinations. Denies sleep talking/ walking, or other parasomnia and/or unusual sleep behaviors Family Sleep History: 
- None known Aracelis Equalityfrancois Cross 7/26/2018 7/10/2018 2/9/2017 5/20/2014 Does the patient snore loudly (louder than talking or loud enough to be heard through closed doors)? 0 1 0 0 Does the patient often feel tired, fatigued, or sleepy during the daytime, even after a \"good\" night's sleep?  1 1 1 1  
Has anyone ever observed the patient stop breathing during their sleep?  0 0 0 0 Does the patient have or are they being treated for high blood pressure? 1 1 1 1 Is the patient's BMI greater than 35? 0 0 0 0 Is your neck circumference greater than 17 inches (Male) or 16 inches (Female)? 0 0 0 0 Is the patient older than 50? 1 1 1 1 Is the patient male? 0 0 0 0 LUCY Score 3 4 3 3 Has the patient been referred to Sleep Medicine? - 1 1 - Has the patient previously been diagnosed with Obstructive Sleep Apnea? - 0 0 -  
 
 
PHQ over the last two weeks 7/26/2018 Little interest or pleasure in doing things Not at all Feeling down, depressed, irritable, or hopeless Not at all Total Score PHQ 2 0 [place] Scale 7/26/2018 7/10/2018 2/9/2017 5/20/2014 Sitting and Reading 2 3 2 3 Watching TV 2 3 3 3 Sitting, inactive in a public place (e.g. a movie theater or meeting) 2 2 3 1 As a passenger in a car for an hour, without a break 3 3 3 1 Lying down to rest in the afternoon, when circumstances permit 3 3 2 3 Sitting and talking to someone 1 2 1 0 Sitting quietly after lunch without alcohol 2 2 2 3 In a car, while stopped for a few minutes in traffic 2 3 1 0 [place] Sleepiness Score 17 21 17 14 Neck circ. in \"inches\": 12 Past Medical History: 
Past Medical History:  
Diagnosis Date  Allergic rhinitis  Cardiac Agatston CAC score, <100 04/30/2014 Coronary calcium score 0.  
 Cardiac Holter monitoring 01/25/2017 Sinus rhythm, avg HR 73 bpm (range ). Benign Holter study.  Cardiac nuclear imaging test 01/11/2013 No convincing evidence for ischemia or infarction in the setting of significant chest wall artifact. EF 62%. No RWMA. Neg EKG on max EST. Ex time 9 min 50 sec.  Cardiac stress echo, normal 05/05/2016 Normal maximal stress echo w/reproduction of atypical chest discomfort. Ex time 11 min 3 sec. EF 55%.   
 Cardiovascular LLE venous duplex 09/28/2016 Left leg:  No DVT.  Chest pain  GERD (gastroesophageal reflux disease)  Heel pain, bilateral   
 Hiatal hernia   
 denies this  HTN (hypertension)  Hyperlipidemia  Insulin resistance   
 follows with endocrinology for this  Night sweats  PVC (premature ventricular contraction)  Right low back pain  S/P colonoscopy 2009, 2014  
 normal per patient  Unsteady gait   
 due to heel pain Past Surgical History: 
Past Surgical History:  
Procedure Laterality Date  D/C SUCTION  2011  HX OTHER SURGICAL  2014  
 colonscopy  HX OTHER SURGICAL    
 endoscopy  HX OTHER SURGICAL    
 wisdom teeth removal x1 Family History: 
Family History Problem Relation Age of Onset  Hypertension Mother  Heart defect Mother   
  anuerysm  Heart Attack Mother  Asthma Father  Heart Attack Maternal Uncle  Heart Attack Maternal Uncle  Heart Disease Brother  Hypertension Brother Social History: 
Social History Substance Use Topics  Smoking status: Former Smoker Packs/day: 0.50 Years: 11.00 Types: Cigarettes Quit date: 12/31/2006  Smokeless tobacco: Former User Quit date: 1/10/1993 Comment: 6-7 cigs per day  Alcohol use 0.0 oz/week  
  0 Standard drinks or equivalent per week Comment: very rare Caffeine Amount Time of last Intake Comments Coffee None Soda None Tea   Caffeine free Energy Drinks None Over- the - counter stimulant pills None Other Substances Alcohol Rare Tobacco Rare Drugs Rare Medications: 
Current Outpatient Prescriptions on File Prior to Visit Medication Sig Dispense Refill  loratadine (CLARITIN) 10 mg tablet Take 1 Tab by mouth daily. Indications: Allergic Rhinitis, Urticaria 90 Tab 3  
 hydrocortisone (CORTAID) 1 % topical cream Apply  to affected area two (2) times a day.  use thin layer 30 g 1  
 fluticasone (FLONASE) 50 mcg/actuation nasal spray 2 Sprays by Both Nostrils route daily. Indications: Allergic Rhinitis 1 Bottle 11  
 atenolol (TENORMIN) 25 mg tablet Take 1/2 to 1 tablet daily. Not to exceed one tablet per day. 30 Tab 3  pravastatin (PRAVACHOL) 80 mg tablet TAKE ONE TABLET BY MOUTH EVERY NIGHT AT BEDTIME 30 Tab 6  
 spironolactone (ALDACTONE) 25 mg tablet Take 1 Tab by mouth daily. 30 Tab 11  
 potassium chloride (KAON 10%) 20 mEq/15 mL solution Patient states she take 1 tablespoon daily  OTHER sample Biofreeze cold therapy pain relief  co-enzyme Q-10 (CO Q-10) 100 mg capsule Take 100 mg by mouth two (2) times a day.  GARLIC PO Take 2 Caps by mouth daily.  aspirin delayed-release (ECOTRIN LOW STRENGTH) 81 mg tablet Take 81 mg by mouth daily.  omega-3 fatty acids-vitamin e (FISH OIL) 1,000 mg cap Take 2 Caps by mouth daily.  melatonin 3 mg tablet Take 3 mg by mouth nightly as needed.  Dexlansoprazole (DEXILANT) 60 mg CpDM Take 1 Cap by mouth every Monday, Wednesday, Friday.  sucralfate (CARAFATE) 1 gram tablet Take 1 g by mouth daily as needed.  MULTIVITAMIN W-MINERALS/LUTEIN (CENTRUM SILVER PO) Take 1 Tab by mouth daily.  glucose blood VI test strips (BLOOD GLUCOSE TEST) strip Use to check glucose daily 1 Package 11 No current facility-administered medications on file prior to visit. Allergy: Allergies Allergen Reactions  Ibuprofen Other (comments)  Nsaids (Non-Steroidal Anti-Inflammatory Drug) Other (comments)  Amoxicillin Unable to Obtain and Other (comments)  Lidocaine Swelling Oral Solution  Lipitor [Atorvastatin] Myalgia  Lopressor [Metoprolol Tartrate] Other (comments) Lightheaded and cp  Percocet [Oxycodone-Acetaminophen] Unable to Obtain and Other (comments)  Vicodin [Hydrocodone-Acetaminophen] Unable to Obtain and Other (comments) Review of Systems General ROS: positive for  - fatigue and sleep disturbance 
negative for - chills, fever, hot flashes, malaise or night sweats ENT ROS: negative Hematological and Lymphatic ROS: negative for - bleeding problems, blood clots, bruising, jaundice, pallor or swollen lymph nodes Endocrine ROS: negative for - polydipsia/polyuria, skin changes, temperature intolerance or unexpected weight changes Respiratory ROS: no cough, shortness of breath, or wheezing Cardiovascular ROS: no chest pain or dyspnea on exertion Gastrointestinal ROS: no abdominal pain, change in bowel habits, or black or bloody stools Genito-Urinary ROS: no dysuria, trouble voiding, or hematuria Musculoskeletal ROS: negative Neurological ROS: no TIA or stroke symptoms Dermatological ROS: negative for - pruritus, rash or skin lesion changes Psychological ROS: as above Otherwise negative and per HPI Physical Exam: 
Blood pressure 110/72, pulse 65, temperature 97.4 °F (36.3 °C), temperature source Oral, resp. rate 16, height 5' 2\" (1.575 m), weight 68.9 kg (152 lb), SpO2 98 %. on RA, Body mass index is 27.8 kg/(m^2). General: in no respiratory distress, acyanotic, appears stated age, cooperative, pleasant HEENT: PERRL, EOMI, throat without erythema or exudate, Tongue-midline, small oral aperture,  Mallampati's score 2+, Uvula- midline Neck: Supple,  no abnormally enlarged lymph nodes, thyroid is not enlarged, non-tender, No JVD Chest: normal 
Lungs: moderate air entry, clear to auscultation bilaterally, Heart: Regular rate and rhythm, S1S2 present, without murmur Abdomen: Flat, bowel sounds normoactive, abdomen is soft without significant tenderness, or guarding Extremity: negative for edema, cyanosis or clubbing Skin: Skin color, texture, turgor normal. No rashes or lesions Data Reviewed: CBC: Lab Results Component Value Date/Time  WBC 5.5 09/02/2017 11:15 PM  
 Hemoglobin, POC 12.2 01/09/2013 08:19 PM  
 HGB 13.8 09/02/2017 11:15 PM  
 Hematocrit, POC 36 01/09/2013 08:19 PM  
 HCT 42.2 09/02/2017 11:15 PM  
 PLATELET 236 13/71/4412 11:15 PM  
 MCV 87.9 09/02/2017 11:15 PM  
 
 
BMP: Lab Results Component Value Date/Time Sodium 139 02/10/2018 11:40 AM  
 Potassium 4.0 02/10/2018 11:40 AM  
 Chloride 104 02/10/2018 11:40 AM  
 CO2 31 02/10/2018 11:40 AM  
 Anion gap 4 02/10/2018 11:40 AM  
 Glucose 91 02/10/2018 11:40 AM  
 BUN 17 02/10/2018 11:40 AM  
 Creatinine 1.18 02/10/2018 11:40 AM  
 BUN/Creatinine ratio 14 02/10/2018 11:40 AM  
 GFR est AA 58 (L) 02/10/2018 11:40 AM  
 GFR est non-AA 48 (L) 02/10/2018 11:40 AM  
 Calcium 9.0 02/10/2018 11:40 AM  
  
 
TSH: 
Lab Results Component Value Date/Time TSH 1.15 08/15/2016 04:43 PM  
 TSH 2.27 03/26/2016 11:30 AM  
 TSH 2.30 12/15/2015 05:12 PM  
 TSH 2.48 07/25/2015 09:44 AM  
 TSH 1.50 05/23/2015 11:20 AM  
 TSH 3.450 03/10/2015 12:00 AM  
 TSH 1.10 05/28/2014 03:05 PM  
 TSH 1.87 05/27/2014 03:05 PM  
 TSH 1.80 02/06/2014 06:05 PM  
 TSH 0.62 09/19/2013 05:30 PM  
 TSH 1.920 04/25/2013 12:00 AM  
 TSH 1.35 09/26/2012 12:45 PM  
 TSH 1.440 01/16/2012 12:00 AM  
 TSH 1.28 10/19/2011 12:50 PM  
 
 
Imaging: 
[x]I have personally reviewed the patients radiographs section Results from [facility] Encounter encounter on 12/20/16 XR CHEST AP LAT Narrative EXAM: Chest Radiographs INDICATION: Cough and shortness of breath TECHNIQUE: PA and lateral views of the chest 
 
COMPARISON: 4/23/2016, 2/4/2016 and 5/10/2015 FINDINGS: No pneumothorax identified. The lungs are clear. No infiltrates 
identified. No effusions appreciated. The cardiomediastinal silhouette is 
unremarkable. The pulmonary vascularity is unremarkable. The osseous structures 
are unremarkable. Impression IMPRESSION: 
1. No acute cardiopulmonary process. Results from Hospital Encounter encounter on 10/13/16 CT ABD PELV W CONT  Narrative CT of abdomen and pelvis with contrast  
 
INDICATION: Lower abdominal pain COMPARISON: 9/29/12 TECHNIQUE: 5 mm helical scan to the abdomen and pelvis is obtained  from the 
diaphragm to the symphysis pubis after uneventful nonionic IV contrast 
administration. All CT scans at this facility performed using dose optimization techniques as 
appreciated to a performed exam, to include automated exposure control, 
adjustment of the mA and or KU according to patient size (including appropriate 
matching for site specific examination), or use of iterative reconstruction 
technique. All CT scans at this facility performed using dose optimization techniques as 
appreciated to a performed exam, to include automated exposure control, 
adjustment of the mA and or KU according to patient size (including appropriate 
matching for site specific examination), or use of iterative reconstruction 
technique. CONTRAST: 100 cc Isovue 370. FINDINGS:  
 
CT OF ABDOMEN: 
 
Lung Bases: Clear. Liver: There is 3 mm tiny hypodense nodule identified in the left lobe of the 
liver at segment IVb, #21, series 3. It was not clearly seen on prior study. The 
focal hypodensity next to the falciform ligament on prior CT is not as hypodense 
as before and unchanged in size, suggesting improving focal fatty infiltration. No other focal liver lesion seen. Gallbladder: There are probably tiny calculi or polypoid nodules in the 
gallbladder near the neck. Recommend ultrasound for better evaluation. Biliary System: Mild common bile duct dilation measures 9 mm in short diameter, 
stable since the prior CT in 9/12 but increased in caliber compared to the 
remote CT in 10/09. There is hypertension with possible stenosis at the level of 
the ampulla. No intraluminal abnormality visualized. Spleen: Normal. 
 
Pancreas: Normal. 
 
Kidneys: Unremarkable. Adrenal Glands: Normal. 
 
Bowel: The stomach is poorly distended. The small and large bowel are 
nondilated. Peritoneum/Retroperitoneum: No adenopathy. No free air or fluid. Vasculature: Unremarkable for age. CT PELVIS:  
 
Bowel: The small and large bowel are nondilated. The appendix appears normal. 
Mild fecal impaction in the colon. No significant diverticular disease. Bladder: Poorly distended. Peritoneum/Retroperitoneum: No adenopathy. Other: The uterus appears atrophic and tilted to the right with heterogeneous 
enhancement, probably fibroid changes. No adnexal mass. No free fluid. CT OSSEOUS STRUCTURES:    
 
Unremarkable for age. Impression IMPRESSION:  
 
1. No acute diagnostic abnormality to explain patient's pain. No significant 
diverticular disease. Normal appendix. 2. Mild common bile duct dilatation is again noted without interval change 
compared to CT in  although not seen in the remote CT in 10/09. There is 
distal lumen narrowing noted but no intraluminal abnormality seen. This can be 
better evaluated by MRCP. 3. Possible tiny nodular densities along the gallbladder wall near the neck and 
could represent small polyps. 4. Atrophic uterus with possible fibroids. Thank you for this referral.  
   
  
 
 
Historical Sleep Testing Data: 
- PS14: Sleep efficiency 87%, Sleep latency 38.5 min Arousal index:12  AHI:1 
- PS17: Sleep efficiency 71%, AHI: 1, REM sleep latency 148.5 min, arousal index: 15 Julianna Medrano DO, [facility]

## 2018-08-03 ENCOUNTER — OFFICE VISIT (OUTPATIENT)
Dept: INTERNAL MEDICINE CLINIC | Age: 58
End: 2018-08-03

## 2018-08-03 VITALS
DIASTOLIC BLOOD PRESSURE: 71 MMHG | HEIGHT: 62 IN | BODY MASS INDEX: 28.08 KG/M2 | WEIGHT: 152.6 LBS | RESPIRATION RATE: 12 BRPM | SYSTOLIC BLOOD PRESSURE: 107 MMHG | HEART RATE: 60 BPM | TEMPERATURE: 97.7 F | OXYGEN SATURATION: 97 %

## 2018-08-03 DIAGNOSIS — L65.9 ALOPECIA: Primary | ICD-10-CM

## 2018-08-03 DIAGNOSIS — Z13.0 SCREENING FOR DEFICIENCY ANEMIA: ICD-10-CM

## 2018-08-03 DIAGNOSIS — I11.9 BENIGN HYPERTENSIVE HEART DISEASE WITHOUT HEART FAILURE: ICD-10-CM

## 2018-08-03 NOTE — PROGRESS NOTES
Chief Complaint Patient presents with  
 Skin Problem  
  on forehead skin problem that has been a problem for the past 5-6 months Patient has refused the Advanced Medical Directive form. 1. Have you been to the ER, urgent care clinic since your last visit? Hospitalized since your last visit? No 
 
2. Have you seen or consulted any other health care providers outside of the [facility] since your last visit? Include any pap smears or colon screening.  No

## 2018-08-03 NOTE — MR AVS SNAPSHOT
[address] [facility] 
 
 
 [address], [facility] [address] [facility] 
[phone] Patient: Angela Ngo MRN: [id number]:[id number] Visit Information Date & Time Provider Department Dept. Phone Encounter #  
 8/3/2018 12:00 PM Facundo Jalloh MD [facility] [phone] Your Appointments 12/5/2018  1:30 PM  
Office Visit with Facundo Jalloh MD  
[facility] [address] [facility]) Appt Note: ov 6mo. s Griseldamallory Domínguez [address], [facility] [address] [place]  
  
   
 [address] [facility], [facility]  
  
    
 1/21/2019  3:00 PM  
Follow Up with Emanuel East DO [facility] 1 ([address] [facility]) Appt Note: 6 month follow up [place] [facility] [id number] [phone] [address] [place] [address] 108 Upcoming Health Maintenance Date Due DTaP/Tdap/Td series (1 - Tdap) 4/5/1983 PAP AKA CERVICAL CYTOLOGY 2/4/2016 BREAST CANCER SCRN MAMMOGRAM 7/11/2019 COLONOSCOPY 11/14/2019 Allergies as of 8/3/2018  Review Complete On: 8/3/2018 By: Gael Das Severity Noted Reaction Type Reactions Ibuprofen High 11/10/2016    Other (comments) Nsaids (Non-steroidal Anti-inflammatory Drug) High 11/10/2016    Other (comments) Amoxicillin  12/12/2011    Unable to Obtain, Other (comments) Lidocaine  01/08/2013    Swelling Oral Solution Lipitor [Atorvastatin]  07/15/2014    Myalgia Lopressor [Metoprolol Tartrate]  02/28/2012    Other (comments) Lightheaded and cp Percocet [Oxycodone-acetaminophen]  12/12/2011    Unable to Obtain, Other (comments) Vicodin [Hydrocodone-acetaminophen]  12/12/2011    Unable to Obtain, Other (comments) Current Immunizations  Reviewed on 11/13/2013 Name Date  
 TB Skin Test (PPD) Intradermal 11/13/2013 Not reviewed this visit You Were Diagnosed With   
  
 Codes Comments Alopecia    -  Primary ICD-10-CM: L65.9 ICD-9-CM: 704.00 Benign hypertensive heart disease without heart failure     ICD-10-CM: I11.9 ICD-9-CM: 402.10 Hyperlipidemia, unspecified hyperlipidemia type     ICD-10-CM: E78.5 ICD-9-CM: 272.4 Screening for deficiency anemia     ICD-10-CM: Z13.0 ICD-9-CM: V78.1 Vitals BP Pulse Temp Resp Height(growth percentile) Weight(growth percentile) 107/71 (BP 1 Location: Left arm, BP Patient Position: Sitting) 60 97.7 °F (36.5 °C) (Oral) 12 5' 2\" (1.575 m) 152 lb 9.6 oz (69.2 kg) SpO2 BMI OB Status Smoking Status 97% 27.91 kg/m2 Postmenopausal Former Smoker Vitals History BMI and BSA Data Body Mass Index Body Surface Area  
 27.91 kg/m 2 1.74 m 2 [facility] [address] [facility] [address], [address] [place] [phone] Your Updated Medication List  
  
   
This list is accurate as of 8/3/18 12:43 PM.  Always use your most recent med list. amLODIPine 2.5 mg tablet Commonly known as:  [place] Blade Take 2.5 mg by mouth daily. atenolol 25 mg tablet Commonly known as:  TENORMIN Take 1/2 to 1 tablet daily. Not to exceed one tablet per day. CARAFATE 1 gram tablet Generic drug:  sucralfate Take 1 g by mouth daily as needed. CENTRUM SILVER PO Take 1 Tab by mouth daily. co-enzyme Q-10 100 mg capsule Commonly known as:  CO Q-10 Take 100 mg by mouth two (2) times a day. DEXILANT 60 mg Cpdb Generic drug:  Dexlansoprazole Take 1 Cap by mouth every Monday, Wednesday, Friday. ECOTRIN LOW STRENGTH 81 mg tablet Generic drug:  aspirin delayed-release Take 81 mg by mouth daily. FISH OIL 1,000 mg Cap Generic drug:  omega-3 fatty acids-vitamin e Take 2 Caps by mouth daily. fluticasone 50 mcg/actuation nasal spray Commonly known as:  Amos Caller 2 Sprays by Both Nostrils route daily. Indications: Allergic Rhinitis GARLIC PO Take 2 Caps by mouth daily. glucose blood VI test strips strip Commonly known as:  blood glucose test  
Use to check glucose daily  
  
 hydrocortisone 1 % topical cream  
Commonly known as:  CORTAID Apply  to affected area two (2) times a day. use thin layer  
  
 loratadine 10 mg tablet Commonly known as:  Kathlyne Old Take 1 Tab by mouth daily. Indications: Allergic Rhinitis, Urticaria  
  
 melatonin 3 mg tablet Take 3 mg by mouth nightly as needed. OTHER  
sample Biofreeze cold therapy pain relief  
  
 potassium chloride 20 mEq/15 mL solution Commonly known as:  KAON 10% Patient states she take 1 tablespoon daily  
  
 pravastatin 80 mg tablet Commonly known as:  PRAVACHOL  
TAKE ONE TABLET BY MOUTH EVERY NIGHT AT BEDTIME  
  
 spironolactone 25 mg tablet Commonly known as:  ALDACTONE Take 1 Tab by mouth daily. We Performed the Following REFERRAL TO DERMATOLOGY [REF19 Custom] To-Do List   
 08/03/2018 Lab:  JESUS COMPREHENSIVE PANEL   
  
 08/03/2018 Lab:  CBC WITH AUTOMATED DIFF Around 08/03/2018 Lab:  TSH AND FREE T4 Referral Information Referral ID Referred By Referred To  
  
 [id number] Filippo Ambriz Not Available Visits Status Start Date End Date 1 New Request 8/3/18 8/3/19 If your referral has a status of pending review or denied, additional information will be sent to support the outcome of this decision. Patient Instructions Health Maintenance Due Topic Date Due  
 DTaP/Tdap/Td series (1 - Tdap) 04/05/1983  PAP AKA CERVICAL CYTOLOGY  02/04/2016 Patient has refused the Advanced Medical Directive form. Introducing [facility]!    
 Jake Whitten introduces [email or web address] patient portal. Now you can access parts of your medical record, email your doctor's office, and request medication refills online. 1. In your internet browser, go to [email or web address] 2. Click on the First Time User? Click Here link in the Sign In box. You will see the New Member Sign Up page. 3. Enter your FINXIt Access Code exactly as it appears below. You will not need to use this code after youve completed the sign-up process. If you do not sign up before the expiration date, you must request a new code. · [email or web address] Access Code: [facility] Expires: 9/3/2018 12:13 PM 
 
4. Enter the last four digits of your Social Security Number (xxxx) and Date of Birth (mm/dd/yyyy) as indicated and click Submit. You will be taken to the next sign-up page. 5. Create a QikServe ID. This will be your QikServe login ID and cannot be changed, so think of one that is secure and easy to remember. 6. Create a QikServe password. You can change your password at any time. 7. Enter your Password Reset Question and Answer. This can be used at a later time if you forget your password. 8. Enter your e-mail address. You will receive e-mail notification when new information is available in [address] 9. Click Sign Up. You can now view and download portions of your medical record. 10. Click the Download Summary menu link to download a portable copy of your medical information. If you have questions, please visit the Frequently Asked Questions section of the QikServe website. Remember, QikServe is NOT to be used for urgent needs. For medical emergencies, dial 911. Now available from your iPhone and Android! Please provide this summary of care documentation to your next provider. Your primary care clinician is listed as Kaity Tucker. If you have any questions after today's visit, please call [phone].

## 2018-08-03 NOTE — PROGRESS NOTES
[facility]: 
8/5/2018, MRN: [id number] Vasiliy Islas is a 66 y.o. female and presents to clinic for Skin Problem (on forehead skin problem that has been a problem for the past 5-6 months) Subjective:  
Pt is a 64yo AAF with h/o chronic bronchitis, LUCY, porphyria (per EHR), allergic rhinitis, multiple food allergies (per lab work), HTN, HLD, constipation, gallbladder polyps (suggested by CT scan 10/13/16), and GERD. 1. Alopecia: Present x 5 months. Worsening. She has hair loss along her hair line and along the top of her head. No fever/chills. No perms/relaxers. No artificial hair/weave. No alleviating factors are known. No aggravating factors are known. Not associated with pain or flaking of the scalp. 2.   HTN: Present >6 months. Her BP is 107/71. On amlodipine, spironolactone, and atenolol. No adverse side effects from these rx. No refills are needed. Patient Active Problem List  
 Diagnosis Date Noted  Allergic rhinitis 10/04/2017  Gallbladder polyp - suggested by findings on CT scan from 10/13/16 03/01/2017  Hypersomnia with sleep apnea 03/13/2014  Hypertensive heart disease  03/05/2012  Constipation 01/16/2012  Hyperlipidemia  GERD (gastroesophageal reflux disease) Current Outpatient Prescriptions Medication Sig Dispense Refill  amLODIPine (NORVASC) 2.5 mg tablet Take 2.5 mg by mouth daily.  loratadine (CLARITIN) 10 mg tablet Take 1 Tab by mouth daily. Indications: Allergic Rhinitis, Urticaria 90 Tab 3  
 hydrocortisone (CORTAID) 1 % topical cream Apply  to affected area two (2) times a day. use thin layer 30 g 1  
 fluticasone (FLONASE) 50 mcg/actuation nasal spray 2 Sprays by Both Nostrils route daily. Indications: Allergic Rhinitis 1 Bottle 11  
 atenolol (TENORMIN) 25 mg tablet Take 1/2 to 1 tablet daily. Not to exceed one tablet per day. 30 Tab 3  pravastatin (PRAVACHOL) 80 mg tablet TAKE ONE TABLET BY MOUTH EVERY NIGHT AT BEDTIME 30 Tab 6  
 spironolactone (ALDACTONE) 25 mg tablet Take 1 Tab by mouth daily. 30 Tab 11  
 potassium chloride (KAON 10%) 20 mEq/15 mL solution Patient states she take 1 tablespoon daily  OTHER sample Biofreeze cold therapy pain relief  glucose blood VI test strips (BLOOD GLUCOSE TEST) strip Use to check glucose daily 1 Package 11  
 co-enzyme Q-10 (CO Q-10) 100 mg capsule Take 100 mg by mouth two (2) times a day.  GARLIC PO Take 2 Caps by mouth daily.  aspirin delayed-release (ECOTRIN LOW STRENGTH) 81 mg tablet Take 81 mg by mouth daily.  omega-3 fatty acids-vitamin e (FISH OIL) 1,000 mg cap Take 2 Caps by mouth daily.  melatonin 3 mg tablet Take 3 mg by mouth nightly as needed.  Dexlansoprazole (DEXILANT) 60 mg CpDM Take 1 Cap by mouth every Monday, Wednesday, Friday.  sucralfate (CARAFATE) 1 gram tablet Take 1 g by mouth daily as needed.  MULTIVITAMIN W-MINERALS/LUTEIN (CENTRUM SILVER PO) Take 1 Tab by mouth daily. Allergies Allergen Reactions  Ibuprofen Other (comments)  Nsaids (Non-Steroidal Anti-Inflammatory Drug) Other (comments)  Amoxicillin Unable to Obtain and Other (comments)  Lidocaine Swelling Oral Solution  Lipitor [Atorvastatin] Myalgia  Lopressor [Metoprolol Tartrate] Other (comments) Lightheaded and cp  Percocet [Oxycodone-Acetaminophen] Unable to Obtain and Other (comments)  Vicodin [Hydrocodone-Acetaminophen] Unable to Obtain and Other (comments) Past Medical History:  
Diagnosis Date  Allergic rhinitis  Cardiac Agatston CAC score, <100 04/30/2014 Coronary calcium score 0.  
 Cardiac Holter monitoring 01/25/2017 Sinus rhythm, avg HR 73 bpm (range ). Benign Holter study.  Cardiac nuclear imaging test 01/11/2013 No convincing evidence for ischemia or infarction in the setting of significant chest wall artifact. EF 62%. No RWMA. Neg EKG on max EST.   Ex time 9 min 50 sec.  Cardiac stress echo, normal 05/05/2016 Normal maximal stress echo w/reproduction of atypical chest discomfort. Ex time 11 min 3 sec. EF 55%.  Cardiovascular LLE venous duplex 09/28/2016 Left leg:  No DVT.  Chest pain  GERD (gastroesophageal reflux disease)  Heel pain, bilateral   
 Hiatal hernia   
 denies this  HTN (hypertension)  Hyperlipidemia  Insulin resistance   
 follows with endocrinology for this  Night sweats  PVC (premature ventricular contraction)  Right low back pain  S/P colonoscopy 2009, 2014  
 normal per patient  Unsteady gait   
 due to heel pain Past Surgical History:  
Procedure Laterality Date  D/C SUCTION  2011  HX OTHER SURGICAL  2014  
 colonscopy  HX OTHER SURGICAL    
 endoscopy  HX OTHER SURGICAL    
 wisdom teeth removal x1 Family History Problem Relation Age of Onset  Hypertension Mother  Heart defect Mother   
  anuerysm  Heart Attack Mother  Asthma Father  Heart Attack Maternal Uncle  Heart Attack Maternal Uncle  Heart Disease Brother  Hypertension Brother Social History Substance Use Topics  Smoking status: Former Smoker Packs/day: 0.50 Years: 11.00 Types: Cigarettes Quit date: 12/31/2006  Smokeless tobacco: Former User Quit date: 1/10/1993 Comment: 6-7 cigs per day  Alcohol use No  
 
 
ROS Review of Systems Constitutional: Negative for chills and fever. HENT: Negative for ear pain and sore throat. Eyes: Negative for blurred vision and pain. Respiratory: Negative for cough and shortness of breath. Cardiovascular: Negative for chest pain. Gastrointestinal: Negative for abdominal pain, blood in stool and melena. Genitourinary: Negative for dysuria and hematuria. Musculoskeletal: Negative for joint pain and myalgias. Skin: Negative for itching.   
Neurological: Negative for tingling, focal weakness and headaches. Endo/Heme/Allergies: Does not bruise/bleed easily. Psychiatric/Behavioral: Negative for substance abuse. Objective Vitals:  
 08/03/18 1205 08/03/18 1209 BP: 107/71 Pulse: 60 Resp: 12 Temp: 97.7 °F (36.5 °C) TempSrc: Oral   
SpO2: 97% Weight: 152 lb 9.6 oz (69.2 kg) Height: 5' 2\" (1.575 m) PainSc:   6   5 PainLoc: Ear Throat Physical Exam  
Constitutional: She is oriented to person, place, and time and well-developed, well-nourished, and in no distress. HENT:  
Head: Normocephalic and atraumatic. Right Ear: External ear normal.  
Left Ear: External ear normal.  
Nose: Nose normal.  
Mouth/Throat: Oropharynx is clear and moist. No oropharyngeal exudate. Eyes: Conjunctivae and EOM are normal. Pupils are equal, round, and reactive to light. Right eye exhibits no discharge. Left eye exhibits no discharge. No scleral icterus. Neck: Neck supple. Cardiovascular: Normal rate, regular rhythm, normal heart sounds and intact distal pulses. Exam reveals no gallop and no friction rub. No murmur heard. Pulmonary/Chest: Effort normal and breath sounds normal. No respiratory distress. She has no wheezes. She has no rales. Abdominal: Soft. Bowel sounds are normal. She exhibits no distension. There is no tenderness. There is no rebound and no guarding. Musculoskeletal: She exhibits no edema or tenderness (BUE). Lymphadenopathy:  
  She has no cervical adenopathy. Neurological: She is alert and oriented to person, place, and time. She exhibits normal muscle tone. Gait normal.  
Skin: Skin is warm and dry. No erythema. There is hair loss along the midline of her hair line (anteriorly) and along the top of her scalp Psychiatric: Affect normal.  
Nursing note and vitals reviewed. LABS Data Review:  
Lab Results Component Value Date/Time  WBC 5.5 09/02/2017 11:15 PM  
 Hemoglobin, POC 12.2 01/09/2013 08:19 PM  
 HGB 13.8 09/02/2017 11:15 PM  
 Hematocrit, POC 36 01/09/2013 08:19 PM  
 HCT 42.2 09/02/2017 11:15 PM  
 PLATELET 707 16/60/7232 11:15 PM  
 MCV 87.9 09/02/2017 11:15 PM  
 
 
Lab Results Component Value Date/Time Sodium 139 02/10/2018 11:40 AM  
 Potassium 4.0 02/10/2018 11:40 AM  
 Chloride 104 02/10/2018 11:40 AM  
 CO2 31 02/10/2018 11:40 AM  
 Anion gap 4 02/10/2018 11:40 AM  
 Glucose 91 02/10/2018 11:40 AM  
 BUN 17 02/10/2018 11:40 AM  
 Creatinine 1.18 02/10/2018 11:40 AM  
 BUN/Creatinine ratio 14 02/10/2018 11:40 AM  
 GFR est AA 58 (L) 02/10/2018 11:40 AM  
 GFR est non-AA 48 (L) 02/10/2018 11:40 AM  
 Calcium 9.0 02/10/2018 11:40 AM  
 
 
Lab Results Component Value Date/Time Cholesterol, total 165 07/11/2018 12:26 PM  
 HDL Cholesterol 75 (H) 07/11/2018 12:26 PM  
 LDL, calculated 81.2 07/11/2018 12:26 PM  
 VLDL, calculated 8.8 07/11/2018 12:26 PM  
 Triglyceride 44 07/11/2018 12:26 PM  
 CHOL/HDL Ratio 2.2 07/11/2018 12:26 PM  
 
 
Lab Results Component Value Date/Time Hemoglobin A1c 5.4 06/05/2018 02:43 PM  
 Hemoglobin A1c (POC) 5.6 09/19/2013 02:30 PM  
 Hemoglobin A1c, External 5.8 10/08/2015 Assessment/Plan: 1. Alopecia:  
- Checking TFTs, an JESUS, and a CBC 
- Placing a referral to Dermatology ORDERS: 
- TSH AND FREE T4; Future - CBC WITH AUTOMATED DIFF; Future 
- REFERRAL TO DERMATOLOGY 
- JESUS COMPREHENSIVE PANEL; Future 2. Health Maintenance:  
- Checking TFTs given h/o HTN 
- C/w rx as prescribed. RTC for a BP check - Screening for anemia with a CBC ORDERS: 
- TSH AND FREE T4; Future - CBC WITH AUTOMATED DIFF; Future Health Maintenance Due Topic Date Due  
 DTaP/Tdap/Td series (1 - Tdap) 04/05/1983  PAP AKA CERVICAL CYTOLOGY  02/04/2016 Lab review: labs are reviewed in the EHR and ordered as mentioned above I have discussed the diagnosis with the patient and the intended plan as seen in the above orders.   The patient has received an after-visit summary and questions were answered concerning future plans. I have discussed medication side effects and warnings with the patient as well. I have reviewed the plan of care with the patient, accepted their input and they are in agreement with the treatment goals. All questions were answered. The patient understands the plan of care. Handouts provided today with above information. Pt instructed if symptoms worsen to call the office or report to the ED for continued care. Greater than 50% of the visit time was spent in counseling and/or coordination of care. Voice recognition was used to generate this report, which may have resulted in some phonetic based errors in grammar and contents. Even though attempts were made to correct all the mistakes, some may have been missed, and remained in the body of the document. Follow-up Disposition: 
Return if symptoms worsen or fail to improve.  
 
Jeff Andrade MD

## 2018-08-03 NOTE — PATIENT INSTRUCTIONS
Health Maintenance Due Topic Date Due  
 DTaP/Tdap/Td series (1 - Tdap) 04/05/1983  PAP AKA CERVICAL CYTOLOGY  02/04/2016 Patient has refused the Advanced Medical Directive form.

## 2018-08-20 ENCOUNTER — HOSPITAL ENCOUNTER (OUTPATIENT)
Dept: LAB | Age: 58
Discharge: HOME | End: 2018-08-20
Payer: COMMERCIAL

## 2018-08-20 ENCOUNTER — HOSPITAL ENCOUNTER (OUTPATIENT)
Dept: LAB | Age: 58
Discharge: HOME | End: 2018-08-20

## 2018-08-20 ENCOUNTER — TELEPHONE (OUTPATIENT)
Dept: INTERNAL MEDICINE CLINIC | Age: 58
End: 2018-08-20

## 2018-08-20 LAB
ANION GAP SERPL CALC-SCNC: 4 MMOL/L (ref 3–18)
BASOPHILS # BLD: 0 K/UL (ref 0–0.1)
BASOPHILS NFR BLD: 1 % (ref 0–2)
BUN SERPL-MCNC: 18 MG/DL (ref 7–18)
BUN/CREAT SERPL: 17 (ref 12–20)
CALCIUM SERPL-MCNC: 8.7 MG/DL (ref 8.5–10.1)
CHLORIDE SERPL-SCNC: 106 MMOL/L (ref 100–108)
CO2 SERPL-SCNC: 31 MMOL/L (ref 21–32)
CREAT SERPL-MCNC: 1.06 MG/DL (ref 0.6–1.3)
DIFFERENTIAL METHOD BLD: NORMAL
EOSINOPHIL # BLD: 0.2 K/UL (ref 0–0.4)
EOSINOPHIL NFR BLD: 3 % (ref 0–5)
ERYTHROCYTE [DISTWIDTH] IN BLOOD BY AUTOMATED COUNT: 13.5 % (ref 11.6–14.5)
GLUCOSE SERPL-MCNC: 94 MG/DL (ref 74–99)
HCT VFR BLD AUTO: 38.1 % (ref 35–45)
HGB BLD-MCNC: 13 G/DL (ref 12–16)
LYMPHOCYTES # BLD: 1.3 K/UL (ref 0.9–3.6)
LYMPHOCYTES NFR BLD: 25 % (ref 21–52)
MCH RBC QN AUTO: 29 PG (ref 24–34)
MCHC RBC AUTO-ENTMCNC: 34.1 G/DL (ref 31–37)
MCV RBC AUTO: 84.9 FL (ref 74–97)
MONOCYTES # BLD: 0.4 K/UL (ref 0.05–1.2)
MONOCYTES NFR BLD: 8 % (ref 3–10)
NEUTS SEG # BLD: 3.3 K/UL (ref 1.8–8)
NEUTS SEG NFR BLD: 63 % (ref 40–73)
PLATELET # BLD AUTO: 228 K/UL (ref 135–420)
PMV BLD AUTO: 10.2 FL (ref 9.2–11.8)
POTASSIUM SERPL-SCNC: 3.8 MMOL/L (ref 3.5–5.5)
RBC # BLD AUTO: 4.49 M/UL (ref 4.2–5.3)
SODIUM SERPL-SCNC: 141 MMOL/L (ref 136–145)
T4 FREE SERPL-MCNC: 0.8 NG/DL (ref 0.7–1.5)
TSH SERPL DL<=0.05 MIU/L-ACNC: 1.91 UIU/ML (ref 0.36–3.74)
WBC # BLD AUTO: 5.2 K/UL (ref 4.6–13.2)

## 2018-08-20 PROCEDURE — 36415 COLL VENOUS BLD VENIPUNCTURE: CPT | Performed by: INTERNAL MEDICINE

## 2018-08-20 PROCEDURE — 85025 COMPLETE CBC W/AUTO DIFF WBC: CPT | Performed by: INTERNAL MEDICINE

## 2018-08-20 PROCEDURE — 80048 BASIC METABOLIC PNL TOTAL CA: CPT | Performed by: INTERNAL MEDICINE

## 2018-08-20 PROCEDURE — 84439 ASSAY OF FREE THYROXINE: CPT | Performed by: INTERNAL MEDICINE

## 2018-08-20 PROCEDURE — 86225 DNA ANTIBODY NATIVE: CPT | Performed by: INTERNAL MEDICINE

## 2018-08-20 NOTE — TELEPHONE ENCOUNTER
Patient calling asking for labs before her appt on Friday. Says she is having pain around side and back and thinks it may be kidney pain. Wants Dr. Freddie Marin to order labs so she can review them with her on Friday. Please advise.

## 2018-08-20 NOTE — TELEPHONE ENCOUNTER
Pt calling to have salud Durán order labs she has appt 08/24. I advised pt  will order labs at the appt if needed but she normally wont order ahead of time.     Pt ask to leave vm for nurse I advised her she does not have vm but I will give her the message she then ask to speak with manager I gave call to Oumar Corrigan

## 2018-08-20 NOTE — TELEPHONE ENCOUNTER
Chief Complaint   Patient presents with    Appointment     per Dr Donta Ennis she will see the patient for further evaluation in office on 8-24-18      Please let her know that I will order a UA once I evaluate her in person. If she is not having urinary sx, then no lab work is actually necessary. Her back/side pain can be from something else other than her kidneys. Without urinary sx and fever, it is highly unlikely that her sx are from her kidneys. Unable to leave a message there was NO ANSWER [phone].  8-21-18 2nd attempt to reach patient by phone, NO ANSWER, the patient will be seen at her next follow up appointment on 8-24-18 with Dr Donta Ennis, and at that time she can discuss her concerns then.

## 2018-08-23 LAB
CENTROMERE B AB SER-ACNC: <0.2 AI (ref 0–0.9)
CHROMATIN AB SERPL-ACNC: <0.2 AI (ref 0–0.9)
DSDNA AB SER-ACNC: <1 IU/ML (ref 0–9)
ENA JO1 AB SER-ACNC: <0.2 AI (ref 0–0.9)
ENA RNP AB SER-ACNC: <0.2 AI (ref 0–0.9)
ENA SCL70 AB SER-ACNC: <0.2 AI (ref 0–0.9)
ENA SM AB SER-ACNC: <0.2 AI (ref 0–0.9)
ENA SS-A AB SER-ACNC: <0.2 AI (ref 0–0.9)
ENA SS-B AB SER-ACNC: <0.2 AI (ref 0–0.9)
SEE BELOW, 164869: NORMAL

## 2018-08-24 ENCOUNTER — OFFICE VISIT (OUTPATIENT)
Dept: INTERNAL MEDICINE CLINIC | Age: 58
End: 2018-08-24

## 2018-08-24 VITALS
WEIGHT: 155.2 LBS | TEMPERATURE: 97.8 F | HEART RATE: 63 BPM | BODY MASS INDEX: 28.56 KG/M2 | DIASTOLIC BLOOD PRESSURE: 80 MMHG | HEIGHT: 62 IN | OXYGEN SATURATION: 100 % | RESPIRATION RATE: 18 BRPM | SYSTOLIC BLOOD PRESSURE: 115 MMHG

## 2018-08-24 DIAGNOSIS — R10.9 LEFT FLANK PAIN: Primary | ICD-10-CM

## 2018-08-24 DIAGNOSIS — M25.552 LEFT HIP PAIN: ICD-10-CM

## 2018-08-24 NOTE — PROGRESS NOTES
1. Have you been to the ER, urgent care clinic or hospitalized since your last visit? NO.     2. Have you seen or consulted any other health care providers outside of the [address] [place] [address] since your last visit (Include any pap smears or colon screening)? YES      Do you have an Advanced Directive? NO    Would you like information on Advanced Directives?  NO

## 2018-08-24 NOTE — PATIENT INSTRUCTIONS
Body Mass Index: Care Instructions  Your Care Instructions    Body mass index (BMI) can help you see if your weight is raising your risk for health problems. It uses a formula to compare how much you weigh with how tall you are. · A BMI lower than 18.5 is considered underweight. · A BMI between 18.5 and 24.9 is considered healthy. · A BMI between 25 and 29.9 is considered overweight. A BMI of 30 or higher is considered obese. If your BMI is in the normal range, it means that you have a lower risk for weight-related health problems. If your BMI is in the overweight or obese range, you may be at increased risk for weight-related health problems, such as high blood pressure, heart disease, stroke, arthritis or joint pain, and diabetes. If your BMI is in the underweight range, you may be at increased risk for health problems such as fatigue, lower protection (immunity) against illness, muscle loss, bone loss, hair loss, and hormone problems. BMI is just one measure of your risk for weight-related health problems. You may be at higher risk for health problems if you are not active, you eat an unhealthy diet, or you drink too much alcohol or use tobacco products. Follow-up care is a key part of your treatment and safety. Be sure to make and go to all appointments, and call your doctor if you are having problems. It's also a good idea to know your test results and keep a list of the medicines you take. How can you care for yourself at home? · Practice healthy eating habits. This includes eating plenty of fruits, vegetables, whole grains, lean protein, and low-fat dairy. · If your doctor recommends it, get more exercise. Walking is a good choice. Bit by bit, increase the amount you walk every day. Try for at least 30 minutes on most days of the week. · Do not smoke. Smoking can increase your risk for health problems. If you need help quitting, talk to your doctor about stop-smoking programs and medicines. These can increase your chances of quitting for good. · Limit alcohol to 2 drinks a day for men and 1 drink a day for women. Too much alcohol can cause health problems. If you have a BMI higher than 25  · Your doctor may do other tests to check your risk for weight-related health problems. This may include measuring the distance around your waist. A waist measurement of more than 40 inches in men or 35 inches in women can increase the risk of weight-related health problems. · Talk with your doctor about steps you can take to stay healthy or improve your health. You may need to make lifestyle changes to lose weight and stay healthy, such as changing your diet and getting regular exercise. If you have a BMI lower than 18.5  · Your doctor may do other tests to check your risk for health problems. · Talk with your doctor about steps you can take to stay healthy or improve your health. You may need to make lifestyle changes to gain or maintain weight and stay healthy, such as getting more healthy foods in your diet and doing exercises to build muscle. Where can you learn more? Go to http://diana-pam.info/. Enter S176 in the search box to learn more about \"Body Mass Index: Care Instructions. \"  Current as of: October 9, 2017  Content Version: 11.7  © 2075-7745 [employer]. Care instructions adapted under license by [employer] (which disclaims liability or warranty for this information). If you have questions about a medical condition or this instruction, always ask your healthcare professional. [address] any warranty or liability for your use of this information.

## 2018-08-24 NOTE — PROGRESS NOTES
INTERNISTS OF Former[facility]:  8/24/2018, MRN: [id number]      Mehrdad Espinosa is a 66 y.o. female and presents to clinic for Flank Pain (Pain on left side )    Subjective:   Pt is a 64yo AAF with h/o chronic bronchitis, LUCY, porphyria (per EHR), allergic rhinitis, multiple food allergies (per lab work), HTN, HLD, constipation, gallbladder polyps (suggested by CT scan 10/13/16), and GERD. Left Flank/Hip Pain: Pain is 7/10. Pain is off/on and present >1 wk. \"It aches a lot. \" It helps when she massages the area. No fever. +Chills. No hematuria. No dysuria. Pain is localized. This has never happened before. No aggravating factors are known. She is asymptomatic during today's visit. No triggers are known for her sx. No h/o trauma. Patient Active Problem List    Diagnosis Date Noted    Allergic rhinitis 10/04/2017    Gallbladder polyp - suggested by findings on CT scan from 10/13/16 03/01/2017    Hypersomnia with sleep apnea 03/13/2014    Hypertensive heart disease  03/05/2012    Constipation 01/16/2012    Hyperlipidemia     GERD (gastroesophageal reflux disease)        Current Outpatient Prescriptions   Medication Sig Dispense Refill    amLODIPine (NORVASC) 2.5 mg tablet Take 2.5 mg by mouth daily.  loratadine (CLARITIN) 10 mg tablet Take 1 Tab by mouth daily. Indications: Allergic Rhinitis, Urticaria 90 Tab 3    hydrocortisone (CORTAID) 1 % topical cream Apply  to affected area two (2) times a day. use thin layer 30 g 1    fluticasone (FLONASE) 50 mcg/actuation nasal spray 2 Sprays by Both Nostrils route daily. Indications: Allergic Rhinitis 1 Bottle 11    atenolol (TENORMIN) 25 mg tablet Take 1/2 to 1 tablet daily. Not to exceed one tablet per day. 30 Tab 3    pravastatin (PRAVACHOL) 80 mg tablet TAKE ONE TABLET BY MOUTH EVERY NIGHT AT BEDTIME 30 Tab 6    spironolactone (ALDACTONE) 25 mg tablet Take 1 Tab by mouth daily.  30 Tab 11    potassium chloride (KAON 10%) 20 mEq/15 mL solution Patient states she take 1 tablespoon daily      OTHER sample Biofreeze cold therapy pain relief      glucose blood VI test strips (BLOOD GLUCOSE TEST) strip Use to check glucose daily 1 Package 11    co-enzyme Q-10 (CO Q-10) 100 mg capsule Take 100 mg by mouth two (2) times a day.  GARLIC PO Take 2 Caps by mouth daily.  aspirin delayed-release (ECOTRIN LOW STRENGTH) 81 mg tablet Take 81 mg by mouth daily.  omega-3 fatty acids-vitamin e (FISH OIL) 1,000 mg cap Take 2 Caps by mouth daily.  melatonin 3 mg tablet Take 3 mg by mouth nightly as needed.  Dexlansoprazole (DEXILANT) 60 mg CpDM Take 1 Cap by mouth every Monday, Wednesday, Friday.  sucralfate (CARAFATE) 1 gram tablet Take 1 g by mouth daily as needed.  MULTIVITAMIN W-MINERALS/LUTEIN (CENTRUM SILVER PO) Take 1 Tab by mouth daily. Allergies   Allergen Reactions    Ibuprofen Other (comments)    Nsaids (Non-Steroidal Anti-Inflammatory Drug) Other (comments)    Amoxicillin Unable to Obtain and Other (comments)    Lidocaine Swelling     Oral Solution    Lipitor [Atorvastatin] Myalgia           Lopressor [Metoprolol Tartrate] Other (comments)     Lightheaded and cp    Percocet [Oxycodone-Acetaminophen] Unable to Obtain and Other (comments)    Vicodin [Hydrocodone-Acetaminophen] Unable to Obtain and Other (comments)       Past Medical History:   Diagnosis Date    Allergic rhinitis     Cardiac Agatston CAC score, <100 04/30/2014    Coronary calcium score 0.    Cardiac Holter monitoring 01/25/2017    Sinus rhythm, avg HR 73 bpm (range ). Benign Holter study.  Cardiac nuclear imaging test 01/11/2013    No convincing evidence for ischemia or infarction in the setting of significant chest wall artifact. EF 62%. No RWMA. Neg EKG on max EST. Ex time 9 min 50 sec.  Cardiac stress echo, normal 05/05/2016    Normal maximal stress echo w/reproduction of atypical chest discomfort. Ex time 11 min 3 sec. EF 55%.  Cardiovascular LLE venous duplex 09/28/2016    Left leg:  No DVT.  Chest pain     GERD (gastroesophageal reflux disease)     Heel pain, bilateral     Hiatal hernia     denies this    HTN (hypertension)     Hyperlipidemia     Insulin resistance     follows with endocrinology for this    Night sweats     PVC (premature ventricular contraction)     Right low back pain     S/P colonoscopy 2009, 2014    normal per patient    Unsteady gait     due to heel pain       Past Surgical History:   Procedure Laterality Date    D/C SUCTION  2011    HX OTHER SURGICAL  2014    colonscopy    HX OTHER SURGICAL      endoscopy    HX OTHER SURGICAL      wisdom teeth removal x1       Family History   Problem Relation Age of Onset    Hypertension Mother     Heart defect Mother      anuerysm    Heart Attack Mother     Asthma Father     Heart Attack Maternal Uncle     Heart Attack Maternal Uncle     Heart Disease Brother     Hypertension Brother        Social History   Substance Use Topics    Smoking status: Former Smoker     Packs/day: 0.50     Years: 11.00     Types: Cigarettes     Quit date: 12/31/2006    Smokeless tobacco: Former User     Quit date: 1/10/1993      Comment: 6-7 cigs per day    Alcohol use No       ROS   Review of Systems   Constitutional: Negative for chills and fever. HENT: Negative for ear pain and sore throat. Eyes: Negative for blurred vision and pain. Respiratory: Negative for cough and shortness of breath. Cardiovascular: Positive for chest pain (off/on, followed by ; no changes to her sx). Gastrointestinal: Negative for abdominal pain, blood in stool and melena. Genitourinary: Positive for flank pain. Negative for dysuria and hematuria. Musculoskeletal: Positive for joint pain. Negative for myalgias. Skin: Negative for rash. Neurological: Negative for tingling, focal weakness and headaches. Endo/Heme/Allergies: Does not bruise/bleed easily. Psychiatric/Behavioral: Negative for substance abuse. Objective     Vitals:    08/24/18 0921   BP: 115/80   Pulse: 63   Resp: 18   Temp: 97.8 °F (36.6 °C)   TempSrc: Oral   SpO2: 100%   Weight: 155 lb 3.2 oz (70.4 kg)   Height: 5' 2\" (1.575 m)   PainSc:   7   PainLoc: Flank       Physical Exam   Constitutional: She is oriented to person, place, and time and well-developed, well-nourished, and in no distress. HENT:   Head: Normocephalic and atraumatic. Right Ear: External ear normal.   Left Ear: External ear normal.   Nose: Nose normal.   Mouth/Throat: Oropharynx is clear and moist. No oropharyngeal exudate. Clear TMs   Eyes: Conjunctivae and EOM are normal. Pupils are equal, round, and reactive to light. Right eye exhibits no discharge. Left eye exhibits no discharge. No scleral icterus. Neck: Neck supple. Cardiovascular: Normal rate, regular rhythm, normal heart sounds and intact distal pulses. Exam reveals no gallop and no friction rub. No murmur heard. Pulmonary/Chest: Effort normal and breath sounds normal. No respiratory distress. She has no wheezes. She has no rales. Abdominal: Soft. Bowel sounds are normal. She exhibits no distension. There is no tenderness. There is no rebound and no guarding. No CVA tenderness to palpation b/l   Musculoskeletal: She exhibits no edema or tenderness (BUE). She has good ROM along her hip jts. All spinous processes and paraspinal muscles are NTTP   Lymphadenopathy:     She has no cervical adenopathy. Neurological: She is alert and oriented to person, place, and time. She exhibits normal muscle tone. Gait normal.   Skin: Skin is warm and dry. No erythema. Psychiatric: Affect normal.   Nursing note and vitals reviewed.       LABS   Data Review:   Lab Results   Component Value Date/Time    WBC 5.2 08/20/2018 06:08 PM    Hemoglobin, POC 12.2 01/09/2013 08:19 PM    HGB 13.0 08/20/2018 06:08 PM    Hematocrit, POC 36 01/09/2013 08:19 PM    HCT 38.1 08/20/2018 06:08 PM    PLATELET 773 54/22/4285 06:08 PM    MCV 84.9 08/20/2018 06:08 PM       Lab Results   Component Value Date/Time    Sodium 141 08/20/2018 06:07 PM    Potassium 3.8 08/20/2018 06:07 PM    Chloride 106 08/20/2018 06:07 PM    CO2 31 08/20/2018 06:07 PM    Anion gap 4 08/20/2018 06:07 PM    Glucose 94 08/20/2018 06:07 PM    BUN 18 08/20/2018 06:07 PM    Creatinine 1.06 08/20/2018 06:07 PM    BUN/Creatinine ratio 17 08/20/2018 06:07 PM    GFR est AA >60 08/20/2018 06:07 PM    GFR est non-AA 54 (L) 08/20/2018 06:07 PM    Calcium 8.7 08/20/2018 06:07 PM       Lab Results   Component Value Date/Time    Cholesterol, total 165 07/11/2018 12:26 PM    HDL Cholesterol 75 (H) 07/11/2018 12:26 PM    LDL, calculated 81.2 07/11/2018 12:26 PM    VLDL, calculated 8.8 07/11/2018 12:26 PM    Triglyceride 44 07/11/2018 12:26 PM    CHOL/HDL Ratio 2.2 07/11/2018 12:26 PM       Lab Results   Component Value Date/Time    Hemoglobin A1c 5.4 06/05/2018 02:43 PM    Hemoglobin A1c (POC) 5.6 09/19/2013 02:30 PM    Hemoglobin A1c, External 5.8 10/08/2015       Assessment/Plan:   Left Flank/Hip pain: Her most recent lab results are reassuring. Her PE findings are reassuring. She is presently asymptomatic during today's visit. - Checking a UA for completeness to r/o nephrolithiasis. If her UA is unremarkable, I instructed her to notify me if her sx worsen over the next 2 wks at which time I would refer her to Orthopedics. If her UA shows microscopic hematuria, I will order a f/u ultrasound to r/o obstruction. This was discussed with her today. ORDERS:  - URINALYSIS W/ RFLX MICROSCOPIC; Future        Health Maintenance Due   Topic Date Due    DTaP/Tdap/Td series (1 - Tdap) 04/05/1983     Lab review: labs are reviewed, up to date and normal    I have discussed the diagnosis with the patient and the intended plan as seen in the above orders.   The patient has received an after-visit summary and questions were answered concerning future plans. I have discussed medication side effects and warnings with the patient as well. I have reviewed the plan of care with the patient, accepted their input and they are in agreement with the treatment goals. All questions were answered. The patient understands the plan of care. Handouts provided today with above information. Pt instructed if symptoms worsen to call the office or report to the ED for continued care. Greater than 50% of the visit time was spent in counseling and/or coordination of care. Voice recognition was used to generate this report, which may have resulted in some phonetic based errors in grammar and contents. Even though attempts were made to correct all the mistakes, some may have been missed, and remained in the body of the document.       Follow-up Disposition: Not on File    Arielle Mejia MD

## 2018-08-24 NOTE — MR AVS SNAPSHOT
[address] [facility] 
 
 
 [address] [facility] [address] [facility] 
[phone] Patient: Aishwarya Baker MRN: [id number] R:0/5/2822 Visit Information Date & Time Provider Department Dept. Phone Encounter #  
 8/24/2018  9:30 AM Rand Loomis MD [facility] [phone] [id number] Follow-up Instructions Return if symptoms worsen or fail to improve. Your Appointments 12/5/2018  1:30 PM  
Office Visit with Rand Loomis MD  
[facility] [address]) Appt Note: ov 6mo. s Kishore Nilay [address] [facility] [place], [address] [facility] [address] [place]  
  
   
 [address] [facility] [place], [address] [facility]  
  
    
 1/21/2019  3:00 PM  
Follow Up with Nathaniel Méndez DO [facility] [address] ([address]) Appt Note: 6 month [facility] [address] [address] [id number] [phone] [address] [place] [address] [address] Upcoming Health Maintenance Date Due DTaP/Tdap/Td series (1 - Tdap) 4/5/1983 BREAST CANCER SCRN MAMMOGRAM 7/11/2019 COLONOSCOPY 11/14/2019 PAP AKA CERVICAL CYTOLOGY 8/6/2021 Allergies as of 8/24/2018  Review Complete On: 8/24/2018 By: Rand Loomis MD  
  
 Severity Noted Reaction Type Reactions Ibuprofen High 11/10/2016    Other (comments) Nsaids (Non-steroidal Anti-inflammatory Drug) High 11/10/2016    Other (comments) Amoxicillin  12/12/2011    Unable to Obtain, Other (comments) Lidocaine  01/08/2013    Swelling Oral Solution Lipitor [Atorvastatin]  07/15/2014    Myalgia Lopressor [Metoprolol Tartrate]  02/28/2012    Other (comments) Lightheaded and cp Percocet [Oxycodone-acetaminophen]  12/12/2011    Unable to Obtain, Other (comments) Vicodin [Hydrocodone-acetaminophen]  12/12/2011    Unable to Obtain, Other (comments) Current Immunizations  Reviewed on 11/13/2013 Name Date  
 TB Skin Test (PPD) Intradermal 11/13/2013 Not reviewed this visit You Were Diagnosed With   
  
 Codes Comments Left flank pain    -  Primary ICD-10-CM: R10.9 ICD-9-CM: 789.09 Vitals BP Pulse Temp Resp Height(growth percentile) Weight(growth percentile) 115/80 (BP 1 Location: Right arm, BP Patient Position: Sitting) 63 97.8 °F (36.6 °C) (Oral) 18 5' 2\" (1.575 m) 155 lb 3.2 oz (70.4 kg) SpO2 BMI OB Status Smoking Status 100% 28.39 kg/m2 Postmenopausal Former Smoker Vitals History BMI and BSA Data Body Mass Index Body Surface Area  
 28.39 kg/m 2 1.75 m 2 Preferred Pharmacy Pharmacy Name Phone [facility] [address], [address] [phone] Your Updated Medication List  
  
   
This list is accurate as of 8/24/18  9:50 AM.  Always use your most recent med list. amLODIPine 2.5 mg tablet Commonly known as:  Elyn Coffer Take 2.5 mg by mouth daily. atenolol 25 mg tablet Commonly known as:  TENORMIN Take 1/2 to 1 tablet daily. Not to exceed one tablet per day. CARAFATE 1 gram tablet Generic drug:  sucralfate Take 1 g by mouth daily as needed. CENTRUM SILVER PO Take 1 Tab by mouth daily. co-enzyme Q-10 100 mg capsule Commonly known as:  CO Q-10 Take 100 mg by mouth two (2) times a day. DEXILANT 60 mg Cpdb Generic drug:  Dexlansoprazole Take 1 Cap by mouth every Monday, Wednesday, Friday. ECOTRIN LOW STRENGTH 81 mg tablet Generic drug:  aspirin delayed-release Take 81 mg by mouth daily. FISH OIL 1,000 mg Cap Generic drug:  omega-3 fatty acids-vitamin e Take 2 Caps by mouth daily. fluticasone 50 mcg/actuation nasal spray Commonly known as:  Lyla Blizzard 2 Sprays by Both Nostrils route daily. Indications: Allergic Rhinitis GARLIC PO Take 2 Caps by mouth daily. glucose blood VI test strips strip Commonly known as:  blood glucose test  
Use to check glucose daily  
  
 hydrocortisone 1 % topical cream  
Commonly known as:  CORTAID Apply  to affected area two (2) times a day. use thin layer  
  
 loratadine 10 mg tablet Commonly known as:  Ian Fleeting Take 1 Tab by mouth daily. Indications: Allergic Rhinitis, Urticaria  
  
 melatonin 3 mg tablet Take 3 mg by mouth nightly as needed. OTHER  
sample Biofreeze cold therapy pain relief  
  
 potassium chloride 20 mEq/15 mL solution Commonly known as:  KAON 10% Patient states she take 1 tablespoon daily  
  
 pravastatin 80 mg tablet Commonly known as:  PRAVACHOL  
TAKE ONE TABLET BY MOUTH EVERY NIGHT AT BEDTIME  
  
 spironolactone 25 mg tablet Commonly known as:  ALDACTONE Take 1 Tab by mouth daily. Follow-up Instructions Return if symptoms worsen or fail to improve. To-Do List   
 08/24/2018 Lab:  URINALYSIS W/ RFLX MICROSCOPIC Patient Instructions Body Mass Index: Care Instructions Your Care Instructions Body mass index (BMI) can help you see if your weight is raising your risk for health problems. It uses a formula to compare how much you weigh with how tall you are. · A BMI lower than 18.5 is considered underweight. · A BMI between 18.5 and 24.9 is considered healthy. · A BMI between 25 and 29.9 is considered overweight. A BMI of 30 or higher is considered obese. If your BMI is in the normal range, it means that you have a lower risk for weight-related health problems. If your BMI is in the overweight or obese range, you may be at increased risk for weight-related health problems, such as high blood pressure, heart disease, stroke, arthritis or joint pain, and diabetes.  If your BMI is in the underweight range, you may be at increased risk for health problems such as fatigue, lower protection (immunity) against illness, muscle loss, bone loss, hair loss, and hormone problems. BMI is just one measure of your risk for weight-related health problems. You may be at higher risk for health problems if you are not active, you eat an unhealthy diet, or you drink too much alcohol or use tobacco products. Follow-up care is a key part of your treatment and safety. Be sure to make and go to all appointments, and call your doctor if you are having problems. It's also a good idea to know your test results and keep a list of the medicines you take. How can you care for yourself at home? · Practice healthy eating habits. This includes eating plenty of fruits, vegetables, whole grains, lean protein, and low-fat dairy. · If your doctor recommends it, get more exercise. Walking is a good choice. Bit by bit, increase the amount you walk every day. Try for at least 30 minutes on most days of the week. · Do not smoke. Smoking can increase your risk for health problems. If you need help quitting, talk to your doctor about stop-smoking programs and medicines. These can increase your chances of quitting for good. · Limit alcohol to 2 drinks a day for men and 1 drink a day for women. Too much alcohol can cause health problems. If you have a BMI higher than 25 · Your doctor may do other tests to check your risk for weight-related health problems. This may include measuring the distance around your waist. A waist measurement of more than 40 inches in men or 35 inches in women can increase the risk of weight-related health problems. · Talk with your doctor about steps you can take to stay healthy or improve your health. You may need to make lifestyle changes to lose weight and stay healthy, such as changing your diet and getting regular exercise. If you have a BMI lower than 18.5 · Your doctor may do other tests to check your risk for health problems.  
· Talk with your doctor about steps you can take to stay healthy or improve your health. You may need to make lifestyle changes to gain or maintain weight and stay healthy, such as getting more healthy foods in your diet and doing exercises to build muscle. Where can you learn more? Go to http://diana-pam.info/. Enter S176 in the search box to learn more about \"Body Mass Index: Care Instructions. \" Current as of: October 9, 2017 Content Version: 11.7 © 6903-6694 [employer]. Care instructions adapted under license by [employer] (which disclaims liability or warranty for this information). If you have questions about a medical condition or this instruction, always ask your healthcare professional. [address] any warranty or liability for your use of this information. Please provide this summary of care documentation to your next provider. Your primary care clinician is listed as Farhat Maloney. If you have any questions after today's visit, please call [phone].

## 2018-08-31 ENCOUNTER — HOSPITAL ENCOUNTER (OUTPATIENT)
Dept: LAB | Age: 58
Discharge: HOME | End: 2018-08-31
Payer: COMMERCIAL

## 2018-08-31 LAB
APPEARANCE UR: CLEAR
BILIRUB UR QL: NEGATIVE
COLOR UR: YELLOW
GLUCOSE UR STRIP.AUTO-MCNC: NEGATIVE MG/DL
HGB UR QL STRIP: NEGATIVE
KETONES UR QL STRIP.AUTO: NEGATIVE MG/DL
LEUKOCYTE ESTERASE UR QL STRIP.AUTO: NEGATIVE
NITRITE UR QL STRIP.AUTO: NEGATIVE
PH UR STRIP: 5.5 [PH] (ref 5–8)
PROT UR STRIP-MCNC: NEGATIVE MG/DL
SP GR UR REFRACTOMETRY: 1.01 (ref 1–1.03)
UROBILINOGEN UR QL STRIP.AUTO: 0.2 EU/DL (ref 0.2–1)

## 2018-08-31 PROCEDURE — 36415 COLL VENOUS BLD VENIPUNCTURE: CPT | Performed by: INTERNAL MEDICINE

## 2018-08-31 PROCEDURE — 81003 URINALYSIS AUTO W/O SCOPE: CPT | Performed by: INTERNAL MEDICINE

## 2018-09-04 RX ORDER — PRAVASTATIN SODIUM 80 MG/1
TABLET ORAL
Qty: 30 TAB | Refills: 5 | Status: SHIPPED | OUTPATIENT
Start: 2018-09-04 | End: 2019-09-11 | Stop reason: SDUPTHER

## 2018-09-04 RX ORDER — PRAVASTATIN SODIUM 80 MG/1
TABLET ORAL
Qty: 30 TAB | Refills: 5 | Status: SHIPPED | OUTPATIENT
Start: 2018-09-04 | End: 2018-09-04 | Stop reason: SDUPTHER

## 2018-09-06 ENCOUNTER — TELEPHONE (OUTPATIENT)
Dept: INTERNAL MEDICINE CLINIC | Age: 58
End: 2018-09-06

## 2018-09-07 DIAGNOSIS — M54.50 ACUTE LEFT-SIDED LOW BACK PAIN WITHOUT SCIATICA: ICD-10-CM

## 2018-09-07 DIAGNOSIS — M25.552 LEFT HIP PAIN: Primary | ICD-10-CM

## 2018-09-07 NOTE — TELEPHONE ENCOUNTER
Patient advised and understood. Stating Dr. Hafsa Chavez had told her that if it came back normal she would refer her to an orthopedic ?

## 2018-10-08 ENCOUNTER — OFFICE VISIT (OUTPATIENT)
Dept: ORTHOPEDIC SURGERY | Age: 58
End: 2018-10-08

## 2018-10-08 VITALS
HEART RATE: 69 BPM | HEIGHT: 62 IN | DIASTOLIC BLOOD PRESSURE: 70 MMHG | OXYGEN SATURATION: 100 % | SYSTOLIC BLOOD PRESSURE: 100 MMHG | BODY MASS INDEX: 27.6 KG/M2 | RESPIRATION RATE: 16 BRPM | WEIGHT: 150 LBS | TEMPERATURE: 97.6 F

## 2018-10-08 DIAGNOSIS — M25.552 LEFT HIP PAIN: Primary | ICD-10-CM

## 2018-10-08 DIAGNOSIS — S76.012A STRAIN OF HIP FLEXOR, LEFT, INITIAL ENCOUNTER: ICD-10-CM

## 2018-10-08 NOTE — PROGRESS NOTES
HISTORY OF PRESENT ILLNESS:  Danielle Rinaldi is here for consultation regarding \"left hip pain. \"  She points to pain more in the region of left anterior superior iliac spine and the left iliac crest.  She says it has been present for a few months. She does not complain of pain in the left groin. She denies radiating leg pain. She does not have a history of back problems. She has not had x-rays of her hip or back yet. She states it is present more when she is getting up out of the chair and when she is walking. She has not noticed a leg length discrepancy. She does not utilize any ambulatory assist.  She has not taken any medication for discomfort. Her family physician does not really want her taking aspirin or anti-inflammatory medications. She has not tried Tylenol. There is no history of trauma to her left hip or right hip. She denies radiating leg pain and numbness or tingling in her lower extremity. PHYSICAL EXAMINATION:  Clinical examination today reveals a slightly overweight 53-year-old  female in minimal discomfort. She moves relatively easily on and off the examination table. With reference to her right hip, she has a normal active and passive range of motion of her right hip without discomfort. Right hip roll test is negative. Wilnette Villela test is negative. With reference to her left hip she, she is able to straight leg raise 90° today with slight hamstring tightness. She has good active and passive range of motion of the left hip without discomfort. Left hip roll test is negative. Wilnette Villela test on the left side results in slight discomfort in the region of the hip flexor tendons. She does not have pain with resisted abduction of the right hip or left hip. Neurovascular testing intact in the lower extremities, proximal and distal, to motor, strength, and sensation. Leg lengths are symmetrical in the supine position. RADIOGRAPHS: X-rays of her hips reveal no osseous pathology. She has good joint space in the weight-bearing portion of both hips. X-rays of lumbosacral spine reveals minimal degenerative disk disease. She still has good space height. No evidence of spondylolisthesis. IMPRESSION:  Left hip flexor strain. RECOMMENDATIONS:  Treatment at this point remains symptomatic and conservative. I will not place her on anti-inflammatory medication for her family [de-identified] request.  She has some topical steroid that she may utilize if necessary. I have recommended a brief course of therapy for stretching and strengthening the left hip flexor muscles as well as abductor muscles. She will return to see me in about 6 weeks. All of her questions were answered today. Vitals:  
 10/08/18 1355 BP: 100/70 Pulse: 69 Resp: 16 Temp: 97.6 °F (36.4 °C) TempSrc: Oral  
SpO2: 100% Weight: 150 lb (68 kg) Height: 5' 2\" (1.575 m) Patient Active Problem List  
Diagnosis Code  Hyperlipidemia E78.5  GERD (gastroesophageal reflux disease) K21.9  Constipation K59.00  Hypertensive heart disease  I11.9  Hypersomnia with sleep apnea G47.10, G47.30  Gallbladder polyp - suggested by findings on CT scan from 10/13/16 K82.4  Allergic rhinitis J30.9 Patient Active Problem List  
 Diagnosis Date Noted  Allergic rhinitis 10/04/2017  Gallbladder polyp - suggested by findings on CT scan from 10/13/16 03/01/2017  Hypersomnia with sleep apnea 03/13/2014  Hypertensive heart disease  03/05/2012  Constipation 01/16/2012  Hyperlipidemia  GERD (gastroesophageal reflux disease) Current Outpatient Prescriptions Medication Sig Dispense Refill  pravastatin (PRAVACHOL) 80 mg tablet TAKE ONE TABLET BY MOUTH EVERY NIGHT AT BEDTIME 30 Tab 5  
 amLODIPine (NORVASC) 2.5 mg tablet Take 2.5 mg by mouth daily.  loratadine (CLARITIN) 10 mg tablet Take 1 Tab by mouth daily. Indications: Allergic Rhinitis, Urticaria 90 Tab 3  
 hydrocortisone (CORTAID) 1 % topical cream Apply  to affected area two (2) times a day. use thin layer 30 g 1  
 fluticasone (FLONASE) 50 mcg/actuation nasal spray 2 Sprays by Both Nostrils route daily. Indications: Allergic Rhinitis 1 Bottle 11  
 atenolol (TENORMIN) 25 mg tablet Take 1/2 to 1 tablet daily. Not to exceed one tablet per day. 30 Tab 3  
 spironolactone (ALDACTONE) 25 mg tablet Take 1 Tab by mouth daily. 30 Tab 11  
 potassium chloride (KAON 10%) 20 mEq/15 mL solution Patient states she take 1 tablespoon daily  OTHER sample Biofreeze cold therapy pain relief  glucose blood VI test strips (BLOOD GLUCOSE TEST) strip Use to check glucose daily 1 Package 11  
 co-enzyme Q-10 (CO Q-10) 100 mg capsule Take 100 mg by mouth two (2) times a day.  GARLIC PO Take 2 Caps by mouth daily.  aspirin delayed-release (ECOTRIN LOW STRENGTH) 81 mg tablet Take 81 mg by mouth daily.  omega-3 fatty acids-vitamin e (FISH OIL) 1,000 mg cap Take 2 Caps by mouth daily.  melatonin 3 mg tablet Take 3 mg by mouth nightly as needed.  Dexlansoprazole (DEXILANT) 60 mg CpDM Take 1 Cap by mouth every Monday, Wednesday, Friday.  sucralfate (CARAFATE) 1 gram tablet Take 1 g by mouth daily as needed.  MULTIVITAMIN W-MINERALS/LUTEIN (CENTRUM SILVER PO) Take 1 Tab by mouth daily. Allergies Allergen Reactions  Ibuprofen Other (comments)  Nsaids (Non-Steroidal Anti-Inflammatory Drug) Other (comments)  Amoxicillin Unable to Obtain and Other (comments)  Lidocaine Swelling Oral Solution  Lipitor [Atorvastatin] Myalgia  Lopressor [Metoprolol Tartrate] Other (comments) Lightheaded and cp  Percocet [Oxycodone-Acetaminophen] Unable to Obtain and Other (comments)  Vicodin [Hydrocodone-Acetaminophen] Unable to Obtain and Other (comments) Past Medical History:  
Diagnosis Date  Allergic rhinitis  Cardiac Agatston CAC score, <100 04/30/2014 Coronary calcium score 0.  
 Cardiac Holter monitoring 01/25/2017 Sinus rhythm, avg HR 73 bpm (range ). Benign Holter study.  Cardiac nuclear imaging test 01/11/2013 No convincing evidence for ischemia or infarction in the setting of significant chest wall artifact. EF 62%. No RWMA. Neg EKG on max EST. Ex time 9 min 50 sec.  Cardiac stress echo, normal 05/05/2016 Normal maximal stress echo w/reproduction of atypical chest discomfort. Ex time 11 min 3 sec. EF 55%.  Cardiovascular LLE venous duplex 09/28/2016 Left leg:  No DVT.  Chest pain  GERD (gastroesophageal reflux disease)  Heel pain, bilateral   
 Hiatal hernia   
 denies this  HTN (hypertension)  Hyperlipidemia  Insulin resistance   
 follows with endocrinology for this  Night sweats  PVC (premature ventricular contraction)  Right low back pain  S/P colonoscopy 2009, 2014  
 normal per patient  Unsteady gait   
 due to heel pain Past Surgical History:  
Procedure Laterality Date  D/C SUCTION  2011  HX OTHER SURGICAL  2014  
 colonscopy  HX OTHER SURGICAL    
 endoscopy  HX OTHER SURGICAL    
 wisdom teeth removal x1 Family History Problem Relation Age of Onset  Hypertension Mother  Heart defect Mother   
  anuerysm  Heart Attack Mother  Asthma Father  Heart Attack Maternal Uncle  Heart Attack Maternal Uncle  Heart Disease Brother  Hypertension Brother Social History Substance Use Topics  Smoking status: Former Smoker Packs/day: 0.50 Years: 11.00 Types: Cigarettes Quit date: 12/31/2006  Smokeless tobacco: Former User Quit date: 1/10/1993 Comment: 6-7 cigs per day  Alcohol use No

## 2018-10-12 ENCOUNTER — APPOINTMENT (OUTPATIENT)
Dept: PHYSICAL THERAPY | Age: 58
End: 2018-10-12

## 2018-10-12 ENCOUNTER — OFFICE VISIT (OUTPATIENT)
Dept: INTERNAL MEDICINE CLINIC | Age: 58
End: 2018-10-12

## 2018-10-12 VITALS
HEIGHT: 62 IN | TEMPERATURE: 98 F | DIASTOLIC BLOOD PRESSURE: 68 MMHG | WEIGHT: 151.8 LBS | RESPIRATION RATE: 14 BRPM | SYSTOLIC BLOOD PRESSURE: 104 MMHG | OXYGEN SATURATION: 99 % | HEART RATE: 69 BPM | BODY MASS INDEX: 27.94 KG/M2

## 2018-10-12 DIAGNOSIS — L25.8 CONTACT DERMATITIS DUE TO SOAP: Primary | ICD-10-CM

## 2018-10-12 RX ORDER — PREDNISONE 10 MG/1
TABLET ORAL
Qty: 21 TAB | Refills: 0 | Status: SHIPPED | OUTPATIENT
Start: 2018-10-12 | End: 2018-12-05 | Stop reason: ALTCHOICE

## 2018-10-12 RX ORDER — RANITIDINE 150 MG/1
150 TABLET, FILM COATED ORAL 2 TIMES DAILY
Qty: 60 TAB | Refills: 0 | Status: SHIPPED | OUTPATIENT
Start: 2018-10-12 | End: 2019-08-14 | Stop reason: ALTCHOICE

## 2018-10-12 NOTE — PROGRESS NOTES
1. Have you been to the ER, urgent care clinic or hospitalized since your last visit? NO.  
 
2. Have you seen or consulted any other health care providers outside of the [address] [place] [address] since your last visit (Include any pap smears or colon screening)? NO Do you have an Advanced Directive? NO Would you like information on Advanced Directives?  NO

## 2018-10-12 NOTE — LETTER
NOTIFICATION RETURN TO WORK / SCHOOL 
 
10/12/2018 10:49 AM 
 
Ms. Cullen Rangel [address] [place] [id number] To Whom It May Concern: 
 
Cullen Rangel is currently under the care of Harjeet Maurice. She will return to work/school on: 10/12/18 If there are questions or concerns please have the patient contact our office. Sincerely, Shilpa Olivera, NP

## 2018-10-12 NOTE — PROGRESS NOTES
Tony Schmidt is a 66 y.o.  female and presents with Chief Complaint Patient presents with  Rash  
  red rash with whelps on skin, itches, noted on various parts of the body, torso and bilateral extremeties, first noticed on 10/11/18, took benedryl and used hydrocortisone cream  
 
 
Subjective: HPI Ms. Sarah Villalba presents today with a generalized rash that is pruritic that started yesterday. She has been using Bendryl and Hydrocortisone cream with minimal relief. She did purchase a new soap? scented, used it Sunday, Monday, and Wednesday, she reports has used it in the past without an issue. She started a new food diet, Brandt Golas, she denies supplementation with it, she reports has been on it two months now, but did in the past without a reaction. Reports did not change linens, towels after stopping the new soap. Denies change in shampoo, laundry detergent, did change fabric softener about 2-3 months. Additional Concerns: none ROS Review of Systems Constitutional: Negative for chills and fever. Respiratory: Negative. Cardiovascular: Negative. Gastrointestinal: Negative. Skin: Positive for itching and rash. Neurological: Negative for dizziness and headaches. Allergies Allergen Reactions  Ibuprofen Other (comments)  Nsaids (Non-Steroidal Anti-Inflammatory Drug) Other (comments)  Amoxicillin Unable to Obtain and Other (comments)  Lidocaine Swelling Oral Solution  Lipitor [Atorvastatin] Myalgia  Lopressor [Metoprolol Tartrate] Other (comments) Lightheaded and cp  Percocet [Oxycodone-Acetaminophen] Unable to Obtain and Other (comments)  Vicodin [Hydrocodone-Acetaminophen] Unable to Obtain and Other (comments) Current Outpatient Prescriptions Medication Sig Dispense Refill  raNITIdine (ZANTAC) 150 mg tablet Take 1 Tab by mouth two (2) times a day.  60 Tab 0  
  predniSONE (STERAPRED DS) 10 mg dose pack See administration instruction per 10mg dose pack 21 Tab 0  pravastatin (PRAVACHOL) 80 mg tablet TAKE ONE TABLET BY MOUTH EVERY NIGHT AT BEDTIME 30 Tab 5  
 amLODIPine (NORVASC) 2.5 mg tablet Take 2.5 mg by mouth daily.  loratadine (CLARITIN) 10 mg tablet Take 1 Tab by mouth daily. Indications: Allergic Rhinitis, Urticaria 90 Tab 3  
 hydrocortisone (CORTAID) 1 % topical cream Apply  to affected area two (2) times a day. use thin layer 30 g 1  
 fluticasone (FLONASE) 50 mcg/actuation nasal spray 2 Sprays by Both Nostrils route daily. Indications: Allergic Rhinitis 1 Bottle 11  
 atenolol (TENORMIN) 25 mg tablet Take 1/2 to 1 tablet daily. Not to exceed one tablet per day. 30 Tab 3  
 spironolactone (ALDACTONE) 25 mg tablet Take 1 Tab by mouth daily. 30 Tab 11  
 potassium chloride (KAON 10%) 20 mEq/15 mL solution Patient states she take 1 tablespoon daily  co-enzyme Q-10 (CO Q-10) 100 mg capsule Take 100 mg by mouth two (2) times a day.  GARLIC PO Take 2 Caps by mouth daily.  aspirin delayed-release (ECOTRIN LOW STRENGTH) 81 mg tablet Take 81 mg by mouth daily.  omega-3 fatty acids-vitamin e (FISH OIL) 1,000 mg cap Take 2 Caps by mouth daily.  melatonin 3 mg tablet Take 3 mg by mouth nightly as needed.  Dexlansoprazole (DEXILANT) 60 mg CpDM Take 1 Cap by mouth every Monday, Wednesday, Friday.  sucralfate (CARAFATE) 1 gram tablet Take 1 g by mouth daily as needed.  MULTIVITAMIN W-MINERALS/LUTEIN (CENTRUM SILVER PO) Take 1 Tab by mouth daily.  OTHER sample Biofreeze cold therapy pain relief  glucose blood VI test strips (BLOOD GLUCOSE TEST) strip Use to check glucose daily 1 Package 11 Social History Social History  Marital status: SINGLE Spouse name: N/A  
 Number of children: N/A  
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  
 Drug use: No  
 Sexual activity: No  
 
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 Past Medical History:  
Diagnosis Date  Allergic rhinitis  Cardiac Agatston CAC score, <100 04/30/2014 Coronary calcium score 0.  
 Cardiac Holter monitoring 01/25/2017 Sinus rhythm, avg HR 73 bpm (range ). Benign Holter study.  Cardiac nuclear imaging test 01/11/2013 No convincing evidence for ischemia or infarction in the setting of significant chest wall artifact. EF 62%. No RWMA. Neg EKG on max EST. Ex time 9 min 50 sec.  Cardiac stress echo, normal 05/05/2016 Normal maximal stress echo w/reproduction of atypical chest discomfort. Ex time 11 min 3 sec. EF 55%.  Cardiovascular LLE venous duplex 09/28/2016 Left leg:  No DVT.  Chest pain  GERD (gastroesophageal reflux disease)  Heel pain, bilateral   
 Hiatal hernia   
 denies this  HTN (hypertension)  Hyperlipidemia  Insulin resistance   
 follows with endocrinology for this  Night sweats  PVC (premature ventricular contraction)  Right low back pain  S/P colonoscopy 2009, 2014  
 normal per patient  Unsteady gait   
 due to heel pain Past Surgical History:  
Procedure Laterality Date  D/C SUCTION  2011  HX OTHER SURGICAL  2014  
 colonscopy  HX OTHER SURGICAL    
 endoscopy  HX OTHER SURGICAL    
 wisdom teeth removal x1 Family History Problem Relation Age of Onset  Hypertension Mother  Heart defect Mother   
  anuerysm  Heart Attack Mother  Asthma Father  Heart Attack Maternal Uncle  Heart Attack Maternal Uncle  Heart Disease Brother  Hypertension Brother Objective: 
Vitals:  
 10/12/18 1025 BP: 104/68 Pulse: 69 Resp: 14 Temp: 98 °F (36.7 °C) TempSrc: Oral  
SpO2: 99% Weight: 151 lb 12.8 oz (68.9 kg) Height: 5' 2\" (1.575 m) PainSc:   0 - No pain LABS  
none TESTS 
none PE Physical Exam  
Constitutional: She is oriented to person, place, and time. She appears well-developed and well-nourished. No distress. Neurological: She is alert and oriented to person, place, and time. Skin: Skin is warm and dry. Rash noted. Rash is urticarial. She is not diaphoretic. No pallor. Noted to bilateral UE, back, thorax. She reports noted new lesion while in office. It is red, raised, linear. The rash is raised where she scratched and linear, thorax and back patchy raised areas. Psychiatric: She has a normal mood and affect. Her behavior is normal. Judgment and thought content normal.  
Vitals reviewed. Assessment/Plan: 1. Allergic contact dermatitis- Steroid dose pack. Claritin and Zantac for itch relief. Follow up if worsening, if chest pain, sob, angioedema present to ED. Lab review: no lab studies available for review at time of visit Today's Visit:  
Diagnoses and all orders for this visit: 1. Contact dermatitis due to soap 
-     raNITIdine (ZANTAC) 150 mg tablet; Take 1 Tab by mouth two (2) times a day. -     predniSONE (STERAPRED DS) 10 mg dose pack; See administration instruction per 10mg dose pack Health Maintenance: Deferred to PCP. I have discussed the diagnosis with the patient and the intended plan as seen in the above orders. The patient has received an after-visit summary and questions were answered concerning future plans. I have discussed medication side effects and warnings with the patient as well. I have reviewed the plan of care with the patient, accepted their input and they are in agreement with the treatment goals. Follow-up Disposition: Not on File More than 1/2 of this 15 minute visit was spent in counseling and coordination of care, as described above. Unknown ZEESHAN Stover Internist of [facility] [address], [address] Phone: [phone] Fax: [phone]

## 2018-10-16 DIAGNOSIS — I11.9 BENIGN HYPERTENSIVE HEART DISEASE WITHOUT HEART FAILURE: ICD-10-CM

## 2018-10-16 RX ORDER — SPIRONOLACTONE 25 MG/1
25 TABLET ORAL DAILY
Qty: 90 TAB | Refills: 3 | Status: SHIPPED | OUTPATIENT
Start: 2018-10-16 | End: 2019-10-16 | Stop reason: SDUPTHER

## 2018-10-16 NOTE — TELEPHONE ENCOUNTER
Last Visit: 10/12/2018 with NP Yemi Hnana Next Appointment: 12/05/2018 with MD Belia Zambrano Previous Refill Encounters: 10/03/2017 per MD Belia Zambrano #[id number] with 11 refills Requested Prescriptions Pending Prescriptions Disp Refills  spironolactone (ALDACTONE) 25 mg tablet [Pharmacy Med Name: SPIRONOLACTONE 25 MG TABLET] 90 Tab 3 Sig: Take 1 Tab by mouth daily.

## 2018-12-05 ENCOUNTER — OFFICE VISIT (OUTPATIENT)
Dept: INTERNAL MEDICINE CLINIC | Age: 58
End: 2018-12-05

## 2018-12-05 VITALS
OXYGEN SATURATION: 98 % | WEIGHT: 151.6 LBS | RESPIRATION RATE: 12 BRPM | TEMPERATURE: 97.5 F | DIASTOLIC BLOOD PRESSURE: 83 MMHG | HEIGHT: 62 IN | HEART RATE: 74 BPM | BODY MASS INDEX: 27.9 KG/M2 | SYSTOLIC BLOOD PRESSURE: 115 MMHG

## 2018-12-05 DIAGNOSIS — Z23 ENCOUNTER FOR IMMUNIZATION: ICD-10-CM

## 2018-12-05 DIAGNOSIS — E78.5 HYPERLIPIDEMIA, UNSPECIFIED HYPERLIPIDEMIA TYPE: ICD-10-CM

## 2018-12-05 DIAGNOSIS — Z13.0 SCREENING FOR DEFICIENCY ANEMIA: ICD-10-CM

## 2018-12-05 DIAGNOSIS — I10 ESSENTIAL HYPERTENSION: Primary | ICD-10-CM

## 2018-12-05 PROBLEM — I49.3 PVC (PREMATURE VENTRICULAR CONTRACTION): Status: ACTIVE | Noted: 2017-04-12

## 2018-12-05 PROBLEM — G90.9 AUTONOMIC DYSFUNCTION: Status: ACTIVE | Noted: 2017-04-12

## 2018-12-05 PROBLEM — R00.0 TACHYCARDIA: Status: ACTIVE | Noted: 2017-04-12

## 2018-12-05 NOTE — PROGRESS NOTES
Myra Joseph 1960 female who presents for routine immunizations. Patient denies any symptoms , reactions or allergies that would exclude them from being immunized today. Risks and adverse reactions were discussed and the VIS was given to them. All questions were addressed. Order placed for tdap,  per Verbal Order from  with read back. Patient was observed for 15 min post injection. There were no reactions observed.  
 
Oly Blas LPN

## 2018-12-05 NOTE — PROGRESS NOTES
INTERNISTS OF [facility]: 
12/11/2018, MRN: [id number] Rebecca Wesley is a 66 y.o. female and presents to clinic for Hypertension Subjective:  
Pt is a 64yo AAF with h/o chronic bronchitis, LUCY, porphyria (per EHR), allergic rhinitis, multiple food allergies (per lab work), HTN, HLD, constipation, gallbladder polyps (suggested by CT scan 10/13/16), and GERD. 1. HTN: Her blood pressure is 115/83 today. Weight 151 pounds. She takes spironolactone, amlodipine, potassium, and atenolol for an over 6-month history of hypertension. She reports no adverse side effects with taking these medications. No refills are needed. 2.  Hyperlipidemia: Present for over 6 months. On pravastatin. She reports no adverse side effects with taking this medication. She tries to limit her processed food intake. She tries to exercise regularly. Patient Active Problem List  
 Diagnosis Date Noted  Allergic rhinitis 10/04/2017  Autonomic dysfunction 04/12/2017  PVC (premature ventricular contraction) 04/12/2017  Tachycardia 04/12/2017  Gallbladder polyp - suggested by findings on CT scan from 10/13/16 03/01/2017  Hypersomnia with sleep apnea 03/13/2014  Hypertensive heart disease  03/05/2012  Constipation 01/16/2012  Essential hypertension  Hyperlipidemia  GERD (gastroesophageal reflux disease) Current Outpatient Medications Medication Sig Dispense Refill  spironolactone (ALDACTONE) 25 mg tablet Take 1 Tab by mouth daily. 90 Tab 3  pravastatin (PRAVACHOL) 80 mg tablet TAKE ONE TABLET BY MOUTH EVERY NIGHT AT BEDTIME 30 Tab 5  
 amLODIPine (NORVASC) 2.5 mg tablet Take 2.5 mg by mouth daily.  loratadine (CLARITIN) 10 mg tablet Take 1 Tab by mouth daily. Indications: Allergic Rhinitis, Urticaria 90 Tab 3  
 hydrocortisone (CORTAID) 1 % topical cream Apply  to affected area two (2) times a day.  use thin layer 30 g 1  
  fluticasone (FLONASE) 50 mcg/actuation nasal spray 2 Sprays by Both Nostrils route daily. Indications: Allergic Rhinitis 1 Bottle 11  
 atenolol (TENORMIN) 25 mg tablet Take 1/2 to 1 tablet daily. Not to exceed one tablet per day. 30 Tab 3  potassium chloride (KAON 10%) 20 mEq/15 mL solution Patient states she take 1 tablespoon daily  glucose blood VI test strips (BLOOD GLUCOSE TEST) strip Use to check glucose daily 1 Package 11  
 co-enzyme Q-10 (CO Q-10) 100 mg capsule Take 200 mg by mouth daily.  GARLIC PO Take 2 Caps by mouth daily.  aspirin delayed-release (ECOTRIN LOW STRENGTH) 81 mg tablet Take 81 mg by mouth daily.  omega-3 fatty acids-vitamin e (FISH OIL) 1,000 mg cap Take 2 Caps by mouth daily.  melatonin 3 mg tablet Take 3 mg by mouth nightly as needed.  Dexlansoprazole (DEXILANT) 60 mg CpDM Take 1 Cap by mouth every Monday, Wednesday, Friday.  sucralfate (CARAFATE) 1 gram tablet Take 1 g by mouth daily as needed.  MULTIVITAMIN W-MINERALS/LUTEIN (CENTRUM SILVER PO) Take 1 Tab by mouth daily.  raNITIdine (ZANTAC) 150 mg tablet Take 1 Tab by mouth two (2) times a day. 60 Tab 0 Allergies Allergen Reactions  Ibuprofen Other (comments)  Nsaids (Non-Steroidal Anti-Inflammatory Drug) Other (comments)  Amoxicillin Unable to Obtain and Other (comments)  Lidocaine Swelling Oral Solution  Lipitor [Atorvastatin] Myalgia  Lopressor [Metoprolol Tartrate] Other (comments) Lightheaded and cp  Percocet [Oxycodone-Acetaminophen] Unable to Obtain and Other (comments)  Vicodin [Hydrocodone-Acetaminophen] Unable to Obtain and Other (comments) Past Medical History:  
Diagnosis Date  Allergic rhinitis  Cardiac Agatston CAC score, <100 04/30/2014 Coronary calcium score 0.  
 Cardiac Holter monitoring 01/25/2017 Sinus rhythm, avg HR 73 bpm (range ). Benign Holter study.  Cardiac nuclear imaging test 2013 No convincing evidence for ischemia or infarction in the setting of significant chest wall artifact. EF 62%. No RWMA. Neg EKG on max EST. Ex time 9 min 50 sec.  Cardiac stress echo, normal 2016 Normal maximal stress echo w/reproduction of atypical chest discomfort. Ex time 11 min 3 sec. EF 55%.  Cardiovascular LLE venous duplex 2016 Left leg:  No DVT.  Chest pain  GERD (gastroesophageal reflux disease)  Heel pain, bilateral   
 Hiatal hernia   
 denies this  HTN (hypertension)  Hyperlipidemia  Insulin resistance   
 follows with endocrinology for this  Night sweats  PVC (premature ventricular contraction)  Right low back pain  S/P colonoscopy ,   
 normal per patient  Unsteady gait   
 due to heel pain Past Surgical History:  
Procedure Laterality Date  D/C SUCTION    HX OTHER SURGICAL    
 colonscopy  HX OTHER SURGICAL    
 endoscopy  HX OTHER SURGICAL    
 wisdom teeth removal x1 Family History Problem Relation Age of Onset  Hypertension Mother  Heart defect Mother   
     anuerysm  Heart Attack Mother  Asthma Father  Heart Attack Maternal Uncle  Heart Attack Maternal Uncle  Heart Disease Brother  Hypertension Brother Social History Tobacco Use  Smoking status: Former Smoker Packs/day: 0.50 Years: 11.00 Pack years: 5.50 Types: Cigarettes Last attempt to quit: 2006 Years since quittin.9  Smokeless tobacco: Former User Quit date: 1/10/1993  Tobacco comment: 6-7 cigs per day Substance Use Topics  Alcohol use: Yes Alcohol/week: 0.0 oz  
  Comment: rare/during Holiday Time ROS Review of Systems Constitutional: Negative for chills and fever. HENT: Negative for ear pain and sore throat. Eyes: Negative for blurred vision and pain. Respiratory: Negative for cough and shortness of breath. Cardiovascular: Negative for chest pain. Gastrointestinal: Negative for abdominal pain, blood in stool and melena. Genitourinary: Negative for dysuria and hematuria. Musculoskeletal: Negative for joint pain and myalgias. Skin: Negative for rash. Neurological: Negative for tingling, focal weakness and headaches. Endo/Heme/Allergies: Does not bruise/bleed easily. Psychiatric/Behavioral: Negative for substance abuse. Objective Vitals:  
 12/05/18 1344 BP: 115/83 Pulse: 74 Resp: 12 Temp: 97.5 °F (36.4 °C) TempSrc: Oral  
SpO2: 98% Weight: 151 lb 9.6 oz (68.8 kg) Height: 5' 2\" (1.575 m) PainSc:   0 - No pain Physical Exam  
Constitutional: She is oriented to person, place, and time and well-developed, well-nourished, and in no distress. HENT:  
Head: Normocephalic and atraumatic. Right Ear: External ear normal.  
Left Ear: External ear normal.  
Nose: Nose normal.  
Mouth/Throat: Oropharynx is clear and moist. No oropharyngeal exudate. Eyes: Conjunctivae and EOM are normal. Pupils are equal, round, and reactive to light. Right eye exhibits no discharge. Left eye exhibits no discharge. No scleral icterus. Neck: Neck supple. Cardiovascular: Normal rate, regular rhythm, normal heart sounds and intact distal pulses. Exam reveals no gallop and no friction rub. No murmur heard. Pulmonary/Chest: Effort normal and breath sounds normal. No respiratory distress. She has no wheezes. She has no rales. Abdominal: Soft. Bowel sounds are normal. She exhibits no distension. There is no tenderness. There is no rebound and no guarding. Musculoskeletal: She exhibits no edema or tenderness (Bue). Lymphadenopathy:  
  She has no cervical adenopathy. Neurological: She is alert and oriented to person, place, and time. She exhibits normal muscle tone. Gait normal.  
Skin: Skin is warm and dry. No erythema. Psychiatric: Affect normal.  
Nursing note and vitals reviewed. LABS Data Review:  
Lab Results Component Value Date/Time WBC 5.2 08/20/2018 06:08 PM  
 Hemoglobin, POC 12.2 01/09/2013 08:19 PM  
 HGB 13.0 08/20/2018 06:08 PM  
 Hematocrit, POC 36 01/09/2013 08:19 PM  
 HCT 38.1 08/20/2018 06:08 PM  
 PLATELET 281 48/10/7515 06:08 PM  
 MCV 84.9 08/20/2018 06:08 PM  
 
 
Lab Results Component Value Date/Time Sodium 141 08/20/2018 06:07 PM  
 Potassium 3.8 08/20/2018 06:07 PM  
 Chloride 106 08/20/2018 06:07 PM  
 CO2 31 08/20/2018 06:07 PM  
 Anion gap 4 08/20/2018 06:07 PM  
 Glucose 94 08/20/2018 06:07 PM  
 BUN 18 08/20/2018 06:07 PM  
 Creatinine 1.06 08/20/2018 06:07 PM  
 BUN/Creatinine ratio 17 08/20/2018 06:07 PM  
 GFR est AA >60 08/20/2018 06:07 PM  
 GFR est non-AA 54 (L) 08/20/2018 06:07 PM  
 Calcium 8.7 08/20/2018 06:07 PM  
 
 
Lab Results Component Value Date/Time Cholesterol, total 165 07/11/2018 12:26 PM  
 HDL Cholesterol 75 (H) 07/11/2018 12:26 PM  
 LDL, calculated 81.2 07/11/2018 12:26 PM  
 VLDL, calculated 8.8 07/11/2018 12:26 PM  
 Triglyceride 44 07/11/2018 12:26 PM  
 CHOL/HDL Ratio 2.2 07/11/2018 12:26 PM  
 
 
Lab Results Component Value Date/Time Hemoglobin A1c 5.4 06/05/2018 02:43 PM  
 Hemoglobin A1c (POC) 5.6 09/19/2013 02:30 PM  
 Hemoglobin A1c, External 5.8 10/08/2015 Assessment/Plan: 1. Health Maintenance: The tetanus vaccine was administered today. I encouraged her to get her shingles vaccine. Screening for anemia with a CBC just before her follow-up appointment. ORDERS: 
- TETANUS, DIPHTHERIA TOXOIDS AND ACELLULAR PERTUSSIS VACCINE (TDAP), IN INDIVIDS. >=7, IM 
 
2. Hypertension: Stable. Continue with medication as prescribed. I will check a urine protein screen, CMP, and a lipid panel just before her follow-up appointment. 3.  Hyperlipidemia: Stable. Continue with pravastatin. Checking a lipid panel and a CMP just before her follow-up appointment. I will also check an A1c given a slightly elevated blood glucose present on a previous BMP per review the EHR. I encouraged her to exercise regularly and to limit her processed food intake. I will recheck her weight at her follow-up appointment. Health Maintenance Due Topic Date Due  Shingrix Vaccine Age 50> (1 of 2) 04/05/2012 Lab review: labs are reviewed in the EHR and ordered as mentioned above I have discussed the diagnosis with the patient and the intended plan as seen in the above orders. The patient has received an after-visit summary and questions were answered concerning future plans. I have discussed medication side effects and warnings with the patient as well. I have reviewed the plan of care with the patient, accepted their input and they are in agreement with the treatment goals. All questions were answered. The patient understands the plan of care. Handouts provided today with above information. Pt instructed if symptoms worsen to call the office or report to the ED for continued care. Greater than 50% of the visit time was spent in counseling and/or coordination of care. Voice recognition was used to generate this report, which may have resulted in some phonetic based errors in grammar and contents. Even though attempts were made to correct all the mistakes, some may have been missed, and remained in the body of the document. Follow-up Disposition: 
Return in about 6 months (around 6/19/2019) for BP check, weight check.  
 
Emmett Barthel, MD

## 2018-12-05 NOTE — PATIENT INSTRUCTIONS
High Blood Pressure: Care Instructions Your Care Instructions If your blood pressure is usually above 130/80, you have high blood pressure, or hypertension. That means the top number is 130 or higher or the bottom number is 80 or higher, or both. Despite what a lot of people think, high blood pressure usually doesn't cause headaches or make you feel dizzy or lightheaded. It usually has no symptoms. But it does increase your risk for heart attack, stroke, and kidney or eye damage. The higher your blood pressure, the more your risk increases. Your doctor will give you a goal for your blood pressure. Your goal will be based on your health and your age. Lifestyle changes, such as eating healthy and being active, are always important to help lower blood pressure. You might also take medicine to reach your blood pressure goal. 
Follow-up care is a key part of your treatment and safety. Be sure to make and go to all appointments, and call your doctor if you are having problems. It's also a good idea to know your test results and keep a list of the medicines you take. How can you care for yourself at home? Medical treatment · If you stop taking your medicine, your blood pressure will go back up. You may take one or more types of medicine to lower your blood pressure. Be safe with medicines. Take your medicine exactly as prescribed. Call your doctor if you think you are having a problem with your medicine. · Talk to your doctor before you start taking aspirin every day. Aspirin can help certain people lower their risk of a heart attack or stroke. But taking aspirin isn't right for everyone, because it can cause serious bleeding. · See your doctor regularly. You may need to see the doctor more often at first or until your blood pressure comes down. · If you are taking blood pressure medicine, talk to your doctor before you take decongestants or anti-inflammatory medicine, such as ibuprofen. Some of these medicines can raise blood pressure. · Learn how to check your blood pressure at home. Lifestyle changes · Stay at a healthy weight. This is especially important if you put on weight around the waist. Losing even 10 pounds can help you lower your blood pressure. · If your doctor recommends it, get more exercise. Walking is a good choice. Bit by bit, increase the amount you walk every day. Try for at least 30 minutes on most days of the week. You also may want to swim, bike, or do other activities. · Avoid or limit alcohol. Talk to your doctor about whether you can drink any alcohol. · Try to limit how much sodium you eat to less than 2,300 milligrams (mg) a day. Your doctor may ask you to try to eat less than 1,500 mg a day. · Eat plenty of fruits (such as bananas and oranges), vegetables, legumes, whole grains, and low-fat dairy products. · Lower the amount of saturated fat in your diet. Saturated fat is found in animal products such as milk, cheese, and meat. Limiting these foods may help you lose weight and also lower your risk for heart disease. · Do not smoke. Smoking increases your risk for heart attack and stroke. If you need help quitting, talk to your doctor about stop-smoking programs and medicines. These can increase your chances of quitting for good. When should you call for help? Call 911 anytime you think you may need emergency care. This may mean having symptoms that suggest that your blood pressure is causing a serious heart or blood vessel problem. Your blood pressure may be over 180/120. 
 For example, call 911 if: 
  · You have symptoms of a heart attack. These may include: 
? Chest pain or pressure, or a strange feeling in the chest. 
? Sweating. ? Shortness of breath. ? Nausea or vomiting. ? Pain, pressure, or a strange feeling in the back, neck, jaw, or upper belly or in one or both shoulders or arms. ? Lightheadedness or sudden weakness. ? A fast or irregular heartbeat.  
  · You have symptoms of a stroke. These may include: 
? Sudden numbness, tingling, weakness, or loss of movement in your face, arm, or leg, especially on only one side of your body. ? Sudden vision changes. ? Sudden trouble speaking. ? Sudden confusion or trouble understanding simple statements. ? Sudden problems with walking or balance. ? A sudden, severe headache that is different from past headaches.  
  · You have severe back or belly pain.  
 Do not wait until your blood pressure comes down on its own. Get help right away. 
 Call your doctor now or seek immediate care if: 
  · Your blood pressure is much higher than normal (such as 180/120 or higher), but you don't have symptoms.  
  · You think high blood pressure is causing symptoms, such as: 
? Severe headache. 
? Blurry vision.  
 Watch closely for changes in your health, and be sure to contact your doctor if: 
  · Your blood pressure measures higher than your doctor recommends at least 2 times. That means the top number is higher or the bottom number is higher, or both.  
  · You think you may be having side effects from your blood pressure medicine. Where can you learn more? Go to http://diana-pam.info/. Enter A921 in the search box to learn more about \"High Blood Pressure: Care Instructions. \" Current as of: December 6, 2017 Content Version: 11.8 © 4466-4181 [employer]wise[employer]. Care instructions adapted under license by [employer] (which disclaims liability or warranty for this information). If you have questions about a medical condition or this instruction, always ask your healthcare professional. [place] [address] any warranty or liability for your use of this information. Patient was given a copy of the Advanced Medical Directive Form, and understands to bring it in once completed. Health Maintenance Due Topic Date Due  
  DTaP/Tdap/Td series (1 - Tdap) 1983  Shingrix Vaccine Age 50> (1 of 2) 2012 Vaccine Information Statement Tdap (Tetanus, Diphtheria, Pertussis) Vaccine: What You Need to Know Many Vaccine Information Statements are available in Syriac and other languages. See www.immunize.org/vis. Hojas de Información Sobre Vacunas están disponibles en español y en muchos otros idiomas. Visite WorthScale.si 1. Why get vaccinated? Tetanus, diphtheria, and pertussis are very serious diseases. Tdap vaccine can protect us from these diseases. And, Tdap vaccine given to pregnant women can protect  babies against pertussis. TETANUS (Lockjaw) is rare in the [facility] today. It causes painful muscle tightening and stiffness, usually all over the body. ? It can lead to tightening of muscles in the head and neck so you cant open your mouth, swallow, or sometimes even breathe. Tetanus kills about 1 out of 10 people who are infected even after receiving the best medical care. DIPHTHERIA is also rare in the [facility] today. It can cause a thick coating to form in the back of the throat. ? It can lead to breathing problems, heart failure, paralysis, and death. PERTUSSIS (Whooping Cough) causes severe coughing spells, which can cause difficulty breathing, vomiting, and disturbed sleep. ? It can also lead to weight loss, incontinence, and rib fractures. Up to 2 in 100 adolescents and 5 in 100 adults with pertussis are hospitalized or have complications, which could include pneumonia or death. These diseases are caused by bacteria. Diphtheria and pertussis are spread from person to person through secretions from coughing or sneezing. Tetanus enters the body through cuts, scratches, or wounds.  
 
Before vaccines, as many as 200,000 cases of diphtheria, 200,000 cases of pertussis, and hundreds of cases of tetanus, were reported in the [facility] each year. Since vaccination began, reports of cases for tetanus and diphtheria have dropped by about 99% and for pertussis by about 80%. 2. Tdap vaccine Tdap vaccine can protect adolescents and adults from tetanus, diphtheria, and pertussis. One dose of Tdap is routinely given at age 6 or 15. People who did not get Tdap at that age should get it as soon as possible. Tdap is especially important for health care professionals and anyone having close contact with a baby younger than 12 months. Pregnant women should get a dose of Tdap during every pregnancy, to protect the  from pertussis. Infants are most at risk for severe, life-threatening complications from pertussis. Another vaccine, called Td, protects against tetanus and diphtheria, but not pertussis. A Td booster should be given every 10 years. Tdap may be given as one of these boosters if you have never gotten Tdap before. Tdap may also be given after a severe cut or burn to prevent tetanus infection. Your doctor or the person giving you the vaccine can give you more information. Tdap may safely be given at the same time as other vaccines. 3. Some people should not get this vaccine  A person who has ever had a life-threatening allergic reaction after a previous dose of any diphtheria, tetanus or pertussis containing vaccine, OR has a severe allergy to any part of this vaccine, should not get Tdap vaccine. Tell the person giving the vaccine about any severe allergies.  Anyone who had coma or long repeated seizures within 7 days after a childhood dose of DTP or DTaP, or a previous dose of Tdap, should not get Tdap, unless a cause other than the vaccine was found. They can still get Td.  
 
 Talk to your doctor if you: 
- have seizures or another nervous system problem, 
- had severe pain or swelling after any vaccine containing diphtheria, tetanus or pertussis,  
 - ever had a condition called Guillain Barré Syndrome (GBS), 
- arent feeling well on the day the shot is scheduled. 4. Risks With any medicine, including vaccines, there is a chance of side effects. These are usually mild and go away on their own. Serious reactions are also possible but are rare. Most people who get Tdap vaccine do not have any problems with it. Mild Problems following Tdap 
(Did not interfere with activities)  Pain where the shot was given (about 3 in 4 adolescents or 2 in 3 adults)  Redness or swelling where the shot was given (about 1 person in 5)  Mild fever of at least 100.4°F (up to about 1 in 25 adolescents or 1 in 100 adults)  Headache (about 3 or 4 people in 10)  Tiredness (about 1 person in 3 or 4)  Nausea, vomiting, diarrhea, stomach ache (up to 1 in 4 adolescents or 1 in 10 adults)  Chills,  sore joints (about 1 person in 10)  Body aches (about 1 person in 3 or 4)  Rash, swollen glands (uncommon) Moderate Problems following Tdap (Interfered with activities, but did not require medical attention)  Pain where the shot was given (up to 1 in 5 or 6)  Redness or swelling where the shot was given (up to about 1 in 16 adolescents or 1 in 12 adults)  Fever over 102°F (about 1 in 100 adolescents or 1 in 250 adults)  Headache (about 1 in 7 adolescents or 1 in 10 adults)  Nausea, vomiting, diarrhea, stomach ache (up to 1 or 3 people in 100)  Swelling of the entire arm where the shot was given (up to about 1 in 500). Severe Problems following Tdap 
(Unable to perform usual activities; required medical attention)  Swelling, severe pain, bleeding, and redness in the arm where the shot was given (rare). Problems that could happen after any vaccine:  People sometimes faint after a medical procedure, including vaccination.  Sitting or lying down for about 15 minutes can help prevent fainting, and injuries caused by a fall. Tell your doctor if you feel dizzy, or have vision changes or ringing in the ears.  Some people get severe pain in the shoulder and have difficulty moving the arm where a shot was given. This happens very rarely.  Any medication can cause a severe allergic reaction. Such reactions from a vaccine are very rare, estimated at fewer than 1 in a million doses, and would happen within a few minutes to a few hours after the vaccination. As with any medicine, there is a very remote chance of a vaccine causing a serious injury or death. The safety of vaccines is always being monitored. For more information, visit: www.cdc.gov/vaccinesafety/ 
 
 
The [facility] Vaccine Injury Compensation Program (VICP) is a federal program that was created to compensate people who may have been injured by certain vaccines.  
 
Persons who believe they may have been injured by a vaccine can learn about the program and about filing a claim by calling 1-470.996.9519 or visiting the [employer] website at www.[facility].gov/vaccinecompensation. There is a time limit to file a claim for compensation. 7. How can I learn more?  Ask your doctor. He or she can give you the vaccine package insert or suggest other sources of information.  Call your local or state health department.  Contact the Centers for Disease Control and Prevention (CDC): 
- Call 7-486.229.5528 (1-800-CDC-INFO) or 
- Visit CDCs website at www.cdc.gov/vaccines Vaccine Information Statement Tdap Vaccine 
(2/24/2015) [address] [id number] Department of Health and [employer] Centers for Disease Control and Prevention Office Use Only

## 2018-12-05 NOTE — PROGRESS NOTES
Chief Complaint Patient presents with  Hypertension 1. Have you been to the ER, urgent care clinic since your last visit? Hospitalized since your last visit? No 
 
2. Have you seen or consulted any other health care providers outside of the [address] [place] [address] since your last visit? Include any pap smears or colon screening. No 
 
Patient was given a copy of the Advanced Directive and understands to bring it in once completed. Health Maintenance Due Topic Date Due  
 DTaP/Tdap/Td series (1 - Tdap) 04/05/1983  Shingrix Vaccine Age 50> (1 of 2) 04/05/2012

## 2018-12-17 ENCOUNTER — HOSPITAL ENCOUNTER (OUTPATIENT)
Dept: LAB | Age: 58
Discharge: HOME | End: 2018-12-17
Payer: COMMERCIAL

## 2018-12-17 LAB
ANION GAP SERPL CALC-SCNC: 6 MMOL/L (ref 3–18)
APPEARANCE UR: CLEAR
BACTERIA URNS QL MICRO: NEGATIVE /HPF
BILIRUB UR QL: NEGATIVE
BUN SERPL-MCNC: 23 MG/DL (ref 7–18)
BUN/CREAT SERPL: 18 (ref 12–20)
CALCIUM SERPL-MCNC: 9.4 MG/DL (ref 8.5–10.1)
CHLORIDE SERPL-SCNC: 107 MMOL/L (ref 100–108)
CO2 SERPL-SCNC: 28 MMOL/L (ref 21–32)
COLOR UR: YELLOW
CREAT SERPL-MCNC: 1.28 MG/DL (ref 0.6–1.3)
EPITH CASTS URNS QL MICRO: ABNORMAL /LPF (ref 0–5)
GLUCOSE SERPL-MCNC: 76 MG/DL (ref 74–99)
GLUCOSE UR STRIP.AUTO-MCNC: NEGATIVE MG/DL
HCT VFR BLD AUTO: 39.8 % (ref 35–45)
HGB BLD-MCNC: 13.3 G/DL (ref 12–16)
HGB UR QL STRIP: NEGATIVE
KETONES UR QL STRIP.AUTO: NEGATIVE MG/DL
LEUKOCYTE ESTERASE UR QL STRIP.AUTO: NEGATIVE
MAGNESIUM SERPL-MCNC: 2.3 MG/DL (ref 1.6–2.6)
NITRITE UR QL STRIP.AUTO: NEGATIVE
PH UR STRIP: 7.5 [PH] (ref 5–8)
POTASSIUM SERPL-SCNC: 4 MMOL/L (ref 3.5–5.5)
PROT UR STRIP-MCNC: NEGATIVE MG/DL
RBC #/AREA URNS HPF: ABNORMAL /HPF (ref 0–5)
SODIUM SERPL-SCNC: 141 MMOL/L (ref 136–145)
SP GR UR REFRACTOMETRY: <1.005 (ref 1–1.03)
UROBILINOGEN UR QL STRIP.AUTO: 0.2 EU/DL (ref 0.2–1)
WBC URNS QL MICRO: ABNORMAL /HPF (ref 0–4)

## 2018-12-17 PROCEDURE — 80048 BASIC METABOLIC PNL TOTAL CA: CPT

## 2018-12-17 PROCEDURE — 83735 ASSAY OF MAGNESIUM: CPT

## 2018-12-17 PROCEDURE — 81001 URINALYSIS AUTO W/SCOPE: CPT

## 2018-12-17 PROCEDURE — 85018 HEMOGLOBIN: CPT

## 2018-12-17 PROCEDURE — 36415 COLL VENOUS BLD VENIPUNCTURE: CPT

## 2019-02-04 RX ORDER — ATENOLOL 25 MG/1
TABLET ORAL
Qty: 30 TAB | Refills: 2 | Status: SHIPPED | OUTPATIENT
Start: 2019-02-04 | End: 2020-03-09

## 2019-02-11 ENCOUNTER — HOSPITAL ENCOUNTER (OUTPATIENT)
Dept: LAB | Age: 59
Discharge: HOME | End: 2019-02-11
Payer: COMMERCIAL

## 2019-02-11 DIAGNOSIS — E78.5 HYPERLIPIDEMIA, UNSPECIFIED HYPERLIPIDEMIA TYPE: ICD-10-CM

## 2019-02-11 DIAGNOSIS — I10 ESSENTIAL HYPERTENSION: ICD-10-CM

## 2019-02-11 DIAGNOSIS — Z13.0 SCREENING FOR DEFICIENCY ANEMIA: ICD-10-CM

## 2019-02-11 LAB
ALBUMIN SERPL-MCNC: 3.8 G/DL (ref 3.4–5)
ALBUMIN/GLOB SERPL: 1.2 {RATIO} (ref 0.8–1.7)
ALP SERPL-CCNC: 43 U/L (ref 45–117)
ALT SERPL-CCNC: 23 U/L (ref 13–56)
ANION GAP SERPL CALC-SCNC: 5 MMOL/L (ref 3–18)
AST SERPL-CCNC: 21 U/L (ref 15–37)
BASOPHILS # BLD: 0 K/UL (ref 0–0.1)
BASOPHILS NFR BLD: 1 % (ref 0–2)
BILIRUB SERPL-MCNC: 0.6 MG/DL (ref 0.2–1)
BUN SERPL-MCNC: 16 MG/DL (ref 7–18)
BUN/CREAT SERPL: 15 (ref 12–20)
CALCIUM SERPL-MCNC: 8.8 MG/DL (ref 8.5–10.1)
CHLORIDE SERPL-SCNC: 105 MMOL/L (ref 100–108)
CHOLEST SERPL-MCNC: 140 MG/DL
CO2 SERPL-SCNC: 31 MMOL/L (ref 21–32)
CREAT SERPL-MCNC: 1.09 MG/DL (ref 0.6–1.3)
CREAT UR-MCNC: 24.7 MG/DL (ref 30–125)
DIFFERENTIAL METHOD BLD: NORMAL
EOSINOPHIL # BLD: 0.1 K/UL (ref 0–0.4)
EOSINOPHIL NFR BLD: 2 % (ref 0–5)
ERYTHROCYTE [DISTWIDTH] IN BLOOD BY AUTOMATED COUNT: 13.4 % (ref 11.6–14.5)
GLOBULIN SER CALC-MCNC: 3.3 G/DL (ref 2–4)
GLUCOSE SERPL-MCNC: 82 MG/DL (ref 74–99)
HBA1C MFR BLD: 5.5 % (ref 4.2–5.6)
HCT VFR BLD AUTO: 41.2 % (ref 35–45)
HDLC SERPL-MCNC: 77 MG/DL (ref 40–60)
HDLC SERPL: 1.8 {RATIO} (ref 0–5)
HGB BLD-MCNC: 13.7 G/DL (ref 12–16)
LDLC SERPL CALC-MCNC: 57 MG/DL (ref 0–100)
LIPID PROFILE,FLP: ABNORMAL
LYMPHOCYTES # BLD: 1.7 K/UL (ref 0.9–3.6)
LYMPHOCYTES NFR BLD: 34 % (ref 21–52)
MCH RBC QN AUTO: 29.4 PG (ref 24–34)
MCHC RBC AUTO-ENTMCNC: 33.3 G/DL (ref 31–37)
MCV RBC AUTO: 88.4 FL (ref 74–97)
MICROALBUMIN UR-MCNC: <0.5 MG/DL (ref 0–3)
MICROALBUMIN/CREAT UR-RTO: ABNORMAL MG/G (ref 0–30)
MONOCYTES # BLD: 0.3 K/UL (ref 0.05–1.2)
MONOCYTES NFR BLD: 6 % (ref 3–10)
NEUTS SEG # BLD: 2.8 K/UL (ref 1.8–8)
NEUTS SEG NFR BLD: 57 % (ref 40–73)
PLATELET # BLD AUTO: 245 K/UL (ref 135–420)
PMV BLD AUTO: 10.6 FL (ref 9.2–11.8)
POTASSIUM SERPL-SCNC: 3.7 MMOL/L (ref 3.5–5.5)
PROT SERPL-MCNC: 7.1 G/DL (ref 6.4–8.2)
RBC # BLD AUTO: 4.66 M/UL (ref 4.2–5.3)
SODIUM SERPL-SCNC: 141 MMOL/L (ref 136–145)
TRIGL SERPL-MCNC: 30 MG/DL (ref ?–150)
VLDLC SERPL CALC-MCNC: 6 MG/DL
WBC # BLD AUTO: 4.9 K/UL (ref 4.6–13.2)

## 2019-02-11 PROCEDURE — 83036 HEMOGLOBIN GLYCOSYLATED A1C: CPT

## 2019-02-11 PROCEDURE — 85025 COMPLETE CBC W/AUTO DIFF WBC: CPT

## 2019-02-11 PROCEDURE — 36415 COLL VENOUS BLD VENIPUNCTURE: CPT

## 2019-02-11 PROCEDURE — 80061 LIPID PANEL: CPT

## 2019-02-11 PROCEDURE — 80053 COMPREHEN METABOLIC PANEL: CPT

## 2019-02-11 PROCEDURE — 82043 UR ALBUMIN QUANTITATIVE: CPT

## 2019-02-12 LAB — CREATININE, EXTERNAL: 1.12

## 2019-02-13 ENCOUNTER — OFFICE VISIT (OUTPATIENT)
Dept: CARDIOLOGY CLINIC | Age: 59
End: 2019-02-13

## 2019-02-13 VITALS
HEIGHT: 62 IN | HEART RATE: 61 BPM | BODY MASS INDEX: 27.23 KG/M2 | WEIGHT: 148 LBS | OXYGEN SATURATION: 98 % | DIASTOLIC BLOOD PRESSURE: 76 MMHG | SYSTOLIC BLOOD PRESSURE: 110 MMHG

## 2019-02-13 DIAGNOSIS — E78.5 HYPERLIPIDEMIA, UNSPECIFIED HYPERLIPIDEMIA TYPE: Primary | ICD-10-CM

## 2019-02-13 DIAGNOSIS — R07.9 CHEST PAIN, UNSPECIFIED TYPE: ICD-10-CM

## 2019-02-13 DIAGNOSIS — I11.9 BENIGN HYPERTENSIVE HEART DISEASE WITHOUT HEART FAILURE: ICD-10-CM

## 2019-02-13 DIAGNOSIS — R00.2 PALPITATIONS: ICD-10-CM

## 2019-02-13 NOTE — PROGRESS NOTES
HPI: I saw Renetta Pizarro in my office today in cardiovascular evaluation regarding her problems with palpitations. Ms. Madison Graves is a very pleasant, but anxious 61-year-old -American female with history of non-anginal sounding chest pain and palpitations over the years. She has had Holter's in the past which have shown no significant ectopy except for some rare PAC's, but due to symptoms of palpitations she has been on atenolol 25 mg daily or as needed and Norvasc 5 mg along with Aldactone 25 mg daily for blood pressure. She comes in today and relates that she still has some intermittent chest pain, palpitations, and shortness of breath but the symptoms have not worsened recently and are similar to what they have been in the past.   Her chest pain continues to sound noncardiac and I should point out that she did have a coronary calcium score back on April 30, 2014 which was 0 which would suggest less than a 1% chance of coronary event in the following 10 years. Her palpitations are remaining at the same level and are occurring maybe once a week particularly when she forgets to take her medication and described as a rapid heart beating for a few minutes usually relieved after she takes her atenolol. Encounter Diagnoses Name Primary?  Chest pain, unspecified type  Hypertensive heart disease  Hyperlipidemia, unspecified hyperlipidemia type Yes  Palpitations Discussion: This lady appears to be doing about as well as we could expect and really of no recommendations for change at this time. She is not having any symptoms to suggest the development of symptomatic obstructive coronary artery disease or heart failure.  
 
She does have palpitations but they are rather infrequent and since she has had completely negative cardiac workups in the past we would suspect that they would be benign and we would simply treat him with beta-blockers which is what were doing in her case. Her graph her latest lipid profile which was just completed on December 11, 2019 was really excellent with a total cholesterol of 140, triglycerides of 30, HDL of 77, LDL of 57, and VLDL of 6 think is very good control on pravastatin 80 mg daily. Her blood pressure is very well controlled today and her EKG is absolutely normal so I will simply plan to see her again in several months or sooner if any new cardiovascular symptoms should surface. PCP: Jose E Casarez MD  
 
 
Past Medical History:  
Diagnosis Date  Allergic rhinitis  Cardiac Agatston CAC score, <100 04/30/2014 Coronary calcium score 0.  
 Cardiac Holter monitoring 01/25/2017 Sinus rhythm, avg HR 73 bpm (range ). Benign Holter study.  Cardiac nuclear imaging test 01/11/2013 No convincing evidence for ischemia or infarction in the setting of significant chest wall artifact. EF 62%. No RWMA. Neg EKG on max EST. Ex time 9 min 50 sec.  Cardiac stress echo, normal 05/05/2016 Normal maximal stress echo w/reproduction of atypical chest discomfort. Ex time 11 min 3 sec. EF 55%.  Cardiovascular LLE venous duplex 09/28/2016 Left leg:  No DVT.  Chest pain  GERD (gastroesophageal reflux disease)  Heel pain, bilateral   
 Hiatal hernia   
 denies this  HTN (hypertension)  Hyperlipidemia  Insulin resistance   
 follows with endocrinology for this  Night sweats  PVC (premature ventricular contraction)  Right low back pain  S/P colonoscopy 2009, 2014  
 normal per patient  Unsteady gait   
 due to heel pain Past Surgical History:  
Procedure Laterality Date  D/C SUCTION  2011  HX OTHER SURGICAL  2014  
 colonscopy  HX OTHER SURGICAL    
 endoscopy  HX OTHER SURGICAL    
 wisdom teeth removal x1 Current Outpatient Medications Medication Sig  
 atenolol (TENORMIN) 25 mg tablet TAKE ONE-HALF TO ONE TABLET BY MOUTH DAILY (DO NOT EXCEED ONE TABLET PER DAY)  spironolactone (ALDACTONE) 25 mg tablet Take 1 Tab by mouth daily.  raNITIdine (ZANTAC) 150 mg tablet Take 1 Tab by mouth two (2) times a day.  pravastatin (PRAVACHOL) 80 mg tablet TAKE ONE TABLET BY MOUTH EVERY NIGHT AT BEDTIME  amLODIPine (NORVASC) 2.5 mg tablet Take 2.5 mg by mouth daily.  loratadine (CLARITIN) 10 mg tablet Take 1 Tab by mouth daily. Indications: Allergic Rhinitis, Urticaria  hydrocortisone (CORTAID) 1 % topical cream Apply  to affected area two (2) times a day. use thin layer  fluticasone (FLONASE) 50 mcg/actuation nasal spray 2 Sprays by Both Nostrils route daily. Indications: Allergic Rhinitis  potassium chloride (KAON 10%) 20 mEq/15 mL solution Patient states she take 1 tablespoon daily  glucose blood VI test strips (BLOOD GLUCOSE TEST) strip Use to check glucose daily  co-enzyme Q-10 (CO Q-10) 100 mg capsule Take 200 mg by mouth daily.  GARLIC PO Take 2 Caps by mouth daily.  aspirin delayed-release (ECOTRIN LOW STRENGTH) 81 mg tablet Take 81 mg by mouth daily.  omega-3 fatty acids-vitamin e (FISH OIL) 1,000 mg cap Take 2 Caps by mouth daily.  melatonin 3 mg tablet Take 3 mg by mouth nightly as needed.  Dexlansoprazole (DEXILANT) 60 mg CpDM Take 1 Cap by mouth every Monday, Wednesday, Friday.  sucralfate (CARAFATE) 1 gram tablet Take 1 g by mouth daily as needed.  MULTIVITAMIN W-MINERALS/LUTEIN (CENTRUM SILVER PO) Take 1 Tab by mouth daily. No current facility-administered medications for this visit. Allergies Allergen Reactions  Ibuprofen Other (comments)  Nsaids (Non-Steroidal Anti-Inflammatory Drug) Other (comments)  Amoxicillin Unable to Obtain and Other (comments)  Lidocaine Swelling Oral Solution  Lipitor [Atorvastatin] Myalgia  Lopressor [Metoprolol Tartrate] Other (comments) Lightheaded and cp  Percocet [Oxycodone-Acetaminophen] Unable to Obtain and Other (comments)  Vicodin [Hydrocodone-Acetaminophen] Unable to Obtain and Other (comments) Social History : 
Social History Tobacco Use  Smoking status: Former Smoker Packs/day: 0.50 Years: 11.00 Pack years: 5.50 Types: Cigarettes Last attempt to quit: 2006 Years since quittin.1  Smokeless tobacco: Former User Quit date: 1/10/1993  Tobacco comment: 6-7 cigs per day Substance Use Topics  Alcohol use: Yes Alcohol/week: 0.0 oz  
  Comment: rare/during Holiday Time Family History: family history includes Asthma in her father; Heart Attack in her maternal uncle, maternal uncle, and mother; Heart Disease in her brother; Heart defect in her mother; Hypertension in her brother and mother. Review of Systems: 
Constitutional: Negative. Respiratory: Positive for shortness of breath. Negative for cough, hemoptysis and wheezing. Cardiovascular: Positive for chest pain and palpitations. Negative for orthopnea and leg swelling. Gastrointestinal: Positive for abdominal pain and heartburn. Negative for blood in stool, constipation, diarrhea, melena, nausea and vomiting. Musculoskeletal: Negative for falls, joint pain and myalgias. Neurological: Negative for dizziness. Physical Exam:   
The patient is a cooperative, alert, well developed, well nourished 66 y.o.  female who is in no acute distress at the time of the examination. Visit Vitals /76 Pulse 61 Ht 5' 2\" (1.575 m) Wt 67.1 kg (148 lb) SpO2 98% BMI 27.07 kg/m² HEENT: Conjuctiva white, mucosa moist, no pallor or cyanosis. NECK: Supple without masses, tenderness or thyromegaly. There was no jugular venous distention. Carotid are full bilaterally without bruits. CHEST: Symmetrical with good excursion. LUNGS: Clear to auscultation in all fields. HEART: The apex is not displaced. There were no lifts, thrills or heaves. There is a normal S1 and S2 without appreciable murmurs rubs clicks or gallops auscultated. ABDOMEN: Soft without masses or tenderness. The liver is palpated 2-3 finger breadths below the right costal margin. EXTREMITIES: Full peripheral pulses without peripheral edema. Review of Data: See PMH and Cardiology and Imaging sections for cardiac testing Lab Results Component Value Date/Time Cholesterol, total 140 02/11/2019 02:26 PM  
 HDL Cholesterol 77 (H) 02/11/2019 02:26 PM  
 LDL, calculated 57 02/11/2019 02:26 PM  
 Triglyceride 30 02/11/2019 02:26 PM  
 CHOL/HDL Ratio 1.8 02/11/2019 02:26 PM  
 
 
 
Results for orders placed or performed in visit on 02/13/19 AMB POC EKG ROUTINE W/ 12 LEADS, INTER & REP     Status: None Narrative Normal sinus rhythm rate 61. This EKG is within normal limits and similar to the EKG of 7/11/2018. Ivy Monge D.O., F.A.C.C. [facility] [place] [address] [address] [facility] [address] [address] [phone] PLEASE NOTE:  This document has been produced using voice recognition software. Unrecognized errors in transcription may be present.

## 2019-02-14 ENCOUNTER — TELEPHONE (OUTPATIENT)
Dept: INTERNAL MEDICINE CLINIC | Age: 59
End: 2019-02-14

## 2019-02-14 NOTE — LETTER
2/15/2019 8:20 AM 
 
Ms. Talia White [address] [id number] Dear Ms. Talia White, 
 
I have tried to reach you by phone, but no way of leaving you a voice message, so today I am mailing you this Result Letter with the following results from Dr Lily Eddy: 
 
Please let her know that her blood counts are normal. Her A1C is normal and not c/w prediabetes/diabetes. Her kidney function labs are normal. Her liver function labs are unremarkable for significant pathology. Her total cholesterol is 140. Her triglycerides are 30. Her HDL is 77. Her LDL is 57. These findings are slightly better than 7 months ago. She should with all medication as prescribed. Sincerely, ABHINAV Infante for Dr. Мария Merchant [facility] of [address] [place] [address]

## 2019-02-14 NOTE — PROGRESS NOTES
Please let her know that her blood counts are normal. Her A1C is normal and not c/w prediabetes/diabetes. Her kidney function labs are normal. Her liver function labs are unremarkable for significant pathology. Her total cholesterol is 140. Her triglycerides are 30. Her HDL is 77. Her LDL is 57. These findings are slightly better than 7 months ago. She should c/w all rx as prescribed. Dr. Rory Bhakta [facility] of [address] [place] [address], [address] [facility], [facility] [address] Phone: [phone] Fax: [phone]

## 2019-02-14 NOTE — TELEPHONE ENCOUNTER
Chief Complaint Patient presents with  Labs  
  done 02-11-19 per Dr Estel Dubin 02-14-19 the one and only contact number for the patient there was no answer. I will try again later on.

## 2019-02-14 NOTE — TELEPHONE ENCOUNTER
----- Message from Lia Smith MD sent at 2/13/2019 11:31 PM EST ----- Please let her know that her blood counts are normal. Her A1C is normal and not c/w prediabetes/diabetes. Her kidney function labs are normal. Her liver function labs are unremarkable for significant pathology. Her total cholesterol is 140. Her triglycerides are 30. Her HDL is 77. Her LDL is 57. These findings are slightly better than 7 months ago. She should c/w all rx as prescribed. Dr. Dailey Vidya [facility] of [address] [place] [address], [address] [facility], [facility] [address] Phone: [phone] Fax: [phone]

## 2019-02-15 NOTE — TELEPHONE ENCOUNTER
Chief Complaint Patient presents with  Labs  
  done 02-11-19 per Dr Kyler Lindsay, due to no contact with patient, I am mailing today a Result Letter 02-15-19 due to no way of reaching the patient by phone, I am mailing a Result Letter to the patient.

## 2019-03-05 RX ORDER — AMLODIPINE BESYLATE 2.5 MG/1
TABLET ORAL
Qty: 90 TAB | Refills: 3 | Status: SHIPPED | OUTPATIENT
Start: 2019-03-05 | End: 2020-03-25

## 2019-04-08 NOTE — PROGRESS NOTES
Chief Complaint   Patient presents with    Shortness of Breath    Chest Pain       Med rec done, med list accurate according to patient. Patient has c/o sob and chest pain (7/10 on pain scale).      Don Maloney LPN Faxed EPIC request for medical clearance to Dr. Amy Charlton office, fax confirmation page received-    Explicit clearance must be noted-    I will anticipate the clearance.

## 2019-06-12 ENCOUNTER — TELEPHONE (OUTPATIENT)
Dept: INTERNAL MEDICINE CLINIC | Age: 59
End: 2019-06-12

## 2019-06-12 NOTE — TELEPHONE ENCOUNTER
Pt came into office with copy of cancelled check and billing statement. Wanted it faxed to [facility] at [phone] Attn. Antonieta.  
 
Items Faxed to per her request.

## 2019-06-20 ENCOUNTER — OFFICE VISIT (OUTPATIENT)
Dept: INTERNAL MEDICINE CLINIC | Age: 59
End: 2019-06-20

## 2019-06-20 ENCOUNTER — TELEPHONE (OUTPATIENT)
Dept: INTERNAL MEDICINE CLINIC | Age: 59
End: 2019-06-20

## 2019-06-20 VITALS
BODY MASS INDEX: 27.42 KG/M2 | WEIGHT: 149 LBS | RESPIRATION RATE: 14 BRPM | DIASTOLIC BLOOD PRESSURE: 78 MMHG | HEIGHT: 62 IN | HEART RATE: 67 BPM | OXYGEN SATURATION: 98 % | TEMPERATURE: 97.9 F | SYSTOLIC BLOOD PRESSURE: 116 MMHG

## 2019-06-20 DIAGNOSIS — M25.552 ACUTE HIP PAIN, LEFT: Primary | ICD-10-CM

## 2019-06-20 NOTE — PROGRESS NOTES
Elliott Zayas is a 64 y.o.  female and presents with    Chief Complaint   Patient presents with    Hip Pain     recurrent left hip pain x 3 months- worse the past week       Subjective:  HPI  Mrs. Diandra Matthew presents with complaints of acute left hip pain x 3 months that has become worse over the last week. The pain is a continuous ache. Worse with laying on it, resting, sometimes can reproduce the pain. She reports drinks plenty of water and does use the restroom frequently but this is her normal, denies n/v/d/c, fever, chills, CVAt, rashes, heat, swelling, redness, injury, fall, paresthesia, change in bowel and bladder. She has been drinking a tea with Tumeric in it for pain. Additional Concerns: none     ROS   Review of Systems   Musculoskeletal: Positive for joint pain. Negative for falls. All other systems reviewed and are negative. Allergies   Allergen Reactions    Ibuprofen Other (comments)    Nsaids (Non-Steroidal Anti-Inflammatory Drug) Other (comments)    Amoxicillin Unable to Obtain and Other (comments)    Lidocaine Swelling     Oral Solution    Lipitor [Atorvastatin] Myalgia           Lopressor [Metoprolol Tartrate] Other (comments)     Lightheaded and cp    Percocet [Oxycodone-Acetaminophen] Unable to Obtain and Other (comments)    Vicodin [Hydrocodone-Acetaminophen] Unable to Obtain and Other (comments)       Current Outpatient Medications   Medication Sig Dispense Refill    amLODIPine (NORVASC) 2.5 mg tablet TAKE ONE TABLET BY MOUTH DAILY 90 Tab 3    atenolol (TENORMIN) 25 mg tablet TAKE ONE-HALF TO ONE TABLET BY MOUTH DAILY (DO NOT EXCEED ONE TABLET PER DAY) 30 Tab 2    spironolactone (ALDACTONE) 25 mg tablet Take 1 Tab by mouth daily. 90 Tab 3    pravastatin (PRAVACHOL) 80 mg tablet TAKE ONE TABLET BY MOUTH EVERY NIGHT AT BEDTIME 30 Tab 5    loratadine (CLARITIN) 10 mg tablet Take 1 Tab by mouth daily. Indications:  Allergic Rhinitis, Urticaria (Patient taking differently: Take 10 mg by mouth after meals as needed. Indications: inflammation of the nose due to an allergy, Hives) 90 Tab 3    hydrocortisone (CORTAID) 1 % topical cream Apply  to affected area two (2) times a day. use thin layer (Patient taking differently: Apply  to affected area as needed. use thin layer) 30 g 1    fluticasone (FLONASE) 50 mcg/actuation nasal spray 2 Sprays by Both Nostrils route daily. Indications: Allergic Rhinitis 1 Bottle 11    potassium chloride (KAON 10%) 20 mEq/15 mL solution Patient states she take 1 tablespoon daily      co-enzyme Q-10 (CO Q-10) 100 mg capsule Take 200 mg by mouth daily.  GARLIC PO Take 2 Caps by mouth daily.  aspirin delayed-release (ECOTRIN LOW STRENGTH) 81 mg tablet Take 81 mg by mouth daily.  omega-3 fatty acids-vitamin e (FISH OIL) 1,000 mg cap Take 2 Caps by mouth daily.  melatonin 3 mg tablet Take 3 mg by mouth nightly as needed.  Dexlansoprazole (DEXILANT) 60 mg CpDM Take 1 Cap by mouth every Monday, Wednesday, Friday.  sucralfate (CARAFATE) 1 gram tablet Take 1 g by mouth daily as needed.  MULTIVITAMIN W-MINERALS/LUTEIN (CENTRUM SILVER PO) Take 1 Tab by mouth daily.  raNITIdine (ZANTAC) 150 mg tablet Take 1 Tab by mouth two (2) times a day.  60 Tab 0    glucose blood VI test strips (BLOOD GLUCOSE TEST) strip Use to check glucose daily 1 Package 11       Social History     Socioeconomic History    Marital status: SINGLE     Spouse name: Not on file    Number of children: Not on file    Years of education: Not on file    Highest education level: Not on file   Occupational History    Occupation: student IT      Employer: NOT EMPLOYED   Social Needs    Financial resource strain: Not on file    Food insecurity:     Worry: Not on file     Inability: Not on file    Transportation needs:     Medical: Not on file     Non-medical: Not on file   Tobacco Use    Smoking status: Former Smoker     Packs/day: 0.50 Years: 11.00     Pack years: 5.50     Types: Cigarettes     Last attempt to quit: 2006     Years since quittin.4    Smokeless tobacco: Former User     Quit date: 1/10/1993    Tobacco comment: 6-7 cigs per day   Substance and Sexual Activity    Alcohol use: Yes     Alcohol/week: 0.0 oz     Comment: rare/during Holiday Time    Drug use: No     Types: Prescription, OTC    Sexual activity: Never   Lifestyle    Physical activity:     Days per week: Not on file     Minutes per session: Not on file    Stress: Not on file   Relationships    Social connections:     Talks on phone: Not on file     Gets together: Not on file     Attends [employer]al service: Not on file     Active member of club or organization: Not on file     Attends meetings of clubs or organizations: Not on file     Relationship status: Not on file    Intimate partner violence:     Fear of current or ex partner: Not on file     Emotionally abused: Not on file     Physically abused: Not on file     Forced sexual activity: Not on file   Other Topics Concern    Not on file   Social History Narrative    Not Currently working, 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 Past Medical History:   Diagnosis Date    Allergic rhinitis     Cardiac Agatston CAC score, <100 2014    Coronary calcium score 0.    Cardiac Holter monitoring 2017    Sinus rhythm, avg HR 73 bpm (range ). Benign Holter study.  Cardiac nuclear imaging test 2013    No convincing evidence for ischemia or infarction in the setting of significant chest wall artifact. EF 62%. No RWMA. Neg EKG on max EST. Ex time 9 min 50 sec.  Cardiac stress echo, normal 2016    Normal maximal stress echo w/reproduction of atypical chest discomfort. Ex time 11 min 3 sec. EF 55%.  Cardiovascular LLE venous duplex 2016    Left leg:  No DVT.     Chest pain     GERD (gastroesophageal reflux disease)     Heel pain, bilateral     Hiatal hernia     denies this    HTN (hypertension)     Hyperlipidemia     Insulin resistance     follows with endocrinology for this    Night sweats     PVC (premature ventricular contraction)     Right low back pain     S/P colonoscopy 2009, 2014    normal per patient    Unsteady gait     due to heel pain       Past Surgical History:   Procedure Laterality Date    D/C SUCTION  2011    HX OTHER SURGICAL  2014    colonscopy    HX OTHER SURGICAL      endoscopy    HX OTHER SURGICAL      wisdom teeth removal x1       Family History   Problem Relation Age of Onset    Hypertension Mother     Heart defect Mother         anuerysm    Heart Attack Mother     Asthma Father     Heart Attack Maternal Uncle     Heart Attack Maternal Uncle     Heart Disease Brother     Hypertension Brother        Objective:  Vitals:    06/20/19 0826   BP: 116/78   Pulse: 67   Resp: 14   Temp: 97.9 °F (36.6 °C)   TempSrc: Oral   SpO2: 98%   Weight: 149 lb (67.6 kg)   Height: 5' 2\" (1.575 m)   PainSc:   7   PainLoc: Hip       LABS   Results for orders placed or performed in visit on 03/15/19   AMB EXT CREATININE   Result Value Ref Range    Creatinine, External 1.12        TESTS  none    PE  Physical Exam   Constitutional: She is oriented to person, place, and time. She appears well-developed and well-nourished. No distress. HENT:   Head: Normocephalic and atraumatic. Musculoskeletal: Normal range of motion. She exhibits no edema, tenderness or deformity. Neurological: She is alert and oriented to person, place, and time. She has normal reflexes. No cranial nerve deficit. Coordination normal.   Skin: Skin is warm and dry. No rash noted. She is not diaphoretic. No erythema. Psychiatric: She has a normal mood and affect. Her behavior is normal. Judgment and thought content normal.   Vitals reviewed. Assessment/Plan:    1. Acute left hip pain- seems arthritic in nature. Xray ordered. Tylenol for pain PRN, UA ordered    Lab review: labs ordered. Today's Visit:   Diagnoses and all orders for this visit:    1. Acute hip pain, left  -     XR HIP LT W OR WO PELV 2-3 VWS; Future  -     AMB POC URINALYSIS DIP STICK AUTO W/O MICRO            Health Maintenance: Deferred to PcP. I have discussed the diagnosis with the patient and the intended plan as seen in the above orders. The patient has received an after-visit summary and questions were answered concerning future plans. I have discussed medication side effects and warnings with the patient as well. I have reviewed the plan of care with the patient, accepted their input and they are in agreement with the treatment goals. More than 1/2 of this 15 minute visit was spent in counseling and coordination of care, as described above.     ZEESHAN Mccrary  Internist of [address], [address] [place] [address].  Phone: [phone]  Fax: [phone]

## 2019-06-20 NOTE — PROGRESS NOTES
Jean Marie Desai presents today for   Chief Complaint   Patient presents with    Hip Pain     recurrent left hip pain x 3 months- worse the past week              Depression Screening:  3 most recent PHQ Screens 2/13/2019   Little interest or pleasure in doing things Not at all   Feeling down, depressed, irritable, or hopeless Not at all   Total Score PHQ 2 0       Learning Assessment:  Learning Assessment 6/5/2018   PRIMARY LEARNER Patient   HIGHEST LEVEL OF EDUCATION - PRIMARY LEARNER  4 YEARS [facility] PRIMARY LEARNER NONE   CO-LEARNER CAREGIVER No   PRIMARY LANGUAGE ENGLISH   LEARNER PREFERENCE PRIMARY DEMONSTRATION     -   ANSWERED BY patient   RELATIONSHIP SELF       Abuse Screening:  Abuse Screening Questionnaire 6/5/2018   Do you ever feel afraid of your partner? N   Are you in a relationship with someone who physically or mentally threatens you? N   Is it safe for you to go home? Y       Fall Risk  Fall Risk Assessment, last 12 mths 4/1/2014   Able to walk? Yes   Fall in past 12 months? No           Coordination of Care:  1. Have you been to the ER, urgent care clinic since your last visit? Hospitalized since your last visit? no    2. Have you seen or consulted any other health care providers outside of the [address] [place] [address] since your last visit? Include any pap smears or colon screening.  no

## 2019-06-22 ENCOUNTER — HOSPITAL ENCOUNTER (OUTPATIENT)
Dept: LAB | Age: 59
Discharge: HOME | End: 2019-06-22
Payer: COMMERCIAL

## 2019-06-22 ENCOUNTER — HOSPITAL ENCOUNTER (OUTPATIENT)
Dept: GENERAL RADIOLOGY | Age: 59
Discharge: HOME | End: 2019-06-22
Payer: COMMERCIAL

## 2019-06-22 DIAGNOSIS — M25.552 ACUTE HIP PAIN, LEFT: ICD-10-CM

## 2019-06-22 LAB
APPEARANCE UR: CLEAR
BILIRUB UR QL: NEGATIVE
COLOR UR: YELLOW
EPITH CASTS URNS QL MICRO: NORMAL /LPF (ref 0–5)
GLUCOSE UR STRIP.AUTO-MCNC: NEGATIVE MG/DL
HGB UR QL STRIP: NEGATIVE
KETONES UR QL STRIP.AUTO: NEGATIVE MG/DL
LEUKOCYTE ESTERASE UR QL STRIP.AUTO: NEGATIVE
NITRITE UR QL STRIP.AUTO: NEGATIVE
PH UR STRIP: 6 [PH] (ref 5–8)
PROT UR STRIP-MCNC: NEGATIVE MG/DL
SP GR UR REFRACTOMETRY: 1.01 (ref 1–1.03)
UROBILINOGEN UR QL STRIP.AUTO: 0.2 EU/DL (ref 0.2–1)
WBC URNS QL MICRO: NORMAL /HPF (ref 0–4)

## 2019-06-22 PROCEDURE — 73502 X-RAY EXAM HIP UNI 2-3 VIEWS: CPT

## 2019-06-22 PROCEDURE — 81001 URINALYSIS AUTO W/SCOPE: CPT

## 2019-06-22 PROCEDURE — 36415 COLL VENOUS BLD VENIPUNCTURE: CPT

## 2019-06-24 ENCOUNTER — TELEPHONE (OUTPATIENT)
Dept: INTERNAL MEDICINE CLINIC | Age: 59
End: 2019-06-24

## 2019-06-24 DIAGNOSIS — I10 ESSENTIAL HYPERTENSION: Primary | ICD-10-CM

## 2019-06-24 NOTE — TELEPHONE ENCOUNTER
Lab orders are in. Dr. Salomón Bernard [facility] of [address] [place] [address], [address] [facility] [place], [address] Phone: [phone] Fax: [phone]

## 2019-06-25 NOTE — TELEPHONE ENCOUNTER
Attempted to call patient- home phone rang continuously- voicemail never picked up. Attempted to call work phone- unable to reach patient. Will try again later.

## 2019-06-25 NOTE — TELEPHONE ENCOUNTER
----- Message from Ines Lema NP sent at 6/24/2019  5:03 PM EDT ----- Xray was normal. Would she like to see orthopedics for further input?

## 2019-06-26 ENCOUNTER — OFFICE VISIT (OUTPATIENT)
Dept: INTERNAL MEDICINE CLINIC | Age: 59
End: 2019-06-26

## 2019-06-26 VITALS
OXYGEN SATURATION: 97 % | SYSTOLIC BLOOD PRESSURE: 104 MMHG | RESPIRATION RATE: 14 BRPM | WEIGHT: 149 LBS | HEART RATE: 77 BPM | TEMPERATURE: 97.9 F | HEIGHT: 62 IN | DIASTOLIC BLOOD PRESSURE: 68 MMHG | BODY MASS INDEX: 27.42 KG/M2

## 2019-06-26 DIAGNOSIS — M25.552 ACUTE HIP PAIN, LEFT: Primary | ICD-10-CM

## 2019-06-26 NOTE — PROGRESS NOTES
Kalie Quiroz presents today for   Chief Complaint   Patient presents with    Hip Pain     follow up for left hip pain              Depression Screening:  3 most recent PHQ Screens 2/13/2019   Little interest or pleasure in doing things Not at all   Feeling down, depressed, irritable, or hopeless Not at all   Total Score PHQ 2 0       Learning Assessment:  Learning Assessment 6/5/2018   PRIMARY LEARNER Patient   HIGHEST LEVEL OF EDUCATION - PRIMARY LEARNER  4 YEARS [facility] PRIMARY LEARNER NONE   CO-LEARNER CAREGIVER No   PRIMARY LANGUAGE ENGLISH   LEARNER PREFERENCE PRIMARY DEMONSTRATION     -   ANSWERED BY patient   RELATIONSHIP SELF       Abuse Screening:  Abuse Screening Questionnaire 6/5/2018   Do you ever feel afraid of your partner? N   Are you in a relationship with someone who physically or mentally threatens you? N   Is it safe for you to go home? Y       Fall Risk  Fall Risk Assessment, last 12 mths 4/1/2014   Able to walk? Yes   Fall in past 12 months? No           Coordination of Care:  1. Have you been to the ER, urgent care clinic since your last visit? Hospitalized since your last visit? no    2. Have you seen or consulted any other health care providers outside of the [address] [place] [address] since your last visit? Include any pap smears or colon screening.  no

## 2019-06-26 NOTE — PROGRESS NOTES
Lydia Archuleta is a 64 y.o.  female and presents with    Chief Complaint   Patient presents with    Hip Pain     follow up for left hip pain       Subjective:  HPI   Mrs. Michelle Clifford returns today regarding her left hip pain. Reviewed Xray. Mrs. Michelle Clifford presented on 6/20/19 with complaints of acute left hip pain x 3 months that has become worse over the last week. The pain is a continuous ache. Worse with laying on it, resting, sometimes can reproduce the pain. She reports drinks plenty of water and does use the restroom frequently but this is her normal, denies n/v/d/c, fever, chills, CVAt, rashes, heat, swelling, redness, injury, fall, paresthesia, change in bowel and bladder. She has been drinking a tea with Tumeric in it for pain. Additional Concerns: none     ROS   Review of Systems   Musculoskeletal: Positive for joint pain. Negative for falls. All other systems reviewed and are negative. Allergies   Allergen Reactions    Ibuprofen Other (comments)    Nsaids (Non-Steroidal Anti-Inflammatory Drug) Other (comments)    Amoxicillin Unable to Obtain and Other (comments)    Lidocaine Swelling     Oral Solution    Lipitor [Atorvastatin] Myalgia           Lopressor [Metoprolol Tartrate] Other (comments)     Lightheaded and cp    Percocet [Oxycodone-Acetaminophen] Unable to Obtain and Other (comments)    Vicodin [Hydrocodone-Acetaminophen] Unable to Obtain and Other (comments)       Current Outpatient Medications   Medication Sig Dispense Refill    amLODIPine (NORVASC) 2.5 mg tablet TAKE ONE TABLET BY MOUTH DAILY 90 Tab 3    atenolol (TENORMIN) 25 mg tablet TAKE ONE-HALF TO ONE TABLET BY MOUTH DAILY (DO NOT EXCEED ONE TABLET PER DAY) 30 Tab 2    spironolactone (ALDACTONE) 25 mg tablet Take 1 Tab by mouth daily. 90 Tab 3    raNITIdine (ZANTAC) 150 mg tablet Take 1 Tab by mouth two (2) times a day.  60 Tab 0    pravastatin (PRAVACHOL) 80 mg tablet TAKE ONE TABLET BY MOUTH EVERY NIGHT AT BEDTIME 30 Tab 5    loratadine (CLARITIN) 10 mg tablet Take 1 Tab by mouth daily. Indications: Allergic Rhinitis, Urticaria (Patient taking differently: Take 10 mg by mouth after meals as needed. Indications: inflammation of the nose due to an allergy, Hives) 90 Tab 3    hydrocortisone (CORTAID) 1 % topical cream Apply  to affected area two (2) times a day. use thin layer (Patient taking differently: Apply  to affected area as needed. use thin layer) 30 g 1    fluticasone (FLONASE) 50 mcg/actuation nasal spray 2 Sprays by Both Nostrils route daily. Indications: Allergic Rhinitis 1 Bottle 11    potassium chloride (KAON 10%) 20 mEq/15 mL solution Patient states she take 1 tablespoon daily      glucose blood VI test strips (BLOOD GLUCOSE TEST) strip Use to check glucose daily 1 Package 11    co-enzyme Q-10 (CO Q-10) 100 mg capsule Take 200 mg by mouth daily.  GARLIC PO Take 2 Caps by mouth daily.  aspirin delayed-release (ECOTRIN LOW STRENGTH) 81 mg tablet Take 81 mg by mouth daily.  omega-3 fatty acids-vitamin e (FISH OIL) 1,000 mg cap Take 2 Caps by mouth daily.  melatonin 3 mg tablet Take 3 mg by mouth nightly as needed.  Dexlansoprazole (DEXILANT) 60 mg CpDM Take 1 Cap by mouth every Monday, Wednesday, Friday.  sucralfate (CARAFATE) 1 gram tablet Take 1 g by mouth daily as needed.  MULTIVITAMIN W-MINERALS/LUTEIN (CENTRUM SILVER PO) Take 1 Tab by mouth daily.          Social History     Socioeconomic History    Marital status: SINGLE     Spouse name: Not on file    Number of children: Not on file    Years of education: Not on file    Highest education level: Not on file   Occupational History    Occupation: student IT      Employer: NOT EMPLOYED   Social Needs    Financial resource strain: Not on file    Food insecurity:     Worry: Not on file     Inability: Not on file    Transportation needs:     Medical: Not on file     Non-medical: Not on file   Tobacco Use    Smoking status: Former Smoker     Packs/day: 0.50     Years: 11.00     Pack years: 5.50     Types: Cigarettes     Last attempt to quit: 2006     Years since quittin.4    Smokeless tobacco: Former User     Quit date: 1/10/1993    Tobacco comment: 6-7 cigs per day   Substance and Sexual Activity    Alcohol use: Yes     Alcohol/week: 0.0 oz     Comment: rare/during Holiday Time    Drug use: No     Types: Prescription, OTC    Sexual activity: Never   Lifestyle    Physical activity:     Days per week: Not on file     Minutes per session: Not on file    Stress: Not on file   Relationships    Social connections:     Talks on phone: Not on file     Gets together: Not on file     Attends [employer] service: Not on file     Active member of club or organization: Not on file     Attends meetings of clubs or organizations: Not on file     Relationship status: Not on file    Intimate partner violence:     Fear of current or ex partner: Not on file     Emotionally abused: Not on file     Physically abused: Not on file     Forced sexual activity: Not on file   Other Topics Concern    Not on file   Social History Narrative    Not Currently working, 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 Past Medical History:   Diagnosis Date    Allergic rhinitis     Cardiac Agatston CAC score, <100 2014    Coronary calcium score 0.    Cardiac Holter monitoring 2017    Sinus rhythm, avg HR 73 bpm (range ). Benign Holter study.  Cardiac nuclear imaging test 2013    No convincing evidence for ischemia or infarction in the setting of significant chest wall artifact. EF 62%. No RWMA. Neg EKG on max EST. Ex time 9 min 50 sec.  Cardiac stress echo, normal 2016    Normal maximal stress echo w/reproduction of atypical chest discomfort. Ex time 11 min 3 sec. EF 55%.  Cardiovascular LLE venous duplex 2016    Left leg:  No DVT.     Chest pain     GERD (gastroesophageal reflux disease)  Heel pain, bilateral     Hiatal hernia     denies this    HTN (hypertension)     Hyperlipidemia     Insulin resistance     follows with endocrinology for this    Night sweats     PVC (premature ventricular contraction)     Right low back pain     S/P colonoscopy 2009, 2014    normal per patient    Unsteady gait     due to heel pain       Past Surgical History:   Procedure Laterality Date    D/C SUCTION  2011    HX OTHER SURGICAL  2014    colonscopy    HX OTHER SURGICAL      endoscopy    HX OTHER SURGICAL      wisdom teeth removal x1       Family History   Problem Relation Age of Onset    Hypertension Mother     Heart defect Mother         anuerysm    Heart Attack Mother     Asthma Father     Heart Attack Maternal Uncle     Heart Attack Maternal Uncle     Heart Disease Brother     Hypertension Brother        Objective:  Vitals:    06/26/19 0827   BP: 104/68   Pulse: 77   Resp: 14   Temp: 97.9 °F (36.6 °C)   TempSrc: Oral   SpO2: 97%   Weight: 149 lb (67.6 kg)   Height: 5' 2\" (1.575 m)   PainSc:   8   PainLoc: Hip       LABS   Results for orders placed or performed during the hospital encounter of 06/22/19   URINALYSIS W/MICROSCOPIC   Result Value Ref Range    Color YELLOW      Appearance CLEAR      Specific gravity 1.009 1.005 - 1.030      pH (UA) 6.0 5.0 - 8.0      Protein NEGATIVE  NEG mg/dL    Glucose NEGATIVE  NEG mg/dL    Ketone NEGATIVE  NEG mg/dL    Bilirubin NEGATIVE  NEG      Blood NEGATIVE  NEG      Urobilinogen 0.2 0.2 - 1.0 EU/dL    Nitrites NEGATIVE  NEG      Leukocyte Esterase NEGATIVE  NEG      WBC 0 to 1 0 - 4 /hpf    Epithelial cells FEW 0 - 5 /lpf       TESTS  none    PE  Physical Exam   Constitutional: She is oriented to person, place, and time. She appears well-developed and well-nourished. No distress. HENT:   Head: Normocephalic and atraumatic. Musculoskeletal: Normal range of motion. She exhibits no edema, tenderness or deformity.    Neurological: She is alert and oriented to person, place, and time. She has normal reflexes. No cranial nerve deficit. Coordination normal.   Skin: Skin is warm and dry. No rash noted. She is not diaphoretic. No erythema. Psychiatric: She has a normal mood and affect. Her behavior is normal. Judgment and thought content normal.   Vitals reviewed. Assessment/Plan:    1. Acute left hip pain- seems arthritic in nature. Xray ordered-,reviewed today negative for acute process. Tylenol for pain PRN. Ref PT and possibly ortho in future. Lab review: labs ordered. Today's Visit:   Diagnoses and all orders for this visit:    1. Acute hip pain, left  -     REFERRAL TO PHYSICAL THERAPY      Health Maintenance: Deferred to PcP. I have discussed the diagnosis with the patient and the intended plan as seen in the above orders. The patient has received an after-visit summary and questions were answered concerning future plans. I have discussed medication side effects and warnings with the patient as well. I have reviewed the plan of care with the patient, accepted their input and they are in agreement with the treatment goals. More than 1/2 of this 15 minute visit was spent in counseling and coordination of care, as described above.     ZEESHAN Lopez  Internist of [address], [facility] [address] [address]  Phone: [phone]  Fax: [phone]

## 2019-08-03 ENCOUNTER — HOSPITAL ENCOUNTER (OUTPATIENT)
Dept: LAB | Age: 59
Discharge: HOME | End: 2019-08-03
Payer: COMMERCIAL

## 2019-08-03 DIAGNOSIS — I10 ESSENTIAL HYPERTENSION: ICD-10-CM

## 2019-08-03 LAB
ALBUMIN SERPL-MCNC: 3.9 G/DL (ref 3.4–5)
ALBUMIN/GLOB SERPL: 1.2 {RATIO} (ref 0.8–1.7)
ALP SERPL-CCNC: 44 U/L (ref 45–117)
ALT SERPL-CCNC: 24 U/L (ref 13–56)
ANION GAP SERPL CALC-SCNC: 3 MMOL/L (ref 3–18)
AST SERPL-CCNC: 21 U/L (ref 10–38)
BILIRUB SERPL-MCNC: 0.6 MG/DL (ref 0.2–1)
BUN SERPL-MCNC: 18 MG/DL (ref 7–18)
BUN/CREAT SERPL: 16 (ref 12–20)
CALCIUM SERPL-MCNC: 9.1 MG/DL (ref 8.5–10.1)
CHLORIDE SERPL-SCNC: 105 MMOL/L (ref 100–111)
CHOLEST SERPL-MCNC: 177 MG/DL
CO2 SERPL-SCNC: 32 MMOL/L (ref 21–32)
CREAT SERPL-MCNC: 1.1 MG/DL (ref 0.6–1.3)
CREAT UR-MCNC: 13 MG/DL (ref 30–125)
GLOBULIN SER CALC-MCNC: 3.2 G/DL (ref 2–4)
GLUCOSE SERPL-MCNC: 85 MG/DL (ref 74–99)
HDLC SERPL-MCNC: 87 MG/DL (ref 40–60)
HDLC SERPL: 2 {RATIO} (ref 0–5)
LDLC SERPL CALC-MCNC: 82.8 MG/DL (ref 0–100)
LIPID PROFILE,FLP: ABNORMAL
MICROALBUMIN UR-MCNC: <0.5 MG/DL (ref 0–3)
MICROALBUMIN/CREAT UR-RTO: ABNORMAL MG/G (ref 0–30)
POTASSIUM SERPL-SCNC: 3.9 MMOL/L (ref 3.5–5.5)
PROT SERPL-MCNC: 7.1 G/DL (ref 6.4–8.2)
SODIUM SERPL-SCNC: 140 MMOL/L (ref 136–145)
TRIGL SERPL-MCNC: 36 MG/DL (ref ?–150)
VLDLC SERPL CALC-MCNC: 7.2 MG/DL

## 2019-08-03 PROCEDURE — 80061 LIPID PANEL: CPT

## 2019-08-03 PROCEDURE — 82043 UR ALBUMIN QUANTITATIVE: CPT

## 2019-08-03 PROCEDURE — 36415 COLL VENOUS BLD VENIPUNCTURE: CPT

## 2019-08-03 PROCEDURE — 80053 COMPREHEN METABOLIC PANEL: CPT

## 2019-08-04 NOTE — PROGRESS NOTES
Her most recent labs showed normal kidney and liver function. Her total cholesterol is 177. Her triglycerides are 36. Her HDL is 87. Her LDL is 82. No additional studies are warranted for these findings. No medication changes are warranted. I will discuss these results at her follow-up appointment.     Dr. Nelli Kelsey  Internists of [address] [place] [address], [address] [place] [address], [facility] [address]  Phone: [phone]  Fax: [phone]

## 2019-08-14 ENCOUNTER — OFFICE VISIT (OUTPATIENT)
Dept: CARDIOLOGY CLINIC | Age: 59
End: 2019-08-14

## 2019-08-14 VITALS
OXYGEN SATURATION: 98 % | BODY MASS INDEX: 27.6 KG/M2 | SYSTOLIC BLOOD PRESSURE: 120 MMHG | WEIGHT: 150 LBS | DIASTOLIC BLOOD PRESSURE: 82 MMHG | HEART RATE: 72 BPM | HEIGHT: 62 IN

## 2019-08-14 DIAGNOSIS — R07.9 CHEST PAIN, UNSPECIFIED TYPE: ICD-10-CM

## 2019-08-14 DIAGNOSIS — I11.9 BENIGN HYPERTENSIVE HEART DISEASE WITHOUT HEART FAILURE: ICD-10-CM

## 2019-08-14 DIAGNOSIS — E78.5 HYPERLIPIDEMIA, UNSPECIFIED HYPERLIPIDEMIA TYPE: ICD-10-CM

## 2019-08-14 DIAGNOSIS — R00.2 PALPITATIONS: Primary | ICD-10-CM

## 2019-08-14 NOTE — PROGRESS NOTES
Kayden Qiu presents today for   Chief Complaint   Patient presents with    Cholesterol Problem     6 month follow up    Irregular Heart Beat       Kayden Qiu preferred language for health care discussion is english/other. Is someone accompanying this pt? No     Is the patient using any DME equipment during OV? No     Depression Screening:  3 most recent PHQ Screens 8/14/2019   Little interest or pleasure in doing things Not at all   Feeling down, depressed, irritable, or hopeless Not at all   Total Score PHQ 2 0       Learning Assessment:  Learning Assessment 6/5/2018   PRIMARY LEARNER Patient   HIGHEST LEVEL OF EDUCATION - PRIMARY LEARNER  [facility] PRIMARY LEARN[facility] NONE   CO-LEARNER CAREGIVER No   PRIMARY LANGUAGE ENGLISH   LEARNER PREFERENCE PRIMARY DEMONSTRATION     -   ANSWERED BY patient   RELATIONSHIP SELF       Abuse Screening:  Abuse Screening Questionnaire 6/5/2018   Do you ever feel afraid of your partner? N   Are you in a relationship with someone who physically or mentally threatens you? N   Is it safe for you to go home? Y       Fall Risk  Fall Risk Assessment, last 12 mths 4/1/2014   Able to walk? Yes   Fall in past 12 months? No       Pt currently taking Anticoagulant therapy? no    Coordination of Care:  1. Have you been to the ER, urgent care clinic since your last visit? Hospitalized since your last visit? no    2. Have you seen or consulted any other health care providers outside of the [address] [place] [address] since your last visit? Include any pap smears or colon screening.  no

## 2019-08-14 NOTE — PROGRESS NOTES
HPI:. I saw Marcy Avery in my office today in cardiovascular evaluation regarding her problems with palpitations. Ms. Shaun Castro is a very pleasant, but anxious 61-year-old -American female with history of non-anginal sounding chest pain and palpitations over the years. She has had Holter's in the past which have shown no significant ectopy except for some rare PAC's, but due to symptoms of palpitations she has been on atenolol 25 mg daily or as needed and Norvasc 5 mg along with Aldactone 25 mg daily for blood pressure. She comes in today and relates that she still having some intermittent chest tightness with walking and occasional palpitations, but these are occurring infrequently and she really denies any other cardiovascular complaints. Encounter Diagnoses   Name Primary?  Chest pain, unspecified type     Hypertensive heart disease      Hyperlipidemia, unspecified hyperlipidemia type     Palpitations Yes       Discussion: This lady appears to be doing quite well from a cardiovascular vantage and I have no recommendations for change at this time. Her chest discomforts do not sound anginal so I am not going to do any cardiovascular testing at this time. Certainly if her chest discomforts become more severe I will need to see her and we may need to do another stress echo or stress myocardial perfusion study. I am going to get another lipid profile on her, but historically her lipids have done well on Pravachol 80 mg daily with total cholesterols in the 170s, HDLs in the high 80s and LDLs in the low 80s does think is reasonable control in view of her minimal risk factors. Her blood pressure is adequately controlled today and her EKG is stable except for 1 PAC and historically she has had only rare PACs and PVCs on Holter monitoring study and I do not feel any work-up in that regard is needed at this time and observing a plan to see her again in several months.     PCP: Nedra Rahman, MD       Past Medical History:   Diagnosis Date    Allergic rhinitis     Cardiac Agatston CAC score, <100 04/30/2014    Coronary calcium score 0.    Cardiac Holter monitoring 01/25/2017    Sinus rhythm, avg HR 73 bpm (range ). Benign Holter study.  Cardiac nuclear imaging test 01/11/2013    No convincing evidence for ischemia or infarction in the setting of significant chest wall artifact. EF 62%. No RWMA. Neg EKG on max EST. Ex time 9 min 50 sec.  Cardiac stress echo, normal 05/05/2016    Normal maximal stress echo w/reproduction of atypical chest discomfort. Ex time 11 min 3 sec. EF 55%.  Cardiovascular LLE venous duplex 09/28/2016    Left leg:  No DVT.  Chest pain     GERD (gastroesophageal reflux disease)     Heel pain, bilateral     Hiatal hernia     denies this    HTN (hypertension)     Hyperlipidemia     Insulin resistance     follows with endocrinology for this    Night sweats     PVC (premature ventricular contraction)     Right low back pain     S/P colonoscopy 2009, 2014    normal per patient    Unsteady gait     due to heel pain         Past Surgical History:   Procedure Laterality Date    D/C SUCTION  2011    HX OTHER SURGICAL  2014    colonscopy    HX OTHER SURGICAL      endoscopy    HX OTHER SURGICAL      wisdom teeth removal x1     Current Outpatient Medications   Medication Sig    amLODIPine (NORVASC) 2.5 mg tablet TAKE ONE TABLET BY MOUTH DAILY    atenolol (TENORMIN) 25 mg tablet TAKE ONE-HALF TO ONE TABLET BY MOUTH DAILY (DO NOT EXCEED ONE TABLET PER DAY)    spironolactone (ALDACTONE) 25 mg tablet Take 1 Tab by mouth daily.  pravastatin (PRAVACHOL) 80 mg tablet TAKE ONE TABLET BY MOUTH EVERY NIGHT AT BEDTIME    hydrocortisone (CORTAID) 1 % topical cream Apply  to affected area two (2) times a day. use thin layer (Patient taking differently: Apply  to affected area as needed.  use thin layer)    fluticasone (FLONASE) 50 mcg/actuation nasal spray 2 Sprays by Both Nostrils route daily. Indications: Allergic Rhinitis    potassium chloride (KAON 10%) 20 mEq/15 mL solution Patient states she take 1 tablespoon daily    co-enzyme Q-10 (CO Q-10) 100 mg capsule Take 200 mg by mouth daily.  GARLIC PO Take 2 Caps by mouth daily.  aspirin delayed-release (ECOTRIN LOW STRENGTH) 81 mg tablet Take 81 mg by mouth daily.  omega-3 fatty acids-vitamin e (FISH OIL) 1,000 mg cap Take 2 Caps by mouth daily.  melatonin 3 mg tablet Take 3 mg by mouth nightly as needed.  Dexlansoprazole (DEXILANT) 60 mg CpDM Take 1 Cap by mouth every Monday, Wednesday, Friday.  sucralfate (CARAFATE) 1 gram tablet Take 1 g by mouth daily as needed.  MULTIVITAMIN W-MINERALS/LUTEIN (CENTRUM SILVER PO) Take 1 Tab by mouth daily.  glucose blood VI test strips (BLOOD GLUCOSE TEST) strip Use to check glucose daily     No current facility-administered medications for this visit. Allergies   Allergen Reactions    Ibuprofen Other (comments)    Nsaids (Non-Steroidal Anti-Inflammatory Drug) Other (comments)    Amoxicillin Unable to Obtain and Other (comments)    Lidocaine Swelling     Oral Solution    Lipitor [Atorvastatin] Myalgia           Lopressor [Metoprolol Tartrate] Other (comments)     Lightheaded and cp    Percocet [Oxycodone-Acetaminophen] Unable to Obtain and Other (comments)    Vicodin [Hydrocodone-Acetaminophen] Unable to Obtain and Other (comments)         Social History :  Social History     Tobacco Use    Smoking status: Former Smoker     Packs/day: 0.50     Years: 11.00     Pack years: 5.50     Types: Cigarettes     Last attempt to quit: 2006     Years since quittin.6    Smokeless tobacco: Former User     Quit date: 1/10/1993    Tobacco comment: 6-7 cigs per day   Substance Use Topics    Alcohol use:  Yes     Alcohol/week: 0.0 standard drinks     Comment: rare/during Holiday Time        Family History: family history includes Asthma in her father; Heart Attack in her maternal uncle, maternal uncle, and mother; Heart Disease in her brother; Heart defect in her mother; Hypertension in her brother and mother. Review of Systems:  Constitutional: Negative. Respiratory: Positive for cough. Negative for hemoptysis, shortness of breath and wheezing. Cardiovascular: Positive for palpitations. Negative for chest pain, orthopnea and leg swelling. Gastrointestinal: Positive for abdominal pain and heartburn. Negative for blood in stool, constipation, diarrhea, melena, nausea and vomiting. Musculoskeletal: Positive for joint pain and myalgias. Negative for falls and neck pain. Neurological: Negative for dizziness. Physical Exam:    The patient is a cooperative, alert, well developed, well nourished 64 y.o.  female who is in no acute distress at the time of the examination. Visit Vitals  /82   Pulse 72   Ht 5' 2\" (1.575 m)   Wt 68 kg (150 lb)   SpO2 98%   BMI 27.44 kg/m²     HEENT: Conjuctiva white, mucosa moist, no pallor or cyanosis. NECK: Supple without masses, tenderness or thyromegaly. There was no jugular venous distention. Carotid are full bilaterally without bruits. CHEST: Symmetrical with good excursion. LUNGS: Clear to auscultation in all fields. HEART: The apex is not displaced. There were no lifts, thrills or heaves. There is a normal S1 and S2 without appreciable murmurs rubs clicks or gallops auscultated. ABDOMEN: Soft without masses or tenderness. The liver is palpated 2-3 finger breadths below the right costal margin. EXTREMITIES: Full peripheral pulses without peripheral edema.     Review of Data: See PMH and Cardiology and Imaging sections for cardiac testing  Lab Results   Component Value Date/Time    Cholesterol, total 177 08/03/2019 11:49 AM    HDL Cholesterol 87 (H) 08/03/2019 11:49 AM    LDL, calculated 82.8 08/03/2019 11:49 AM    Triglyceride 36 08/03/2019 11:49 AM    CHOL/HDL Ratio 2.0 08/03/2019 11:49 AM         Results for orders placed or performed in visit on 08/14/19   AMB POC EKG ROUTINE W/ 12 LEADS, INTER & REP     Status: None    Narrative    Normal sinus rhythm rate 68. There is 1 PAC. Tracing is otherwise within normal limits and similar to the EKG of February 13, 2019 although the [facility] is new. Gary Graves D.O., F.A.C.C. [facility]  [facility]  [address] [place] [address]. [address] [place]    PLEASE NOTE:  This document has been produced using voice recognition software. Unrecognized errors in transcription may be present.

## 2019-08-15 ENCOUNTER — OFFICE VISIT (OUTPATIENT)
Dept: INTERNAL MEDICINE CLINIC | Age: 59
End: 2019-08-15

## 2019-08-15 ENCOUNTER — DOCUMENTATION ONLY (OUTPATIENT)
Dept: INTERNAL MEDICINE CLINIC | Age: 59
End: 2019-08-15

## 2019-08-15 VITALS
OXYGEN SATURATION: 98 % | HEIGHT: 62 IN | TEMPERATURE: 98 F | DIASTOLIC BLOOD PRESSURE: 80 MMHG | SYSTOLIC BLOOD PRESSURE: 134 MMHG | RESPIRATION RATE: 12 BRPM | WEIGHT: 150.8 LBS | HEART RATE: 63 BPM | BODY MASS INDEX: 27.75 KG/M2

## 2019-08-15 DIAGNOSIS — E78.5 HYPERLIPIDEMIA, UNSPECIFIED HYPERLIPIDEMIA TYPE: ICD-10-CM

## 2019-08-15 DIAGNOSIS — I10 ESSENTIAL HYPERTENSION: ICD-10-CM

## 2019-08-15 DIAGNOSIS — Z12.31 ENCOUNTER FOR SCREENING MAMMOGRAM FOR BREAST CANCER: ICD-10-CM

## 2019-08-15 DIAGNOSIS — R10.30 LOWER ABDOMINAL PAIN: Primary | ICD-10-CM

## 2019-08-15 PROBLEM — R00.0 TACHYCARDIA: Status: RESOLVED | Noted: 2017-04-12 | Resolved: 2019-08-15

## 2019-08-15 PROBLEM — I49.3 PVC (PREMATURE VENTRICULAR CONTRACTION): Status: RESOLVED | Noted: 2017-04-12 | Resolved: 2019-08-15

## 2019-08-15 PROBLEM — G90.9 AUTONOMIC DYSFUNCTION: Status: RESOLVED | Noted: 2017-04-12 | Resolved: 2019-08-15

## 2019-08-15 NOTE — PROGRESS NOTES
INTERNISTS OF [facility]:  8/15/2019, MRN: [id number]      Lizeth Joyner is a 64 y.o. female and presents to clinic for Hypertension (follow up ROOM 4) and Labs (8-03-19 )    Subjective:   Pt is a 60yo AAF with h/o chronic bronchitis, LUCY, porphyria (per EHR), allergic rhinitis, multiple food allergies (per lab work), HTN, HLD, constipation, gallbladder polyps (suggested by CT scan 10/13/16), and GERD.     1.  Hypertension: Present for over 6 months. On amlodipine, atenolol, spironolactone, and potassium. Followed by Rupa Birmingham who she saw earlier this year. Although the patient has intermittent chest pain, is not thought to be cardiac in nature. No shortness of breath. BP is 134/80. 2. HLD: Present for over 6 months. Her most recent labs show: Normal kidney and liver function. Her total cholesterol is 177. Her triglycerides are 36. Her HDL is 87. Her LDL is 82. No adverse side effects of taking pravastatin. No refills are needed. 3. Health Maintenance:  - Colonoscopy: Overdue per our records but up-to-date per patient history. Followed by [facility]. - Mammogram: Overdue per our records    4. Abdominal Pain: She reports pain along her lower abdomen x 4 months. Pain is sharp and off/on. It can lasts a couple of hours. No alleviating factors are known. No urinary sx. No rectal bleeding. No N/V. Pain occurs on avg 2-3 times per wk. She is scheduled with her GYN team later this year.         Patient Active Problem List    Diagnosis Date Noted    Allergic rhinitis 10/04/2017    Autonomic dysfunction 04/12/2017    PVC (premature ventricular contraction) 04/12/2017    Tachycardia 04/12/2017    Gallbladder polyp - suggested by findings on CT scan from 10/13/16 03/01/2017    Hypersomnia with sleep apnea 03/13/2014    Hypertensive heart disease  03/05/2012    Constipation 01/16/2012    Essential hypertension     Hyperlipidemia     GERD (gastroesophageal reflux disease)        Current Outpatient Medications   Medication Sig Dispense Refill    amLODIPine (NORVASC) 2.5 mg tablet TAKE ONE TABLET BY MOUTH DAILY 90 Tab 3    atenolol (TENORMIN) 25 mg tablet TAKE ONE-HALF TO ONE TABLET BY MOUTH DAILY (DO NOT EXCEED ONE TABLET PER DAY) 30 Tab 2    spironolactone (ALDACTONE) 25 mg tablet Take 1 Tab by mouth daily. 90 Tab 3    pravastatin (PRAVACHOL) 80 mg tablet TAKE ONE TABLET BY MOUTH EVERY NIGHT AT BEDTIME 30 Tab 5    fluticasone (FLONASE) 50 mcg/actuation nasal spray 2 Sprays by Both Nostrils route daily. Indications: Allergic Rhinitis 1 Bottle 11    potassium chloride (KAON 10%) 20 mEq/15 mL solution Patient states she take 1 tablespoon daily      glucose blood VI test strips (BLOOD GLUCOSE TEST) strip Use to check glucose daily 1 Package 11    co-enzyme Q-10 (CO Q-10) 100 mg capsule Take 200 mg by mouth daily.  GARLIC PO Take 2 Caps by mouth daily.  aspirin delayed-release (ECOTRIN LOW STRENGTH) 81 mg tablet Take 81 mg by mouth daily.  omega-3 fatty acids-vitamin e (FISH OIL) 1,000 mg cap Take 2 Caps by mouth daily.  melatonin 3 mg tablet Take 3 mg by mouth nightly as needed.  Dexlansoprazole (DEXILANT) 60 mg CpDM Take 1 Cap by mouth every Monday, Wednesday, Friday.  sucralfate (CARAFATE) 1 gram tablet Take 1 g by mouth daily as needed.  MULTIVITAMIN W-MINERALS/LUTEIN (CENTRUM SILVER PO) Take 1 Tab by mouth daily.          Allergies   Allergen Reactions    Ibuprofen Other (comments)    Nsaids (Non-Steroidal Anti-Inflammatory Drug) Other (comments)    Amoxicillin Unable to Obtain and Other (comments)    Lidocaine Swelling     Oral Solution    Lipitor [Atorvastatin] Myalgia           Lopressor [Metoprolol Tartrate] Other (comments)     Lightheaded and cp    Percocet [Oxycodone-Acetaminophen] Unable to Obtain and Other (comments)    Vicodin [Hydrocodone-Acetaminophen] Unable to Obtain and Other (comments)       Past Medical History:   Diagnosis Date    Allergic rhinitis     Cardiac Agatston CAC score, <100 2014    Coronary calcium score 0.    Cardiac Holter monitoring 2017    Sinus rhythm, avg HR 73 bpm (range ). Benign Holter study.  Cardiac nuclear imaging test 2013    No convincing evidence for ischemia or infarction in the setting of significant chest wall artifact. EF 62%. No RWMA. Neg EKG on max EST. Ex time 9 min 50 sec.  Cardiac stress echo, normal 2016    Normal maximal stress echo w/reproduction of atypical chest discomfort. Ex time 11 min 3 sec. EF 55%.  Cardiovascular LLE venous duplex 2016    Left leg:  No DVT.  Chest pain     GERD (gastroesophageal reflux disease)     Heel pain, bilateral     Hiatal hernia     denies this    HTN (hypertension)     Hyperlipidemia     Insulin resistance     follows with endocrinology for this    Night sweats     PVC (premature ventricular contraction)     Right low back pain     S/P colonoscopy ,     normal per patient    Unsteady gait     due to heel pain       Past Surgical History:   Procedure Laterality Date    D/C SUCTION      HX OTHER SURGICAL      colonscopy    HX OTHER SURGICAL      endoscopy    HX OTHER SURGICAL      wisdom teeth removal x1       Family History   Problem Relation Age of Onset    Hypertension Mother     Heart defect Mother         anuerysm    Heart Attack Mother     Asthma Father     Heart Attack Maternal Uncle     Heart Attack Maternal Uncle     Heart Disease Brother     Hypertension Brother        Social History     Tobacco Use    Smoking status: Former Smoker     Packs/day: 0.50     Years: 11.00     Pack years: 5.50     Types: Cigarettes     Last attempt to quit: 2006     Years since quittin.6    Smokeless tobacco: Former User     Quit date: 1/10/1993    Tobacco comment: 6-7 cigs per day   Substance Use Topics    Alcohol use:  Yes     Alcohol/week: 0.0 standard drinks     Comment: 1-2 glasses wine 3-4 times a week       ROS   Review of Systems   Constitutional: Negative for chills and fever. HENT: Negative for ear pain and sore throat. Eyes: Negative for blurred vision and pain. Respiratory: Negative for cough and shortness of breath. Gastrointestinal: Positive for abdominal pain (lower abdominal pain). Negative for blood in stool, melena, nausea and vomiting. Genitourinary: Negative for dysuria and hematuria. Musculoskeletal: Positive for back pain (left lower back pain, scheduled to start PT, pain does not radiate). Negative for joint pain and myalgias. Skin: Negative for rash. Neurological: Negative for tingling, focal weakness and headaches. Endo/Heme/Allergies: Does not bruise/bleed easily. Psychiatric/Behavioral: Negative for substance abuse. Objective     Vitals:    08/15/19 1251   BP: 134/80   Pulse: 63   Resp: 12   Temp: 98 °F (36.7 °C)   TempSrc: Oral   SpO2: 98%   Weight: 150 lb 12.8 oz (68.4 kg)   Height: 5' 2\" (1.575 m)   PainSc:   0 - No pain       Physical Exam   Constitutional: She is oriented to person, place, and time and well-developed, well-nourished, and in no distress. HENT:   Head: Normocephalic and atraumatic. Right Ear: External ear normal.   Left Ear: External ear normal.   Nose: Nose normal.   Mouth/Throat: Oropharynx is clear and moist. No oropharyngeal exudate. Eyes: Pupils are equal, round, and reactive to light. Conjunctivae and EOM are normal. Right eye exhibits no discharge. Left eye exhibits no discharge. No scleral icterus. Neck: Neck supple. Cardiovascular: Normal rate, regular rhythm, normal heart sounds and intact distal pulses. Exam reveals no gallop and no friction rub. No murmur heard. Pulmonary/Chest: Effort normal and breath sounds normal. No respiratory distress. She has no wheezes. She has no rales. Abdominal: Soft. Bowel sounds are normal. She exhibits no distension.  There is tenderness (mildly TTP along her suprapubic area). There is no rebound and no guarding. Musculoskeletal: She exhibits no edema or tenderness (BUE). Lymphadenopathy:     She has no cervical adenopathy. Neurological: She is alert and oriented to person, place, and time. She exhibits normal muscle tone. Gait normal.   Skin: Skin is warm and dry. No erythema. Psychiatric: Affect normal.   Nursing note and vitals reviewed. LABS   Data Review:   Lab Results   Component Value Date/Time    WBC 4.9 02/11/2019 02:26 PM    Hemoglobin, POC 12.2 01/09/2013 08:19 PM    HGB 13.7 02/11/2019 02:26 PM    Hematocrit, POC 36 01/09/2013 08:19 PM    HCT 41.2 02/11/2019 02:26 PM    PLATELET 639 03/21/2047 02:26 PM    MCV 88.4 02/11/2019 02:26 PM       Lab Results   Component Value Date/Time    Sodium 140 08/03/2019 11:49 AM    Potassium 3.9 08/03/2019 11:49 AM    Chloride 105 08/03/2019 11:49 AM    CO2 32 08/03/2019 11:49 AM    Anion gap 3 08/03/2019 11:49 AM    Glucose 85 08/03/2019 11:49 AM    BUN 18 08/03/2019 11:49 AM    Creatinine 1.10 08/03/2019 11:49 AM    BUN/Creatinine ratio 16 08/03/2019 11:49 AM    GFR est AA >60 08/03/2019 11:49 AM    GFR est non-AA 51 (L) 08/03/2019 11:49 AM    Calcium 9.1 08/03/2019 11:49 AM       Lab Results   Component Value Date/Time    Cholesterol, total 177 08/03/2019 11:49 AM    HDL Cholesterol 87 (H) 08/03/2019 11:49 AM    LDL, calculated 82.8 08/03/2019 11:49 AM    VLDL, calculated 7.2 08/03/2019 11:49 AM    Triglyceride 36 08/03/2019 11:49 AM    CHOL/HDL Ratio 2.0 08/03/2019 11:49 AM       Lab Results   Component Value Date/Time    Hemoglobin A1c 5.5 02/11/2019 02:26 PM    Hemoglobin A1c (POC) 5.6 09/19/2013 02:30 PM    Hemoglobin A1c, External 5.8 10/08/2015       Assessment/Plan:   1. Lower Abdominal Pain: Etiology is unknown. Her most recent labs are unrevealing. Ordering an abdominal ultrasound. I encouraged her to follow-up with GI and her GYN team for additional studies.   Requesting her last colonoscopy. ORDERS:  - US ABD COMP; Future    2. Health Maintenance:  Ordering a mammogram to screen for breast cancer. ORDERS:  - [facility] MAMMO BI SCREENING INCL CAD; Future    3. Hypertension: Stable. Continue with Rx as prescribed. Return to clinic for BP check. 4. HLD: Stable. Continue with Rx as prescribed. I encouraged her to maintain a low-carb Mediterranean type diet. We discussed the importance of weight reduction by exercising and eating healthy. I will recheck her weight at her follow-up appointment. Health Maintenance Due   Topic Date Due    Shingrix Vaccine Age 49> (1 of 2) 04/05/2012    BREAST CANCER SCRN MAMMOGRAM  07/11/2019    COLONOSCOPY  11/14/2019     Lab review: labs are reviewed in the EHR     I have discussed the diagnosis with the patient and the intended plan as seen in the above orders. The patient has received an after-visit summary and questions were answered concerning future plans. I have discussed medication side effects and warnings with the patient as well. I have reviewed the plan of care with the patient, accepted their input and they are in agreement with the treatment goals. All questions were answered. The patient understands the plan of care. Handouts provided today with above information. Pt instructed if symptoms worsen to call the office or report to the ED for continued care. Greater than 50% of the visit time was spent in counseling and/or coordination of care. Voice recognition was used to generate this report, which may have resulted in some phonetic based errors in grammar and contents. Even though attempts were made to correct all the mistakes, some may have been missed, and remained in the body of the document. Follow-up and Dispositions    · Return in about 6 months (around 2/15/2020) for BP check.          Yo Madera MD

## 2019-08-15 NOTE — PATIENT INSTRUCTIONS
Health Maintenance Due Topic Date Due  Shingrix Vaccine Age 50> (1 of 2) 04/05/2012  BREAST CANCER SCRN MAMMOGRAM  07/11/2019  COLONOSCOPY  11/14/2019 Abdominal Pain: Care Instructions Your Care Instructions Abdominal pain has many possible causes. Some aren't serious and get better on their own in a few days. Others need more testing and treatment. If your pain continues or gets worse, you need to be rechecked and may need more tests to find out what is wrong. You may need surgery to correct the problem. Don't ignore new symptoms, such as fever, nausea and vomiting, urination problems, pain that gets worse, and dizziness. These may be signs of a more serious problem. Your doctor may have recommended a follow-up visit in the next 8 to 12 hours. If you are not getting better, you may need more tests or treatment. The doctor has checked you carefully, but problems can develop later. If you notice any problems or new symptoms, get medical treatment right away. Follow-up care is a key part of your treatment and safety. Be sure to make and go to all appointments, and call your doctor if you are having problems. It's also a good idea to know your test results and keep a list of the medicines you take. How can you care for yourself at home? · Rest until you feel better. · To prevent dehydration, drink plenty of fluids, enough so that your urine is light yellow or clear like water. Choose water and other caffeine-free clear liquids until you feel better. If you have kidney, heart, or liver disease and have to limit fluids, talk with your doctor before you increase the amount of fluids you drink. · If your stomach is upset, eat mild foods, such as rice, dry toast or crackers, bananas, and applesauce. Try eating several small meals instead of two or three large ones. · Wait until 48 hours after all symptoms have gone away before you have spicy foods, alcohol, and drinks that contain caffeine. · Do not eat foods that are high in fat. · Avoid anti-inflammatory medicines such as aspirin, ibuprofen (Advil, Motrin), and naproxen (Aleve). These can cause stomach upset. Talk to your doctor if you take daily aspirin for another health problem. When should you call for help? Call 911 anytime you think you may need emergency care. For example, call if: 
  · You passed out (lost consciousness).  
  · You pass maroon or very bloody stools.  
  · You vomit blood or what looks like coffee grounds.  
  · You have new, severe belly pain.  
 Call your doctor now or seek immediate medical care if: 
  · Your pain gets worse, especially if it becomes focused in one area of your belly.  
  · You have a new or higher fever.  
  · Your stools are black and look like tar, or they have streaks of blood.  
  · You have unexpected vaginal bleeding.  
  · You have symptoms of a urinary tract infection. These may include: 
? Pain when you urinate. ? Urinating more often than usual. 
? Blood in your urine.  
  · You are dizzy or lightheaded, or you feel like you may faint.  
 Watch closely for changes in your health, and be sure to contact your doctor if: 
  · You are not getting better after 1 day (24 hours). Where can you learn more? Go to http://diana-pam.info/. Enter B537 in the search box to learn more about \"Abdominal Pain: Care Instructions. \" Current as of: September 23, 2018 Content Version: 12.1 © 6824-3407 [employer]. Care instructions adapted under license by [employer] (which disclaims liability or warranty for this information). If you have questions about a medical condition or this instruction, always ask your healthcare professional. [place] [address] any warranty or liability for your use of this information.

## 2019-08-15 NOTE — PROGRESS NOTES
Chief Complaint   Patient presents with    Hypertension     follow up ROOM 4    Labs     8-03-19        1. Have you been to the ER, urgent care clinic since your last visit? Hospitalized since your last visit? No    2. Have you seen or consulted any other health care providers outside of the [address] [place] [address] since your last visit? Include any pap smears or colon screening. No    Patient was given a copy of the Advanced Directive and understands to bring it in once completed.   Health Maintenance Due   Topic Date Due    Shingrix Vaccine Age 49> (1 of 2) 04/05/2012    BREAST CANCER SCRN MAMMOGRAM  07/11/2019    COLONOSCOPY  11/14/2019

## 2019-08-29 ENCOUNTER — TELEPHONE (OUTPATIENT)
Dept: INTERNAL MEDICINE CLINIC | Age: 59
End: 2019-08-29

## 2019-08-29 DIAGNOSIS — R10.30 LOWER ABDOMINAL PAIN: Primary | ICD-10-CM

## 2019-08-29 NOTE — TELEPHONE ENCOUNTER
Please get an abdominal and pelvic ultrasound given c/o lower abdominal pain described at her last apt. Orders in for both ultrasounds.     Dr. Mandi Sin  [facility], [address] [place] [address], [address] [place] [address] Str.  Phone: [phone]  Fax: [phone]

## 2019-08-29 NOTE — TELEPHONE ENCOUNTER
Nichelle Love is calling from [facility] ultrasound. Patient is scheduled for a complete abdominal ultrasound tomorrow. She wants to know if you are looking for a hernia or something in the pelnic area. If a hernia, would need to order an abdominal limited. If the pelvic area, would need to order a pelvis ultrasound. Complete abdominal ultrasound only shows the upper abdomen.

## 2019-08-30 ENCOUNTER — HOSPITAL ENCOUNTER (OUTPATIENT)
Dept: ULTRASOUND IMAGING | Age: 59
Discharge: HOME | End: 2019-08-30
Attending: INTERNAL MEDICINE
Payer: COMMERCIAL

## 2019-08-30 DIAGNOSIS — R10.30 LOWER ABDOMINAL PAIN: ICD-10-CM

## 2019-08-30 PROCEDURE — 76700 US EXAM ABDOM COMPLETE: CPT

## 2019-09-03 PROBLEM — K75.81 NASH (NONALCOHOLIC STEATOHEPATITIS): Status: ACTIVE | Noted: 2019-09-03

## 2019-09-03 NOTE — PROGRESS NOTES
Please let her know that her ultrasound shows findings consistent with fatty liver disease or MURPHY -nonalcoholic steatohepatitis. She needs to exercise regularly in order to resolve this issue. She also has benign appearing renal cyst.  No additional studies need to be obtained for those findings. If she continues to have abdominal pain/discomfort, please have her follow-up with GI. Place a referral under my name if necessary - for me to sign - for her to follow-up with GI for additional studies/evaluation of her symptoms.     Dr. Robison Reading  [facility] of [address].  Phone: [phone]  Fax: [phone]

## 2019-09-04 ENCOUNTER — TELEPHONE (OUTPATIENT)
Dept: INTERNAL MEDICINE CLINIC | Age: 59
End: 2019-09-04

## 2019-09-04 NOTE — TELEPHONE ENCOUNTER
----- Message from Isaiah Haskins MD sent at 9/3/2019  7:12 PM EDT -----  Please let her know that her ultrasound shows findings consistent with fatty liver disease or MURPHY -nonalcoholic steatohepatitis. She needs to exercise regularly in order to resolve this issue. She also has benign appearing renal cyst.  No additional studies need to be obtained for those findings. If she continues to have abdominal pain/discomfort, please have her follow-up with GI. Place a referral under my name if necessary - for me to sign - for her to follow-up with GI for additional studies/evaluation of her symptoms.     Dr. David Townsend  [facility], [address] [place] [address], [address] [place] [address].  Phone: [phone]  Fax: [phone]

## 2019-09-04 NOTE — TELEPHONE ENCOUNTER
Chief Complaint   Patient presents with    Results     done 8-30-19 US Abdomen Comp per Dr Curry Billing     9-04-19 unable to reach the patient nor leave a message. I will try again later.

## 2019-09-09 NOTE — TELEPHONE ENCOUNTER
Pt aware of message below and verbalized understanding. Pt is scheduled to see [facility] October 16. She is requesting results be faxed to them along with US of pelvis she is having done on Wed. Patient is also requesting a call from provider preferably morning to discuss fatty liver in further details. Patient did not state what specific questions or concerns she had.

## 2019-09-10 NOTE — TELEPHONE ENCOUNTER
Tried to reach the patient on home number. No answer and no voicemail. Will try to reach the patient at a later time.

## 2019-09-11 ENCOUNTER — HOSPITAL ENCOUNTER (OUTPATIENT)
Dept: ULTRASOUND IMAGING | Age: 59
Discharge: HOME | End: 2019-09-11
Attending: INTERNAL MEDICINE
Payer: COMMERCIAL

## 2019-09-11 DIAGNOSIS — R10.30 LOWER ABDOMINAL PAIN: ICD-10-CM

## 2019-09-11 PROCEDURE — 76856 US EXAM PELVIC COMPLETE: CPT

## 2019-09-12 ENCOUNTER — TELEPHONE (OUTPATIENT)
Dept: INTERNAL MEDICINE CLINIC | Age: 59
End: 2019-09-12

## 2019-09-12 RX ORDER — PRAVASTATIN SODIUM 80 MG/1
TABLET ORAL
Qty: 30 TAB | Refills: 4 | Status: SHIPPED | OUTPATIENT
Start: 2019-09-12 | End: 2020-05-07

## 2019-09-12 NOTE — PROGRESS NOTES
Please let her know that her transvaginal ultrasound shows small fibroids present. No additional studies are warranted for this finding. If she continues to have pain, she is to follow-up with her GYN and GI teams. If cleared of any issues by GYN and GI, I will refer her to Urology for additional testing.     Dr. Navarro Semen  [facility], [address] [place] [address], [address] [place] [address] Str.  Phone: [phone]  Fax: [phone]

## 2019-09-12 NOTE — TELEPHONE ENCOUNTER
Unable to leave a voicemail for the patient. The phone rang but no voicemail. Will try to reach the patient at another time.

## 2019-09-12 NOTE — TELEPHONE ENCOUNTER
----- Message from Remi Contreras MD sent at 9/12/2019  1:31 PM EDT -----  Please let her know that her transvaginal ultrasound shows small fibroids present. No additional studies are warranted for this finding. If she continues to have pain, she is to follow-up with her GYN and GI teams. If cleared of any issues by GYN and GI, I will refer her to Urology for additional testing.     Dr. Sharon Cornelius  [facility], [address] [place] [address], [address] [place] [address].  Phone: [phone]  Fax: [phone]

## 2019-09-16 NOTE — TELEPHONE ENCOUNTER
Chief Complaint   Patient presents with    Results     US Pelvic done 9-11-19 9-16-19 still no answer from patient, the phone just rings, and no voice mail.

## 2019-10-01 ENCOUNTER — TELEPHONE (OUTPATIENT)
Dept: CARDIOLOGY CLINIC | Age: 59
End: 2019-10-01

## 2019-10-01 NOTE — TELEPHONE ENCOUNTER
Attempted to reach patient to get her scheduled for an EKG on 10/4/19. There was no answer and there was no way to leave a message. I will try again later to reach her. 
 
 
 
  
1 Jarrett Goldman Female, 64 y.o., 1960 Weight:  
68.4 kg (150 lb 12.8 oz) MRN:  
[id number] Phone:  
[phone] (H) PCP:  
Meghan Machado MD 
Primary Cvg:  
SENIA/ATNA - CHOICE (POS II) Last Appt With Me Next Appt With Me 
None Next Appt 2/12/20 Allie Arvizu MD - [facility] 
RE: EKG Check - possible afib on apple watch Received: Today Message Contents HAYLEY Martin  
  
   
  
I think we should bring her in for Laurie to do that EKG Previous Messages   
 
----- Message ----- From: Elroy Kirk Sent: 9/30/2019   8:01 AM EDT To: Aaron Holden RN, [address] Subject: FW: EKG Check - possible afib on apple watch  
 
----- Message ----- From: Airam Mayen PA-C Sent: 9/27/2019   9:08 PM EDT To: [address] Subject: EKG Check - possible afib on apple watch      
 
Good morning, This is a patient of Dr. Herman Saucedo that called 9/27 PM due to possible afib according to her apple watch.  I recommended that she print out the strips from this episode to bring in to the office and was hoping that we could schedule her for an EKG check this week (and have one of the MD's review her strips?).  She said HR was 104 and subsequent check had sinus rhythm with HR in 80's.  She also noted that she had a couple hard ciders before this as well.  I did message Dr. Tram Cavazos as well to give him a heads up but just noticed that he's on vacation this week. Dallin Rector you!

## 2019-10-11 ENCOUNTER — CLINICAL SUPPORT (OUTPATIENT)
Dept: CARDIOLOGY CLINIC | Age: 59
End: 2019-10-11

## 2019-10-11 ENCOUNTER — TELEPHONE (OUTPATIENT)
Dept: ORTHOPEDIC SURGERY | Age: 59
End: 2019-10-11

## 2019-10-11 VITALS
SYSTOLIC BLOOD PRESSURE: 106 MMHG | OXYGEN SATURATION: 96 % | HEIGHT: 62 IN | DIASTOLIC BLOOD PRESSURE: 64 MMHG | BODY MASS INDEX: 27.58 KG/M2 | HEART RATE: 69 BPM

## 2019-10-11 DIAGNOSIS — R00.2 PALPITATIONS: Primary | ICD-10-CM

## 2019-10-11 NOTE — PROGRESS NOTES
Patient was seen today for an EKG after complaining of chest pain in middle of chest and having rhythm issues. She states her apple watch gave a reading of \"a fib\". EKG read by physician. No changes made to medications.    48 hour holter monitor ordered   Verbal order and read back per Tai Cardoza MD

## 2019-10-11 NOTE — PATIENT INSTRUCTIONS
Central Scheduling will call you to schedule a 48 hour Holter Monitor as ordered by the cardiologist. If you have not heard from [facility] within in 72 business hours please call [phone] to schedule appt.

## 2019-10-11 NOTE — TELEPHONE ENCOUNTER
Tried calling patient and no answer. Should she call back please let her know that she must be seen prior to getting a note for work since she has not been seen in 2 years.

## 2019-10-11 NOTE — TELEPHONE ENCOUNTER
Pt stopped by [facility] location to get a letter stating she has plantar fasciitis, she hasn't been seen since 2016, stated she need the letter for her job. Pt can be reached at [phone]

## 2019-10-16 ENCOUNTER — HOSPITAL ENCOUNTER (OUTPATIENT)
Dept: MAMMOGRAPHY | Age: 59
Discharge: HOME | End: 2019-10-16
Attending: INTERNAL MEDICINE
Payer: COMMERCIAL

## 2019-10-16 DIAGNOSIS — Z12.31 ENCOUNTER FOR SCREENING MAMMOGRAM FOR BREAST CANCER: ICD-10-CM

## 2019-10-16 DIAGNOSIS — I11.9 BENIGN HYPERTENSIVE HEART DISEASE WITHOUT HEART FAILURE: ICD-10-CM

## 2019-10-16 PROCEDURE — 77067 SCR MAMMO BI INCL CAD: CPT

## 2019-10-17 RX ORDER — SPIRONOLACTONE 25 MG/1
TABLET ORAL
Qty: 30 TAB | Refills: 2 | Status: SHIPPED | OUTPATIENT
Start: 2019-10-17 | End: 2020-02-02

## 2019-10-28 ENCOUNTER — TELEPHONE (OUTPATIENT)
Dept: INTERNAL MEDICINE CLINIC | Age: 59
End: 2019-10-28

## 2019-10-28 NOTE — TELEPHONE ENCOUNTER
Pt calling asking if she can get tested for blood clots. Says she is having leg cramps and has unexplained bruising on her limbs. Off Tuesday or Wednesday for appt if you can fit her in. She doesn't have a number to call her back since she is leaving for work now. Says she'll call back to check on reply.

## 2019-10-28 NOTE — TELEPHONE ENCOUNTER
She needs to stay hydrated drinking lots of water per day. No studies are warranted unless she has unilateral swelling and/or SOB. If she is just having myalgias, please encourage stretching and have her f/u with us in 2 days to see if her sx are improving. As long as the bruising along her legs are resolving no studies are warranted at this time. Dr. Dayana Satnley [facility] of [address] [place] [address], [address] [facility], [address] [place] [address] Str. Phone: [phone] Fax: [phone]

## 2019-10-29 NOTE — TELEPHONE ENCOUNTER
Patient came into the office, patient asked for name and , and she declined to give me her . Patient states that she has experienced identity theft and does not like to say it out loud. Patient wrote down her  and it was verified. Patient given message below from DR. Drew Ramirez. Patient states that she has concerns since her mother has history of aneurysms.

## 2019-10-31 ENCOUNTER — HOSPITAL ENCOUNTER (OUTPATIENT)
Dept: NON INVASIVE DIAGNOSTICS | Age: 59
Discharge: HOME | End: 2019-10-31
Attending: INTERNAL MEDICINE
Payer: COMMERCIAL

## 2019-10-31 DIAGNOSIS — R00.2 PALPITATIONS: ICD-10-CM

## 2019-10-31 PROCEDURE — 93226 XTRNL ECG REC<48 HR SCAN A/R: CPT

## 2019-11-14 ENCOUNTER — TELEPHONE (OUTPATIENT)
Dept: INTERNAL MEDICINE CLINIC | Age: 59
End: 2019-11-14

## 2019-11-14 NOTE — TELEPHONE ENCOUNTER
Patient dropped off FMLA forms to be completed. She also brought a copy of the prior FMLA form that was completed back in 2016. That is attached along with a signed release so it can be faxed to [address] once completed.

## 2019-12-03 NOTE — PROGRESS NOTES
Dr. Tiffanie Figueroa patient Melissa Fresh testing was ordered\" Patient was seen today for an EKG after complaining of chest pain in middle of chest and having rhythm issues. She states her apple watch gave a reading of \"a fib\". EKG read by physician. No changes made to medications.    48 hour holter monitor ordered   Verbal order and read back per Faby Damon MD

## 2019-12-04 ENCOUNTER — TELEPHONE (OUTPATIENT)
Dept: CARDIOLOGY CLINIC | Age: 59
End: 2019-12-04

## 2019-12-04 NOTE — TELEPHONE ENCOUNTER
I tried to call the patient's home phone number, but there was no answer or answering machine. Her Holter monitor study was benign showing only very rare PVCs and occasional PACs making of about 1.6% of all of her beats with only one 7 beat run of a supraventricular tachycardia. None of her symptoms correlated with any of her rhythm issues and the only treatment for the PACs which would be given would be a beta-blocker which she is already on so I do not think I would make any changes at this time. Please let her know.   ES

## 2019-12-04 NOTE — LETTER
12/6/2019 12:59 PM 
 
Ms. Montez [address] [address] Dear Ms. Chantell Good, We have been unable to reach you by phone to notify you of your test results. Please call our office at [phone] and ask to speak with my nurse in order to explain these results to you and advise you of any recommendations.  
 
 
 
Sincerely, 
 
 
Azeem Larson, DO

## 2019-12-04 NOTE — TELEPHONE ENCOUNTER
----- Message from Charlie Santana LPN sent at 42/3/4027  8:55 AM EST ----- Dr. Dunia Espinosa patient Lia Body testing was ordered\" Patient was seen today for an EKG after complaining of chest pain in middle of chest and having rhythm issues. She states her apple watch gave a reading of \"a fib\". EKG read by physician. No changes made to medications. 48 hour holter monitor ordered Verbal order and read back per Malena Otero MD

## 2019-12-20 ENCOUNTER — HOSPITAL ENCOUNTER (OUTPATIENT)
Dept: LAB | Age: 59
Discharge: HOME | End: 2019-12-20
Payer: COMMERCIAL

## 2019-12-20 LAB
ANION GAP SERPL CALC-SCNC: 4 MMOL/L (ref 3–18)
APPEARANCE UR: CLEAR
BACTERIA URNS QL MICRO: NEGATIVE /HPF
BILIRUB UR QL: NEGATIVE
BUN SERPL-MCNC: 20 MG/DL (ref 7–18)
BUN/CREAT SERPL: 17 (ref 12–20)
CALCIUM SERPL-MCNC: 9 MG/DL (ref 8.5–10.1)
CHLORIDE SERPL-SCNC: 107 MMOL/L (ref 100–111)
CO2 SERPL-SCNC: 32 MMOL/L (ref 21–32)
COLOR UR: YELLOW
CREAT SERPL-MCNC: 1.2 MG/DL (ref 0.6–1.3)
EPITH CASTS URNS QL MICRO: ABNORMAL /LPF (ref 0–5)
GLUCOSE SERPL-MCNC: 69 MG/DL (ref 74–99)
GLUCOSE UR STRIP.AUTO-MCNC: NEGATIVE MG/DL
HCT VFR BLD AUTO: 41.1 % (ref 35–45)
HGB BLD-MCNC: 13.7 G/DL (ref 12–16)
HGB UR QL STRIP: NEGATIVE
KETONES UR QL STRIP.AUTO: NEGATIVE MG/DL
LEUKOCYTE ESTERASE UR QL STRIP.AUTO: NEGATIVE
MAGNESIUM SERPL-MCNC: 2.3 MG/DL (ref 1.6–2.6)
NITRITE UR QL STRIP.AUTO: NEGATIVE
PH UR STRIP: 7.5 [PH] (ref 5–8)
POTASSIUM SERPL-SCNC: 3.7 MMOL/L (ref 3.5–5.5)
PROT UR STRIP-MCNC: NEGATIVE MG/DL
RBC #/AREA URNS HPF: NEGATIVE /HPF (ref 0–5)
SODIUM SERPL-SCNC: 143 MMOL/L (ref 136–145)
SP GR UR REFRACTOMETRY: <1.005 (ref 1–1.03)
UROBILINOGEN UR QL STRIP.AUTO: 0.2 EU/DL (ref 0.2–1)
WBC URNS QL MICRO: ABNORMAL /HPF (ref 0–4)

## 2019-12-20 PROCEDURE — 83735 ASSAY OF MAGNESIUM: CPT

## 2019-12-20 PROCEDURE — 85018 HEMOGLOBIN: CPT

## 2019-12-20 PROCEDURE — 81001 URINALYSIS AUTO W/SCOPE: CPT

## 2019-12-20 PROCEDURE — 80048 BASIC METABOLIC PNL TOTAL CA: CPT

## 2019-12-20 PROCEDURE — 36415 COLL VENOUS BLD VENIPUNCTURE: CPT

## 2019-12-30 ENCOUNTER — TELEPHONE (OUTPATIENT)
Dept: INTERNAL MEDICINE CLINIC | Age: 59
End: 2019-12-30

## 2019-12-30 NOTE — TELEPHONE ENCOUNTER
Pt calling, says Dr. Mignon Perez filled out la paper work for her. Wants to know if Dr. Amado Shah change the amount of days she can be out by 2-3 days per month. Says she has to take days off for doctor visits and labs and just needs to have a little more time available. Says you can update the copy we have on file and she can re file it. Pt notified  Is out of the office until next week.

## 2020-01-02 NOTE — TELEPHONE ENCOUNTER
Based on her present health, her [facility] paperwork cannot be justly addended as requested. It will need to stand as it. Please let her know.     Dr. Nathalie Wise  [facility], [address] [place] [address], [address] [place] [address] Str.  Phone: [phone]  Fax: [phone]

## 2020-01-07 ENCOUNTER — TELEPHONE (OUTPATIENT)
Dept: INTERNAL MEDICINE CLINIC | Age: 60
End: 2020-01-07

## 2020-01-07 NOTE — TELEPHONE ENCOUNTER
Patient contacted and instructed to go to urgent care for evaluation. Patient verbalizes understanding.

## 2020-01-07 NOTE — TELEPHONE ENCOUNTER
Pt called asking if she can be seen by anyone, today she has had sore throat, chest congestion ear pain and fever since 01/03 please advise

## 2020-01-11 ENCOUNTER — TELEPHONE (OUTPATIENT)
Dept: INTERNAL MEDICINE CLINIC | Age: 60
End: 2020-01-11

## 2020-01-11 NOTE — TELEPHONE ENCOUNTER
Received call from patient last night at 2350. Patient reported having URI symptoms for several days with nasal congestion and cough. She denied any fever or shortness of breath. She states that she has been taking Mucinex DM and Tessalon to help control her cough. She states that her appetite has been good and she has been attempting to eat well and drink plenty of fluids. She reports that while traveling from work this evening she began to feel some transient nausea and lightheadedness. She was worried that this was due to low blood sugar and ate some chicken wings and other food. She states that she had a recurrence when lying down and became concerned, prompting call. She denied any vertigo or spinning sensation, but did admit to some ear pain when at home related to URI. States that it improves when she goes outside. She was advised to increase her fluid intake and get plenty of rest for her current URI. Discussed that her nausea and lightheadedness maybe due to dehydration or related to the medications she is taking for her URI. Also discussed that may be vertiginous in origin given her intermittent ear pain, likely related to URI and congestion. Advised that if symptoms persist, would recommend evaluation in urgent care. Patient agreeable.

## 2020-03-24 ENCOUNTER — TELEPHONE (OUTPATIENT)
Dept: INTERNAL MEDICINE CLINIC | Age: 60
End: 2020-03-24

## 2020-03-24 NOTE — TELEPHONE ENCOUNTER
Pt is not a candidate for the flu clinic. Her cough sx are mild. No fever. No SOB. I encouraged her to take OTC rx for allergic rhinitis and/or cold sx. I instructed her to notify me if sx worsen. I also discussed the option of doing a virtual visit. Dr. Narinder Gomez [facility] of [address] [place] [address] 207, [address] [facility], [address] [place] [address] Str. Phone: [phone] Fax: [phone]

## 2020-03-24 NOTE — TELEPHONE ENCOUNTER
Pt calling, says she called the flu clinic to get appt but they told her she had to call her doctor. Says she had a cold in February. She self medicated. She now has some chest discomfort most likely from congestion. No fevers, she says she has been coughing but did not cough during our call. Says she has a runny nose sometimes but she isn't sure if it is allergies. Wants to know what she should do. She does not have a phone or computer with a camera.

## 2020-03-25 RX ORDER — AMLODIPINE BESYLATE 2.5 MG/1
TABLET ORAL
Qty: 30 TAB | Refills: 2 | Status: SHIPPED | OUTPATIENT
Start: 2020-03-25 | End: 2020-07-28

## 2020-04-13 ENCOUNTER — TELEPHONE (OUTPATIENT)
Dept: INTERNAL MEDICINE CLINIC | Age: 60
End: 2020-04-13

## 2020-04-13 DIAGNOSIS — I10 ESSENTIAL HYPERTENSION: Primary | ICD-10-CM

## 2020-04-13 DIAGNOSIS — E78.5 HYPERLIPIDEMIA, UNSPECIFIED HYPERLIPIDEMIA TYPE: ICD-10-CM

## 2020-04-13 DIAGNOSIS — K75.81 NASH (NONALCOHOLIC STEATOHEPATITIS): ICD-10-CM

## 2020-04-13 NOTE — TELEPHONE ENCOUNTER
Lab orders are in. Dr. Jef Torres [facility] of [address] [place] [address] 207, [address] [facility], [address] Phone: [phone] Fax: [phone]

## 2020-04-13 NOTE — TELEPHONE ENCOUNTER
Spoke with patient. She reports currently having allergy symptoms-slight cough- little sore throat, ear ache, clear mucus x a few days. Reports being outside this weekend for the first time in weeks. She will take flonase and other antihistamines as tolerated. She wanted to know what symptoms to look out for for the concern of Covid. Discussed these symptoms in detail. Patient reports no other issues or concerns right now. Will plan for June appointment- patient requesting to have labs done at [facility] prior to June appointment please order.

## 2020-04-13 NOTE — TELEPHONE ENCOUNTER
Patient rescheduled her physical appointment to June. However, she wants to speak to a nurse about her symptoms. Please call.

## 2020-06-03 ENCOUNTER — OFFICE VISIT (OUTPATIENT)
Dept: CARDIOLOGY CLINIC | Age: 60
End: 2020-06-03

## 2020-06-03 VITALS
HEART RATE: 67 BPM | DIASTOLIC BLOOD PRESSURE: 80 MMHG | WEIGHT: 154 LBS | SYSTOLIC BLOOD PRESSURE: 126 MMHG | OXYGEN SATURATION: 97 % | BODY MASS INDEX: 35.64 KG/M2 | HEIGHT: 55 IN

## 2020-06-03 DIAGNOSIS — R00.2 PALPITATIONS: Primary | ICD-10-CM

## 2020-06-03 DIAGNOSIS — E78.5 HYPERLIPIDEMIA, UNSPECIFIED HYPERLIPIDEMIA TYPE: ICD-10-CM

## 2020-06-03 DIAGNOSIS — I10 ESSENTIAL HYPERTENSION: ICD-10-CM

## 2020-06-03 DIAGNOSIS — R07.9 CHEST PAIN, UNSPECIFIED TYPE: ICD-10-CM

## 2020-06-03 PROBLEM — E66.01 OBESITY, MORBID (HCC): Status: ACTIVE | Noted: 2020-06-03

## 2020-06-03 NOTE — PROGRESS NOTES
HPI:. . I saw Chapis Foil in my office today in cardiovascular evaluation regarding her problems with palpitations. Ms. Rani Regan is a very pleasant, but anxious 68-year-old -American female with history of non-anginal sounding chest pain and palpitations over the years. She has had Holter's in the past which have shown no significant ectopy except for some rare PAC's, but due to symptoms of palpitations she has been on atenolol 25 mg daily or as needed and Norvasc 5 mg along with Aldactone 25 mg daily for blood pressure. She comes in today relates that her palpitations are occurring most nights. She checks her at home 25 mg half tablet at night and that seems to help with the palpitations begin no other times when she takes a walk in the late afternoon. Encounter Diagnoses   Name Primary?  Palpitations Yes    Chest pain, unspecified type     Hyperlipidemia, unspecified hyperlipidemia type     Essential hypertension        Discussion: This lady has history of frequent PVCs on a Holter in the past and my suspicion is that the atenolol which she is taking at night is wearing off by the late afternoon which is the reason she is having increased palpitations. I recommended that she take her atenolol 25 mg half a tablet in the morning rather than at night to see how that works. If that does not work well but I would try atenolol 25 mg half tablet twice a day first and then if that is unsuccessful try atenolol 25 mg in the morning. She should let us know if none of these medication adjustments work. She does have some hypercholesterolemia but she has been home and eating more than usual to the COVID-19 pandemic and consequently is going away to get her cholesterol done in a few months. She is continuing Pravachol 80 mg daily for her cholesterol.     Her blood pressure is very well controlled today and her EKG is completely normal some good I plan to see her again in 8 or 9 months or if I have retired by that time we will have her follow-up with Dr. Taz Subramanian. PCP: Melissa Schmidt MD       Past Medical History:   Diagnosis Date    Allergic rhinitis     Cardiac Agatston CAC score, <100 04/30/2014    Coronary calcium score 0.    Cardiac Holter monitoring 01/25/2017    Sinus rhythm, avg HR 73 bpm (range ). Benign Holter study.  Cardiac nuclear imaging test 01/11/2013    No convincing evidence for ischemia or infarction in the setting of significant chest wall artifact. EF 62%. No RWMA. Neg EKG on max EST. Ex time 9 min 50 sec.  Cardiac stress echo, normal 05/05/2016    Normal maximal stress echo w/reproduction of atypical chest discomfort. Ex time 11 min 3 sec. EF 55%.  Cardiovascular LLE venous duplex 09/28/2016    Left leg:  No DVT.  Chest pain     GERD (gastroesophageal reflux disease)     Heel pain, bilateral     Hiatal hernia     denies this    HTN (hypertension)     Hyperlipidemia     Insulin resistance     follows with endocrinology for this    Night sweats     PVC (premature ventricular contraction)     Right low back pain     S/P colonoscopy 2009, 2014    normal per patient    Unsteady gait     due to heel pain         Past Surgical History:   Procedure Laterality Date    D/C SUCTION  2011    HX OTHER SURGICAL  2014    colonscopy    HX OTHER SURGICAL      endoscopy    HX OTHER SURGICAL      wisdom teeth removal x1     Current Outpatient Medications   Medication Sig    CLOBETASOL PROPIONATE, BULK, 0.05 % by Does Not Apply route two (2) times a day.     pravastatin (PRAVACHOL) 80 mg tablet TAKE ONE TABLET BY MOUTH EVERY NIGHT AT BEDTIME    amLODIPine (NORVASC) 2.5 mg tablet TAKE ONE TABLET BY MOUTH DAILY    atenoloL (TENORMIN) 25 mg tablet TAKE ONE-HALF TO ONE TABLET BY MOUTH DAILY (DO NOT EXCEED ONE TABLET PER DAY)    spironolactone (ALDACTONE) 25 mg tablet TAKE ONE TABLET BY MOUTH DAILY    fluticasone (FLONASE) 50 mcg/actuation nasal spray 2 Sprays by Both Nostrils route daily. Indications: Allergic Rhinitis    potassium chloride (KAON 10%) 20 mEq/15 mL solution Patient states she take 1 tablespoon daily    co-enzyme Q-10 (CO Q-10) 100 mg capsule Take 200 mg by mouth daily.  GARLIC PO Take 2 Caps by mouth daily.  aspirin delayed-release (ECOTRIN LOW STRENGTH) 81 mg tablet Take 81 mg by mouth daily.  omega-3 fatty acids-vitamin e (FISH OIL) 1,000 mg cap Take 2 Caps by mouth daily.  melatonin 3 mg tablet Take 3 mg by mouth nightly as needed.  Dexlansoprazole (DEXILANT) 60 mg CpDM Take 1 Cap by mouth every Monday, Wednesday, Friday.  sucralfate (CARAFATE) 1 gram tablet Take 1 g by mouth daily as needed.  MULTIVITAMIN W-MINERALS/LUTEIN (CENTRUM SILVER PO) Take 1 Tab by mouth daily.  glucose blood VI test strips (BLOOD GLUCOSE TEST) strip Use to check glucose daily     No current facility-administered medications for this visit. Allergies   Allergen Reactions    Ibuprofen Other (comments)    Nsaids (Non-Steroidal Anti-Inflammatory Drug) Other (comments)    Amoxicillin Unable to Obtain and Other (comments)    Lidocaine Swelling     Oral Solution    Lipitor [Atorvastatin] Myalgia           Lopressor [Metoprolol Tartrate] Other (comments)     Lightheaded and cp    Percocet [Oxycodone-Acetaminophen] Unable to Obtain and Other (comments)    Vicodin [Hydrocodone-Acetaminophen] Unable to Obtain and Other (comments)         Social History :  Social History     Tobacco Use    Smoking status: Former Smoker     Packs/day: 0.50     Years: 11.00     Pack years: 5.50     Types: Cigarettes     Last attempt to quit: 2006     Years since quittin.4    Smokeless tobacco: Former User     Quit date: 1/10/1993    Tobacco comment: 6-7 cigs per day   Substance Use Topics    Alcohol use:  Yes     Alcohol/week: 0.0 standard drinks     Comment: 1-2 glasses wine 3-4 times a week        Family History: family history includes Asthma in her father; Heart Attack in her maternal uncle, maternal uncle, and mother; Heart Disease in her brother; Heart defect in her mother; Hypertension in her brother and mother. Review of Systems:  Constitutional: Negative. Respiratory: Positive for cough. Negative for hemoptysis, shortness of breath and wheezing. Cardiovascular: Positive for palpitations. Negative for chest pain, orthopnea and leg swelling. Gastrointestinal: Positive for abdominal pain and heartburn. Negative for blood in stool, constipation, diarrhea, melena, nausea and vomiting. Musculoskeletal: Positive for joint pain and myalgias. Negative for falls and neck pain. Neurological: Negative for dizziness. Physical Exam:    The patient is a cooperative, alert, well developed, well nourished 64 y.o.  female who is in no acute distress at the time of the examination. Visit Vitals  /80 (BP 1 Location: Left arm, BP Patient Position: Sitting)   Pulse 67   Ht 4' 2\" (1.27 m)   Wt 154 lb (69.9 kg)   SpO2 97%   BMI 43.31 kg/m²     HEENT: Conjuctiva white, mucosa moist, no pallor or cyanosis. NECK: Supple without masses, tenderness or thyromegaly. There was no jugular venous distention. Carotid are full bilaterally without bruits. CHEST: Symmetrical with good excursion. LUNGS: Clear to auscultation in all fields. HEART: The apex is not displaced. There were no lifts, thrills or heaves. There is a normal S1 and S2 without appreciable murmurs rubs clicks or gallops auscultated. ABDOMEN: Soft without masses or tenderness. The liver is palpated 2-3 finger breadths below the right costal margin. EXTREMITIES: Full peripheral pulses without peripheral edema.     Review of Data: See PMH and Cardiology and Imaging sections for cardiac testing  Lab Results   Component Value Date/Time    Cholesterol, total 177 08/03/2019 11:49 AM    HDL Cholesterol 87 (H) 08/03/2019 11:49 AM    LDL, calculated 82.8 08/03/2019 11:49 AM Triglyceride 36 08/03/2019 11:49 AM    CHOL/HDL Ratio 2.0 08/03/2019 11:49 AM         Results for orders placed or performed in visit on 06/03/20   AMB POC EKG ROUTINE W/ 12 LEADS, INTER & REP     Status: None    Narrative    Normal sinus rhythm with rate 67. This EKG is within normal limits and similar to the EKG of October 11, 2019. Nelson Kocher, D.O., F.A.C.C. [facility]  [facility]  [address] [place] [address]. [address]    PLEASE NOTE:  This document has been produced using voice recognition software. Unrecognized errors in transcription may be present.

## 2020-06-03 NOTE — PATIENT INSTRUCTIONS
Please take your atenolol 25 mg a half a tablet in the morning to see if your palpitations are better in the evening. If this does not work that well then try atenolol 25 mg a half a tablet twice a day and finally if that is unsuccessful then try atenolol 25 mg once a day in the morning. If none of these medication adjustments work then give us a call.

## 2020-06-03 NOTE — PROGRESS NOTES
Michael Dunn presents today for   Chief Complaint   Patient presents with    Follow-up     9 month follow up    Palpitations     at night    Shortness of Breath     with exertion       Michael Dunn preferred language for health care discussion is english/other. Is someone accompanying this pt? no    Is the patient using any DME equipment during [address]? no    Depression Screening:  3 most recent PHQ Screens 6/3/2020   Little interest or pleasure in doing things Not at all   Feeling down, depressed, irritable, or hopeless Not at all   Total Score PHQ 2 0       Learning Assessment:  Learning Assessment 8/15/2019   PRIMARY LEARNER Patient   HIGHEST LEVEL OF EDUCATION - PRIMARY LEARNER  4 YEARS [facility] PRIMARY LEARNER NONE   CO-LEARNER CAREGIVER No   PRIMARY LANGUAGE ENGLISH   LEARNER PREFERENCE PRIMARY DEMONSTRATION     -   ANSWERED BY patient   RELATIONSHIP SELF       Abuse Screening:  Abuse Screening Questionnaire 6/3/2020   Do you ever feel afraid of your partner? N   Are you in a relationship with someone who physically or mentally threatens you? N   Is it safe for you to go home? Y       Fall Risk  Fall Risk Assessment, last 12 mths 6/3/2020   Able to walk? Yes   Fall in past 12 months? No       Pt currently taking Anticoagulant therapy? ASA 81 mg once a day    Coordination of Care:  1. Have you been to the ER, urgent care clinic since your last visit? Hospitalized since your last visit? no    2. Have you seen or consulted any other health care providers outside of the [address] [place] [address] since your last visit? Include any pap smears or colon screening.  no

## 2020-07-29 RX ORDER — AMLODIPINE BESYLATE 2.5 MG/1
TABLET ORAL
Qty: 30 TAB | Refills: 11 | Status: SHIPPED | OUTPATIENT
Start: 2020-07-29 | End: 2021-04-29 | Stop reason: SDUPTHER

## 2020-09-01 DIAGNOSIS — I11.9 BENIGN HYPERTENSIVE HEART DISEASE WITHOUT HEART FAILURE: ICD-10-CM

## 2020-09-01 RX ORDER — SPIRONOLACTONE 25 MG/1
TABLET ORAL
Qty: 30 TAB | Refills: 4 | Status: SHIPPED | OUTPATIENT
Start: 2020-09-01 | End: 2021-02-26

## 2020-09-02 ENCOUNTER — TELEPHONE (OUTPATIENT)
Dept: INTERNAL MEDICINE CLINIC | Age: 60
End: 2020-09-02

## 2020-09-02 NOTE — TELEPHONE ENCOUNTER
Pt is requesting an in-office visit for her annual wellness visit which requires a form to be completed for her employer. She also wants to get labs done at time of appt only due to her concerns regarding Covid. She did state she can fast for the appt. She only wants to come into office once and she is requesting last appt of the day. Please advise if you can see this patient at [phone].

## 2020-09-03 ENCOUNTER — TELEPHONE (OUTPATIENT)
Dept: INTERNAL MEDICINE CLINIC | Age: 60
End: 2020-09-03

## 2020-09-03 NOTE — TELEPHONE ENCOUNTER
Pt returned call and said she is very sorry but can not get off work for tomorrow. Stated she needs to give her employer more notice. She kindly ask if you would permit her to come in next Friday 09/11/2020

## 2020-09-03 NOTE — TELEPHONE ENCOUNTER
I have tried twice to reach this patient to schedule her in the office for tomorrow at 3:30pm without luck

## 2020-09-11 ENCOUNTER — OFFICE VISIT (OUTPATIENT)
Dept: INTERNAL MEDICINE CLINIC | Age: 60
End: 2020-09-11

## 2020-09-11 VITALS
RESPIRATION RATE: 12 BRPM | TEMPERATURE: 97.6 F | OXYGEN SATURATION: 98 % | BODY MASS INDEX: 35.59 KG/M2 | HEIGHT: 55 IN | DIASTOLIC BLOOD PRESSURE: 80 MMHG | WEIGHT: 153.8 LBS | HEART RATE: 70 BPM | SYSTOLIC BLOOD PRESSURE: 120 MMHG

## 2020-09-11 DIAGNOSIS — Z00.00 ROUTINE GENERAL MEDICAL EXAMINATION AT A HEALTH CARE FACILITY: Primary | ICD-10-CM

## 2020-09-11 DIAGNOSIS — E78.5 HYPERLIPIDEMIA, UNSPECIFIED HYPERLIPIDEMIA TYPE: ICD-10-CM

## 2020-09-11 DIAGNOSIS — I10 ESSENTIAL HYPERTENSION: ICD-10-CM

## 2020-09-11 DIAGNOSIS — Z12.31 ENCOUNTER FOR SCREENING MAMMOGRAM FOR BREAST CANCER: ICD-10-CM

## 2020-09-11 DIAGNOSIS — K75.81 NASH (NONALCOHOLIC STEATOHEPATITIS): ICD-10-CM

## 2020-09-11 NOTE — PROGRESS NOTES
Arlyn Davey is a 64 y.o. female who was seen by synchronous (real-time) audio-video technology on 9/11/2020. Assessment & Plan: 1. Essential hypertension *** 
- LIPID PANEL; Future 
- HEMOGLOBIN A1C W/O EAG; Future - METABOLIC PANEL, COMPREHENSIVE; Future - MICROALBUMIN, UR, RAND W/ MICROALB/CREAT RATIO; Future - CBC WITH AUTOMATED DIFF; Future 2. MURPHY (nonalcoholic steatohepatitis) *** 
- LIPID PANEL; Future - METABOLIC PANEL, COMPREHENSIVE; Future 3. Hyperlipidemia, unspecified hyperlipidemia type *** 
- LIPID PANEL; Future - METABOLIC PANEL, COMPREHENSIVE; Future Lab review: labs are reviewed in the EHR*** 
  
I have discussed the diagnosis with the patient and the intended plan as seen in the above orders. I have discussed medication side effects and warnings with the patient as well. I have reviewed the plan of care with the patient, accepted their input and they are in agreement with the treatment goals. All questions were answered. The patient understands the plan of care. Pt instructed if symptoms worsen to call the office or report to the ED for continued care. Greater than 50% of the visit time was spent in counseling and/or coordination of care. *** Voice recognition was used to generate this report, which may have resulted in some phonetic based errors in grammar and contents. Even though attempts were made to correct all the mistakes, some may have been missed, and remained in the body of the document. Subjective:  
Arlyn Davey was seen for Chief Complaint Patient presents with [facility] Annual Wellness Visit Patient here for wellness visit. Pt is a 65yo AAF with h/o chronic bronchitis, LUCY, porphyria (per EHR), allergic rhinitis, multiple food allergies (per lab work), HTN, HLD, constipation, gallbladder polyps (suggested by CT scan 10/13/16), and GERD.   
 
1. HTN:  Taking: ***. BP is <140/90 today. 2. ***: ***. Taking: ***. No adverse side effects from this rx.*** Prior to Admission medications Medication Sig Start Date End Date Taking? Authorizing Provider  
spironolactone (ALDACTONE) 25 mg tablet TAKE ONE TABLET BY MOUTH DAILY 9/1/20  Yes Reji Solis MD  
amLODIPine (NORVASC) 2.5 mg tablet TAKE ONE TABLET BY MOUTH DAILY 7/29/20  Yes Ean Horner,   
CLOBETASOL PROPIONATE, BULK, 0.05 % by Does Not Apply route two (2) times a day. Yes Provider, Historical  
pravastatin (PRAVACHOL) 80 mg tablet TAKE ONE TABLET BY MOUTH EVERY NIGHT AT BEDTIME 5/7/20  Yes Yamini Hendrickson NP  
atenoloL (TENORMIN) 25 mg tablet TAKE ONE-HALF TO ONE TABLET BY MOUTH DAILY (DO NOT EXCEED ONE TABLET PER DAY) 3/9/20  Yes Ryann BUSTILLOS NP  
fluticasone (FLONASE) 50 mcg/actuation nasal spray 2 Sprays by Both Nostrils route daily. Indications: Allergic Rhinitis 7/5/18  Yes Justin Cuellar NP  
potassium chloride (KAON 10%) 20 mEq/15 mL solution Patient states she take 1 tablespoon daily 1/14/17  Yes Provider, Historical  
glucose blood VI test strips (BLOOD GLUCOSE TEST) strip Use to check glucose daily 4/20/15  Yes Bettina CLEMENTS DO  
co-enzyme Q-10 (CO Q-10) 100 mg capsule Take 200 mg by mouth daily. Yes Provider, Historical  
GARLIC PO Take 2 Caps by mouth daily. Yes Provider, Historical  
aspirin delayed-release (ECOTRIN LOW STRENGTH) 81 mg tablet Take 81 mg by mouth daily. Yes Provider, Historical  
omega-3 fatty acids-vitamin e (FISH OIL) 1,000 mg cap Take 2 Caps by mouth daily. Yes Provider, Historical  
melatonin 3 mg tablet Take 3 mg by mouth nightly as needed. Yes Provider, Historical  
Dexlansoprazole (DEXILANT) 60 mg CpDM Take 1 Cap by mouth every Monday, Wednesday, Friday. Yes Provider, Historical  
sucralfate (CARAFATE) 1 gram tablet Take 1 g by mouth daily as needed.    Yes Provider, Historical  
 MULTIVITAMIN W-MINERALS/LUTEIN (CENTRUM SILVER PO) Take 1 Tab by mouth daily. Yes Provider, Historical  
 
Allergies Allergen Reactions  Ibuprofen Other (comments)  Nsaids (Non-Steroidal Anti-Inflammatory Drug) Other (comments)  Amoxicillin Unable to Obtain and Other (comments)  Lidocaine Swelling Oral Solution  Lipitor [Atorvastatin] Myalgia  Lopressor [Metoprolol Tartrate] Other (comments) Lightheaded and cp  Percocet [Oxycodone-Acetaminophen] Unable to Obtain and Other (comments)  Vicodin [Hydrocodone-Acetaminophen] Unable to Obtain and Other (comments) Past Medical History:  
Diagnosis Date  Allergic rhinitis  Cardiac Agatston CAC score, <100 04/30/2014 Coronary calcium score 0.  
 Cardiac Holter monitoring 01/25/2017 Sinus rhythm, avg HR 73 bpm (range ). Benign Holter study.  Cardiac nuclear imaging test 01/11/2013 No convincing evidence for ischemia or infarction in the setting of significant chest wall artifact. EF 62%. No RWMA. Neg EKG on max EST. Ex time 9 min 50 sec.  Cardiac stress echo, normal 05/05/2016 Normal maximal stress echo w/reproduction of atypical chest discomfort. Ex time 11 min 3 sec. EF 55%.  Cardiovascular LLE venous duplex 09/28/2016 Left leg:  No DVT.  Chest pain  GERD (gastroesophageal reflux disease)  Heel pain, bilateral   
 Hiatal hernia   
 denies this  HTN (hypertension)  Hyperlipidemia  Insulin resistance   
 follows with endocrinology for this  Night sweats  PVC (premature ventricular contraction)  Right low back pain  S/P colonoscopy 2009, 2014  
 normal per patient  Unsteady gait   
 due to heel pain Past Surgical History:  
Procedure Laterality Date  D/C SUCTION  2011  HX OTHER SURGICAL  2014  
 colonscopy  HX OTHER SURGICAL    
 endoscopy  HX OTHER SURGICAL    
 wisdom teeth removal x1 Family History Problem Relation Age of Onset  Hypertension Mother  Heart defect Mother   
     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 ROS: 
Gen: No fever/chills HEENT: No sore throat, eye pain, ear pain, or congestion. No HA 
CV: No CP Resp: No cough/SOB 
GI: No abdominal pain : No hematuria/dysuria Derm: No rash Neuro: No new paresthesias/weakness Musc: No new myalgias/jt pain Psych: No depression sx Objective:  
 
General: {gen appear:65549::\"alert\",\"cooperative\",\"no distress\"} Mental  status: {mental status:921375::\"alert, oriented to person, place, and time\",\"normal mood, behavior, speech, dress, motor activity, and thought processes\":1} Resp: {resp:31051::\"normal effort\",\"no respiratory distress\":1} Neuro: {neuro:11270::\"no gross deficits\":1} Skin: {skin:376187::\"no discoloration or lesions of concern on visible areas\":1} LABS: 
Lab Results Component Value Date/Time Sodium 143 12/20/2019 03:52 PM  
 Potassium 3.7 12/20/2019 03:52 PM  
 Chloride 107 12/20/2019 03:52 PM  
 CO2 32 12/20/2019 03:52 PM  
 Anion gap 4 12/20/2019 03:52 PM  
 Glucose 69 (L) 12/20/2019 03:52 PM  
 BUN 20 (H) 12/20/2019 03:52 PM  
 Creatinine 1.20 12/20/2019 03:52 PM  
 BUN/Creatinine ratio 17 12/20/2019 03:52 PM  
 GFR est AA 56 (L) 12/20/2019 03:52 PM  
 GFR est non-AA 46 (L) 12/20/2019 03:52 PM  
 Calcium 9.0 12/20/2019 03:52 PM  
 
 
Lab Results Component Value Date/Time Cholesterol, total 177 08/03/2019 11:49 AM  
 HDL Cholesterol 87 (H) 08/03/2019 11:49 AM  
 LDL, calculated 82.8 08/03/2019 11:49 AM  
 VLDL, calculated 7.2 08/03/2019 11:49 AM  
 Triglyceride 36 08/03/2019 11:49 AM  
 CHOL/HDL Ratio 2.0 08/03/2019 11:49 AM  
 
 
Lab Results Component Value Date/Time WBC 4.9 02/11/2019 02:26 PM  
 Hemoglobin, POC 12.2 01/09/2013 08:19 PM  
 HGB 13.7 12/20/2019 03:52 PM  
 Hematocrit, POC 36 01/09/2013 08:19 PM  
 HCT 41.1 12/20/2019 03:52 PM  
 PLATELET 473 54/91/3564 02:26 PM  
 MCV 88.4 02/11/2019 02:26 PM  
 
 
Lab Results Component Value Date/Time Hemoglobin A1c 5.5 02/11/2019 02:26 PM  
 Hemoglobin A1c (POC) 5.6 09/19/2013 02:30 PM  
 Hemoglobin A1c, External 5.8 10/08/2015 Lab Results Component Value Date/Time TSH 1.91 08/20/2018 06:08 PM  
 
 
 
 
Due to this being a TeleHealth *** evaluation, many elements of the physical examination are unable to be assessed. The pt was seen by synchronous (real-time) audio-video*** technology, and/or her healthcare decision maker, is aware that this patient-initiated, Telehealth encounter is a billable service, with coverage as determined by her insurance carrier. She is aware that she may receive a bill and has provided verbal consent to proceed: Yes. The pt is being evaluated by a video*** visit encounter for concerns as above. A caregiver was present when appropriate.  Due to this being a TeleHealth encounter (During SHABV-90 public health emergency), evaluation of the following organ systems was limited: Vitals/Constitutional/EENT/Resp/CV/GI//MS/Neuro/Skin/Heme-Lymph-Imm. Pursuant to the emergency declaration under the [facility], [address] [place] [address] and the [employer] and [employer] Act, this Virtual *** Visit was conducted, with patient's (and/or legal guardian's) consent, to reduce the patient's risk of exposure to COVID-19 and provide necessary medical care. Services were provided through a video*** synchronous discussion virtually to substitute for in-person clinic visit. Patient and provider were located at their individual homes. We discussed the expected course, resolution and complications of the diagnosis(es) in detail. Medication risks, benefits, costs, interactions, and alternatives were discussed as indicated. I advised her to contact the office if her condition worsens, changes or fails to improve as anticipated. She expressed understanding with the diagnosis(es) and plan.   
 
Pablito Jamil MD

## 2020-09-12 LAB
ALB/GLOBRATIO, 58C: 1.4 (CALC) (ref 1–2.5)
ALBUMIN SERPL-MCNC: 4.1 G/DL (ref 3.6–5.1)
ALKALINE PHOSPHATASE, TOTAL, 25002000: 35 U/L (ref 37–153)
ALT SERPL-CCNC: 16 U/L (ref 6–29)
AST SERPL W P-5'-P-CCNC: 20 U/L (ref 10–35)
BASOPHILS # BLD: 38 CELLS/UL (ref 0–200)
BASOPHILS NFR BLD: 0.8 %
BILIRUB SERPL-MCNC: 0.7 MG/DL (ref 0.2–1.2)
BUN SERPL-MCNC: 25 MG/DL (ref 7–25)
BUN/CREATININE RATIO,BUCR: 23 (CALC) (ref 6–22)
CALCIUM SERPL-MCNC: 9.9 MG/DL (ref 8.6–10.4)
CHLORIDE SERPL-SCNC: 104 MMOL/L (ref 98–110)
CHOL/HDL RATIO,CHHDX: 2.4 (CALC)
CHOLEST SERPL-MCNC: 172 MG/DL
CO2 SERPL-SCNC: 26 MMOL/L (ref 20–32)
CREAT SERPL-MCNC: 1.11 MG/DL (ref 0.5–1.05)
EOSINOPHIL # BLD: 29 CELLS/UL (ref 15–500)
EOSINOPHIL NFR BLD: 0.6 %
ERYTHROCYTE [DISTWIDTH] IN BLOOD BY AUTOMATED COUNT: 13 % (ref 11–15)
GLOBULIN,GLOB: 3 G/DL (CALC) (ref 1.9–3.7)
GLUCOSE SERPL-MCNC: 73 MG/DL (ref 65–99)
HBA1C MFR BLD HPLC: 5.3 % OF TOTAL HGB
HCT VFR BLD AUTO: 42.9 % (ref 35–45)
HDLC SERPL-MCNC: 73 MG/DL
HGB BLD-MCNC: 14.1 G/DL (ref 11.7–15.5)
LDL-CHOLESTEROL: 87 MG/DL (CALC)
LYMPHOCYTES # BLD: 1234 CELLS/UL (ref 850–3900)
LYMPHOCYTES NFR BLD: 25.7 %
MCH RBC QN AUTO: 29.5 PG (ref 27–33)
MCHC RBC AUTO-ENTMCNC: 32.9 G/DL (ref 32–36)
MCV RBC AUTO: 89.7 FL (ref 80–100)
MONOCYTES # BLD: 346 CELLS/UL (ref 200–950)
MONOCYTES NFR BLD: 7.2 %
NEUTROPHILS # BLD AUTO: 3154 CELLS/UL (ref 1500–7800)
NEUTROPHILS # BLD: 65.7 %
NON-HDL CHOLESTEROL, 011976: 99 MG/DL (CALC)
PLATELET # BLD AUTO: 258 THOUSAND/UL (ref 140–400)
PMV BLD AUTO: 10.3 FL (ref 7.5–12.5)
POTASSIUM SERPL-SCNC: 3.9 MMOL/L (ref 3.5–5.3)
PROT SERPL-MCNC: 7.1 G/DL (ref 6.1–8.1)
RBC # BLD AUTO: 4.78 MILLION/UL (ref 3.8–5.1)
SODIUM SERPL-SCNC: 141 MMOL/L (ref 135–146)
TRIGL SERPL-MCNC: 43 MG/DL (ref ?–150)
WBC # BLD AUTO: 4.8 THOUSAND/UL (ref 3.8–10.8)

## 2020-09-14 ENCOUNTER — TELEPHONE (OUTPATIENT)
Dept: INTERNAL MEDICINE CLINIC | Age: 60
End: 2020-09-14

## 2020-09-14 NOTE — TELEPHONE ENCOUNTER
----- Message from Rosas Matos MD sent at 9/14/2020 11:27 AM EDT ----- Please let her know that her blood counts are normal.  Her creatinine is mildly elevated at 1.11. Her BUN is 25. Her creatinine is slightly elevated likely secondary to dehydration. She needs to stay hydrated, drinking water throughout the day. No additional studies are warranted for her mildly elevated creatinine. Her A1c is normal.  Her total cholesterol is 172 and HDL is 73. Her triglycerides are 43. Her LDL is 87. She should continue taking all medication as prescribed for now. Also let her know that I reviewed her Dermatology records. Her lab results show no contraindications to pursuing the treatments recommended by her dermatologist.  We will have her labs faxed to her dermatologist.  Please fax her results. Meanwhile, we will fax her work-related paperwork, that has been completed. Dr. [facility] of [address] [place] [address], [address] Phone: [phone] Fax: [phone] Dr. [facility] of [address] [place] [address] [address], [address] [facility], [facility] [address] Phone: [phone] Fax: [phone]

## 2020-09-14 NOTE — TELEPHONE ENCOUNTER
----- Message from Pablito Jamil MD sent at 9/13/2020 11:39 PM EDT ----- Regarding: F/u apt needed Please schedule for an in office 6 month 30 min follow up apt with me. Thanks, 
Dr. India Shelton [facility] [address], [address] [facility], [address] [place] [address] [address] Phone: [phone] Fax: [phone]

## 2020-09-14 NOTE — PROGRESS NOTES
[facility]:  9/13/2020, MRN: [id number]      Janice Frias is a 64 y.o. female and presents to clinic for Annual Wellness Visit (Patient here for wellness visit.)    Subjective:   Pt is a 65yo AAF with h/o chronic bronchitis, LUCY, porphyria (per EHR), allergic rhinitis, MURPHY, multiple food allergies (per lab work), HTN, HLD, constipation, gallbladder polyps (suggested by CT scan 10/13/16), and GERD.      1. HTN:  Taking: aldacone, norvasc, and atenolol. BP is <140/90 today. 2. Health Maintenance:  - No vaginal bleeding or rectal bleeding.  - No drug/tobacco use  - Mammogram due  - Overdue for colon cancer screening per our records. - Pt has questions regarding whether she should be screened for Covid-19. She is asymptomatic and has no known sick exposures. 3. HLD/MURPHY: On pravastatin. No adverse side effects. No excessive ETOH intake. 4. Alopecia: She brought in records today from her Dermatologist, who diagnosed her with alopecia, and is recommending topical steroid rx +/- plaquenil. Pt is unsure as to whether she should try these treatments. Patient Active Problem List    Diagnosis Date Noted    Obesity, morbid ([facility] [address] 06/03/2020    MURPHY (nonalcoholic steatohepatitis) 09/03/2019    Allergic rhinitis 10/04/2017    Gallbladder polyp - suggested by findings on CT scan from 10/13/16 03/01/2017    Hypersomnia with sleep apnea 03/13/2014    Constipation 01/16/2012    Essential hypertension     Hyperlipidemia     GERD (gastroesophageal reflux disease)        Current Outpatient Medications   Medication Sig Dispense Refill    spironolactone (ALDACTONE) 25 mg tablet TAKE ONE TABLET BY MOUTH DAILY 30 Tab 4    amLODIPine (NORVASC) 2.5 mg tablet TAKE ONE TABLET BY MOUTH DAILY 30 Tab 11    CLOBETASOL PROPIONATE, BULK, 0.05 % by Does Not Apply route two (2) times a day.       pravastatin (PRAVACHOL) 80 mg tablet TAKE ONE TABLET BY MOUTH EVERY NIGHT AT BEDTIME 30 Tab 6    atenoloL (TENORMIN) 25 mg tablet TAKE ONE-HALF TO ONE TABLET BY MOUTH DAILY (DO NOT EXCEED ONE TABLET PER DAY) 30 Tab 5    fluticasone (FLONASE) 50 mcg/actuation nasal spray 2 Sprays by Both Nostrils route daily. Indications: Allergic Rhinitis 1 Bottle 11    potassium chloride (KAON 10%) 20 mEq/15 mL solution Patient states she take 1 tablespoon daily      glucose blood VI test strips (BLOOD GLUCOSE TEST) strip Use to check glucose daily 1 Package 11    co-enzyme Q-10 (CO Q-10) 100 mg capsule Take 200 mg by mouth daily.  GARLIC PO Take 2 Caps by mouth daily.  aspirin delayed-release (ECOTRIN LOW STRENGTH) 81 mg tablet Take 81 mg by mouth daily.  omega-3 fatty acids-vitamin e (FISH OIL) 1,000 mg cap Take 2 Caps by mouth daily.  melatonin 3 mg tablet Take 3 mg by mouth nightly as needed.  Dexlansoprazole (DEXILANT) 60 mg CpDM Take 1 Cap by mouth every Monday, Wednesday, Friday.  sucralfate (CARAFATE) 1 gram tablet Take 1 g by mouth daily as needed.  MULTIVITAMIN W-MINERALS/LUTEIN (CENTRUM SILVER PO) Take 1 Tab by mouth daily. Allergies   Allergen Reactions    Ibuprofen Other (comments)    Nsaids (Non-Steroidal Anti-Inflammatory Drug) Other (comments)    Amoxicillin Unable to Obtain and Other (comments)    Lidocaine Swelling     Oral Solution    Lipitor [Atorvastatin] Myalgia           Lopressor [Metoprolol Tartrate] Other (comments)     Lightheaded and cp    Percocet [Oxycodone-Acetaminophen] Unable to Obtain and Other (comments)    Vicodin [Hydrocodone-Acetaminophen] Unable to Obtain and Other (comments)       Past Medical History:   Diagnosis Date    Allergic rhinitis     Cardiac Agatston CAC score, <100 04/30/2014    Coronary calcium score 0.    Cardiac Holter monitoring 01/25/2017    Sinus rhythm, avg HR 73 bpm (range ). Benign Holter study.     Cardiac nuclear imaging test 01/11/2013    No convincing evidence for ischemia or infarction in the setting of significant chest wall artifact. EF 62%. No RWMA. Neg EKG on max EST. Ex time 9 min 50 sec.  Cardiac stress echo, normal 2016    Normal maximal stress echo w/reproduction of atypical chest discomfort. Ex time 11 min 3 sec. EF 55%.  Cardiovascular LLE venous duplex 2016    Left leg:  No DVT.  Chest pain     GERD (gastroesophageal reflux disease)     Heel pain, bilateral     Hiatal hernia     denies this    HTN (hypertension)     Hyperlipidemia     Insulin resistance     follows with endocrinology for this    Night sweats     PVC (premature ventricular contraction)     Right low back pain     S/P colonoscopy ,     normal per patient    Unsteady gait     due to heel pain       Past Surgical History:   Procedure Laterality Date    D/C SUCTION      HX OTHER SURGICAL      colonscopy    HX OTHER SURGICAL      endoscopy    HX OTHER SURGICAL      wisdom teeth removal x1       Family History   Problem Relation Age of Onset    Hypertension Mother     Heart defect Mother         anuerysm    Heart Attack Mother     Asthma Father     Heart Attack Maternal Uncle     Heart Attack Maternal Uncle     Heart Disease Brother     Hypertension Brother        Social History     Tobacco Use    Smoking status: Former Smoker     Packs/day: 0.50     Years: 11.00     Pack years: 5.50     Types: Cigarettes     Last attempt to quit: 2006     Years since quittin.7    Smokeless tobacco: Former User     Quit date: 1/10/1993    Tobacco comment: 6-7 cigs per day   Substance Use Topics    Alcohol use: Yes     Alcohol/week: 0.0 standard drinks     Comment: 1-2 glasses wine 3-4 times a week       ROS   Review of Systems   Constitutional: Negative for chills and fever. HENT: Negative for ear pain and sore throat. Eyes: Negative for blurred vision and pain. Respiratory: Negative for cough and shortness of breath. Cardiovascular: Negative for chest pain. Gastrointestinal: Negative for abdominal pain, blood in stool and melena. Genitourinary: Negative for dysuria and hematuria. Musculoskeletal: Negative for joint pain and myalgias. Skin: Negative for rash. Neurological: Negative for tingling, focal weakness and headaches. Endo/Heme/Allergies: Does not bruise/bleed easily. Psychiatric/Behavioral: Negative for depression and substance abuse. Objective     Vitals:    09/11/20 1341   BP: 120/80   Pulse: 70   Resp: 12   Temp: 97.6 °F (36.4 °C)   TempSrc: Oral   SpO2: 98%   Weight: 153 lb 12.8 oz (69.8 kg)   Height: 4' 2\" (1.27 m)   PainSc:   0 - No pain       Physical Exam  Vitals signs and nursing note reviewed. HENT:      Head: Normocephalic and atraumatic. Right Ear: External ear normal.      Left Ear: External ear normal.   Eyes:      General: No scleral icterus. Right eye: No discharge. Left eye: No discharge. Conjunctiva/sclera: Conjunctivae normal.   Neck:      Musculoskeletal: Neck supple. Cardiovascular:      Rate and Rhythm: Normal rate and regular rhythm. Heart sounds: Normal heart sounds. No murmur. No friction rub. No gallop. Pulmonary:      Effort: Pulmonary effort is normal. No respiratory distress. Breath sounds: Normal breath sounds. No wheezing or rales. Chest:      Chest wall: No tenderness. Abdominal:      General: Bowel sounds are normal. There is no distension. Palpations: Abdomen is soft. There is no mass. Tenderness: There is no abdominal tenderness. There is no guarding or rebound. Musculoskeletal:         General: No swelling (Bue/ BLE) or tenderness (BUE/BLE). Lymphadenopathy:      Cervical: No cervical adenopathy. Skin:     General: Skin is warm and dry. Findings: No erythema. Neurological:      Mental Status: She is alert and oriented to person, place, and time. Motor: No abnormal muscle tone. Gait: Gait is intact.  Gait normal.   Psychiatric: Mood and Affect: Mood and affect normal.         LABS   Data Review:   Lab Results   Component Value Date/Time    WBC 4.8 09/11/2020 02:28 PM    Hemoglobin, POC 12.2 01/09/2013 08:19 PM    HGB 14.1 09/11/2020 02:28 PM    Hematocrit, POC 36 01/09/2013 08:19 PM    HCT 42.9 09/11/2020 02:28 PM    PLATELET 708 11/13/1340 02:28 PM    MCV 89.7 09/11/2020 02:28 PM       Lab Results   Component Value Date/Time    Sodium 141 09/11/2020 02:28 PM    Potassium 3.9 09/11/2020 02:28 PM    Chloride 104 09/11/2020 02:28 PM    CO2 26 09/11/2020 02:28 PM    Anion gap 4 12/20/2019 03:52 PM    Glucose 73 09/11/2020 02:28 PM    BUN 25 09/11/2020 02:28 PM    Creatinine 1.11 (H) 09/11/2020 02:28 PM    BUN/Creatinine ratio 23 (H) 09/11/2020 02:28 PM    GFR est AA 63 09/11/2020 02:28 PM    GFR est non-AA 55 (L) 09/11/2020 02:28 PM    Calcium 9.9 09/11/2020 02:28 PM       Lab Results   Component Value Date/Time    Cholesterol, total 172 09/11/2020 02:28 PM    HDL Cholesterol 73 09/11/2020 02:28 PM    LDL-CHOLESTEROL 87 09/11/2020 02:28 PM    LDL, calculated 82.8 08/03/2019 11:49 AM    VLDL, calculated 7.2 08/03/2019 11:49 AM    Triglyceride 43 09/11/2020 02:28 PM    CHOL/HDL Ratio 2.0 08/03/2019 11:49 AM    Cholesterol/HDL ratio 2.4 09/11/2020 02:28 PM       Lab Results   Component Value Date/Time    Hemoglobin A1c 5.3 09/11/2020 02:28 PM    Hemoglobin A1c (POC) 5.6 09/19/2013 02:30 PM    Hemoglobin A1c, External 5.8 10/08/2015       Assessment/Plan:   1. Essential hypertension: Stable. - C/w rx as prescribed. - Checking labs. ORDERS:  - LIPID PANEL; Future  - HEMOGLOBIN A1C W/O EAG; Future  - METABOLIC PANEL, COMPREHENSIVE; Future  - MICROALBUMIN, UR, RAND W/ MICROALB/CREAT RATIO; Future  - CBC WITH AUTOMATED DIFF; Future  - CBC WITH AUTOMATED DIFF  - MICROALBUMIN, UR, RAND W/ MICROALB/CREAT RATIO  - METABOLIC PANEL, COMPREHENSIVE  - HEMOGLOBIN A1C W/O EAG  - LIPID PANEL    2. MURPHY/HLD: Stable. - C/w rx as prescribed.  Checking labs.    ORDERS:  - LIPID PANEL; Future  - METABOLIC PANEL, COMPREHENSIVE; Future  - METABOLIC PANEL, COMPREHENSIVE  - LIPID PANEL    3. Alopecia:  - I see no contraindication to pursuing rx recommended by her Dermatologist    4. Health Maintenance:  - Covid-19 testing is not recommended as she is asymptomatic w/o any sick contacts - which was discussed with her today. - Mammogram ordered for breast cancer screening.  - I recommended for her to pursue colon cancer screening  - I encouraged her to get all recommended vaccines  - Healthy diet and exercise discussed. - Work related forms completed. ORDERS:  - UHBER MAMMO BI SCREENING INCL CAD; Future  - HUBER MAMMO BI SCREENING INCL CAD        Health Maintenance Due   Topic Date Due    Shingrix Vaccine Age 49> (1 of 2) 04/05/2012    Colonoscopy  11/14/2019    Lipid Screen  08/03/2020         Lab review: labs are reviewed in the EHR and ordered as mentioned above    I have discussed the diagnosis with the patient and the intended plan as seen in the above orders. The patient has received an after-visit summary and questions were answered concerning future plans. I have discussed medication side effects and warnings with the patient as well. I have reviewed the plan of care with the patient, accepted their input and they are in agreement with the treatment goals. All questions were answered. The patient understands the plan of care. Handouts provided today with above information. Pt instructed if symptoms worsen to call the office or report to the ED for continued care. Greater than 50% of the visit time was spent in counseling and/or coordination of care. Voice recognition was used to generate this report, which may have resulted in some phonetic based errors in grammar and contents. Even though attempts were made to correct all the mistakes, some may have been missed, and remained in the body of the document.       Follow-up and Dispositions    · Return in about 6 months (around 3/11/2021) for BP check.          Ruddy Agosto MD

## 2020-09-14 NOTE — PROGRESS NOTES
Please let her know that her blood counts are normal.  Her creatinine is mildly elevated at 1.11. Her BUN is 25. Her creatinine is slightly elevated likely secondary to dehydration. She needs to stay hydrated, drinking water throughout the day. No additional studies are warranted for her mildly elevated creatinine. Her A1c is normal.  Her total cholesterol is 172 and HDL is 73. Her triglycerides are 43. Her LDL is 87. She should continue taking all medication as prescribed for now. Also let her know that I reviewed her Dermatology records. Her lab results show no contraindications to pursuing the treatments recommended by her dermatologist.  We will have her labs faxed to her dermatologist.  Please fax her results. Meanwhile, we will fax her work-related paperwork, that has been completed.      Dr. Kwasi Marshall  [facility] [address]  Phone: [phone]  Fax: [phone]        Dr. Kwasi Marshall  [facility] [address]  Phone: [phone]  Fax: [phone]

## 2020-10-02 ENCOUNTER — TELEPHONE (OUTPATIENT)
Dept: INTERNAL MEDICINE CLINIC | Age: 60
End: 2020-10-02

## 2020-10-02 NOTE — TELEPHONE ENCOUNTER
Patient reached and given message per DR. Regla Womack. Patient verbalizes understanding and no further questions.

## 2020-10-02 NOTE — TELEPHONE ENCOUNTER
Pt c/o left eye still twitching and sometimes even on side of eye it twitches. Also, she is cold all the time in the house even with heavy clothing on. Gretchen Luna this is not a new problem, has been going on for a long time. Please advise pt at [phone]

## 2020-10-02 NOTE — TELEPHONE ENCOUNTER
Please let her know that eye twitching is typically stress related. She needs to make sure she is hydrating well with water, relaxing, and resting well. If she has blurry vision with the eye twitching, she needs to see an ophthalmologist for a dilated exam. 
 
Cold intolerance is common. Her most recent labs show no evidence of anemia. If she develops any other sx, have her notify me. Her Sept labs are negative for anemia. Dr. Gaurav Parsons [facility] [address] [place] [address], [address] [facility], [facility] [address] Phone: [phone] Fax: [phone]

## 2020-11-06 ENCOUNTER — TELEPHONE (OUTPATIENT)
Dept: INTERNAL MEDICINE CLINIC | Age: 60
End: 2020-11-06

## 2020-11-06 NOTE — TELEPHONE ENCOUNTER
Pt called asking for nurse to call her back , she  said she is having a \"funny sensation \" on top of her head for the past two days , she said it is where her hair loss is , she has been wearing a silk cap and wool hat and it has not helped she said she is asking if a nurse would call her back she does have an appt with dermatology buy not until 11/19

## 2020-11-10 NOTE — TELEPHONE ENCOUNTER
Patient reached and states that her dermatologist recommended she wait for her upcoming appointment to discuss.

## 2020-11-17 DIAGNOSIS — E78.5 HYPERLIPIDEMIA, UNSPECIFIED HYPERLIPIDEMIA TYPE: Primary | ICD-10-CM

## 2020-11-19 ENCOUNTER — TELEPHONE (OUTPATIENT)
Dept: INTERNAL MEDICINE CLINIC | Age: 60
End: 2020-11-19

## 2020-11-19 NOTE — TELEPHONE ENCOUNTER
Pt states her dermatologist wants to put her on steroid medication. She states that Dr Jemal Abbott had told her that she didn't want her taking steroids. She was asking to speak with Dr Marylou Rincon nurse. She has a temporary number of [phone] (said it changes every 90 days so doesn't want it added to her chart). Said if she doesn't answer it, it is because she is at a doctor's office being seen.

## 2020-11-20 NOTE — TELEPHONE ENCOUNTER
Tried reaching patient, her phones states that she isn't accepting calls at this time. Will try to reach the patient again at a later time.

## 2020-12-02 ENCOUNTER — TELEPHONE (OUTPATIENT)
Dept: INTERNAL MEDICINE CLINIC | Age: 60
End: 2020-12-02

## 2020-12-02 DIAGNOSIS — R51.9 HEAD PAIN, CHRONIC: Primary | ICD-10-CM

## 2020-12-02 DIAGNOSIS — G89.29 HEAD PAIN, CHRONIC: Primary | ICD-10-CM

## 2020-12-02 DIAGNOSIS — R20.0 FACIAL NUMBNESS: ICD-10-CM

## 2020-12-02 NOTE — TELEPHONE ENCOUNTER
Patient called and stated that she saw Dermatology for the tingling in her head. Patient states that the Dermatologist gave a injection and if that didn't help she would need a further evaluation. Patient states that she is still having head pain with tingling sensation. Patient has seen a Neurologist in the past, so a referral was generated and faxed to DR. Han's group.  Patient also given contact info to follow up on the referral.

## 2020-12-12 ENCOUNTER — TELEPHONE (OUTPATIENT)
Dept: INTERNAL MEDICINE CLINIC | Age: 60
End: 2020-12-12

## 2020-12-13 NOTE — TELEPHONE ENCOUNTER
Received call from patient and reviewed chart. Reports experiencing pain on her scalp with sensation of coldness and tingling across her forehead and her occiput. She states that the pain originates in her area of alopecia. Denies actual headache, neck pain, or visual changes. States that did receive steroid injection from her dermatologist in the area of alopecia with some mild improvement. Has also tried Tylenol. Discussed that tingling/ coldness are likely neurogenic in origin, and paresthesias/pain are typical clinical symptoms associated with frontal fibrosing alopecia. Does have appointment with neurologist on 12/23/2020, but will try to move up appointment to next week. Answered all questions. Discussed that if pain becomes more severe or other neurologic symptoms or deficits develop, she should present to ED.

## 2020-12-21 ENCOUNTER — TELEPHONE (OUTPATIENT)
Dept: INTERNAL MEDICINE CLINIC | Age: 60
End: 2020-12-21

## 2020-12-21 DIAGNOSIS — M25.552 LEFT HIP PAIN: Primary | ICD-10-CM

## 2020-12-21 NOTE — TELEPHONE ENCOUNTER
Pt says she is again having the pain on her left hip bone. Wants to know who she needs to see?  Referral?

## 2020-12-22 NOTE — TELEPHONE ENCOUNTER
Patient reached she states that she is having the left hip pain that she experienced a couple of years ago. Patient was seen by Deborah Fenton (Lamar Peace) at that time. Patient asked for number to [facility] to schedule a new appointment. Patient aware that DR. Frank Camarena is no longer there so a new referral was generated. Patient will call for appointment.

## 2020-12-24 ENCOUNTER — TELEPHONE (OUTPATIENT)
Dept: INTERNAL MEDICINE CLINIC | Age: 60
End: 2020-12-24

## 2020-12-24 NOTE — TELEPHONE ENCOUNTER
Pt stated she is having soreness in right calf and along side of calf. Hard to touch. No redness, no warmth. She stated pain comes and goes. She does exercise but stated its not related to that. This has been going on for about 10 days. Pt was requesting appointment for next week. Pt was advised to go to ER as there was no openings. Still wanted to send message to a provider.

## 2020-12-24 NOTE — TELEPHONE ENCOUNTER
No response from oncall provider yet. Since closing early due to holiday. Contacted patient back and advised her if she starts to develop any sob, increasing calf pain, swelling, warmth or redness to go to ER. Pt verbalized understanding.

## 2021-01-02 ENCOUNTER — HOSPITAL ENCOUNTER (OUTPATIENT)
Dept: LAB | Age: 61
Discharge: HOME | End: 2021-01-02
Payer: COMMERCIAL

## 2021-01-02 LAB
ALBUMIN SERPL-MCNC: 3.9 G/DL (ref 3.4–5)
ANION GAP SERPL CALC-SCNC: 8 MMOL/L (ref 3–18)
APPEARANCE UR: CLEAR
BACTERIA URNS QL MICRO: NEGATIVE /HPF
BILIRUB UR QL: NEGATIVE
BUN SERPL-MCNC: 19 MG/DL (ref 7–18)
BUN/CREAT SERPL: 17 (ref 12–20)
CALCIUM SERPL-MCNC: 9.1 MG/DL (ref 8.5–10.1)
CALCIUM SERPL-MCNC: 9.1 MG/DL (ref 8.5–10.1)
CHLORIDE SERPL-SCNC: 105 MMOL/L (ref 100–111)
CO2 SERPL-SCNC: 25 MMOL/L (ref 21–32)
COLOR UR: YELLOW
CREAT SERPL-MCNC: 1.14 MG/DL (ref 0.6–1.3)
CREAT UR-MCNC: 16 MG/DL (ref 30–125)
EPITH CASTS URNS QL MICRO: ABNORMAL /LPF (ref 0–5)
ERYTHROCYTE [DISTWIDTH] IN BLOOD BY AUTOMATED COUNT: 14.2 % (ref 11.6–14.5)
GLUCOSE SERPL-MCNC: 82 MG/DL (ref 74–99)
GLUCOSE UR STRIP.AUTO-MCNC: NEGATIVE MG/DL
HCT VFR BLD AUTO: 41.9 % (ref 35–45)
HGB BLD-MCNC: 13.9 G/DL (ref 12–16)
HGB UR QL STRIP: NEGATIVE
KETONES UR QL STRIP.AUTO: NEGATIVE MG/DL
LEUKOCYTE ESTERASE UR QL STRIP.AUTO: NEGATIVE
MCH RBC QN AUTO: 29.4 PG (ref 24–34)
MCHC RBC AUTO-ENTMCNC: 33.2 G/DL (ref 31–37)
MCV RBC AUTO: 88.6 FL (ref 74–97)
MICROALBUMIN UR-MCNC: <0.5 MG/DL (ref 0–3)
MICROALBUMIN/CREAT UR-RTO: ABNORMAL MG/G (ref 0–30)
NITRITE UR QL STRIP.AUTO: NEGATIVE
PH UR STRIP: 5.5 [PH] (ref 5–8)
PHOSPHATE SERPL-MCNC: 3.3 MG/DL (ref 2.5–4.9)
PLATELET # BLD AUTO: 291 K/UL (ref 135–420)
PMV BLD AUTO: 10.6 FL (ref 9.2–11.8)
POTASSIUM SERPL-SCNC: 3.9 MMOL/L (ref 3.5–5.5)
PROT UR STRIP-MCNC: NEGATIVE MG/DL
PTH-INTACT SERPL-MCNC: 104.7 PG/ML (ref 18.4–88)
RBC # BLD AUTO: 4.73 M/UL (ref 4.2–5.3)
RBC #/AREA URNS HPF: NEGATIVE /HPF (ref 0–5)
SODIUM SERPL-SCNC: 138 MMOL/L (ref 136–145)
SP GR UR REFRACTOMETRY: <1.005 (ref 1–1.03)
UROBILINOGEN UR QL STRIP.AUTO: 0.2 EU/DL (ref 0.2–1)
WBC # BLD AUTO: 5.8 K/UL (ref 4.6–13.2)
WBC URNS QL MICRO: NEGATIVE /HPF (ref 0–4)

## 2021-01-02 PROCEDURE — 82043 UR ALBUMIN QUANTITATIVE: CPT

## 2021-01-02 PROCEDURE — 85027 COMPLETE CBC AUTOMATED: CPT

## 2021-01-02 PROCEDURE — 81001 URINALYSIS AUTO W/SCOPE: CPT

## 2021-01-02 PROCEDURE — 83970 ASSAY OF PARATHORMONE: CPT

## 2021-01-02 PROCEDURE — 80069 RENAL FUNCTION PANEL: CPT

## 2021-01-02 PROCEDURE — 36415 COLL VENOUS BLD VENIPUNCTURE: CPT

## 2021-01-05 ENCOUNTER — OFFICE VISIT (OUTPATIENT)
Dept: ORTHOPEDIC SURGERY | Age: 61
End: 2021-01-05
Payer: COMMERCIAL

## 2021-01-05 ENCOUNTER — TELEPHONE (OUTPATIENT)
Dept: INTERNAL MEDICINE CLINIC | Age: 61
End: 2021-01-05

## 2021-01-05 VITALS
DIASTOLIC BLOOD PRESSURE: 79 MMHG | WEIGHT: 153 LBS | TEMPERATURE: 95.7 F | BODY MASS INDEX: 28.16 KG/M2 | RESPIRATION RATE: 18 BRPM | HEART RATE: 67 BPM | SYSTOLIC BLOOD PRESSURE: 138 MMHG | HEIGHT: 62 IN

## 2021-01-05 DIAGNOSIS — M25.552 LEFT HIP PAIN: ICD-10-CM

## 2021-01-05 DIAGNOSIS — M75.51 SUBACROMIAL BURSITIS OF RIGHT SHOULDER JOINT: Primary | ICD-10-CM

## 2021-01-05 DIAGNOSIS — M25.511 RIGHT SHOULDER PAIN, UNSPECIFIED CHRONICITY: ICD-10-CM

## 2021-01-05 DIAGNOSIS — R79.89 ELEVATED PTHRP LEVEL: Primary | ICD-10-CM

## 2021-01-05 PROCEDURE — 73030 X-RAY EXAM OF SHOULDER: CPT | Performed by: ORTHOPAEDIC SURGERY

## 2021-01-05 PROCEDURE — 73502 X-RAY EXAM HIP UNI 2-3 VIEWS: CPT | Performed by: ORTHOPAEDIC SURGERY

## 2021-01-05 PROCEDURE — 99203 OFFICE O/P NEW LOW 30 MIN: CPT | Performed by: ORTHOPAEDIC SURGERY

## 2021-01-05 NOTE — TELEPHONE ENCOUNTER
Tried reaching patient, patients phone would only ring, no voicemail available. Will try to reach the patient again at a later time.

## 2021-01-05 NOTE — PROGRESS NOTES
Janine Aguirre  1960   Chief Complaint   Patient presents with    Hip Pain     left    Arm Pain     right        HISTORY OF PRESENT ILLNESS  Janine Aguirre is a 64 y.o. female who presents today for evaluation of right shoulder pain. Pain has been present since 1/02/2021. She had blood drawn from her left arm on 1/02/2021. She then started to experience right shoulder pain later that day. Has tried taking Tylenol. Pain with certain movements, reaching. Pt works in IT. No night pain. Pt also complains of left hip pain. Pain has been present for years, worse in the last 3-4 weeks. Patient describes the pain as aching that is Intermittent in nature. Symptoms are worse with Activity and certain movements and is better with  Rest. Associated symptoms include nothing. Since problem started, it: is unchanged. Pain does not wake patient up at night. Has taken no meds for the problem. Has tried following treatments: Injections:NO; Brace:NO; Therapy:NO; Cane/Crutch:NO       Allergies   Allergen Reactions    Ibuprofen Other (comments)    Nsaids (Non-Steroidal Anti-Inflammatory Drug) Other (comments)    Amoxicillin Unable to Obtain and Other (comments)    Lidocaine Swelling     Oral Solution    Lipitor [Atorvastatin] Myalgia           Lopressor [Metoprolol Tartrate] Other (comments)     Lightheaded and cp    Percocet [Oxycodone-Acetaminophen] Unable to Obtain and Other (comments)    Vicodin [Hydrocodone-Acetaminophen] Unable to Obtain and Other (comments)        Past Medical History:   Diagnosis Date    Allergic rhinitis     Cardiac Agatston CAC score, <100 04/30/2014    Coronary calcium score 0.    Cardiac Holter monitoring 01/25/2017    Sinus rhythm, avg HR 73 bpm (range ). Benign Holter study.  Cardiac nuclear imaging test 01/11/2013    No convincing evidence for ischemia or infarction in the setting of significant chest wall artifact. EF 62%. No RWMA. Neg EKG on max EST.   Ex time 9 min 50 sec.  Cardiac stress echo, normal 2016    Normal maximal stress echo w/reproduction of atypical chest discomfort. Ex time 11 min 3 sec. EF 55%.  Cardiovascular LLE venous duplex 2016    Left leg:  No DVT.  Chest pain     GERD (gastroesophageal reflux disease)     Heel pain, bilateral     Hiatal hernia     denies this    HTN (hypertension)     Hyperlipidemia     Insulin resistance     follows with endocrinology for this    Night sweats     PVC (premature ventricular contraction)     Right low back pain     S/P colonoscopy ,     normal per patient    Unsteady gait     due to heel pain      Social History     Socioeconomic History    Marital status: SINGLE     Spouse name: Not on file    Number of children: Not on file    Years of education: Not on file    Highest education level: Not on file   Occupational History    Occupation: student IT      Employer: NOT EMPLOYED   Social Needs    Financial resource strain: Not on file    Food insecurity     Worry: Not on file     Inability: Not on file    Transportation needs     Medical: Not on file     Non-medical: Not on file   Tobacco Use    Smoking status: Former Smoker     Packs/day: 0.50     Years: 11.00     Pack years: 5.50     Types: Cigarettes     Quit date: 2006     Years since quittin.0    Smokeless tobacco: Former User     Quit date: 1/10/1993    Tobacco comment: 6-7 cigs per day   Substance and Sexual Activity    Alcohol use:  Yes     Alcohol/week: 0.0 standard drinks     Comment: 1-2 glasses wine 3-4 times a week    Drug use: No     Types: Prescription, OTC    Sexual activity: Never   Lifestyle    Physical activity     Days per week: Not on file     Minutes per session: Not on file    Stress: Not on file   Relationships    Social connections     Talks on phone: Not on file     Gets together: Not on file     Attends Alevism service: Not on file     Active member of club or organization: Not on file     Attends meetings of clubs or organizations: Not on file     Relationship status: Not on file    Intimate partner violence     Fear of current or ex partner: Not on file     Emotionally abused: Not on file     Physically abused: Not on file     Forced sexual activity: Not on file   Other Topics Concern    Not on file   Social History Narrative    Not Currently working, 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 Past Surgical History:   Procedure Laterality Date    D/C SUCTION  2011    HX OTHER SURGICAL  2014    colonscopy    HX OTHER SURGICAL      endoscopy    HX OTHER SURGICAL      wisdom teeth removal x1      Family History   Problem Relation Age of Onset    Hypertension Mother     Heart defect Mother         anuerysm    Heart Attack Mother     Asthma Father     Heart Attack Maternal Uncle     Heart Attack Maternal Uncle     Heart Disease Brother     Hypertension Brother       Current Outpatient Medications   Medication Sig    spironolactone (ALDACTONE) 25 mg tablet TAKE ONE TABLET BY MOUTH DAILY    amLODIPine (NORVASC) 2.5 mg tablet TAKE ONE TABLET BY MOUTH DAILY    CLOBETASOL PROPIONATE, BULK, 0.05 % by Does Not Apply route two (2) times a day.  pravastatin (PRAVACHOL) 80 mg tablet TAKE ONE TABLET BY MOUTH EVERY NIGHT AT BEDTIME    atenoloL (TENORMIN) 25 mg tablet TAKE ONE-HALF TO ONE TABLET BY MOUTH DAILY (DO NOT EXCEED ONE TABLET PER DAY)    potassium chloride (KAON 10%) 20 mEq/15 mL solution Patient states she take 1 tablespoon daily    Dexlansoprazole (DEXILANT) 60 mg CpDM Take 1 Cap by mouth every Monday, Wednesday, Friday.  fluticasone (FLONASE) 50 mcg/actuation nasal spray 2 Sprays by Both Nostrils route daily. Indications: Allergic Rhinitis    glucose blood VI test strips (BLOOD GLUCOSE TEST) strip Use to check glucose daily    co-enzyme Q-10 (CO Q-10) 100 mg capsule Take 200 mg by mouth daily.  GARLIC PO Take 2 Caps by mouth daily.     aspirin delayed-release (ECOTRIN LOW STRENGTH) 81 mg tablet Take 81 mg by mouth daily.  omega-3 fatty acids-vitamin e (FISH OIL) 1,000 mg cap Take 2 Caps by mouth daily.  melatonin 3 mg tablet Take 3 mg by mouth nightly as needed.  sucralfate (CARAFATE) 1 gram tablet Take 1 g by mouth daily as needed.  MULTIVITAMIN W-MINERALS/LUTEIN (CENTRUM SILVER PO) Take 1 Tab by mouth daily. No current facility-administered medications for this visit. REVIEW OF SYSTEM   Patient denies: Weight loss, Fever/Chills, HA, Visual changes, Fatigue, Chest pain, SOB, Abdominal pain, N/V/D/C, Blood in stool or urine, Edema. Pertinent positive as above in HPI. All others were negative    PHYSICAL EXAM:   Visit Vitals  /79 (BP 1 Location: Left arm, BP Patient Position: Sitting)   Pulse 67   Temp (!) 95.7 °F (35.4 °C) (Temporal)   Resp 18   Ht 5' 2\" (1.575 m)   Wt 153 lb (69.4 kg)   BMI 27.98 kg/m²     The patient is a well-developed, well-nourished female   in no acute distress. The patient is alert and oriented times three. The patient is alert and oriented times three. Mood and affect are normal.  LYMPHATIC: lymph nodes are not enlarged and are within normal limits  SKIN: normal in color and non tender to palpation. There are no bruises or abrasions noted. NEUROLOGICAL: Motor sensory exam is within normal limits. Reflexes are equal bilaterally.  There is normal sensation to pinprick and light touch  MUSCULOSKELETAL:   Examination Left hip   Skin Intact   Flexion    Extension ROM 20   External Rotation ROM 45   Internal Rotation ROM 45   Abduction ROM 45   Adduction ROM 30   FADDIR -   BERHANE -   Log roll test -   Trochanteric tenderness -   Tomás test -   Neurovascular Intact     Examination Right shoulder   Skin Intact   AC joint tenderness -   Biceps tenderness -   Forward flexion/Elevation    Active abduction    Glenohumeral abduction 90   External rotation ROM 90   Internal rotation ROM 70   Apprehension -   [place]s Relocation -   Jerk -   Load and Shift -   [place]bri[place] -   Speeds -   Impingement sign +   Supraspinatus/Empty Can -, 5/5   External Rotation Strength -, 5/5   Lift Off/Belly Press -, 5/5   Neurovascular Intact       IMAGING: XR of right shoulder obtained in the office dated 1/05/2021 was reviewed and read by Dr. Earlene Antony: no acute abnormalities      XR of left hip obtained in the office dated 1/05/2021 was reviewed and read by Dr. Earlene Antony: no acute abnormalities        IMPRESSION:      ICD-10-CM ICD-9-CM    1. Subacromial bursitis of right shoulder joint  M75.51 726.19    2. Left hip pain  M25.552 719.45 AMB POC X-RAY RADEX HIP UNI WITH PELVIS 2-3 VIEWS   3. Right shoulder pain, unspecified chronicity  M25.511 719.41 AMB POC XRAY, SHOULDER; COMPLETE, 2+        PLAN:  1. Pt presents today with right shoulder pain due to subacromial bursitis and I advised her to try taking Tylenol Arthritis, she cannot take NSAIDs. I advised her to follow up with her PCP regarding her complaint of left hip pain as I do not believe her pain is originating from the hip. Risk factors include: htn, no NSAIDs  2. No ultrasound exam indicated today  3. No cortisone injection indicated today   4. No Physical/Occupational Therapy indicated today  5. No diagnostic test indicated today:   6. No durable medical equipment indicated today  7. No referral indicated today   8. No medications indicated today:   9. No Narcotic indicated today       RTC prn      Scribed by [address]) as dictated by Robert Méndez MD    I, Dr. Robert Méndez, confirm that all documentation is accurate.     Robert Méndez M.D.   [facility]

## 2021-01-05 NOTE — TELEPHONE ENCOUNTER
----- Message from Palmer Fry MD sent at 1/4/2021  4:31 PM EST ----- Please place a referral for me to sign to Endocrinology for additional recommendations given her elevated PTH. Her PTH is elevated but her calcium is normal. Please let her know. Her renal function labs show no CKD. Her CBC is normal. 
 
Dr. Marysol Alaniz [facility] [address] [place] [address], [address] [facility], [facility] [address] Phone: [phone] Fax: [phone]

## 2021-01-06 NOTE — TELEPHONE ENCOUNTER
Patient reached and given information per DR. Beba Ocasio.  Patient also given contact info to endocrinologist.

## 2021-01-08 ENCOUNTER — TELEPHONE (OUTPATIENT)
Dept: INTERNAL MEDICINE CLINIC | Age: 61
End: 2021-01-08

## 2021-01-08 NOTE — TELEPHONE ENCOUNTER
Patient is aware to continue with Zpak and Tylenol through the weekend. Call office on Monday if she is still having issues. Patient verbalizes understanding.

## 2021-01-08 NOTE — TELEPHONE ENCOUNTER
Patient is calling asking to speak with April. Stating she was on [facility] last night and she needs a prescription. Refused to provide me any information or details.

## 2021-01-08 NOTE — TELEPHONE ENCOUNTER
Agree she should take the tylenol and  Zpak  For the weekend and f/u on Monday if still having issues.

## 2021-01-08 NOTE — TELEPHONE ENCOUNTER
Patient returned call and states that she went on [facility] (virtual appt) for sore throat and ear ache. They prescribed her a z-pack and instructed her to take tylenol. Patient unable to take ibuprofen due to kidneys. Patient asking if something stronger for the pain could be sent to her pharmacy. Patient advised that I would send to provider but that she would need to give the z-pack and tylenol a chance to work.

## 2021-01-12 ENCOUNTER — OFFICE VISIT (OUTPATIENT)
Dept: ORTHOPEDIC SURGERY | Age: 61
End: 2021-01-12
Payer: COMMERCIAL

## 2021-01-12 VITALS
HEART RATE: 67 BPM | RESPIRATION RATE: 20 BRPM | SYSTOLIC BLOOD PRESSURE: 124 MMHG | HEIGHT: 62 IN | BODY MASS INDEX: 27.98 KG/M2 | TEMPERATURE: 98.4 F | DIASTOLIC BLOOD PRESSURE: 88 MMHG

## 2021-01-12 DIAGNOSIS — M25.512 LEFT SHOULDER PAIN, UNSPECIFIED CHRONICITY: ICD-10-CM

## 2021-01-12 DIAGNOSIS — M79.18 CERVICAL MYOFASCIAL PAIN SYNDROME: ICD-10-CM

## 2021-01-12 DIAGNOSIS — R29.2: ICD-10-CM

## 2021-01-12 DIAGNOSIS — M54.2 NECK PAIN: Primary | ICD-10-CM

## 2021-01-12 PROCEDURE — 72040 X-RAY EXAM NECK SPINE 2-3 VW: CPT | Performed by: PHYSICAL MEDICINE & REHABILITATION

## 2021-01-12 PROCEDURE — 99204 OFFICE O/P NEW MOD 45 MIN: CPT | Performed by: PHYSICAL MEDICINE & REHABILITATION

## 2021-01-12 NOTE — PROGRESS NOTES
[address]  [address], [address]  [place], [facility] [address]  Phone: [phone]  Fax: [phone]        Stacy Bauman  : 1960  PCP: Ankita Sauceda MD  2021    NEW PATIENT      HISTORY OF PRESENT ILLNESS  Efrain Corey is a 64 y.o. female c/o neck grinding and popping. She notes that she was recently diagnosed with frontal fibrosing alopecia, and then she began experiencing grinding in her neck and pain radiating into the head. Pt notes that if she sits in an unsupported chair while watching TV, she has pain radiating into the head. She feels fine if she sits in a chair with a back support like a recliner. She also c/o right shoulder pain for which she is seeing Dr. Olvin France. Pain Score: 5/10. Treatments patient has tried:  Physical therapy:Pending  Doing HEP: Unknown  Non-opioid medications: Yes  Spinal injections: No  Spinal surgery- No.   Last Cervical Spine MRI: None. PmHx: Frontal fibrosing alopecia     ASSESSMENT  This is a 64year-old female with c/o neck grinding and pain radiating into the head in a splenius capitus distribution. Her symptoms are likely due to myofascial pain. She also likely has a right rotator cuff pathology. Given her positive Silverman's sign on the L, I would like to obtain a cervical MRI for further evaluation since her brain MRI was normal.    PLAN  1. Referral to PT with optional dry needling [facility])  2. Cervical MRI - Positive Silverman's sign on the L; Brain MRI was normal; neck pain radiating into head  3. I recommended a high back computer chair for her at home work station  4. Provided neck and upper back exercises to add to HEP. Pt will f/u after MRI or sooner if needed. Diagnoses and all orders for this visit:    1. Neck pain  -     AMB POC XRAY, SPINE, CERVICAL; 2 OR 3  -     REFERRAL TO PHYSICAL THERAPY  -     MRI CERV SPINE WO CONT; Future    2.  Cervical myofascial pain syndrome  - REFERRAL TO PHYSICAL THERAPY  -     MRI CERV SPINE WO CONT; Future    3. Silverman's reflex positive  -     MRI CERV SPINE WO CONT; Future    4. Left shoulder pain, unspecified chronicity  -     REFERRAL TO PHYSICAL THERAPY             CHIEF COMPLAINT  Burgess Marie is seen today in consultation at the request of Jose David Soto MD for complaints of neck grinding. PAST MEDICAL HISTORY   Past Medical History:   Diagnosis Date    Allergic rhinitis     Cardiac Agatston CAC score, <100 04/30/2014    Coronary calcium score 0.    Cardiac Holter monitoring 01/25/2017    Sinus rhythm, avg HR 73 bpm (range ). Benign Holter study.  Cardiac nuclear imaging test 01/11/2013    No convincing evidence for ischemia or infarction in the setting of significant chest wall artifact. EF 62%. No RWMA. Neg EKG on max EST. Ex time 9 min 50 sec.  Cardiac stress echo, normal 05/05/2016    Normal maximal stress echo w/reproduction of atypical chest discomfort. Ex time 11 min 3 sec. EF 55%.  Cardiovascular LLE venous duplex 09/28/2016    Left leg:  No DVT.     Chest pain     GERD (gastroesophageal reflux disease)     Heel pain, bilateral     Hiatal hernia     denies this    HTN (hypertension)     Hyperlipidemia     Insulin resistance     follows with endocrinology for this    Night sweats     PVC (premature ventricular contraction)     Right low back pain     S/P colonoscopy 2009, 2014    normal per patient    Unsteady gait     due to heel pain       Past Surgical History:   Procedure Laterality Date    D/C SUCTION  2011    HX OTHER SURGICAL  2014    colonscopy    HX OTHER SURGICAL      endoscopy    HX OTHER SURGICAL      wisdom teeth removal x1       MEDICATIONS    Current Outpatient Medications   Medication Sig Dispense Refill    spironolactone (ALDACTONE) 25 mg tablet TAKE ONE TABLET BY MOUTH DAILY 30 Tab 4    amLODIPine (NORVASC) 2.5 mg tablet TAKE ONE TABLET BY MOUTH DAILY 30 Tab 11    CLOBETASOL PROPIONATE, BULK, 0.05 % by Does Not Apply route two (2) times a day.  pravastatin (PRAVACHOL) 80 mg tablet TAKE ONE TABLET BY MOUTH EVERY NIGHT AT BEDTIME 30 Tab 6    atenoloL (TENORMIN) 25 mg tablet TAKE ONE-HALF TO ONE TABLET BY MOUTH DAILY (DO NOT EXCEED ONE TABLET PER DAY) 30 Tab 5    fluticasone (FLONASE) 50 mcg/actuation nasal spray 2 Sprays by Both Nostrils route daily. Indications: Allergic Rhinitis 1 Bottle 11    potassium chloride (KAON 10%) 20 mEq/15 mL solution Patient states she take 1 tablespoon daily      glucose blood VI test strips (BLOOD GLUCOSE TEST) strip Use to check glucose daily 1 Package 11    co-enzyme Q-10 (CO Q-10) 100 mg capsule Take 200 mg by mouth daily.  GARLIC PO Take 2 Caps by mouth daily.  aspirin delayed-release (ECOTRIN LOW STRENGTH) 81 mg tablet Take 81 mg by mouth daily.  omega-3 fatty acids-vitamin e (FISH OIL) 1,000 mg cap Take 2 Caps by mouth daily.  melatonin 3 mg tablet Take 3 mg by mouth nightly as needed.  Dexlansoprazole (DEXILANT) 60 mg CpDM Take 1 Cap by mouth every Monday, Wednesday, Friday.  sucralfate (CARAFATE) 1 gram tablet Take 1 g by mouth daily as needed.  MULTIVITAMIN W-MINERALS/LUTEIN (CENTRUM SILVER PO) Take 1 Tab by mouth daily.          ALLERGIES  Allergies   Allergen Reactions    Ibuprofen Other (comments)    Nsaids (Non-Steroidal Anti-Inflammatory Drug) Other (comments)    Amoxicillin Unable to Obtain and Other (comments)    Lidocaine Swelling     Oral Solution    Lipitor [Atorvastatin] Myalgia           Lopressor [Metoprolol Tartrate] Other (comments)     Lightheaded and cp    Percocet [Oxycodone-Acetaminophen] Unable to Obtain and Other (comments)    Vicodin [Hydrocodone-Acetaminophen] Unable to Obtain and Other (comments)          SOCIAL HISTORY    Social History     Socioeconomic History    Marital status: SINGLE     Spouse name: Not on file    Number of children: Not on file    Years of education: Not on file    Highest education level: Not on file   Occupational History    Occupation: student IT      Employer: NOT EMPLOYED   Tobacco Use    Smoking status: Former Smoker     Packs/day: 0.50     Years: 11.00     Pack years: 5.50     Types: Cigarettes     Quit date: 2006     Years since quittin.0    Smokeless tobacco: Former User     Quit date: 1/10/1993    Tobacco comment: 6-7 cigs per day   Substance and Sexual Activity    Alcohol use: Yes     Alcohol/week: 0.0 standard drinks     Comment: 1-2 glasses wine 3-4 times a week    Drug use: No     Types: Prescription, OTC    Sexual activity: Never   Social History Narrative    Not Currently working, 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 FAMILY HISTORY  Family History   Problem Relation Age of Onset    Hypertension Mother     Heart defect Mother         anuerysm    Heart Attack Mother     Asthma Father     Heart Attack Maternal Uncle     Heart Attack Maternal Uncle     Heart Disease Brother     Hypertension Brother          REVIEW OF SYSTEMS  Review of Systems   Constitutional: Negative for chills, fever and weight loss. Respiratory: Negative for shortness of breath. Cardiovascular: Negative for chest pain. Gastrointestinal: Negative for constipation. Negative for fecal incontinence    Genitourinary: Negative for dysuria. Negative for urinary incontinence   Musculoskeletal: Positive for joint pain (R shoulder ) and neck pain. Neck pain/grinding radiating into head   Skin: Negative for rash. Alopecia   Neurological: Negative for dizziness, tingling, tremors, focal weakness and headaches. Endo/Heme/Allergies: Does not bruise/bleed easily. Psychiatric/Behavioral: The patient does not have insomnia.           PHYSICAL EXAMINATION  Visit Vitals  /88   Pulse 67   Temp 98.4 °F (36.9 °C) (Temporal)   Resp 20   Ht 5' 2\" (1.575 m)   BMI 27.98 kg/m²         Pain Assessment  2021   Location of Pain Neck   Location Modifiers -   Severity of Pain 5   Quality of Pain Aching   Duration of Pain A few hours   Frequency of Pain Intermittent   Aggravating Factors -   Aggravating Factors Comment -   Limiting Behavior -   Relieving Factors -   Result of Injury -         Constitutional:  Well developed, well nourished, in no acute distress.   Psychiatric: Affect and mood are appropriate.   HEENT: Normocephalic, atraumatic. Extraocular movements intact.   Integumentary: No rashes or abrasions noted on exposed areas.    Cardiovascular: Regular rate and rhythm.   Pulmonary: Clear to auscultation bilaterally.    SPINE/MUSCULOSKELETAL EXAM    Cervical spine:  Neck is midline.   Normal muscle tone.   No focal atrophy is noted.   ROM pain free.   Shoulder ROM intact.   Tenderness to palpation.  Negative Spurling's sign.   Negative Tinel's sign.   Negative Silverman's sign.               Sensation in the bilateral arms grossly intact to light touch.     Positive Flores sign on the R  Positive Silverman's sign on the L.    MOTOR:      Biceps  Triceps Deltoids Wrist Ext Wrist Flex Hand Intrin   Right 5/5 5/5 5/5 5/5 5/5 5/5   Left 5/5 5/5 5/5 5/5 5/5 5/5             Hip Flex  Quads Hamstrings Ankle DF EHL Ankle PF   Right 5/5 5/5 5/5 5/5 5/5 5/5   Left 5/5 5/5 5/5 5/5 5/5 5/5     Negative Straight Leg raise.   Squat not tested.   No difficulty with tandem gait.     Ambulation without assistive device. FWB.      RADIOGRAPHS/DATA  2V AP/Lateral Cervical Spine XR images taken on 1/12/2021 personally reviewed with patient:  Straightening of cervical lordosis.  Degeneration at C4-5, C5-6  Mild posterolisthesis of C5 on C6  No significant facet sclerosis  Lean to the R     reviewed    Ms. Mendez has a reminder for a \"due or due soon\" health maintenance. I have asked that she contact her primary care provider for follow-up on this health maintenance.     28 minutes of face-to-face contact were spent with the patient during today's  visit extensively discussing symptoms and treatment plan. All questions were answered. More than half of this visit today was spent on counseling. Written by Milena King, as dictated by Dr. Campbell Members. I, Dr. Campbell Members, confirm that all documentation is accurate.

## 2021-01-12 NOTE — PATIENT INSTRUCTIONS
Neck: Exercises Introduction Here are some examples of exercises for you to try. The exercises may be suggested for a condition or for rehabilitation. Start each exercise slowly. Ease off the exercises if you start to have pain. You will be told when to start these exercises and which ones will work best for you. How to do the exercises Neck stretch 1. This stretch works best if you keep your shoulder down as you lean away from it. To help you remember to do this, start by relaxing your shoulders and lightly holding on to your thighs or your chair. 2. Tilt your head toward your shoulder and hold for 15 to 30 seconds. Let the weight of your head stretch your muscles. 3. If you would like a little added stretch, use your hand to gently and steadily pull your head toward your shoulder. For example, keeping your right shoulder down, lean your head to the left. 4. Repeat 2 to 4 times toward each shoulder. Diagonal neck stretch 1. Turn your head slightly toward the direction you will be stretching, and tilt your head diagonally toward your chest and hold for 15 to 30 seconds. 2. If you would like a little added stretch, use your hand to gently and steadily pull your head forward on the diagonal. 
3. Repeat 2 to 4 times toward each side. Dorsal glide stretch The dorsal glide stretches the back of the neck. If you feel pain, do not glide so far back. Some people find this exercise easier to do while lying on their backs with an ice pack on the neck. 1. Sit or stand tall and look straight ahead. 2. Slowly tuck your chin as you glide your head backward over your body 3. Hold for a count of 6, and then relax for up to 10 seconds. 4. Repeat 8 to 12 times. Chest and shoulder stretch 1. Sit or stand tall and glide your head backward as in the dorsal glide stretch. 2. Raise both arms so that your hands are next to your ears. 3. Take a deep breath, and as you breathe out, lower your elbows down and behind your back. You will feel your shoulder blades slide down and together, and at the same time you will feel a stretch across your chest and the front of your shoulders. 4. Hold for about 6 seconds, and then relax for up to 10 seconds. 5. Repeat 8 to 12 times. Strengthening: Hands on head 1. Move your head backward, forward, and side to side against gentle pressure from your hands, holding each position for about 6 seconds. 2. Repeat 8 to 12 times. Follow-up care is a key part of your treatment and safety. Be sure to make and go to all appointments, and call your doctor if you are having problems. It's also a good idea to know your test results and keep a list of the medicines you take. Where can you learn more? Go to http://www.galvin.com/ Enter P975 in the search box to learn more about \"Neck: Exercises. \" Current as of: March 2, 2020               Content Version: 12.6 © 3821-1756 [employer], [employer]orporated. Care instructions adapted under license by [employer] (which disclaims liability or warranty for this information). If you have questions about a medical condition or this instruction, always ask your healthcare professional. [address] any warranty or liability for your use of this information. Healthy Upper Back: Exercises Introduction Here are some examples of exercises for your upper back. Start each exercise slowly. Ease off the exercise if you start to have pain. Your doctor or physical therapist will tell you when you can start these exercises and which ones will work best for you. How to do the exercises Lower neck and upper back stretch 1. Stretch your arms out in front of your body. Clasp one hand on top of your other hand. 2. Gently reach out so that you feel your shoulder blades stretching away from each other. 3. Gently bend your head forward. 4. Hold for 15 to 30 seconds. 5. Repeat 2 to 4 times. Midback stretch If you have knee pain, do not do this exercise. 1. Kneel on the floor, and sit back on your ankles. 2. Lean forward, place your hands on the floor, and stretch your arms out in front of you. Rest your head between your arms. 3. Gently push your chest toward the floor, reaching as far in front of you as possible. 4. Hold for 15 to 30 seconds. 5. Repeat 2 to 4 times. Shoulder rolls 1. Sit comfortably with your feet shoulder-width apart. You can also do this exercise while standing. 2. Roll your shoulders up, then back, and then down in a smooth, circular motion. 3. Repeat 2 to 4 times. Wall push-up 1. Stand against a wall with your feet about 12 to 24 inches back from the wall. If you feel any pain when you do this exercise, stand closer to the wall. 2. Place your hands on the wall slightly wider apart than your shoulders, and lean forward. 3. Gently lean your body toward the wall. Then push back to your starting position. Keep the motion smooth and controlled. 4. Repeat 8 to 12 times. Resisted shoulder blade squeeze For this exercise, you will need elastic exercise material, such as surgical tubing or Thera-Band. 1. Sit or stand, holding the band in both hands in front of you. Keep your elbows close to your sides, bent at a 90-degree angle. Your palms should face up. 2. Squeeze your shoulder blades together, and move your arms to the outside, stretching the band. Be sure to keep your elbows at your sides while you do this. 3. Relax. 4. Repeat 8 to 12 times. Resisted rows For this exercise, you will need elastic exercise material, such as surgical tubing or Thera-Band. 1. Put the band around a solid object, such as a bedpost, at about waist level. Hold one end of the band in each hand. 2. With your elbows at your sides and bent to 90 degrees, pull the band back to move your shoulder blades toward each other. Return to the starting position. 3. Repeat 8 to 12 times. Follow-up care is a key part of your treatment and safety. Be sure to make and go to all appointments, and call your doctor if you are having problems. It's also a good idea to know your test results and keep a list of the medicines you take. Where can you learn more? Go to http://www.gray.com/ Enter L191 in the search box to learn more about \"Healthy Upper Back: Exercises. \" Current as of: March 2, 2020               Content Version: 12.6 © 2006-2020 [employer]. Care instructions adapted under license by [employer] (which disclaims liability or warranty for this information). If you have questions about a medical condition or this instruction, always ask your healthcare professional. [address] any warranty or liability for your use of this information.

## 2021-01-13 DIAGNOSIS — M54.2 NECK PAIN: ICD-10-CM

## 2021-01-13 DIAGNOSIS — M79.18 CERVICAL MYOFASCIAL PAIN SYNDROME: ICD-10-CM

## 2021-01-13 DIAGNOSIS — R29.2: ICD-10-CM

## 2021-01-14 ENCOUNTER — TELEPHONE (OUTPATIENT)
Dept: ORTHOPEDIC SURGERY | Age: 61
End: 2021-01-14

## 2021-01-14 NOTE — TELEPHONE ENCOUNTER
Patient called stating her work needs a letter that says she needs a high back chair with a neck rest so they can provide it for her. She's asking if the letter can be mailed to her at the address we have on file. Please advise patient when completed at [phone].

## 2021-01-14 NOTE — LETTER
NOTIFICATION RETURN TO WORK / SCHOOL 
 
1/14/2021 1:54 PM 
 
Ms. David Whitley [address] [place] [phone] To Whom It May Concern: 
 
David Whitley is currently under the care of [facility]. I recommend a high back computer chair for her at home work station. If there are questions or concerns please have the patient contact our office.  
 
 
 
Sincerely, 
 
 
Toshia Benitez MD

## 2021-01-14 NOTE — TELEPHONE ENCOUNTER
Per his note, I recommended a high back computer chair for her at home work station Ok to put this in a letter

## 2021-01-14 NOTE — TELEPHONE ENCOUNTER
Attempted to contact the pt. She was not able to be reached. The line was busy. Will attempt to contact pt at another time. Letter has been completed.

## 2021-01-14 NOTE — LETTER
NOTIFICATION RETURN TO WORK / SCHOOL 
 
1/15/2021 10:42 AM 
 
Ms. Adrienne Rivas [address] [place] [phone] To Whom It May Concern: 
 
Adrienne Rivas is currently under the care of [facility]. I recommend a high back computer chair with neck rest for her at home work station. If there are questions or concerns please have the patient contact our office.  
 
 
 
Sincerely, 
 
 
Jacinto Zavala MD

## 2021-01-15 NOTE — TELEPHONE ENCOUNTER
Pt was contacted and notified that the letter has been completed. The pt was identified using 2 pt identifiers. The letter needed to be redone because the chair needed to have a neck rest. Pt will come by the office to  the letter since it may take a week or longer in the mail. No other requests from the pt at this time.

## 2021-01-21 ENCOUNTER — TELEPHONE (OUTPATIENT)
Dept: INTERNAL MEDICINE CLINIC | Age: 61
End: 2021-01-21

## 2021-01-21 DIAGNOSIS — M79.604 PAIN OF RIGHT LOWER EXTREMITY: Primary | ICD-10-CM

## 2021-01-21 NOTE — TELEPHONE ENCOUNTER
Patient reached, she states that she is having right inner thigh pain x 30 days. Patient denies any warmness, redness, or swelling. Patient also denies any injury. Patient describes the pain as a aching pain. Patient states that sometimes her left leg will have the same pain. Please advise.

## 2021-01-27 ENCOUNTER — HOSPITAL ENCOUNTER (OUTPATIENT)
Dept: MAMMOGRAPHY | Age: 61
Discharge: HOME | End: 2021-01-27
Attending: INTERNAL MEDICINE
Payer: COMMERCIAL

## 2021-01-27 DIAGNOSIS — Z12.31 ENCOUNTER FOR SCREENING MAMMOGRAM FOR BREAST CANCER: ICD-10-CM

## 2021-01-27 PROCEDURE — 77063 BREAST TOMOSYNTHESIS BI: CPT

## 2021-01-30 ENCOUNTER — HOSPITAL ENCOUNTER (OUTPATIENT)
Age: 61
Discharge: HOME | End: 2021-01-30
Attending: PHYSICAL MEDICINE & REHABILITATION
Payer: COMMERCIAL

## 2021-01-30 PROCEDURE — 72141 MRI NECK SPINE W/O DYE: CPT

## 2021-02-02 ENCOUNTER — TELEPHONE (OUTPATIENT)
Dept: INTERNAL MEDICINE CLINIC | Age: 61
End: 2021-02-02

## 2021-02-02 NOTE — TELEPHONE ENCOUNTER
Pt is having  A lot of  right leg pain w unexplained bruising on it , she said it started 6 weeks ago , and asking iif someone can see her

## 2021-02-05 ENCOUNTER — OFFICE VISIT (OUTPATIENT)
Dept: INTERNAL MEDICINE CLINIC | Age: 61
End: 2021-02-05
Payer: COMMERCIAL

## 2021-02-05 VITALS
WEIGHT: 153 LBS | HEART RATE: 80 BPM | TEMPERATURE: 97.9 F | OXYGEN SATURATION: 99 % | HEIGHT: 62 IN | SYSTOLIC BLOOD PRESSURE: 119 MMHG | BODY MASS INDEX: 28.16 KG/M2 | RESPIRATION RATE: 16 BRPM | DIASTOLIC BLOOD PRESSURE: 77 MMHG

## 2021-02-05 DIAGNOSIS — M79.604 RIGHT LEG PAIN: Primary | ICD-10-CM

## 2021-02-05 PROCEDURE — 99213 OFFICE O/P EST LOW 20 MIN: CPT | Performed by: NURSE PRACTITIONER

## 2021-02-05 NOTE — PROGRESS NOTES
Kalin Cheema presents today for   Chief Complaint   Patient presents with    Leg Pain     Right leg pain x 6 weeks, with unexplained bruising. Patient denies any swelling. Coordination of Care:  1. Have you been to the ER, urgent care clinic since your last visit? Hospitalized since your last visit? no    2. Have you seen or consulted any other health care providers outside of the [address] [place] [address] since your last visit? Include any pap smears or colon screening.  no

## 2021-02-05 NOTE — LETTER
2/5/2021 1:01 PM 
 
[facility] Tipton [facility] [phone] To Whom it May Concern, 
 
Ms Beatrice Chavez has been instructed to stand and walk every hour while at work for 10 minutes. This is to allow proper blood circulation to her lower extremities. Sincerely, Elham Uriarte, DNP 
 
 oral DASH/TLC (sodium and cholesterol restricted diet)/consistent carbohydrate (no snacks)

## 2021-02-05 NOTE — PROGRESS NOTES
[facility] [address] [place] [address], [address] [place] [address] [place]  [phone] NJAWJY/[phone] fax        2/5/2021    Patient Saeed Byrd 1960     Primary MD Agnes Obregon MD    Subjective    Saeed Byrd a 64 y.o. female who presents with a sick visit for   Chief Complaint   Patient presents with    Leg Pain     Right leg pain x 6 weeks, with unexplained bruising. Patient denies any swelling. Patient comes in with complaints of right leg pain x6 weeks and unexplained bruising. She states today she is having no pain but she has been having for 6 weeks. She also states she has a bruise on her right ankle that she does not know where it came from. She states she usually does not bruise in the winter only in the summertime because she is cautious about where she walks. She has noticed varicose veins to the back of her right knee. She is not sure if this pain is related to varicose veins. She also states there was a knot on her right calf for the past 6 weeks but is not there today. She says the pain travels from her calf up to her knee to her back 10% of the time and 90% of the time the pain is in her calf. She denies any swelling, any redness. She states for work she sits all day behind a desk. She is not getting up except for lunch and at the end of the day. Past Medical History:   Diagnosis Date    Allergic rhinitis     Cardiac Agatston CAC score, <100 04/30/2014    Coronary calcium score 0.    Cardiac Holter monitoring 01/25/2017    Sinus rhythm, avg HR 73 bpm (range ). Benign Holter study.  Cardiac nuclear imaging test 01/11/2013    No convincing evidence for ischemia or infarction in the setting of significant chest wall artifact. EF 62%. No RWMA. Neg EKG on max EST. Ex time 9 min 50 sec.  Cardiac stress echo, normal 05/05/2016    Normal maximal stress echo w/reproduction of atypical chest discomfort. Ex time 11 min 3 sec. EF 55%.  Cardiovascular LLE venous duplex 2016    Left leg:  No DVT.  Chest pain     GERD (gastroesophageal reflux disease)     Heel pain, bilateral     Hiatal hernia     denies this    HTN (hypertension)     Hyperlipidemia     Insulin resistance     follows with endocrinology for this    Night sweats     PVC (premature ventricular contraction)     Right low back pain     S/P colonoscopy ,     normal per patient    Unsteady gait     due to heel pain       Past Surgical History:   Procedure Laterality Date    D/C SUCTION      HX OTHER SURGICAL      colonscopy    HX OTHER SURGICAL      endoscopy    HX OTHER SURGICAL      wisdom teeth removal x1       Social History     Socioeconomic History    Marital status: SINGLE     Spouse name: Not on file    Number of children: Not on file    Years of education: Not on file    Highest education level: Not on file   Occupational History    Occupation: student IT      Employer: NOT EMPLOYED   Social Needs    Financial resource strain: Not on file    Food insecurity     Worry: Not on file     Inability: Not on file    Transportation needs     Medical: Not on file     Non-medical: Not on file   Tobacco Use    Smoking status: Former Smoker     Packs/day: 0.50     Years: 11.00     Pack years: 5.50     Types: Cigarettes     Quit date: 2006     Years since quittin.1    Smokeless tobacco: Former User     Quit date: 1/10/1993    Tobacco comment: 6-7 cigs per day   Substance and Sexual Activity    Alcohol use:  Yes     Alcohol/week: 0.0 standard drinks     Comment: 1-2 glasses wine 3-4 times a week    Drug use: No     Types: Prescription, OTC    Sexual activity: Never   Lifestyle    Physical activity     Days per week: Not on file     Minutes per session: Not on file    Stress: Not on file   Relationships    Social connections     Talks on phone: Not on file     Gets together: Not on file     Attends Yazidi service: Not on file     Active member of club or organization: Not on file     Attends meetings of clubs or organizations: Not on file     Relationship status: Not on file    Intimate partner violence     Fear of current or ex partner: Not on file     Emotionally abused: Not on file     Physically abused: Not on file     Forced sexual activity: Not on file   Other Topics Concern    Not on file   Social History Narrative    Not Currently working, 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 Family History   Problem Relation Age of Onset    Hypertension Mother     Heart defect Mother         anuerysm    Heart Attack Mother     Asthma Father     Heart Attack Maternal Uncle     Heart Attack Maternal Uncle     Heart Disease Brother     Hypertension Brother        Current Outpatient Medications on File Prior to Visit   Medication Sig Dispense Refill    spironolactone (ALDACTONE) 25 mg tablet TAKE ONE TABLET BY MOUTH DAILY 30 Tab 4    amLODIPine (NORVASC) 2.5 mg tablet TAKE ONE TABLET BY MOUTH DAILY 30 Tab 11    pravastatin (PRAVACHOL) 80 mg tablet TAKE ONE TABLET BY MOUTH EVERY NIGHT AT BEDTIME 30 Tab 6    atenoloL (TENORMIN) 25 mg tablet TAKE ONE-HALF TO ONE TABLET BY MOUTH DAILY (DO NOT EXCEED ONE TABLET PER DAY) (Patient taking differently: TAKE ONE-HALF BID) 30 Tab 5    fluticasone (FLONASE) 50 mcg/actuation nasal spray 2 Sprays by Both Nostrils route daily. Indications: Allergic Rhinitis 1 Bottle 11    glucose blood VI test strips (BLOOD GLUCOSE TEST) strip Use to check glucose daily 1 Package 11    co-enzyme Q-10 (CO Q-10) 100 mg capsule Take 200 mg by mouth daily.  GARLIC PO Take 2 Caps by mouth daily.  aspirin delayed-release (ECOTRIN LOW STRENGTH) 81 mg tablet Take 81 mg by mouth daily.  Dexlansoprazole (DEXILANT) 60 mg CpDM Take 1 Cap by mouth every Monday, Wednesday, Friday.  MULTIVITAMIN W-MINERALS/LUTEIN (CENTRUM SILVER PO) Take 1 Tab by mouth daily.       CLOBETASOL PROPIONATE, BULK, 0.05 % by Does Not Apply route two (2) times a day.  potassium chloride (KAON 10%) 20 mEq/15 mL solution Patient states she take 1 tablespoon daily  powder      omega-3 fatty acids-vitamin e (FISH OIL) 1,000 mg cap Take 2 Caps by mouth daily.  melatonin 3 mg tablet Take 3 mg by mouth nightly as needed.  sucralfate (CARAFATE) 1 gram tablet Take 1 g by mouth daily as needed. No current facility-administered medications on file prior to visit. Objective    Visit Vitals  /77   Pulse 80   Temp 97.9 °F (36.6 °C) (Temporal)   Resp 16   Ht 5' 2\" (1.575 m)   Wt 153 lb (69.4 kg)   SpO2 99%   BMI 27.98 kg/m²       Physical Exam  Vitals signs and nursing note reviewed. Constitutional:       Appearance: Normal appearance. HENT:      Head: Normocephalic and atraumatic. Eyes:      Extraocular Movements: Extraocular movements intact. Pupils: Pupils are equal, round, and reactive to light. Neck:      Musculoskeletal: Normal range of motion and neck supple. Cardiovascular:      Rate and Rhythm: Normal rate and regular rhythm. Heart sounds: Normal heart sounds. Pulmonary:      Breath sounds: Normal breath sounds. Musculoskeletal: Normal range of motion. Comments: No limitations noted. Stable gait. Skin:     General: Skin is warm and dry. Comments: Purple-yellow bruising noted to lateral aspect above the ankle measuring approximately 3 cm in circumference. No swelling to either lower extremity. No redness or not palpated in the right calf. Pedal pulses palpable. Neurological:      General: No focal deficit present. Mental Status: She is alert and oriented to person, place, and time. Psychiatric:         Mood and Affect: Mood normal.         Behavior: Behavior normal.         Assessment    1. Right leg pain  Patient has suffered with right lower extremity pain for 6 weeks until today where her pain has subsided.   She had noticed a knot in the middle of her right calf, but she is unable to locate the knot today. No varicose veins were noted to either lower extremity. 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 Instructed pt she is on chronic aspirin therapy which could cause bruising. Informed pt I do not suspect a DVT d/t lack of signs/sx. Instructed patient on the importance of standing up every hour for approximately 10 minutes to help with circulation to her lower extremities as she sits behind a desk all day for work 8+ hours. Instructed patient she could use some tights to help comfort her legs while she is at work. If she has swelling she can elevate them at nighttime. A work note was given per patient request stating she needed to stand approximately every hour for 10 minutes to assist with circulation to her lower extremities. Dr Yulia Noyola. CLINT Stiles-JAKE, DNP  [facility]      I spent 22 minutes with the patient in face-to-face consultation, of which greater than 50% was spent in counseling and coordination of care as described above.

## 2021-02-18 ENCOUNTER — APPOINTMENT (OUTPATIENT)
Dept: PHYSICAL THERAPY | Age: 61
End: 2021-02-18
Payer: COMMERCIAL

## 2021-02-25 ENCOUNTER — HOSPITAL ENCOUNTER (OUTPATIENT)
Dept: PHYSICAL THERAPY | Age: 61
Discharge: HOME | End: 2021-02-25
Payer: COMMERCIAL

## 2021-02-25 PROCEDURE — 97162 PT EVAL MOD COMPLEX 30 MIN: CPT

## 2021-02-25 PROCEDURE — 97535 SELF CARE MNGMENT TRAINING: CPT

## 2021-02-25 PROCEDURE — 97110 THERAPEUTIC EXERCISES: CPT

## 2021-02-25 NOTE — PROGRESS NOTES
PT DAILY TREATMENT NOTE     Patient Name: Contreras Steevnson  Date:2021  : 1960  [x]  Patient  Verified  Payor: Abigail Rangel / Plan: Britni Zapata PPO / Product Type: PPO /    In time:748  Out time:832  Total Treatment Time (min): 44  Visit #: 1 of 2-3     Treatment Area: Neck pain [M54.2]  Left shoulder pain [M25.512]    SUBJECTIVE  Pain Level (0-10 scale): 0  Any medication changes, allergies to medications, adverse drug reactions, diagnosis change, or new procedure performed?: [x] No    [] Yes (see summary sheet for update)  Subjective functional status/changes:   [] No changes reported  Reports she wants an HEP to manage posture and reduce grinding of the neck and pain referral to the crown of her head. Pain increases when looking up and down. Primarily works with computers. Brought a picture of her sitting at a desk chair but it was a frontal view so therapist unable to comment on posture. OBJECTIVE    14 min [x]Eval                  []Re-Eval       15 min Therapeutic Exercise:  [] See flow sheet : creation of HEP with brief explanation. Rationale: increase ROM, increase strength and improve coordination to improve the patients ability to manage posture at work and reduce pain referral.    15 min Self Care/Home Management:  []  See flow sheet : Discussion of plan to perform regimented daily HEP and 3x/week HEP to work on stretching and postural strengthening. Educated pt regarding gentle cervical PA mobilizations that her family member may perform to assist with mobility at home. Discussed turning onto each side equally when using tablet in bed to reduce tightness and shoulder referral. Additionally advised pt to sleep in supine with purchase of a wedged pillow for her low back to improve neck posture. Rationale: increase ROM, increase strength and improve coordination  to improve the patients ability to manage self care.             With   [] TE   [] TA   [] neuro   [] other: Patient Education: [x] Review HEP    [] Progressed/Changed HEP based on:   [] positioning   [] body mechanics   [] transfers   [] heat/ice application    [] other:      Other Objective/Functional Measures: see paper eval/POC     Pain Level (0-10 scale) post treatment: 0    ASSESSMENT/Changes in Function: Pt is a 29-year-old female presenting to the clinic with c/o pain referral to the crown of her head as well as \"grinding\" of the neck with rotational motions. Pt goal is to learn to self-manage and treat these c/o with a HEP and 1 follow-up check-in. Pt brought with her to appointment a picture of her seated in a desk chair in a frontal view with chin forward, so therapist was unable to comment on neck posture today. Cervical ROM is WNL in all directions, but pt reports increased capital pain referral with cervical flexion and extension postures. Some increase in pain when sidelying as well. B UE strength WNL today and pt denies headaches. Occasional pain referral to the right shoulder is reported that \"comes and goes. \" Full UE ROM without pain provocation today. Capital head pain referral is slightly elicited with cervical PA's to C3-4. Decreased mobility of C5 through the upper thoracic spine is noted today with PA's and pt is educated to have a family member assist her with this mobilization at home. Pt provided with a thorough daily and 3x/week HEP and has scheduled one follow-up in the next 30 days. Pt educated to avoid exercises that increase pains until re-consulting therapist. Stretching and postural exercises emphasized today secondary to soft tissue restrictions and forward head with rounded shoulders posturing.      Patient will benefit from skilled PT services to modify and progress therapeutic interventions, address functional mobility deficits, address ROM deficits, address strength deficits, analyze and address soft tissue restrictions, analyze and cue movement patterns, analyze and modify body mechanics/ergonomics and assess and modify postural abnormalities to attain remaining goals. [x]  See Plan of Care  []  See progress note/recertification  []  See Discharge Summary         Progress towards goals / Updated goals:  Short Term Goals: To be accomplished in 2 weeks:  1. Pt will report daily compliance with HEP to maximize therapeutic success. Eval: HEP assigned  2. Pt will report supine sleeping at least 3-5x/week to improve ease of self care. Eval: S/L and prone sleeping  Long Term Goals: To be accomplished in 4 weeks:  1. Pt will improve FOTO to 74%, demonstrating improved functional capacity for ADLs. Eval: 69%  2. Pt will demonstrate correct performance of HEP, demonstrating readiness to progress exercises and improve independence with self management of symptoms. Eval: HEP assigned  3. Pt will demonstrate appropriate self correction of posture with neutral thoracic extension and neutral cervical spine to improve posture for work. Eval: forward head, thoracic flexion  4. Pt will report reduced pain referral to her neck, indicating readiness to d/c to HEP.    Eval: Pain referral to top of head     PLAN  []  Upgrade activities as tolerated     [x]  Continue plan of care  []  Update interventions per flow sheet       []  Discharge due to:_  []  Other:_      Aicha Marin, PT 2/25/2021  8:47 AM    Future Appointments   Date Time Provider Tammi Grady   3/15/2021  7:45 AM Linda Carney, PT [id number]   3/31/2021  3:40 PM Claude Carrillo DO [facility]

## 2021-02-25 NOTE — PROGRESS NOTES
[facility]  [address]  [place], [address]  [phone] fax    Plan of Care/ Statement of Necessity for Physical Therapy Services    Patient name: Burgess Marie Start of Care: 2021   Referral source: Edgar Carrillo MD : 1960    Medical Diagnosis: Neck pain [M54.2]  Left shoulder pain [M25.512]  Payor: [place] / Plan: [facility] [employer] PPO / Product Type: PPO /  Onset Date:< 6 mo    Treatment Diagnosis: c/s with cervical capital referral   Prior Hospitalization: see medical history Provider#: [id number]   Medications: Verified on Patient summary List    Comorbidities: allergies, back pain, GI disease, high blood pressure   Prior Level of Function: no previous capital head pain with work. The Plan of Care and following information is based on the information from the initial evaluation. Assessment/ key information: Pt is a 45-year-old female presenting to the clinic with c/o pain referral to the crown of her head as well as \"grinding\" of the neck with rotational motions. Pt goal is to learn to self-manage and treat these c/o with a HEP and 1 follow-up check-in. Pt brought with her to appointment a picture of her seated in a desk chair in a frontal view with chin forward, so therapist was unable to comment on neck posture today. Cervical ROM is WNL in all directions, but pt reports increased capital pain referral with cervical flexion and extension postures. Some increase in pain when sidelying as well. B UE strength WNL today and pt denies headaches. Occasional pain referral to the right shoulder is reported that \"comes and goes. \" Full UE ROM without pain provocation today. Capital head pain referral is slightly elicited with cervical PA's to C3-4. Decreased mobility of C5 through the upper thoracic spine is noted today with PA's and pt is educated to have a family member assist her with this mobilization at home.  Pt provided with a thorough daily and 3x/week HEP and has scheduled one follow-up in the next 30 days. Pt educated to avoid exercises that increase pains until re-consulting therapist. Stretching and postural exercises emphasized today secondary to soft tissue restrictions and forward head with rounded shoulders posturing. Patient will benefit from skilled PT services to modify and progress therapeutic interventions, address functional mobility deficits, address ROM deficits, address strength deficits, analyze and address soft tissue restrictions, analyze and cue movement patterns, analyze and modify body mechanics/ergonomics and assess and modify postural abnormalities to attain remaining goals. Evaluation Complexity History MEDIUM  Complexity : 1-2 comorbidities / personal factors will impact the outcome/ POC ; Examination MEDIUM Complexity : 3 Standardized tests and measures addressing body structure, function, activity limitation and / or participation in recreation  ;Presentation MEDIUM Complexity : Evolving with changing characteristics  ; Clinical Decision Making MEDIUM Complexity : FOTO score of 26-74  Overall Complexity Rating: MEDIUM  Problem List: pain affecting function, decrease strength, decrease ADL/ functional abilitiies, decrease activity tolerance and decrease flexibility/ joint mobility   Treatment Plan may include any combination of the following: Therapeutic exercise, Therapeutic activities, Neuromuscular re-education, Physical agent/modality, Manual therapy, Patient education, Self Care training and Functional mobility training  Patient / Family readiness to learn indicated by: asking questions, trying to perform skills and interest  Persons(s) to be included in education: patient (P)  Barriers to Learning/Limitations: None  Patient Goal (s): straighten align, head, neck, shoulders  Patient Self Reported Health Status: good  Rehabilitation Potential: good    Short Term Goals:  To be accomplished in 2 weeks: 1. Pt will report daily compliance with HEP to maximize therapeutic success. 2. Pt will report supine sleeping at least 3-5x/week to improve ease of self care. Long Term Goals: To be accomplished in 4 weeks:  1. Pt will improve FOTO to 74%, demonstrating improved functional capacity for ADLs. 2. Pt will demonstrate correct performance of HEP, demonstrating readiness to progress exercises and improve independence with self management of symptoms. 3. Pt will demonstrate appropriate self correction of posture with neutral thoracic extension and neutral cervical spine to improve posture for work. 4. Pt will report reduced pain referral to her neck, indicating readiness to d/c to HEP. Frequency / Duration: Patient to be seen 1 times per week for 2-3 treatments. Patient/ Caregiver education and instruction: Diagnosis, prognosis, self care, activity modification and exercises   [x]  Plan of care has been reviewed with DARLYN Dunn, PT 2/25/2021 9:15 AM    ________________________________________________________________________    I certify that the above Therapy Services are being furnished while the patient is under my care. I agree with the treatment plan and certify that this therapy is necessary.     [de-identified] Signature:____________Date:_________TIME:________     Arline Harada, MD  ** Signature, Date and Time must be completed for valid certification **    Please sign and return to [facility] [address]  [address]  [place], [address]  [phone] fax

## 2021-02-26 DIAGNOSIS — I11.9 BENIGN HYPERTENSIVE HEART DISEASE WITHOUT HEART FAILURE: ICD-10-CM

## 2021-02-26 RX ORDER — SPIRONOLACTONE 25 MG/1
TABLET ORAL
Qty: 30 TAB | Refills: 5 | Status: SHIPPED | OUTPATIENT
Start: 2021-02-26 | End: 2021-03-22 | Stop reason: SDUPTHER

## 2021-02-26 NOTE — TELEPHONE ENCOUNTER
Patient called and requested a temporary supply be sent to [facility] on [address] [place] [address].

## 2021-02-26 NOTE — TELEPHONE ENCOUNTER
Please schedule for a 30 min virtual apt with me. Dr. Denver Crosser [facility] [address], [address] [facility], [address] Phone: [phone] Fax: [phone]

## 2021-03-08 ENCOUNTER — TELEPHONE (OUTPATIENT)
Dept: INTERNAL MEDICINE CLINIC | Age: 61
End: 2021-03-08

## 2021-03-08 ENCOUNTER — APPOINTMENT (OUTPATIENT)
Dept: GENERAL RADIOLOGY | Age: 61
End: 2021-03-08
Attending: PHYSICIAN ASSISTANT
Payer: COMMERCIAL

## 2021-03-08 ENCOUNTER — HOSPITAL ENCOUNTER (EMERGENCY)
Age: 61
Discharge: HOME | End: 2021-03-08
Attending: EMERGENCY MEDICINE
Payer: COMMERCIAL

## 2021-03-08 VITALS
WEIGHT: 156 LBS | RESPIRATION RATE: 15 BRPM | BODY MASS INDEX: 28.71 KG/M2 | TEMPERATURE: 98.7 F | OXYGEN SATURATION: 99 % | HEART RATE: 56 BPM | HEIGHT: 62 IN | DIASTOLIC BLOOD PRESSURE: 74 MMHG | SYSTOLIC BLOOD PRESSURE: 135 MMHG

## 2021-03-08 DIAGNOSIS — R07.9 CHEST PAIN, UNSPECIFIED TYPE: Primary | ICD-10-CM

## 2021-03-08 LAB
ALBUMIN SERPL-MCNC: 3.8 G/DL (ref 3.4–5)
ALBUMIN/GLOB SERPL: 0.9 {RATIO} (ref 0.8–1.7)
ALP SERPL-CCNC: 40 U/L (ref 45–117)
ALT SERPL-CCNC: 27 U/L (ref 13–56)
ANION GAP SERPL CALC-SCNC: 3 MMOL/L (ref 3–18)
AST SERPL-CCNC: 24 U/L (ref 10–38)
ATRIAL RATE: 59 BPM
BASOPHILS # BLD: 0.1 K/UL (ref 0–0.1)
BASOPHILS NFR BLD: 1 % (ref 0–2)
BILIRUB SERPL-MCNC: 0.4 MG/DL (ref 0.2–1)
BUN SERPL-MCNC: 13 MG/DL (ref 7–18)
BUN/CREAT SERPL: 12 (ref 12–20)
CALCIUM SERPL-MCNC: 9.4 MG/DL (ref 8.5–10.1)
CALCULATED P AXIS, ECG09: 54 DEGREES
CALCULATED R AXIS, ECG10: 35 DEGREES
CALCULATED T AXIS, ECG11: 61 DEGREES
CHLORIDE SERPL-SCNC: 109 MMOL/L (ref 100–111)
CK MB CFR SERPL CALC: 1 % (ref 0–4)
CK MB SERPL-MCNC: 1.6 NG/ML (ref 5–25)
CK SERPL-CCNC: 163 U/L (ref 26–192)
CO2 SERPL-SCNC: 29 MMOL/L (ref 21–32)
CREAT SERPL-MCNC: 1.13 MG/DL (ref 0.6–1.3)
D DIMER PPP FEU-MCNC: <0.27 UG/ML(FEU)
DIAGNOSIS, 93000: NORMAL
DIFFERENTIAL METHOD BLD: NORMAL
EOSINOPHIL # BLD: 0.1 K/UL (ref 0–0.4)
EOSINOPHIL NFR BLD: 2 % (ref 0–5)
ERYTHROCYTE [DISTWIDTH] IN BLOOD BY AUTOMATED COUNT: 13.8 % (ref 11.6–14.5)
GLOBULIN SER CALC-MCNC: 4.3 G/DL (ref 2–4)
GLUCOSE SERPL-MCNC: 82 MG/DL (ref 74–99)
HCT VFR BLD AUTO: 42.8 % (ref 35–45)
HGB BLD-MCNC: 13.9 G/DL (ref 12–16)
LYMPHOCYTES # BLD: 1.4 K/UL (ref 0.9–3.6)
LYMPHOCYTES NFR BLD: 29 % (ref 21–52)
MCH RBC QN AUTO: 28.8 PG (ref 24–34)
MCHC RBC AUTO-ENTMCNC: 32.5 G/DL (ref 31–37)
MCV RBC AUTO: 88.8 FL (ref 74–97)
MONOCYTES # BLD: 0.3 K/UL (ref 0.05–1.2)
MONOCYTES NFR BLD: 6 % (ref 3–10)
NEUTS SEG # BLD: 3 K/UL (ref 1.8–8)
NEUTS SEG NFR BLD: 62 % (ref 40–73)
P-R INTERVAL, ECG05: 146 MS
PLATELET # BLD AUTO: 247 K/UL (ref 135–420)
PMV BLD AUTO: 10.5 FL (ref 9.2–11.8)
POTASSIUM SERPL-SCNC: 4 MMOL/L (ref 3.5–5.5)
PROT SERPL-MCNC: 8.1 G/DL (ref 6.4–8.2)
Q-T INTERVAL, ECG07: 404 MS
QRS DURATION, ECG06: 74 MS
QTC CALCULATION (BEZET), ECG08: 399 MS
RBC # BLD AUTO: 4.82 M/UL (ref 4.2–5.3)
SODIUM SERPL-SCNC: 141 MMOL/L (ref 136–145)
TROPONIN I SERPL-MCNC: <0.02 NG/ML (ref 0–0.04)
TROPONIN I SERPL-MCNC: <0.02 NG/ML (ref 0–0.04)
VENTRICULAR RATE, ECG03: 59 BPM
WBC # BLD AUTO: 4.8 K/UL (ref 4.6–13.2)

## 2021-03-08 PROCEDURE — 82553 CREATINE MB FRACTION: CPT

## 2021-03-08 PROCEDURE — 71046 X-RAY EXAM CHEST 2 VIEWS: CPT

## 2021-03-08 PROCEDURE — 85379 FIBRIN DEGRADATION QUANT: CPT

## 2021-03-08 PROCEDURE — 99285 EMERGENCY DEPT VISIT HI MDM: CPT

## 2021-03-08 PROCEDURE — 80053 COMPREHEN METABOLIC PANEL: CPT

## 2021-03-08 PROCEDURE — 93005 ELECTROCARDIOGRAM TRACING: CPT

## 2021-03-08 PROCEDURE — 85025 COMPLETE CBC W/AUTO DIFF WBC: CPT

## 2021-03-08 NOTE — ED NOTES
ASHLEY Jones reviewed discharge instructions with the patient. The patient verbalized understanding. Pt refused to allow removal of armband and wanted to take home labels, educated on risk for personal information being exposed, verbalized understanding. Stated she would shred at home.

## 2021-03-08 NOTE — TELEPHONE ENCOUNTER
Patient called and states that she is having chest pain on the left side that feels like a arrow going through her chest. Patient also reports having left arm pain that's located under the arm pit. Patient denies any SOB. Patient advised to go to the ER, or call 911. Patient verbalizes understanding.

## 2021-03-08 NOTE — ED PROVIDER NOTES
[address] EMERGENCY DEPT    Date: 3/8/2021  Patient Name: April Fraire    History of Presenting Illness     Chief Complaint   Patient presents with    Chest Pain     64 y.o. female with a past medical history of Diabetes, Hypertension, Hyperlipidemia, GERD, and PVCs presents the ED complaining of chest pain intermittent over the past 5 days. Patient states when it comes on it is a sharp stabbing pain from her left upper chest like an arrow going through to her back. She also notes having some intermittent left axillary discomfort. Patient initially reports having pain on 3/3, 3/5, 3/7, and then again today. She states the pain does not stay all day, but is intermittent. Notes her last episode was 2 hours ago, patient is completely asymptomatic at this time. She is not taking anything for symptoms. She called her doctor who recommended she come here. Patient denies any persistent calf pain, swelling, abdominal pain, diaphoresis, shortness of breath, other symptoms at this time. Patient denies any other associated signs or symptoms. Patient denies any other complaints. Nursing notes regarding the HPI and triage nursing notes were reviewed. Prior medical records were reviewed. Current Outpatient Medications   Medication Sig Dispense Refill    spironolactone (ALDACTONE) 25 mg tablet TAKE ONE TABLET BY MOUTH DAILY 30 Tab 5    amLODIPine (NORVASC) 2.5 mg tablet TAKE ONE TABLET BY MOUTH DAILY 30 Tab 11    CLOBETASOL PROPIONATE, BULK, 0.05 % by Does Not Apply route two (2) times a day.  pravastatin (PRAVACHOL) 80 mg tablet TAKE ONE TABLET BY MOUTH EVERY NIGHT AT BEDTIME 30 Tab 6    atenoloL (TENORMIN) 25 mg tablet TAKE ONE-HALF TO ONE TABLET BY MOUTH DAILY (DO NOT EXCEED ONE TABLET PER DAY) (Patient taking differently: TAKE ONE-HALF BID) 30 Tab 5    fluticasone (FLONASE) 50 mcg/actuation nasal spray 2 Sprays by Both Nostrils route daily. Indications:  Allergic Rhinitis 1 Bottle 11  potassium chloride (KAON 10%) 20 mEq/15 mL solution Patient states she take 1 tablespoon daily  powder      glucose blood VI test strips (BLOOD GLUCOSE TEST) strip Use to check glucose daily 1 Package 11    co-enzyme Q-10 (CO Q-10) 100 mg capsule Take 200 mg by mouth daily.  GARLIC PO Take 2 Caps by mouth daily.  aspirin delayed-release (ECOTRIN LOW STRENGTH) 81 mg tablet Take 81 mg by mouth daily.  omega-3 fatty acids-vitamin e (FISH OIL) 1,000 mg cap Take 2 Caps by mouth daily.  melatonin 3 mg tablet Take 3 mg by mouth nightly as needed.  Dexlansoprazole (DEXILANT) 60 mg CpDM Take 1 Cap by mouth every Monday, Wednesday, Friday.  sucralfate (CARAFATE) 1 gram tablet Take 1 g by mouth daily as needed.  MULTIVITAMIN W-MINERALS/LUTEIN (CENTRUM SILVER PO) Take 1 Tab by mouth daily. Past History     Past Medical History:  Past Medical History:   Diagnosis Date    Allergic rhinitis     Cardiac Agatston CAC score, <100 04/30/2014    Coronary calcium score 0.    Cardiac Holter monitoring 01/25/2017    Sinus rhythm, avg HR 73 bpm (range ). Benign Holter study.  Cardiac nuclear imaging test 01/11/2013    No convincing evidence for ischemia or infarction in the setting of significant chest wall artifact. EF 62%. No RWMA. Neg EKG on max EST. Ex time 9 min 50 sec.  Cardiac stress echo, normal 05/05/2016    Normal maximal stress echo w/reproduction of atypical chest discomfort. Ex time 11 min 3 sec. EF 55%.  Cardiovascular LLE venous duplex 09/28/2016    Left leg:  No DVT.     Chest pain     GERD (gastroesophageal reflux disease)     Heel pain, bilateral     Hiatal hernia     denies this    HTN (hypertension)     Hyperlipidemia     Insulin resistance     follows with endocrinology for this    Night sweats     PVC (premature ventricular contraction)     Right low back pain     S/P colonoscopy 2009, 2014    normal per patient    Unsteady gait due to heel pain       Past Surgical History:  Past Surgical History:   Procedure Laterality Date    D/C SUCTION  2011    HX OTHER SURGICAL  2014    colonscopy    HX OTHER SURGICAL      endoscopy    HX OTHER SURGICAL      wisdom teeth removal x1       Family History:  Family History   Problem Relation Age of Onset    Hypertension Mother     Heart defect Mother         anuerysm    Heart Attack Mother     Asthma Father     Heart Attack Maternal Uncle     Heart Attack Maternal Uncle     Heart Disease Brother     Hypertension Brother        Social History:  Social History     Tobacco Use    Smoking status: Former Smoker     Packs/day: 0.50     Years: 11.00     Pack years: 5.50     Types: Cigarettes     Quit date: 2006     Years since quittin.1    Smokeless tobacco: Former User     Quit date: 1/10/1993    Tobacco comment: 6-7 cigs per day   Substance Use Topics    Alcohol use: Yes     Alcohol/week: 0.0 standard drinks     Comment: 1-2 glasses wine 3-4 times a week    Drug use: No     Types: Prescription, OTC       Allergies: Allergies   Allergen Reactions    Ibuprofen Other (comments)    Nsaids (Non-Steroidal Anti-Inflammatory Drug) Other (comments)    Amoxicillin Unable to Obtain and Other (comments)    Lidocaine Swelling     Oral Solution    Lipitor [Atorvastatin] Myalgia           Lopressor [Metoprolol Tartrate] Other (comments)     Lightheaded and cp    Percocet [Oxycodone-Acetaminophen] Unable to Obtain and Other (comments)    Vicodin [Hydrocodone-Acetaminophen] Unable to Obtain and Other (comments)       Patient's primary care provider (as noted in EPIC):  Agnes Obregon MD    Review of Systems   Constitutional:  Denies malaise, fever, chills. Neck:  Denies injury or pain. Chest:  Denies injury. Cardiac: + chest pain radiating to back, resolved currently. Denies palpitations. Respiratory:  Denies cough, wheezing, difficulty breathing, shortness of breath.    GI/ABD: Denies injury, pain, distention, nausea, vomiting, diarrhea. Back: See chest.  Pelvis:  Denies injury or pain. Extremity/MS:  Denies injury or pain. Neuro:  Denies headache, LOC, dizziness, neurologic symptoms/deficits/paresthesias. Skin: Denies injury, rash, itching or skin changes. All other systems negative as reviewed. Visit Vitals  /74   Pulse (!) 56   Temp 98.7 °F (37.1 °C)   Resp 15   Ht 5' 2\" (1.575 m)   Wt 70.8 kg (156 lb)   SpO2 99%   BMI 28.53 kg/m²       PHYSICAL EXAM:    CONSTITUTIONAL:  Alert, in no apparent distress;  well developed;  well nourished. HEAD:  Normocephalic, atraumatic. EYES:  EOMI. Non-icteric sclera. Normal conjunctiva. ENTM:  Nose:  no rhinorrhea. Throat:  no erythema or exudate, mucous membranes moist.  NECK:  Supple  RESPIRATORY:  Chest clear, equal breath sounds, good air movement. CARDIOVASCULAR:  Regular rate and rhythm. No murmurs, rubs, or gallops. Chest:  No rash, lesions, bruising. Focal left superior chest with reproducible tenderness to palpation. GI:  Normal bowel sounds, abdomen soft and non-tender. No rebound or guarding. BACK:  Non-tender. UPPER EXT:  Normal inspection. LOWER EXT:  No edema, no calf tenderness. Distal pulses intact. NEURO:  Moves all four extremities, and grossly normal motor exam.  SKIN:  No rashes;  Normal for age. PSYCH:  Alert and normal affect. DIFFERENTIAL DIAGNOSES/ MEDICAL DECISION MAKING:  Chest pain etiologies include acute cardiac events to include possible acute myocardial infarction, acute coronary syndrome, pneumonia, chest wall pain (myofascial/ musculoskeletal etiology), chronic obstructive pulmonary disease (copd), acute asthma exacerbation, congestive heart failure, acute bronchitis, pulmonary embolism, upper respiratory infection, referred abdominal pain, other etiologies, versus combination of the above. EKG: Sinus isi rate of 59npm; no STEMI.     ED COURSE:    Recent Results (from the past 12 hour(s))   CBC WITH AUTOMATED DIFF    Collection Time: 03/08/21  1:36 PM   Result Value Ref Range    WBC 4.8 4.6 - 13.2 K/uL    RBC 4.82 4.20 - 5.30 M/uL    HGB 13.9 12.0 - 16.0 g/dL    HCT 42.8 35.0 - 45.0 %    MCV 88.8 74.0 - 97.0 FL    MCH 28.8 24.0 - 34.0 PG    MCHC 32.5 31.0 - 37.0 g/dL    RDW 13.8 11.6 - 14.5 %    PLATELET 175 544 - 614 K/uL    MPV 10.5 9.2 - 11.8 FL    NEUTROPHILS 62 40 - 73 %    LYMPHOCYTES 29 21 - 52 %    MONOCYTES 6 3 - 10 %    EOSINOPHILS 2 0 - 5 %    BASOPHILS 1 0 - 2 %    ABS. NEUTROPHILS 3.0 1.8 - 8.0 K/UL    ABS. LYMPHOCYTES 1.4 0.9 - 3.6 K/UL    ABS. MONOCYTES 0.3 0.05 - 1.2 K/UL    ABS. EOSINOPHILS 0.1 0.0 - 0.4 K/UL    ABS. BASOPHILS 0.1 0.0 - 0.1 K/UL    DF AUTOMATED     METABOLIC PANEL, COMPREHENSIVE    Collection Time: 03/08/21  1:36 PM   Result Value Ref Range    Sodium 141 136 - 145 mmol/L    Potassium 4.0 3.5 - 5.5 mmol/L    Chloride 109 100 - 111 mmol/L    CO2 29 21 - 32 mmol/L    Anion gap 3 3.0 - 18 mmol/L    Glucose 82 74 - 99 mg/dL    BUN 13 7.0 - 18 MG/DL    Creatinine 1.13 0.6 - 1.3 MG/DL    BUN/Creatinine ratio 12 12 - 20      GFR est AA 60 (L) >60 ml/min/1.73m2    GFR est non-AA 49 (L) >60 ml/min/1.73m2    Calcium 9.4 8.5 - 10.1 MG/DL    Bilirubin, total 0.4 0.2 - 1.0 MG/DL    ALT (SGPT) 27 13 - 56 U/L    AST (SGOT) 24 10 - 38 U/L    Alk.  phosphatase 40 (L) 45 - 117 U/L    Protein, total 8.1 6.4 - 8.2 g/dL    Albumin 3.8 3.4 - 5.0 g/dL    Globulin 4.3 (H) 2.0 - 4.0 g/dL    A-G Ratio 0.9 0.8 - 1.7     CARDIAC PANEL,(CK, CKMB & TROPONIN)    Collection Time: 03/08/21  1:36 PM   Result Value Ref Range    CK - MB 1.6 <3.6 ng/ml    CK-MB Index 1.0 0.0 - 4.0 %     26 - 192 U/L    Troponin-I, QT <0.02 0.0 - 0.045 NG/ML   D DIMER    Collection Time: 03/08/21  1:36 PM   Result Value Ref Range    D DIMER <0.27 <0.46 ug/ml(FEU)   EKG, 12 LEAD, INITIAL    Collection Time: 03/08/21  1:36 PM   Result Value Ref Range    Ventricular Rate 59 BPM    Atrial Rate 59 BPM P-R Interval 146 ms    QRS Duration 74 ms    Q-T Interval 404 ms    QTC Calculation (Bezet) 399 ms    Calculated P Axis 54 degrees    Calculated R Axis 35 degrees    Calculated T Axis 61 degrees    Diagnosis       Sinus bradycardia  Otherwise normal ECG  When compared with ECG of 05-MAY-2016 13:38,  premature atrial complexes are no longer present  Confirmed by Lela Goldsmith MD, Dk William ([id number]) on 3/8/2021 3:26:02 PM     TROPONIN I    Collection Time: 03/08/21  4:40 PM   Result Value Ref Range    Troponin-I, QT <0.02 0.0 - 0.045 NG/ML      Xr Chest Pa Lat    Result Date: 3/8/2021  CHEST PA AND LATERAL: CPT CODE: 37035 INDICATIONS: Chest pain. COMPARISONS: December 2016. FINDINGS: Two views are obtained . Lungs: Clear. Cardiac Silhouette And Mediastinal Contours: Normal. Pleural Spaces: No pneumothorax or pleural effusion evident. Bones And Soft Tissues: Unremarkable for age. No acute or active disease evident. Heart score 3    ED COURSE: 2 sets of cardiac enzymes was normal, doubt AMI. IMPRESSION AND MEDICAL DECISION MAKING:  Patient presents with left-sided chest pain which she describes as an arrow shooting through to her back intermittent for the past 5 days. Last episode of pain was 2 hours prior to arrival.  Patient was completely pain-free throughout her entire ED visit. Chest x-ray unremarkable, vitals look well as the labs. D-dimer within normal limits. Patient was offered a CTA, however, she is declining at this time, stating that she has had a lot of imaging done lately. Given her history, unlikely that this is a dissection as her pain is resolving completely. Patient was counseled to follow-up with her primary care doctor as well as cardiologist.  Should she develop any acute worsening she was counseled to call 911, return immediately. Dr. Miller Hawkins has seen and evaluated the patient as well and agrees with the assessment and plan. Diagnosis:   1.  Chest pain, unspecified type Disposition: Discharge    Follow-up Information     Follow up With Specialties Details Why Contact Info    Edilson Murphy MD Family Medicine In 3 days  [facility]  [address] [facility] [place] [facility] [address]  [address] [address] [facility]      Sena Garsia MD Cardiology, Internal Medicine In 3 days  [address]  [address] [address] [id number] [phone] [address] [facility] EMERGENCY DEPT Emergency Medicine  If symptoms worsen [address] [facility] [place]  [phone]          Discharge Medication List as of 3/8/2021  5:33 PM      CONTINUE these medications which have NOT CHANGED    Details   spironolactone (ALDACTONE) 25 mg tablet TAKE ONE TABLET BY MOUTH DAILY, Normal, Disp-30 Tab, R-5      amLODIPine (NORVASC) 2.5 mg tablet TAKE ONE TABLET BY MOUTH DAILY, Normal, Disp-30 Tab,R-11      CLOBETASOL PROPIONATE, BULK, 0.05 % by Does Not Apply route two (2) times a day., Historical Med      pravastatin (PRAVACHOL) 80 mg tablet TAKE ONE TABLET BY MOUTH EVERY NIGHT AT BEDTIME, Normal, Disp-30 Tab, R-6      atenoloL (TENORMIN) 25 mg tablet TAKE ONE-HALF TO ONE TABLET BY MOUTH DAILY (DO NOT EXCEED ONE TABLET PER DAY), Normal, Disp-30 Tab, R-5      fluticasone (FLONASE) 50 mcg/actuation nasal spray 2 Sprays by Both Nostrils route daily. Indications: Allergic Rhinitis, Print, Disp-1 Bottle, R-11      potassium chloride (KAON 10%) 20 mEq/15 mL solution Patient states she take 1 tablespoon daily  powder, Historical Med      glucose blood VI test strips (BLOOD GLUCOSE TEST) strip Use to check glucose daily, Normal, Disp-1 Package, R-11      co-enzyme Q-10 (CO Q-10) 100 mg capsule Take 200 mg by mouth daily. , Historical Med      GARLIC PO Take 2 Caps by mouth daily. , Historical Med      aspirin delayed-release (ECOTRIN LOW STRENGTH) 81 mg tablet Take 81 mg by mouth daily. , Historical Med      omega-3 fatty acids-vitamin e (FISH OIL) 1,000 mg cap Take 2 Caps by mouth daily. , Historical Med      melatonin 3 mg tablet Take 3 mg by mouth nightly as needed., Historical Med      Dexlansoprazole (DEXILANT) 60 mg CpDM Take 1 Cap by mouth every Monday, Wednesday, Friday., Historical Med      sucralfate (CARAFATE) 1 gram tablet Take 1 g by mouth daily as needed., Historical Med      MULTIVITAMIN W-MINERALS/LUTEIN (CENTRUM SILVER PO) Take 1 Tab by mouth daily. , Historical Med           [place], [place]

## 2021-03-08 NOTE — ED TRIAGE NOTES
Left sided chest pain x one week. Pain \"shoots to my back like an arrow\" and started spreading to axilla on Friday.

## 2021-03-09 ENCOUNTER — TELEPHONE (OUTPATIENT)
Dept: ORTHOPEDIC SURGERY | Age: 61
End: 2021-03-09

## 2021-03-09 NOTE — TELEPHONE ENCOUNTER
Patient is requesting to speak with an xray technician. I could not get any further information, only that she has a very important question for whomever actually takes the xray, not the doctor or nurse. Patient [phone]

## 2021-03-09 NOTE — TELEPHONE ENCOUNTER
Patient had questions concerning the safety of wearing a facemask during a chest x-ray. Advised there were no contraindications between the two.

## 2021-03-12 ENCOUNTER — OFFICE VISIT (OUTPATIENT)
Dept: CARDIOLOGY CLINIC | Age: 61
End: 2021-03-12
Payer: COMMERCIAL

## 2021-03-12 VITALS
BODY MASS INDEX: 27.79 KG/M2 | SYSTOLIC BLOOD PRESSURE: 160 MMHG | HEIGHT: 62 IN | HEART RATE: 88 BPM | OXYGEN SATURATION: 98 % | DIASTOLIC BLOOD PRESSURE: 86 MMHG | WEIGHT: 151 LBS

## 2021-03-12 DIAGNOSIS — I11.9 BENIGN HYPERTENSIVE HEART DISEASE WITHOUT HEART FAILURE: ICD-10-CM

## 2021-03-12 DIAGNOSIS — I11.9 BENIGN HYPERTENSIVE HEART DISEASE WITHOUT HEART FAILURE: Primary | ICD-10-CM

## 2021-03-12 PROCEDURE — 99215 OFFICE O/P EST HI 40 MIN: CPT | Performed by: INTERNAL MEDICINE

## 2021-03-12 NOTE — PROGRESS NOTES
Michael Dunn    Chief Complaint   Patient presents with   [address] [place] [address] ED Follow-up     recently in the ED for chest pain     Chest Pain     intermittent, middle, sharp, radiated to back     Palpitations     fluttering        HPI    Michael Dunn is a 64 y.o. AAF with no known coronary disease, here for urgent ER follow up of CP. She says she was calling her PCP for a routine followup appt and when talking to her nurse and mentioned a sharp pain in her chest, was told she could be having a stroke and please go to the ER. Records from 3/8/21 reviewed, she r/o for ACS and told to follow up outpt. EKG there was sinus isi and her symptoms already resolved. Again the pain was like an \"arrow\" going through her chest to her back, but hasnt recurred. It was at rest and no obvious assoc symptoms. She has a list of questions. Been undergoing workup for issues with her neck. Was having pain on her scalp she thinks is related Lucia Romance). She also has hair loss from alopecia, has followup with derm in few weeks. Memory loss, word finding/ paraphrasing issues. Has seen neuro and still undergoing eval for sleep apnea. She is a prior pt of . He has seen her for atypical noncardiac CP, palpitations over the years. She has done well on Atenolol + Norvasc + Spironolactone 25 mg. Past Medical History:   Diagnosis Date    Allergic rhinitis     Cardiac Agatston CAC score, <100 04/30/2014    Coronary calcium score 0.    Cardiac Holter monitoring 01/25/2017    Sinus rhythm, avg HR 73 bpm (range ). Benign Holter study.  Cardiac nuclear imaging test 01/11/2013    No convincing evidence for ischemia or infarction in the setting of significant chest wall artifact. EF 62%. No RWMA. Neg EKG on max EST. Ex time 9 min 50 sec.  Cardiac stress echo, normal 05/05/2016    Normal maximal stress echo w/reproduction of atypical chest discomfort. Ex time 11 min 3 sec. EF 55%.     Cardiovascular LLE venous duplex 09/28/2016    Left leg:  No DVT.  Chest pain     GERD (gastroesophageal reflux disease)     Heel pain, bilateral     Hiatal hernia     denies this    HTN (hypertension)     Hyperlipidemia     Insulin resistance     follows with endocrinology for this    Night sweats     PVC (premature ventricular contraction)     Right low back pain     S/P colonoscopy 2009, 2014    normal per patient    Unsteady gait     due to heel pain       Past Surgical History:   Procedure Laterality Date    D/C SUCTION  2011    HX OTHER SURGICAL  2014    colonscopy    HX OTHER SURGICAL      endoscopy    HX OTHER SURGICAL      wisdom teeth removal x1       Current Outpatient Medications   Medication Sig Dispense Refill    spironolactone (ALDACTONE) 25 mg tablet TAKE ONE TABLET BY MOUTH DAILY 30 Tab 5    amLODIPine (NORVASC) 2.5 mg tablet TAKE ONE TABLET BY MOUTH DAILY 30 Tab 11    CLOBETASOL PROPIONATE, BULK, 0.05 % by Does Not Apply route two (2) times a day.  pravastatin (PRAVACHOL) 80 mg tablet TAKE ONE TABLET BY MOUTH EVERY NIGHT AT BEDTIME 30 Tab 6    atenoloL (TENORMIN) 25 mg tablet TAKE ONE-HALF TO ONE TABLET BY MOUTH DAILY (DO NOT EXCEED ONE TABLET PER DAY) (Patient taking differently: TAKE ONE-HALF BID) 30 Tab 5    fluticasone (FLONASE) 50 mcg/actuation nasal spray 2 Sprays by Both Nostrils route daily. Indications: Allergic Rhinitis 1 Bottle 11    potassium chloride (KAON 10%) 20 mEq/15 mL solution Patient states she take 1 tablespoon daily  powder      glucose blood VI test strips (BLOOD GLUCOSE TEST) strip Use to check glucose daily 1 Package 11    co-enzyme Q-10 (CO Q-10) 100 mg capsule Take 200 mg by mouth daily.  GARLIC PO Take 2 Caps by mouth daily.  aspirin delayed-release (ECOTRIN LOW STRENGTH) 81 mg tablet Take 81 mg by mouth daily.  omega-3 fatty acids-vitamin e (FISH OIL) 1,000 mg cap Take 2 Caps by mouth daily.       melatonin 3 mg tablet Take 3 mg by mouth nightly as needed.  Dexlansoprazole (DEXILANT) 60 mg CpDM Take 1 Cap by mouth every Monday, Wednesday, Friday.  sucralfate (CARAFATE) 1 gram tablet Take 1 g by mouth daily as needed.  MULTIVITAMIN W-MINERALS/LUTEIN (CENTRUM SILVER PO) Take 1 Tab by mouth daily. Allergies   Allergen Reactions    Ibuprofen Other (comments)    Nsaids (Non-Steroidal Anti-Inflammatory Drug) Other (comments)    Amoxicillin Unable to Obtain and Other (comments)    Lidocaine Swelling     Oral Solution    Lipitor [Atorvastatin] Myalgia           Lopressor [Metoprolol Tartrate] Other (comments)     Lightheaded and cp    Percocet [Oxycodone-Acetaminophen] Unable to Obtain and Other (comments)    Vicodin [Hydrocodone-Acetaminophen] Unable to Obtain and Other (comments)       Social History     Socioeconomic History    Marital status: SINGLE     Spouse name: Not on file    Number of children: Not on file    Years of education: Not on file    Highest education level: Not on file   Occupational History    Occupation: student IT      Employer: NOT EMPLOYED   Social Needs    Financial resource strain: Not on file    Food insecurity     Worry: Not on file     Inability: Not on file    Transportation needs     Medical: Not on file     Non-medical: Not on file   Tobacco Use    Smoking status: Former Smoker     Packs/day: 0.50     Years: 11.00     Pack years: 5.50     Types: Cigarettes     Quit date: 2006     Years since quittin.2    Smokeless tobacco: Former User     Quit date: 1/10/1993    Tobacco comment: 6-7 cigs per day   Substance and Sexual Activity    Alcohol use:  Yes     Alcohol/week: 0.0 standard drinks     Comment: 1-2 glasses wine 3-4 times a week    Drug use: No     Types: Prescription, OTC    Sexual activity: Never   Lifestyle    Physical activity     Days per week: Not on file     Minutes per session: Not on file    Stress: Not on file   Relationships    Social connections     Talks on phone: Not on file     Gets together: Not on file     Attends Alevism service: Not on file     Active member of club or organization: Not on file     Attends meetings of clubs or organizations: Not on file     Relationship status: Not on file    Intimate partner violence     Fear of current or ex partner: Not on file     Emotionally abused: Not on file     Physically abused: Not on file     Forced sexual activity: Not on file   Other Topics Concern    Not on file   Social History Narrative    Not Currently working, 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 no children, \"I have a niece. \" loves to walk    FH: +premature ASCVD    Review of Systems    14 pt Review of Systems is negative unless otherwise mentioned in the HPI. Wt Readings from Last 3 Encounters:   03/12/21 68.5 kg (151 lb)   03/08/21 70.8 kg (156 lb)   02/05/21 69.4 kg (153 lb)     Temp Readings from Last 3 Encounters:   03/08/21 98.7 °F (37.1 °C)   02/05/21 97.9 °F (36.6 °C) (Temporal)   01/12/21 98.4 °F (36.9 °C) (Temporal)     BP Readings from Last 3 Encounters:   03/12/21 (!) 160/86   03/08/21 135/74   02/05/21 119/77     Pulse Readings from Last 3 Encounters:   03/12/21 88   03/08/21 (!) 56   02/05/21 80       Physical Exam:    Visit Vitals  BP (!) 160/86 (BP 1 Location: Left upper arm, BP Patient Position: Sitting, BP Cuff Size: Adult)   Pulse 88   Ht 5' 2\" (1.575 m)   Wt 68.5 kg (151 lb)   SpO2 98%   BMI 27.62 kg/m²      Physical Exam  HENT:      Head: Normocephalic and atraumatic. Eyes:      Pupils: Pupils are equal, round, and reactive to light. Cardiovascular:      Rate and Rhythm: Normal rate and regular rhythm. Heart sounds: Normal heart sounds. No murmur. No friction rub. No gallop. Pulmonary:      Effort: Pulmonary effort is normal. No respiratory distress. Breath sounds: Normal breath sounds. No wheezing or rales. Chest:      Chest wall: No tenderness.    Abdominal:      General: Bowel sounds are normal.      Palpations: Abdomen is soft. Musculoskeletal:         General: No tenderness. Skin:     General: Skin is warm and dry. Neurological:      Mental Status: She is alert and oriented to person, place, and time. Psychiatric:         Mood and Affect: Mood is anxious. Cognition and Memory: Memory is impaired. EKG last: sinus isi    Lab Results   Component Value Date/Time    Sodium 141 03/08/2021 01:36 PM    Potassium 4.0 03/08/2021 01:36 PM    Chloride 109 03/08/2021 01:36 PM    CO2 29 03/08/2021 01:36 PM    Anion gap 3 03/08/2021 01:36 PM    Glucose 82 03/08/2021 01:36 PM    BUN 13 03/08/2021 01:36 PM    Creatinine 1.13 03/08/2021 01:36 PM    BUN/Creatinine ratio 12 03/08/2021 01:36 PM    GFR est AA 60 (L) 03/08/2021 01:36 PM    GFR est non-AA 49 (L) 03/08/2021 01:36 PM    Calcium 9.4 03/08/2021 01:36 PM    Bilirubin, total 0.4 03/08/2021 01:36 PM    Alk.  phosphatase 40 (L) 03/08/2021 01:36 PM    Protein, total 8.1 03/08/2021 01:36 PM    Albumin 3.8 03/08/2021 01:36 PM    Globulin 4.3 (H) 03/08/2021 01:36 PM    A-G Ratio 0.9 03/08/2021 01:36 PM    ALT (SGPT) 27 03/08/2021 01:36 PM    AST (SGOT) 24 03/08/2021 01:36 PM     Lab Results   Component Value Date/Time    WBC 4.8 03/08/2021 01:36 PM    Hemoglobin, POC 12.2 01/09/2013 08:19 PM    HGB 13.9 03/08/2021 01:36 PM    Hematocrit, POC 36 01/09/2013 08:19 PM    HCT 42.8 03/08/2021 01:36 PM    PLATELET 939 34/63/6388 01:36 PM    MCV 88.8 03/08/2021 01:36 PM     Lab Results   Component Value Date/Time    TSH 1.91 08/20/2018 06:08 PM     Lab Results   Component Value Date/Time    Hemoglobin A1c 5.3 09/11/2020 02:28 PM    Hemoglobin A1c (POC) 5.6 09/19/2013 02:30 PM    Hemoglobin A1c, External 5.8 10/08/2015     Lab Results   Component Value Date/Time    Cholesterol, total 172 09/11/2020 02:28 PM    HDL Cholesterol 73 09/11/2020 02:28 PM    LDL-CHOLESTEROL 87 09/11/2020 02:28 PM    LDL, calculated 82.8 08/03/2019 11:49 AM    VLDL, calculated 7.2 08/03/2019 11:49 AM Triglyceride 43 09/11/2020 02:28 PM    CHOL/HDL Ratio 2.0 08/03/2019 11:49 AM    Cholesterol/HDL ratio 2.4 09/11/2020 02:28 PM     Lab Results   Component Value Date/Time     03/08/2021 01:36 PM    CK - MB 1.6 03/08/2021 01:36 PM    CK-MB Index 1.0 03/08/2021 01:36 PM    Troponin-I, QT <0.02 03/08/2021 04:40 PM    B-type Natriuretic Peptide 51.6 03/10/2015 12:00 AM    B-type Natriuretic Peptide CANCELED 03/10/2015 12:00 AM       Impression and Plan:  Abdulaziz Muñoz is a 64 y.o. with:    1.) Atypical CP, doubt ACS, likely noncardiac, (neg SE 2016)  2.) HTN, on Norvasc + Spironolactone 25  3.) Palpitations,PVCs/ PACs, (holter 2019) managed with Atenolol  4.) BB-induced sinus isi  5.) Frontal Fibrosing Alopecia, known  6.) Neck pain, cervical degenerative findings on MRI  7.) Memory loss    1.) No documentation of AFib to date (this was specifically a question of hers)  2.) No contraindications for minoxidil from CV standpoint  3.) Can increase Spironolactone if needed from derm standpoint  4.) Heart healthy diet dw pt at length  5.) Heart scan per patient request- will schedule for her  6.) RTC 6 months    H/o atypical CP doubt ACS  Would not pursue repeat ischemic eval  Instead can reclassify her risk with CAC    She has concern her derm treatments would interfere with her heart meds, so I have specifically addressed and will fax todays note to her derm as requested  She also asked me about Ketoconazole Shampoo and asked me to write down to her derm to discuss further    >60 mins spent, prior records/ CV testing, ER records and labs    Thank you for allowing me to participate in the care of your patient, please do not hesitate to call with questions or concerns.     [address],    Toñito Garcia DO

## 2021-03-12 NOTE — PATIENT INSTRUCTIONS
Follow up with Dr. Desi Negron in 6 months CT scan of heart If you have not heard from the central scheduler to schedule your testing in 48 hours, please call [phone].

## 2021-03-12 NOTE — PROGRESS NOTES
Aracelyvinod Grantyaima presents today for   Chief Complaint   Patient presents with   [facility] ED Follow-up     recently in the ED for chest pain        Aracely Peguero preferred language for health care discussion is english/other. Is someone accompanying this pt? no    Is the patient using any DME equipment during [address]? no    Depression Screening:  3 most recent PHQ Screens 1/5/2021   PHQ Not Done Patient Decline   Little interest or pleasure in doing things -   Feeling down, depressed, irritable, or hopeless -   Total Score PHQ 2 -       Learning Assessment:  Learning Assessment 8/15/2019   PRIMARY LEARNER Patient   HIGHEST LEVEL OF EDUCATION - PRIMARY LEARNER  4 YEARS OF COLLEGE   BARRIERS PRIMARY LEARNER NONE   CO-LEARNER CAREGIVER No   PRIMARY LANGUAGE ENGLISH   LEARNER PREFERENCE PRIMARY DEMONSTRATION     -   ANSWERED BY patient   RELATIONSHIP SELF       Abuse Screening:  Abuse Screening Questionnaire 6/3/2020   Do you ever feel afraid of your partner? N   Are you in a relationship with someone who physically or mentally threatens you? N   Is it safe for you to go home? Y       Fall Risk  Fall Risk Assessment, last 12 mths 6/3/2020   Able to walk? Yes   Fall in past 12 months? No       Pt currently taking Anticoagulant therapy? ASA 81mg every day     Coordination of Care:  1. Have you been to the ER, urgent care clinic since your last visit? Hospitalized since your last visit? 3/8/21 for chest pain     2. Have you seen or consulted any other health care providers outside of the [address] [place] [address] since your last visit? Include any pap smears or colon screening.  No

## 2021-03-15 ENCOUNTER — APPOINTMENT (OUTPATIENT)
Dept: PHYSICAL THERAPY | Age: 61
End: 2021-03-15

## 2021-03-22 DIAGNOSIS — I11.9 BENIGN HYPERTENSIVE HEART DISEASE WITHOUT HEART FAILURE: ICD-10-CM

## 2021-03-22 RX ORDER — ATENOLOL 25 MG/1
TABLET ORAL
Qty: 30 TAB | Refills: 5 | Status: CANCELLED | OUTPATIENT
Start: 2021-03-22

## 2021-03-22 RX ORDER — SPIRONOLACTONE 25 MG/1
25 TABLET ORAL DAILY
Qty: 90 TAB | Refills: 1 | Status: SHIPPED | OUTPATIENT
Start: 2021-03-22 | End: 2021-07-20

## 2021-03-22 NOTE — TELEPHONE ENCOUNTER
Kory Horowitz is requesting a 90 day supply Previous Rx was sent to [facility] Last Visit: 9/11/20 with MD Radha Foster Next Appointment: none Previous Refill Encounter(s): 2/26/21 #30 with 5 refills Requested Prescriptions Pending Prescriptions Disp Refills  spironolactone (ALDACTONE) 25 mg tablet 90 Tab 1 Sig: Take 1 Tab by mouth daily.

## 2021-03-22 NOTE — TELEPHONE ENCOUNTER
Please schedule for a 30 min in office visit with me (first available). Dr. Jef Torres [facility] [address] [place] [address] [address], [address] [facility], [address] Phone: [phone] Fax: [phone]

## 2021-03-25 ENCOUNTER — OFFICE VISIT (OUTPATIENT)
Dept: ORTHOPEDIC SURGERY | Age: 61
End: 2021-03-25
Payer: COMMERCIAL

## 2021-03-25 VITALS
HEART RATE: 70 BPM | DIASTOLIC BLOOD PRESSURE: 81 MMHG | OXYGEN SATURATION: 99 % | SYSTOLIC BLOOD PRESSURE: 123 MMHG | WEIGHT: 151 LBS | TEMPERATURE: 97.6 F | BODY MASS INDEX: 27.62 KG/M2

## 2021-03-25 DIAGNOSIS — M48.02 CERVICAL SPINAL STENOSIS: Primary | ICD-10-CM

## 2021-03-25 DIAGNOSIS — M79.18 CERVICAL MYOFASCIAL PAIN SYNDROME: ICD-10-CM

## 2021-03-25 PROCEDURE — 99213 OFFICE O/P EST LOW 20 MIN: CPT | Performed by: PHYSICAL MEDICINE & REHABILITATION

## 2021-03-25 NOTE — PROGRESS NOTES
[address]  [address]  [place]  Phone: [phone]  Fax: [phone]        Emre De La Torre  : 1960  PCP: Mauro Bolivar MD  3/25/2021    PROGRESS NOTE      HISTORY OF PRESENT ILLNESS  Nixon Rm is a 64 y.o. female who was seen as a new patient 21 with c/o neck grinding and popping. She notes that she was recently diagnosed with frontal fibrosing alopecia, and then she began experiencing grinding in her neck and pain radiating into the head. Pt notes that if she sits in an unsupported chair while watching TV, she has pain radiating into the head. She feels fine if she sits in a chair with a back support like a recliner. She also c/o right shoulder pain for which she is seeing Dr. Rosario Santos. Nixon Rm comes in to the office today for f/u. Cervical spine MRI dated 21 reviewed. Per report, Multilevel degenerative findings causing various degrees of spinal canal and neuroforaminal narrowing. Findings most pronounced (moderate/severe) at C3-C4, C4-C5 and C5-C6. Please see report for further findings and details. She remains in PT (21-current; [place] 1) with some benefit. She continues to have right shoulder pain that is bothersome with exercise and putting her arm behind her back. Pt notes that she did get the high back chair with the head rest for working. Pain Score: 0/10    Treatments patient has tried:  Physical therapy:Pending  Doing HEP: Unknown  Non-opioid medications: Yes  Spinal injections: No  Spinal surgery- No.   Last Cervical Spine MRI:      PmHx: Frontal fibrosing alopecia     ASSESSMENT  This is a 64year-old female with c/o neck grinding and pain radiating into the head in a splenius capitus distribution. Her symptoms are likely due to myofascial pain and moderate central stenosis C5-6. She also likely has a right rotator cuff pathology.  She had a positive Silverman's sign on the L, but there is no evidence of myelomalacia on her MRI. This may be a natural reflex for her. PLAN  1. Referral for cervical ENDER. Pt will f/u after cervical ENDER or sooner as needed. Diagnoses and all orders for this visit:    1. Cervical spinal stenosis  -     REFERRAL TO PAIN MANAGEMENT    2. Cervical myofascial pain syndrome         PAST MEDICAL HISTORY   Past Medical History:   Diagnosis Date    Allergic rhinitis     Cardiac Agatston CAC score, <100 04/30/2014    Coronary calcium score 0.    Cardiac Holter monitoring 01/25/2017    Sinus rhythm, avg HR 73 bpm (range ). Benign Holter study.  Cardiac nuclear imaging test 01/11/2013    No convincing evidence for ischemia or infarction in the setting of significant chest wall artifact. EF 62%. No RWMA. Neg EKG on max EST. Ex time 9 min 50 sec.  Cardiac stress echo, normal 05/05/2016    Normal maximal stress echo w/reproduction of atypical chest discomfort. Ex time 11 min 3 sec. EF 55%.  Cardiovascular LLE venous duplex 09/28/2016    Left leg:  No DVT.  Chest pain     GERD (gastroesophageal reflux disease)     Heel pain, bilateral     Hiatal hernia     denies this    HTN (hypertension)     Hyperlipidemia     Insulin resistance     follows with endocrinology for this    Night sweats     PVC (premature ventricular contraction)     Right low back pain     S/P colonoscopy 2009, 2014    normal per patient    Unsteady gait     due to heel pain       Past Surgical History:   Procedure Laterality Date    D/C SUCTION  2011    HX OTHER SURGICAL  2014    colonscopy    HX OTHER SURGICAL      endoscopy    HX OTHER SURGICAL      wisdom teeth removal x1   . MEDICATIONS      Current Outpatient Medications   Medication Sig Dispense Refill    spironolactone (ALDACTONE) 25 mg tablet Take 1 Tab by mouth daily.  90 Tab 1    amLODIPine (NORVASC) 2.5 mg tablet TAKE ONE TABLET BY MOUTH DAILY 30 Tab 11    pravastatin (PRAVACHOL) 80 mg tablet TAKE ONE TABLET BY MOUTH EVERY NIGHT AT BEDTIME 30 Tab 6    atenoloL (TENORMIN) 25 mg tablet TAKE ONE-HALF TO ONE TABLET BY MOUTH DAILY (DO NOT EXCEED ONE TABLET PER DAY) (Patient taking differently: TAKE ONE-HALF BID) 30 Tab 5    fluticasone (FLONASE) 50 mcg/actuation nasal spray 2 Sprays by Both Nostrils route daily. Indications: Allergic Rhinitis 1 Bottle 11    potassium chloride (KAON 10%) 20 mEq/15 mL solution Patient states she take 1 tablespoon daily  powder      co-enzyme Q-10 (CO Q-10) 100 mg capsule Take 200 mg by mouth daily.  aspirin delayed-release (ECOTRIN LOW STRENGTH) 81 mg tablet Take 81 mg by mouth daily.  melatonin 3 mg tablet Take 3 mg by mouth nightly as needed.  Dexlansoprazole (DEXILANT) 60 mg CpDM Take 1 Cap by mouth every Monday, Wednesday, Friday.  MULTIVITAMIN W-MINERALS/LUTEIN (CENTRUM SILVER PO) Take 1 Tab by mouth daily.  CLOBETASOL PROPIONATE, BULK, 0.05 % by Does Not Apply route two (2) times a day.  glucose blood VI test strips (BLOOD GLUCOSE TEST) strip Use to check glucose daily 1 Package 11    GARLIC PO Take 2 Caps by mouth daily.  omega-3 fatty acids-vitamin e (FISH OIL) 1,000 mg cap Take 2 Caps by mouth daily.  sucralfate (CARAFATE) 1 gram tablet Take 1 g by mouth daily as needed.           ALLERGIES    Allergies   Allergen Reactions    Ibuprofen Other (comments)    Nsaids (Non-Steroidal Anti-Inflammatory Drug) Other (comments)    Amoxicillin Unable to Obtain and Other (comments)    Lidocaine Swelling     Oral Solution    Lipitor [Atorvastatin] Myalgia           Lopressor [Metoprolol Tartrate] Other (comments)     Lightheaded and cp    Percocet [Oxycodone-Acetaminophen] Unable to Obtain and Other (comments)    Vicodin [Hydrocodone-Acetaminophen] Unable to Obtain and Other (comments)          SOCIAL HISTORY    Social History     Socioeconomic History    Marital status: SINGLE     Spouse name: Not on file    Number of children: Not on file    Years of education: Not on file    Highest education level: Not on file   Occupational History    Occupation: student IT      Employer: NOT EMPLOYED   Tobacco Use    Smoking status: Former Smoker     Packs/day: 0.50     Years: 11.00     Pack years: 5.50     Types: Cigarettes     Quit date: 2006     Years since quittin.2    Smokeless tobacco: Former User     Quit date: 1/10/1993    Tobacco comment: 6-7 cigs per day   Substance and Sexual Activity    Alcohol use: Yes     Alcohol/week: 0.0 standard drinks     Comment: 1-2 glasses wine 3-4 times a week    Drug use: No     Types: Prescription, OTC    Sexual activity: Never   Social History Narrative    Not Currently working, 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 FAMILY HISTORY  Family History   Problem Relation Age of Onset    Hypertension Mother     Heart defect Mother         anuerysm    Heart Attack Mother     Asthma Father     Heart Attack Maternal Uncle     Heart Attack Maternal Uncle     Heart Disease Brother     Hypertension Brother          REVIEW OF SYSTEMS  Review of Systems   Constitutional: Negative for chills, fever and weight loss. Respiratory: Negative for shortness of breath. Cardiovascular: Negative for chest pain. Gastrointestinal: Negative for constipation. Negative for fecal incontinence    Genitourinary: Negative for dysuria. Negative for urinary incontinence   Musculoskeletal: Positive for joint pain ( R shoulder) and neck pain. Neck pain/grinding radiating into head    Skin: Negative for rash. Alopecia   Neurological: Negative for dizziness, tingling, tremors, focal weakness and headaches. Endo/Heme/Allergies: Does not bruise/bleed easily. Psychiatric/Behavioral: The patient does not have insomnia.            PHYSICAL EXAMINATION  Visit Vitals  /81 (BP 1 Location: Left arm, BP Patient Position: Sitting)   Pulse 70   Temp 97.6 °F (36.4 °C) (Skin)   Wt 151 lb (68.5 kg)   SpO2 99%   BMI 27.62 kg/m²       Pain Assessment  3/25/2021   Location of Pain Neck   Location Modifiers -   Severity of Pain 0   Quality of Pain Sharp   Duration of Pain Persistent   Frequency of Pain Intermittent   Aggravating Factors Other (Comment)   Aggravating Factors Comment nothing   Limiting Behavior -   Relieving Factors Other (Comment)   Relieving Factors Comment sitting up straight   Result of Injury -           Constitutional:  Well developed, well nourished, in no acute distress. Psychiatric: Affect and mood are appropriate. Integumentary: No rashes or abrasions noted on exposed areas. SPINE/MUSCULOSKELETAL EXAM    Cervical spine:  Neck is midline. Normal muscle tone. No focal atrophy is noted. ROM pain free. Shoulder ROM intact. Tenderness to palpation. Negative Spurling's sign. Negative Tinel's sign. Negative Silverman's sign. Sensation in the bilateral arms grossly intact to light touch.      Positive Flores sign on the R  Positive Silverman's sign on the L.    Updates 3/25/21:  Painful provocative testing of R shoulder  Positive Flores sign on the R    MOTOR:      Biceps  Triceps Deltoids Wrist Ext Wrist Flex Hand Intrin   Right 5/5 5/5 5/5 5/5 5/5 5/5   Left 5/5 5/5 5/5 5/5 5/5 5/5             Hip Flex  Quads Hamstrings Ankle DF EHL Ankle PF   Right 5/5 5/5 5/5 5/5 5/5 5/5   Left 5/5 5/5 5/5 5/5 5/5 5/5     Negative Straight Leg raise. Squat not tested. No difficulty with tandem gait.      Ambulation without assistive device.  FWB.       RADIOGRAPHS/DATA  Cervical MRI images taken on 1/30/2021 personally reviewed with patient:  Trace rotoscoliosis and loss of cervical lordosis.     Craniocervical junction within normal limits.     Diffuse heterogeneous bone marrow signal which is nonspecific.     No acute fracture.     Left vertebral artery dominance.     C2-C3: Small central protrusion without spinal canal or neuroforaminal  narrowing.     C3-C4: Broad-based bulge and bilateral uncovertebral joint hypertrophy, right  greater left. Mild/moderate left neuroforaminal narrowing. Moderate/severe right  neuroforaminal narrowing. Mild spinal canal narrowing.     C4-C5: Broad-based bulge and bilateral uncovertebral joint hypertrophy. Moderate/severe right neuroforaminal narrowing. Severe left neuroforaminal  narrowing. Mild spinal canal narrowing.     C5-C6: Broad-based bulge and bilateral uncovertebral joint hypertrophy. Moderate  bilateral neuroforaminal narrowing. Moderate spinal canal narrowing with mass  effect on spinal cord. No definitive abnormal spinal cord signal.     C6-C7: No spinal canal or neuroforaminal narrowing.     C7-T1: No spinal canal or neuroforaminal narrowing.        IMPRESSION     Multilevel degenerative findings causing various degrees of spinal canal and  neuroforaminal narrowing. Findings most pronounced at C3-C4, C4-C5 and C5-C6. Please see report for further findings and details. 2V AP/Lateral Cervical Spine XR images taken on 1/12/2021 personally reviewed with patient:  Straightening of cervical lordosis. Degeneration at C4-5, C5-6  Mild posterolisthesis of C5 on C6  No significant facet sclerosis  Lean to the R    26 minutes of face-to-face contact were spent with the patient during today's visit extensively discussing symptoms and treatment plan. All questions were answered. More than half of this visit today was spent on counseling.      Written by Ezequiel Rolle as dictated by Rabia Reynoso MD

## 2021-03-25 NOTE — PATIENT INSTRUCTIONS
Learning About a Cervical Epidural Injection What is a cervical epidural steroid injection? A cervical epidural steroid injection is a shot of medicine into the area around the spinal cord in your neck. You may get it to help with pain, tingling, or numbness in your neck, shoulder, or arm. It may have a steroid to reduce swelling and pain and a local anesthetic to numb the nerves. How is a cervical epidural steroid injection done? The doctor will use a tiny needle to numb the skin where you are getting the injection. After the skin is numb, your doctor will use a larger needle for the epidural injection. He or she may use X-ray or ultrasound to help guide the needle. You may feel some pressure. But you should not feel pain. How long does an epidural steroid injection take? It takes about 10 to 15 minutes to get this injection. You will probably go home about 20 to 30 minutes after you get it. What can you expect after a cervical epidural steroid injection? If your injection included local anesthetic medicine, your neck, shoulder, arm, or hand may feel heavy or numb right after the shot. With a local anesthetic, your pain may be gone right away. But it may return after a few hours. This is because the steroid hasn't started working yet. Before the steroid starts to work, your neck, shoulder, or arm may be sore for a few days. These injections don't always work. When they do, it takes 1 to 5 days. The pain relief can last for several days to a few months or longer. Some people are dizzy or feel sick to their stomach after getting this shot. These symptoms usually don't last very long. You may want to do less than normal for a few days. But you may also be able to return to your daily routine. If your pain is better, you may be able to keep doing your normal activities or physical therapy. But try not to overdo it, even if your pain has improved a lot.  If your pain is only a little better or if it comes back, your doctor may want you to get another injection in a few weeks. If your pain has not changed, talk to your doctor about other treatment choices. Follow-up care is a key part of your treatment and safety. Be sure to make and go to all appointments, and call your doctor if you are having problems. It's also a good idea to know your test results and keep a list of the medicines you take. Where can you learn more? Go to http://www.gray.com/ Enter L710 in the search box to learn more about \"Learning About a Cervical Epidural Injection. \" Current as of: March 2, 2020               Content Version: 12.6 © 6190-7369 [employer]. Care instructions adapted under license by [employer] (which disclaims liability or warranty for this information). If you have questions about a medical condition or this instruction, always ask your healthcare professional. [address] any warranty or liability for your use of this information.

## 2021-03-30 RX ORDER — ATENOLOL 25 MG/1
12.5 TABLET ORAL 2 TIMES DAILY
Qty: 90 TAB | Refills: 3 | Status: SHIPPED | OUTPATIENT
Start: 2021-03-30 | End: 2021-11-04 | Stop reason: SDUPTHER

## 2021-03-30 RX ORDER — PRAVASTATIN SODIUM 80 MG/1
80 TABLET ORAL DAILY
Qty: 90 TAB | Refills: 3 | Status: SHIPPED | OUTPATIENT
Start: 2021-03-30 | End: 2021-11-04 | Stop reason: SDUPTHER

## 2021-04-10 ENCOUNTER — HOSPITAL ENCOUNTER (OUTPATIENT)
Dept: CT IMAGING | Age: 61
Discharge: HOME | End: 2021-04-10
Attending: INTERNAL MEDICINE
Payer: SELF-PAY

## 2021-04-10 PROCEDURE — 75571 CT HRT W/O DYE W/CA TEST: CPT

## 2021-04-12 NOTE — PROGRESS NOTES
[facility]  [address]  [place], [address]  [phone] fax    Physical Therapy Discharge Summary  Patient name: Smitha Correa Start of Care: 2021   Referral source: Jessica Perry MD : 1960   Medical/Treatment Diagnosis: Neck pain [M54.2]  Left shoulder pain [M25.512]  Payor: Venkata Hernandez / Plan: [employer] PPO / Product Type: PPO /  Onset Date:< 6 mo     Prior Hospitalization: see medical history Provider#: [id number]   Medications: Verified on Patient Summary List    Comorbidities: allergies, back pain, GI disease, HBP  Prior Level of Function:no previous capital head pain at work. Visits from Start of Care: 1    Missed Visits: 1  Reporting Period : 2021 to 2021    Summary of Care:  Short Term Goals: To be accomplished in 2 weeks:  1. Pt will report daily compliance with HEP to maximize therapeutic success. Eval: HEP assigned  2. Pt will report supine sleeping at least 3-5x/week to improve ease of self care. Eval: S/L and prone sleeping  Long Term Goals: To be accomplished in 4 weeks:  1. Pt will improve FOTO to 74%, demonstrating improved functional capacity for ADLs. Eval: 69%  2. Pt will demonstrate correct performance of HEP, demonstrating readiness to progress exercises and improve independence with self management of symptoms. Eval: HEP assigned  3. Pt will demonstrate appropriate self correction of posture with neutral thoracic extension and neutral cervical spine to improve posture for work. Eval: forward head, thoracic flexion  4. Pt will report reduced pain referral to her neck, indicating readiness to d/c to HEP. Eval: Pain referral to top of head     ASSESSMENT/RECOMMENDATIONS:   Pt was evaluated on 2021 for an HEP with plans to follow-up for an upgraded plan.  Pt did not follow-up, but did call, saying the pain referral to her neck and the capital portion of her head has improved but she was having some shoulder pain. Advised pt in exercise modification at that time. As pt did not return for a follow-up in > 30 days, she is being discharged at this time and goals are unable to be formally re-assessed.     Thank you for this referral,  [x]Discontinue therapy: []Patient has reached or is progressing toward set goals      [x]Patient is non-compliant or has abdicated      []Due to lack of appreciable progress towards set goals    General Go, PT 4/12/2021 9:10 AM

## 2021-04-16 ENCOUNTER — TELEPHONE (OUTPATIENT)
Dept: CARDIOLOGY CLINIC | Age: 61
End: 2021-04-16

## 2021-04-16 NOTE — TELEPHONE ENCOUNTER
----- Message from Jj Henao DO sent at 4/16/2021 11:44 AM EDT ----- 
CAC is 0! Which is low risk for future coronary disease No new orders and this is very reassuring Thanks 
----- Message ----- From: Jazmin Beltran LPN Sent: 4/16/2021   9:22 AM EDT To: Jj Henao DO Per your last note\" Herlinda Doyle is a 64 y.o. with: 
  
1.) Atypical CP, doubt ACS, likely noncardiac, (neg SE 2016) 2.) HTN, on Norvasc + Spironolactone 25 
3.) Palpitations,PVCs/ PACs, (holter 2019) managed with Atenolol 4.) BB-induced sinus isi 5.) Frontal Fibrosing Alopecia, known 
6.) Neck pain, cervical degenerative findings on MRI 
7.) Memory loss 
  
1.) No documentation of AFib to date (this was specifically a question of hers) 2.) No contraindications for minoxidil from CV standpoint 3.) Can increase Spironolactone if needed from derm standpoint 4.) Heart healthy diet dw pt at length 
5.) Heart scan per patient request- will schedule for her 
6.) RTC 6 months 
  
H/o atypical CP doubt ACS Would not pursue repeat ischemic eval 
Instead can reclassify her risk with CAC 
  
She has concern her derm treatments would interfere with her heart meds, so I have specifically addressed and will fax todays note to her derm as requested She also asked me about Ketoconazole Shampoo and asked me to write down to her derm to discuss further

## 2021-04-16 NOTE — PROGRESS NOTES
Per your last note\" David Whitley is a 64 y.o. with:     1.) Atypical CP, doubt ACS, likely noncardiac, (neg SE 2016)  2.) HTN, on Norvasc + Spironolactone 25  3.) Palpitations,PVCs/ PACs, (holter 2019) managed with Atenolol  4.) BB-induced sinus isi  5.) Frontal Fibrosing Alopecia, known  6.) Neck pain, cervical degenerative findings on MRI  7.) Memory loss     1.) No documentation of AFib to date (this was specifically a question of hers)  2.) No contraindications for minoxidil from CV standpoint  3.) Can increase Spironolactone if needed from derm standpoint  4.) Heart healthy diet dw pt at length  5.) Heart scan per patient request- will schedule for her  6.) RTC 6 months     H/o atypical CP doubt ACS  Would not pursue repeat ischemic eval  Instead can reclassify her risk with CAC     She has concern her derm treatments would interfere with her heart meds, so I have specifically addressed and will fax todays note to her derm as requested  She also asked me about Ketoconazole Shampoo and asked me to write down to her derm to discuss further

## 2021-04-19 NOTE — TELEPHONE ENCOUNTER
----- Message from Zach Madera DO sent at 4/16/2021 11:44 AM EDT ----- 
CAC is 0! Which is low risk for future coronary disease No new orders and this is very reassuring Thanks 
----- Message ----- From: Josephine Robles LPN Sent: 4/16/2021   9:22 AM EDT To: Zach Madera DO Per your last note\" Lynn Aviles is a 64 y.o. with: 
  
1.) Atypical CP, doubt ACS, likely noncardiac, (neg SE 2016) 2.) HTN, on Norvasc + Spironolactone 25 
3.) Palpitations,PVCs/ PACs, (holter 2019) managed with Atenolol 4.) BB-induced sinus isi 5.) Frontal Fibrosing Alopecia, known 
6.) Neck pain, cervical degenerative findings on MRI 
7.) Memory loss 
  
1.) No documentation of AFib to date (this was specifically a question of hers) 2.) No contraindications for minoxidil from CV standpoint 3.) Can increase Spironolactone if needed from derm standpoint 4.) Heart healthy diet dw pt at length 
5.) Heart scan per patient request- will schedule for her 
6.) RTC 6 months 
  
H/o atypical CP doubt ACS Would not pursue repeat ischemic eval 
Instead can reclassify her risk with CAC 
  
She has concern her derm treatments would interfere with her heart meds, so I have specifically addressed and will fax todays note to her derm as requested She also asked me about Ketoconazole Shampoo and asked me to write down to her derm to discuss further

## 2021-04-27 NOTE — PROGRESS NOTES
Cristian Carrillo presents today for   Chief Complaint   Patient presents with    Follow-up    Hypertension    Cholesterol Problem           Coordination of Care:  1. Have you been to the ER, urgent care clinic since your last visit? Hospitalized since your last visit? no    2. Have you seen or consulted any other health care providers outside of the [address] [place] [address] since your last visit? Include any pap smears or colon screening.  yes

## 2021-04-28 ENCOUNTER — DOCUMENTATION ONLY (OUTPATIENT)
Dept: INTERNAL MEDICINE CLINIC | Age: 61
End: 2021-04-28

## 2021-04-28 ENCOUNTER — OFFICE VISIT (OUTPATIENT)
Dept: INTERNAL MEDICINE CLINIC | Age: 61
End: 2021-04-28
Payer: COMMERCIAL

## 2021-04-28 VITALS
HEART RATE: 71 BPM | SYSTOLIC BLOOD PRESSURE: 117 MMHG | WEIGHT: 154 LBS | RESPIRATION RATE: 18 BRPM | OXYGEN SATURATION: 98 % | BODY MASS INDEX: 28.34 KG/M2 | DIASTOLIC BLOOD PRESSURE: 75 MMHG | HEIGHT: 62 IN | TEMPERATURE: 97.3 F

## 2021-04-28 DIAGNOSIS — Z86.39 H/O HYPERPARATHYROIDISM: ICD-10-CM

## 2021-04-28 DIAGNOSIS — M25.561 CHRONIC PAIN OF BOTH KNEES: ICD-10-CM

## 2021-04-28 DIAGNOSIS — E78.5 HYPERLIPIDEMIA, UNSPECIFIED HYPERLIPIDEMIA TYPE: ICD-10-CM

## 2021-04-28 DIAGNOSIS — M25.562 CHRONIC PAIN OF BOTH KNEES: Primary | ICD-10-CM

## 2021-04-28 DIAGNOSIS — M79.605 PAIN IN BOTH LOWER EXTREMITIES: ICD-10-CM

## 2021-04-28 DIAGNOSIS — G89.29 CHRONIC PAIN OF BOTH KNEES: Primary | ICD-10-CM

## 2021-04-28 DIAGNOSIS — M25.561 CHRONIC PAIN OF BOTH KNEES: Primary | ICD-10-CM

## 2021-04-28 DIAGNOSIS — M48.02 CERVICAL SPINAL STENOSIS: ICD-10-CM

## 2021-04-28 DIAGNOSIS — R07.89 ATYPICAL CHEST PAIN: ICD-10-CM

## 2021-04-28 DIAGNOSIS — M79.604 PAIN IN BOTH LOWER EXTREMITIES: ICD-10-CM

## 2021-04-28 DIAGNOSIS — G31.84 MILD COGNITIVE IMPAIRMENT: ICD-10-CM

## 2021-04-28 DIAGNOSIS — G89.29 CHRONIC PAIN OF BOTH KNEES: ICD-10-CM

## 2021-04-28 DIAGNOSIS — I10 ESSENTIAL HYPERTENSION: ICD-10-CM

## 2021-04-28 DIAGNOSIS — M25.562 CHRONIC PAIN OF BOTH KNEES: ICD-10-CM

## 2021-04-28 PROBLEM — E66.01 OBESITY, MORBID (HCC): Status: RESOLVED | Noted: 2020-06-03 | Resolved: 2021-04-28

## 2021-04-28 PROCEDURE — 99214 OFFICE O/P EST MOD 30 MIN: CPT | Performed by: INTERNAL MEDICINE

## 2021-04-28 RX ORDER — POTASSIUM CHLORIDE 1.5 G/1.77G
POWDER, FOR SOLUTION ORAL
COMMUNITY
Start: 2021-04-14 | End: 2022-06-28 | Stop reason: SDUPTHER

## 2021-04-28 NOTE — PROGRESS NOTES
Pt declined to schedule f/u appt at check out today. She stated she will call back. Did not obtain signatures for requested items of as she had left the building when I saw this note. \"Please get her GYN last note and PAP. Please get her cologuard test from GI\".

## 2021-04-28 NOTE — PROGRESS NOTES
INTERNISTS OF [facility]:  4/28/2021, MRN: [id number]      Miguel A Hermosillo is a 63 y.o. female and presents to clinic for Follow-up, Hypertension, and Cholesterol Problem    Subjective:   Pt is a 65yo AAF with h/o chronic bronchitis, LUCY, porphyria (per EHR), allergic rhinitis, MURPHY, cervical spinal stenosis, multiple food allergies (per lab work), HTN, HLD, constipation, gallbladder polyps (suggested by CT scan 10/13/16), and GERD.       1. HLD/HTN: VSS. Taking Norvasc, pravastatin, Atenolol, and Aldactone. No adverse effects. 2.  Elevated PTH:  She had an elevated PTH noted on her labs. Her calcium was normal.  Her renal function labs did not show any evidence of CKD. Her CBC was normal.  She was referred to the Endocrine team for evaluation. Since then, she reports:  told her to take vitamin D.     3. Cervical Spinal Stenosis and BLE Pain: MRI findings are below. Followed by [facility]. She has chronic neck pain. Her pain radiates to the top of her head per her hx. Today she reports: pain along her b/l popliteal fossas (right is more painful than her left). Popliteal fossa pain is off/on. She is using compression stockings. Pain: 6/10. Paresthesias/weakness: none. Given her recent MRI findings, she was referred to Pain Management for evaluation. 1/30/21 Cervical Spine MRI: Multilevel degenerative findings causing various degrees of spinal canal and neuroforaminal narrowing. Findings most pronounced at C3-C4, C4-C5 and C5-C6. Please see report for further findings and details. 4. CP: She went to the ED with complaint of atypical chest pain earlier this year. She was subsequently seen by Yulia Pak after her ED evaluation was unremarkable. Per review of 's note, her chest pain is likely noncardiac in etiology. She had an unremarkable calcium heart scan. Today the patient reports: no pain today. She occasionally has left sided CP. It feels \"very hollow. \"     5.  Health Maintenance:  - PAP: She saw her GYN team last wk. - Colon cancer screening: Overdue per our records but up to date with her cologuard. 6. Mild Cognitive Impairment: She has had some memory issues. She is scheduled with Neurology later this summer. She is scheduled for EEG testing and neuropsychological testing. Patient Active Problem List    Diagnosis Date Noted    Obesity, morbid ([address] 06/03/2020    MURPHY (nonalcoholic steatohepatitis) 09/03/2019    Allergic rhinitis 10/04/2017    Gallbladder polyp - suggested by findings on CT scan from 10/13/16 03/01/2017    Hypersomnia with sleep apnea 03/13/2014    Constipation 01/16/2012    Essential hypertension     Hyperlipidemia     GERD (gastroesophageal reflux disease)        Current Outpatient Medications   Medication Sig Dispense Refill    pravastatin (PRAVACHOL) 80 mg tablet Take 1 Tab by mouth daily. 90 Tab 3    atenoloL (TENORMIN) 25 mg tablet Take 0.5 Tabs by mouth two (2) times a day. 90 Tab 3    spironolactone (ALDACTONE) 25 mg tablet Take 1 Tab by mouth daily. 90 Tab 1    amLODIPine (NORVASC) 2.5 mg tablet TAKE ONE TABLET BY MOUTH DAILY 30 Tab 11    CLOBETASOL PROPIONATE, BULK, 0.05 % by Does Not Apply route two (2) times a day.  glucose blood VI test strips (BLOOD GLUCOSE TEST) strip Use to check glucose daily 1 Package 11    co-enzyme Q-10 (CO Q-10) 100 mg capsule Take 200 mg by mouth daily.  aspirin delayed-release (ECOTRIN LOW STRENGTH) 81 mg tablet Take 81 mg by mouth daily.  omega-3 fatty acids-vitamin e (FISH OIL) 1,000 mg cap Take 2 Caps by mouth daily.  Dexlansoprazole (DEXILANT) 60 mg CpDM Take 1 Cap by mouth every Monday, Wednesday, Friday.  MULTIVITAMIN W-MINERALS/LUTEIN (CENTRUM SILVER PO) Take 1 Tab by mouth daily.  potassium chloride (KLOR-CON) 20 mEq pack       fluticasone (FLONASE) 50 mcg/actuation nasal spray 2 Sprays by Both Nostrils route daily. Indications:  Allergic Rhinitis 1 Bottle 11    potassium chloride (KAON 10%) 20 mEq/15 mL solution Patient states she take 1 tablespoon daily  powder      GARLIC PO Take 2 Caps by mouth daily.  melatonin 3 mg tablet Take 3 mg by mouth nightly as needed.  sucralfate (CARAFATE) 1 gram tablet Take 1 g by mouth daily as needed. Allergies   Allergen Reactions    Ibuprofen Other (comments)    Nsaids (Non-Steroidal Anti-Inflammatory Drug) Other (comments)    Amoxicillin Unable to Obtain and Other (comments)    Lidocaine Swelling     Oral Solution    Lipitor [Atorvastatin] Myalgia           Lopressor [Metoprolol Tartrate] Other (comments)     Lightheaded and cp    Percocet [Oxycodone-Acetaminophen] Unable to Obtain and Other (comments)    Vicodin [Hydrocodone-Acetaminophen] Unable to Obtain and Other (comments)       Past Medical History:   Diagnosis Date    Allergic rhinitis     Cardiac Agatston CAC score, <100 04/30/2014    Coronary calcium score 0.    Cardiac Holter monitoring 01/25/2017    Sinus rhythm, avg HR 73 bpm (range ). Benign Holter study.  Cardiac nuclear imaging test 01/11/2013    No convincing evidence for ischemia or infarction in the setting of significant chest wall artifact. EF 62%. No RWMA. Neg EKG on max EST. Ex time 9 min 50 sec.  Cardiac stress echo, normal 05/05/2016    Normal maximal stress echo w/reproduction of atypical chest discomfort. Ex time 11 min 3 sec. EF 55%.  Cardiovascular LLE venous duplex 09/28/2016    Left leg:  No DVT.     Chest pain     GERD (gastroesophageal reflux disease)     Heel pain, bilateral     Hiatal hernia     denies this    HTN (hypertension)     Hyperlipidemia     Insulin resistance     follows with endocrinology for this    Night sweats     PVC (premature ventricular contraction)     Right low back pain     S/P colonoscopy 2009, 2014    normal per patient    Unsteady gait     due to heel pain       Past Surgical History:   Procedure Laterality Date    D/C SUCTION  2011    HX OTHER SURGICAL  2014    colonscopy    HX OTHER SURGICAL      endoscopy    HX OTHER SURGICAL      wisdom teeth removal x1       Family History   Problem Relation Age of Onset    Hypertension Mother     Heart defect Mother         anuerysm    Heart Attack Mother     Asthma Father     Heart Attack Maternal Uncle     Heart Attack Maternal Uncle     Heart Disease Brother     Hypertension Brother        Social History     Tobacco Use    Smoking status: Former Smoker     Packs/day: 0.50     Years: 11.00     Pack years: 5.50     Types: Cigarettes     Quit date: 2006     Years since quittin.3    Smokeless tobacco: Former User     Quit date: 1/10/1993    Tobacco comment: 6-7 cigs per day   Substance Use Topics    Alcohol use: Yes     Alcohol/week: 0.0 standard drinks     Comment: 1-2 glasses wine 3-4 times a week       ROS   Review of Systems   Constitutional: Negative for chills and fever. HENT: Negative for ear pain and sore throat. Eyes: Negative for blurred vision and pain. Respiratory: Negative for cough and shortness of breath. Cardiovascular: Negative for chest pain. Gastrointestinal: Negative for abdominal pain, blood in stool and melena. Genitourinary: Negative for dysuria and hematuria. Musculoskeletal: Positive for joint pain, myalgias and neck pain. Skin: Negative for rash. Neurological: Negative for tingling, focal weakness and headaches. Endo/Heme/Allergies: Does not bruise/bleed easily. Psychiatric/Behavioral: Negative for substance abuse. Objective     Vitals:    21 0933   BP: 117/75   Pulse: 71   Resp: 18   Temp: 97.3 °F (36.3 °C)   TempSrc: Temporal   SpO2: 98%   Weight: 154 lb (69.9 kg)   Height: 5' 2\" (1.575 m)   PainSc:   6   PainLoc: Leg       Physical Exam  Vitals signs and nursing note reviewed. HENT:      Head: Normocephalic and atraumatic.       Right Ear: External ear normal.      Left Ear: External ear normal.   Eyes:      General: No scleral icterus. Right eye: No discharge. Left eye: No discharge. Conjunctiva/sclera: Conjunctivae normal.   Neck:      Musculoskeletal: Neck supple. Cardiovascular:      Rate and Rhythm: Normal rate and regular rhythm. Heart sounds: Normal heart sounds. No murmur. No friction rub. No gallop. Pulmonary:      Effort: Pulmonary effort is normal. No respiratory distress. Breath sounds: Normal breath sounds. No wheezing or rales. Chest:      Chest wall: No tenderness. Abdominal:      General: Bowel sounds are normal. There is no distension. Palpations: Abdomen is soft. There is no mass. Tenderness: There is no abdominal tenderness. There is no guarding or rebound. Musculoskeletal:         General: No swelling (BUE) or tenderness (BUE). Comments: Her cervical spinous processes are mildly tender to palpation today. Bilateral popliteal fossa's are mildly tender to palpation but more along the right versus left. Lymphadenopathy:      Cervical: No cervical adenopathy. Skin:     General: Skin is warm and dry. Findings: No erythema. Neurological:      Mental Status: She is alert and oriented to person, place, and time. Motor: No abnormal muscle tone. Gait: Gait is intact.  Gait normal.   Psychiatric:         Mood and Affect: Mood and affect normal.         LABS   Data Review:   Lab Results   Component Value Date/Time    WBC 4.8 03/08/2021 01:36 PM    Hemoglobin, POC 12.2 01/09/2013 08:19 PM    HGB 13.9 03/08/2021 01:36 PM    Hematocrit, POC 36 01/09/2013 08:19 PM    HCT 42.8 03/08/2021 01:36 PM    PLATELET 587 35/46/3472 01:36 PM    MCV 88.8 03/08/2021 01:36 PM       Lab Results   Component Value Date/Time    Sodium 141 03/08/2021 01:36 PM    Potassium 4.0 03/08/2021 01:36 PM    Chloride 109 03/08/2021 01:36 PM    CO2 29 03/08/2021 01:36 PM    Anion gap 3 03/08/2021 01:36 PM    Glucose 82 03/08/2021 01:36 PM    BUN 13 03/08/2021 01:36 PM    Creatinine 1.13 03/08/2021 01:36 PM    BUN/Creatinine ratio 12 03/08/2021 01:36 PM    GFR est AA 60 (L) 03/08/2021 01:36 PM    GFR est non-AA 49 (L) 03/08/2021 01:36 PM    Calcium 9.4 03/08/2021 01:36 PM       Lab Results   Component Value Date/Time    Cholesterol, total 172 09/11/2020 02:28 PM    HDL Cholesterol 73 09/11/2020 02:28 PM    LDL-CHOLESTEROL 87 09/11/2020 02:28 PM    LDL, calculated 82.8 08/03/2019 11:49 AM    VLDL, calculated 7.2 08/03/2019 11:49 AM    Triglyceride 43 09/11/2020 02:28 PM    CHOL/HDL Ratio 2.0 08/03/2019 11:49 AM    Cholesterol/HDL ratio 2.4 09/11/2020 02:28 PM       Lab Results   Component Value Date/Time    Hemoglobin A1c 5.3 09/11/2020 02:28 PM    Hemoglobin A1c (POC) 5.6 09/19/2013 02:30 PM    Hemoglobin A1c, External 5.8 10/08/2015       Assessment/Plan:   1. Cervical Radiculopathy:  Follow-up with Orthopedics as needed. Activity as tolerated. 2. HLD/HTN: Stable. Continue with Rx as prescribed. 3. Atypical CP: Resolved. Observation. 4. Elevated PTH:   Follow-up with Endocrinology prn. Ok to c/w vitamin D.    5. Mild Cognitive Impairment:   I encouraged her to follow-up with Neurology. I encouraged her to get the EEG testing and neuropsychological testing ordered for    6. Health Maintenance:  I am requesting records from her gynecologist and her GI doctor (who has her negative Cologuard test result)    7. BLE Pain: From Baker's Cysts?  - PVL studies ordered to r/o DVT  - Ultrasound studies to evaluate her popliteal fossa pain bilaterally      Health Maintenance Due   Topic Date Due    COVID-19 Vaccine (1) Never done    Shingrix Vaccine Age 50> (1 of 2) Never done    Colorectal Cancer Screening Combo  11/14/2019    PAP AKA CERVICAL CYTOLOGY  08/06/2021     Lab review: labs are reviewed in the EHR    I have discussed the diagnosis with the patient and the intended plan as seen in the above orders.   The patient has received an after-visit summary and questions were answered concerning future plans. I have discussed medication side effects and warnings with the patient as well. I have reviewed the plan of care with the patient, accepted their input and they are in agreement with the treatment goals. All questions were answered. The patient understands the plan of care. Handouts provided today with above information. Pt instructed if symptoms worsen to call the office or report to the ED for continued care. Greater than 50% of the visit time was spent in counseling and/or coordination of care. Voice recognition was used to generate this report, which may have resulted in some phonetic based errors in grammar and contents. Even though attempts were made to correct all the mistakes, some may have been missed, and remained in the body of the document. Follow-up and Dispositions    · Return in about 6 months (around 10/28/2021).          Antonio Luna MD

## 2021-04-28 NOTE — PATIENT INSTRUCTIONS

## 2021-04-29 RX ORDER — AMLODIPINE BESYLATE 2.5 MG/1
TABLET ORAL
Qty: 90 TAB | Refills: 3 | Status: SHIPPED | OUTPATIENT
Start: 2021-04-29 | End: 2021-11-04 | Stop reason: SDUPTHER

## 2021-05-06 ENCOUNTER — TELEPHONE (OUTPATIENT)
Dept: INTERNAL MEDICINE CLINIC | Age: 61
End: 2021-05-06

## 2021-05-06 NOTE — TELEPHONE ENCOUNTER
Patient returned call, she asked about her FMLA. Patient informed that she did not sign the form. We can fax once she signs. Patient will come by tomorrow.

## 2021-05-06 NOTE — TELEPHONE ENCOUNTER
Pt is requesting a call back from April regarding paperwork and upcoming test.  Please advise her at [phone]

## 2021-05-06 NOTE — TELEPHONE ENCOUNTER
Attempted to contact patient to let her know that her form is ready for . No answer and no machine to leave message.

## 2021-05-13 ENCOUNTER — HOSPITAL ENCOUNTER (OUTPATIENT)
Dept: ULTRASOUND IMAGING | Age: 61
Discharge: HOME | End: 2021-05-13
Attending: INTERNAL MEDICINE
Payer: COMMERCIAL

## 2021-05-13 ENCOUNTER — HOSPITAL ENCOUNTER (OUTPATIENT)
Dept: VASCULAR SURGERY | Age: 61
Discharge: HOME | End: 2021-05-13
Attending: INTERNAL MEDICINE
Payer: COMMERCIAL

## 2021-05-13 PROCEDURE — 93970 EXTREMITY STUDY: CPT

## 2021-05-13 PROCEDURE — 76882 US LMTD JT/FCL EVL NVASC XTR: CPT

## 2021-05-17 NOTE — PROGRESS NOTES
Please let her know that her PVL study is negative for a DVT.     Dr. Quang Miller  [facility] of [facility], [address] [facility], [address]  Phone: [phone]  Fax: [phone]

## 2021-05-17 NOTE — PROGRESS NOTES
Please let her know that her ultrasound study is unremarkable.     Dr. Fernandez Mini  [facility] of [facility], [address] [facility], [address] [place], [place] [address]  Phone: [phone]  Fax: [phone]

## 2021-05-18 ENCOUNTER — TELEPHONE (OUTPATIENT)
Dept: INTERNAL MEDICINE CLINIC | Age: 61
End: 2021-05-18

## 2021-05-18 DIAGNOSIS — J30.9 ALLERGIC RHINITIS, UNSPECIFIED SEASONALITY, UNSPECIFIED TRIGGER: Primary | ICD-10-CM

## 2021-05-18 DIAGNOSIS — M79.605 PAIN IN BOTH LOWER EXTREMITIES: ICD-10-CM

## 2021-05-18 DIAGNOSIS — M79.604 PAIN IN BOTH LOWER EXTREMITIES: ICD-10-CM

## 2021-05-18 NOTE — TELEPHONE ENCOUNTER
Patient reached and given results. Patient asking what is the plan now, for her leg pain. Patient requested referral to ENT. Referral generated.

## 2021-05-18 NOTE — TELEPHONE ENCOUNTER
----- Message from Katherine Cottrell MD sent at 5/17/2021  9:06 AM EDT ----- Please let her know that her PVL study is negative for a DVT. Dr. Renee Thomas [facility] of [address] [place] [address], [address] [facility], [address] Phone: [phone] Fax: [phone]

## 2021-05-18 NOTE — TELEPHONE ENCOUNTER
----- Message from Antonio Luna MD sent at 5/16/2021  9:40 PM EDT ----- Please let her know that her ultrasound study is unremarkable. Dr. Lo Durant [facility] of [address] [place] [address], [address] [facility], [facility] [address] Phone: [phone] Fax: [phone]

## 2021-05-20 ENCOUNTER — TELEPHONE (OUTPATIENT)
Dept: INTERNAL MEDICINE CLINIC | Age: 61
End: 2021-05-20

## 2021-05-20 DIAGNOSIS — M79.604 PAIN IN BOTH LOWER EXTREMITIES: ICD-10-CM

## 2021-05-20 DIAGNOSIS — M79.605 PAIN IN BOTH LOWER EXTREMITIES: ICD-10-CM

## 2021-05-20 NOTE — TELEPHONE ENCOUNTER
Returned call to patient. She is requesting a letter for her employer stating that she cannot change her current position, due to the stress this will add to her. She states that due to her HTN she cannot add any stress to her life. She states that this is for her health wellness and quality of life. Please advise if you can complete this letter for the patient.

## 2021-05-20 NOTE — TELEPHONE ENCOUNTER
Pt called asking to speak to April , she said it is about a letter she needs for her employer about her health and wellness  \" will get more into detail when she speaks to April\"

## 2021-05-21 NOTE — TELEPHONE ENCOUNTER
Patient is asking for a letter to give to her employer stating that she cannot change positions within the job due to added stress it may be. She stated that due to her HTN she cannot have any added stress.

## 2021-05-21 NOTE — TELEPHONE ENCOUNTER
Patient reached, per DR. Shane Morejon we are unable to complete a letter as we are not treating her or discussed stress at work. Patient has now scheduled an appointment to discuss.

## 2021-05-21 NOTE — TELEPHONE ENCOUNTER
Hi April,    I'm not sure I understand what the pt is requesting. Is she interested in medication for stress?       Respectfully,  Dr. Esme Mcdonald  [facility] of [facility], [address] [place] [address], [address] [place] [address] Str[address]  Phone: [phone]  Fax: [phone] Patient is calling regarding her potentially having a UTI. She is requesting to see If she can drop off a urine sample to [facility] and If  is able to send a prescription. Patient states she has had a history of UTI and is also concerned about certain antibiotics as she did develop C-diff. Please advise.

## 2021-05-27 ENCOUNTER — OFFICE VISIT (OUTPATIENT)
Dept: NEUROLOGY | Age: 61
End: 2021-05-27
Payer: COMMERCIAL

## 2021-05-27 DIAGNOSIS — R47.89 WORD FINDING DIFFICULTY: ICD-10-CM

## 2021-05-27 DIAGNOSIS — F41.8 ANXIETY ABOUT HEALTH: ICD-10-CM

## 2021-05-27 DIAGNOSIS — R41.3 MEMORY LOSS: Primary | ICD-10-CM

## 2021-05-27 PROCEDURE — 90791 PSYCH DIAGNOSTIC EVALUATION: CPT | Performed by: PSYCHOLOGIST
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
## 2021-06-07 ENCOUNTER — OFFICE VISIT (OUTPATIENT)
Dept: INTERNAL MEDICINE CLINIC | Age: 61
End: 2021-06-07
Payer: COMMERCIAL

## 2021-06-07 VITALS
TEMPERATURE: 98.1 F | OXYGEN SATURATION: 99 % | RESPIRATION RATE: 16 BRPM | BODY MASS INDEX: 29.08 KG/M2 | DIASTOLIC BLOOD PRESSURE: 79 MMHG | HEIGHT: 62 IN | WEIGHT: 158 LBS | HEART RATE: 65 BPM | SYSTOLIC BLOOD PRESSURE: 125 MMHG

## 2021-06-07 DIAGNOSIS — F41.9 ANXIETY AND DEPRESSION: Primary | ICD-10-CM

## 2021-06-07 DIAGNOSIS — F32.A ANXIETY AND DEPRESSION: Primary | ICD-10-CM

## 2021-06-07 PROCEDURE — 99213 OFFICE O/P EST LOW 20 MIN: CPT | Performed by: INTERNAL MEDICINE

## 2021-06-07 NOTE — PROGRESS NOTES
[facility]:  6/7/2021, MRN: [id number]      Gerard Partida is a 63 y.o. female and presents to clinic for Stress (Discuss stress at work. )      Subjective:   Pt is a 67yo AAF with h/o chronic bronchitis, LUCY, porphyria (per EHR), allergic rhinitis, MURPHY, cervical spinal stenosis, multiple food allergies (per lab work), HTN, HLD, constipation, gallbladder polyps (suggested by CT scan 10/13/16), and GERD.      Anxiety: The patient is scheduled for neuropsychological testing. At her last appointment, she reported difficulty remembering things. Since then, she reports: work related stress. She is stressed about her health. She is not on any medication for stress. No depression. She admits to drinking 4-5 glasses of wine per night to reduce her anxiety/stress. She has reduced her ETOH intake. Her job adjusted her work schedule so that she does not come in contact with others - during the time of the pandemic. She thrived until they readjusted her job and added customer contact and more responsibilities. Note that her job has been changed multiple times w/i the past 3 months. As her job changes, her stress has increased, as she has more responsibilities. Her drinking has increased as a result. +Depression. Sx have been so bad that she has stayed home from work in bed. She has not participated in counseling.      Patient Active Problem List    Diagnosis Date Noted    Cervical spinal stenosis 04/28/2021    MURPHY (nonalcoholic steatohepatitis) 09/03/2019    Allergic rhinitis 10/04/2017    Gallbladder polyp - suggested by findings on CT scan from 10/13/16 03/01/2017    Hypersomnia with sleep apnea 03/13/2014    Constipation 01/16/2012    Essential hypertension     Hyperlipidemia     GERD (gastroesophageal reflux disease)        Current Outpatient Medications   Medication Sig Dispense Refill    amLODIPine (NORVASC) 2.5 mg tablet TAKE ONE TABLET BY MOUTH DAILY 90 Tab 3    potassium chloride (KLOR-CON) 20 mEq pack       atenoloL (TENORMIN) 25 mg tablet Take 0.5 Tabs by mouth two (2) times a day. 90 Tab 3    spironolactone (ALDACTONE) 25 mg tablet Take 1 Tab by mouth daily. 90 Tab 1    fluticasone (FLONASE) 50 mcg/actuation nasal spray 2 Sprays by Both Nostrils route daily. Indications: Allergic Rhinitis 1 Bottle 11    glucose blood VI test strips (BLOOD GLUCOSE TEST) strip Use to check glucose daily 1 Package 11    co-enzyme Q-10 (CO Q-10) 100 mg capsule Take 200 mg by mouth daily.  aspirin delayed-release (ECOTRIN LOW STRENGTH) 81 mg tablet Take 81 mg by mouth daily.  omega-3 fatty acids-vitamin e (FISH OIL) 1,000 mg cap Take 2 Caps by mouth daily.  melatonin 3 mg tablet Take 3 mg by mouth nightly as needed.  Dexlansoprazole (DEXILANT) 60 mg CpDM Take 1 Cap by mouth every Monday, Wednesday, Friday.  MULTIVITAMIN W-MINERALS/LUTEIN (CENTRUM SILVER PO) Take 1 Tab by mouth daily.  pravastatin (PRAVACHOL) 80 mg tablet Take 1 Tab by mouth daily. (Patient not taking: Reported on 6/7/2021) 90 Tab 3    CLOBETASOL PROPIONATE, BULK, 0.05 % by Does Not Apply route two (2) times a day. (Patient not taking: Reported on 6/7/2021)      potassium chloride (KAON 10%) 20 mEq/15 mL solution Patient states she take 1 tablespoon daily  powder (Patient not taking: Reported on 9/4/5464)      GARLIC PO Take 2 Caps by mouth daily. (Patient not taking: Reported on 6/7/2021)      sucralfate (CARAFATE) 1 gram tablet Take 1 g by mouth daily as needed.  (Patient not taking: Reported on 6/7/2021)         Allergies   Allergen Reactions    Ibuprofen Other (comments)    Nsaids (Non-Steroidal Anti-Inflammatory Drug) Other (comments)    Amoxicillin Unable to Obtain and Other (comments)    Lidocaine Swelling     Oral Solution    Lipitor [Atorvastatin] Myalgia           Lopressor [Metoprolol Tartrate] Other (comments)     Lightheaded and cp    Percocet [Oxycodone-Acetaminophen] Unable to Obtain and Other (comments)    Vicodin [Hydrocodone-Acetaminophen] Unable to Obtain and Other (comments)       Past Medical History:   Diagnosis Date    Allergic rhinitis     Cardiac Agatston CAC score, <100 2014    Coronary calcium score 0.    Cardiac Holter monitoring 2017    Sinus rhythm, avg HR 73 bpm (range ). Benign Holter study.  Cardiac nuclear imaging test 2013    No convincing evidence for ischemia or infarction in the setting of significant chest wall artifact. EF 62%. No RWMA. Neg EKG on max EST. Ex time 9 min 50 sec.  Cardiac stress echo, normal 2016    Normal maximal stress echo w/reproduction of atypical chest discomfort. Ex time 11 min 3 sec. EF 55%.  Cardiovascular LLE venous duplex 2016    Left leg:  No DVT.     Chest pain     GERD (gastroesophageal reflux disease)     Heel pain, bilateral     Hiatal hernia     denies this    HTN (hypertension)     Hyperlipidemia     Insulin resistance     follows with endocrinology for this    Night sweats     PVC (premature ventricular contraction)     Right low back pain     S/P colonoscopy ,     normal per patient    Unsteady gait     due to heel pain       Past Surgical History:   Procedure Laterality Date    D/C SUCTION      HX OTHER SURGICAL      colonscopy    HX OTHER SURGICAL      endoscopy    HX OTHER SURGICAL      wisdom teeth removal x1       Family History   Problem Relation Age of Onset    Hypertension Mother     Heart defect Mother         anuerysm    Heart Attack Mother     Asthma Father     Heart Attack Maternal Uncle     Heart Attack Maternal Uncle     Heart Disease Brother     Hypertension Brother        Social History     Tobacco Use    Smoking status: Former Smoker     Packs/day: 0.50     Years: 11.00     Pack years: 5.50     Types: Cigarettes     Quit date: 2006     Years since quittin.4    Smokeless tobacco: Former User     Quit date: 1/10/1993    Tobacco comment: 6-7 cigs per day   Substance Use Topics    Alcohol use: Yes     Alcohol/week: 0.0 standard drinks     Comment: 1-2 glasses wine 3-4 times a week       ROS   Review of Systems   Constitutional: Negative for chills and fever. HENT: Negative for ear pain and sore throat. Eyes: Negative for blurred vision and pain. Respiratory: Negative for cough and shortness of breath. Cardiovascular: Negative for chest pain. Gastrointestinal: Negative for abdominal pain, blood in stool and melena. Genitourinary: Negative for dysuria and hematuria. Musculoskeletal: Negative for joint pain and myalgias. Skin: Negative for rash. Neurological: Negative for tingling, focal weakness and headaches. Endo/Heme/Allergies: Does not bruise/bleed easily. Psychiatric/Behavioral: Positive for depression. Negative for substance abuse. The patient is nervous/anxious. Objective     Vitals:    06/07/21 1427   BP: 125/79   Pulse: 65   Resp: 16   Temp: 98.1 °F (36.7 °C)   TempSrc: Temporal   SpO2: 99%   Weight: 158 lb (71.7 kg)   Height: 5' 2\" (1.575 m)   PainSc:   0 - No pain       Physical Exam  Vitals and nursing note reviewed. HENT:      Head: Normocephalic and atraumatic. Right Ear: External ear normal.      Left Ear: External ear normal.   Eyes:      General: No scleral icterus. Right eye: No discharge. Left eye: No discharge. Conjunctiva/sclera: Conjunctivae normal.   Cardiovascular:      Rate and Rhythm: Normal rate and regular rhythm. Heart sounds: Normal heart sounds. No murmur heard. No friction rub. No gallop. Pulmonary:      Effort: Pulmonary effort is normal. No respiratory distress. Breath sounds: Normal breath sounds. No wheezing or rales. Abdominal:      General: Bowel sounds are normal. There is no distension. Palpations: Abdomen is soft. Musculoskeletal:         General: No tenderness (BUE). Cervical back: Neck supple. Lymphadenopathy:      Cervical: No cervical adenopathy. Skin:     General: Skin is warm and dry. Findings: No erythema. Neurological:      Mental Status: She is alert and oriented to person, place, and time. Motor: No abnormal muscle tone. Gait: Gait is intact. Gait normal.   Psychiatric:         Mood and Affect: Mood and affect normal.         LABS   Data Review:   Lab Results   Component Value Date/Time    WBC 4.8 03/08/2021 01:36 PM    Hemoglobin, POC 12.2 01/09/2013 08:19 PM    HGB 13.9 03/08/2021 01:36 PM    Hematocrit, POC 36 01/09/2013 08:19 PM    HCT 42.8 03/08/2021 01:36 PM    PLATELET 505 39/04/1029 01:36 PM    MCV 88.8 03/08/2021 01:36 PM       Lab Results   Component Value Date/Time    Sodium 141 03/08/2021 01:36 PM    Potassium 4.0 03/08/2021 01:36 PM    Chloride 109 03/08/2021 01:36 PM    CO2 29 03/08/2021 01:36 PM    Anion gap 3 03/08/2021 01:36 PM    Glucose 82 03/08/2021 01:36 PM    BUN 13 03/08/2021 01:36 PM    Creatinine 1.13 03/08/2021 01:36 PM    BUN/Creatinine ratio 12 03/08/2021 01:36 PM    GFR est AA 60 (L) 03/08/2021 01:36 PM    GFR est non-AA 49 (L) 03/08/2021 01:36 PM    Calcium 9.4 03/08/2021 01:36 PM       Lab Results   Component Value Date/Time    Cholesterol, total 172 09/11/2020 02:28 PM    HDL Cholesterol 73 09/11/2020 02:28 PM    LDL-CHOLESTEROL 87 09/11/2020 02:28 PM    LDL, calculated 82.8 08/03/2019 11:49 AM    VLDL, calculated 7.2 08/03/2019 11:49 AM    Triglyceride 43 09/11/2020 02:28 PM    CHOL/HDL Ratio 2.0 08/03/2019 11:49 AM    Cholesterol/HDL ratio 2.4 09/11/2020 02:28 PM       Lab Results   Component Value Date/Time    Hemoglobin A1c 5.3 09/11/2020 02:28 PM    Hemoglobin A1c (POC) 5.6 09/19/2013 02:30 PM    Hemoglobin A1c, External 5.8 10/08/2015 12:00 AM       Assessment/Plan:   Stress/Anxiety:  - She is asking for a work note to avoid customer contact - as customer contact has stressed her to the point in which she drinks excessively.    I will complete a work note on her behalf. Talk therapy recommended  I encouraged her to stop all alcoholic beverage consumption. Health Maintenance Due   Topic Date Due    COVID-19 Vaccine (1) Never done    Shingrix Vaccine Age 50> (1 of 2) Never done    Colorectal Cancer Screening Combo  03/26/2021    PAP AKA CERVICAL CYTOLOGY  08/06/2021         Lab review: labs are reviewed in the EHR    I have discussed the diagnosis with the patient and the intended plan as seen in the above orders. The patient has received an after-visit summary and questions were answered concerning future plans. I have discussed medication side effects and warnings with the patient as well. I have reviewed the plan of care with the patient, accepted their input and they are in agreement with the treatment goals. All questions were answered. The patient understands the plan of care. Handouts provided today with above information. Pt instructed if symptoms worsen to call the office or report to the ED for continued care. Greater than 50% of the visit time was spent in counseling and/or coordination of care. Voice recognition was used to generate this report, which may have resulted in some phonetic based errors in grammar and contents. Even though attempts were made to correct all the mistakes, some may have been missed, and remained in the body of the document.           Sedrick Morrison MD

## 2021-06-07 NOTE — PROGRESS NOTES
Kelli Everett presents today for   Chief Complaint   Patient presents with    Stress     Discuss stress at work. Coordination of Care:  1. Have you been to the ER, urgent care clinic since your last visit? Hospitalized since your last visit? no    2. Have you seen or consulted any other health care providers outside of the [address] [place] [address] since your last visit? Include any pap smears or colon screening.  yes

## 2021-06-07 NOTE — LETTER
7/27/2021 7:50 AM    Ms. Calles [facility]  [id number]      To Whom It May Concern:    Latricia Mitchell is my patient and is under my medical care. She does not have a physical or mental impairment that limits her ability to engage in a major life activity such as the ability to work, care for herself, perform manual tasks, walk, see, hear, ear, and sleep. I recommend though that you allow her to have a non-customer interactive position to help alleviate heightened levels of anxiety and mental stress. She is not able to work on the phones all day. She cannot work on the phone (0 hours). She is presently getting ongoing treatment for anxiety and it is imperative that she had a non-customer interactive position - as part of her treatment plan. Please feel free to have her contact our office if there is any additional information that you need.            Respectfully,            Katherine Cottrell MD

## 2021-06-07 NOTE — LETTER
7/27/2021 9:14 AM    Ms. Calles [facility]  [id number]  Accomodation Event: [id number]    To Whom It May Concern:     Mrs. Rusty Pelayo is my patient and is under my medical care. She does not have a physical or mental impairment that limits her ability to engage in a major life activity such as the ability to work, care for herself, perform manual tasks, walk, see, hear, ear, and sleep. I recommend though that you allow her to have a non-customer interactive position to help alleviate heightened levels of anxiety and mental stress. She is not able to work on the phones all day. She cannot work on the phone (0 hours). She is presently getting ongoing treatment for anxiety and it is imperative that she had a non-customer interactive position - as part of her treatment plan. Please feel free to have her contact our office if there is any additional information that you need.          Sincerely,            Shante Rodrigues MD

## 2021-06-11 ENCOUNTER — TELEPHONE (OUTPATIENT)
Dept: INTERNAL MEDICINE CLINIC | Age: 61
End: 2021-06-11

## 2021-06-11 NOTE — LETTER
6/15/2021 8:29 AM 
 
[facility] Som [facility]errano [address] [facility] [address] To Whom It May Concern: 
KELY DIAZ [facility] [address] [place], [address] [facility] Re: Event# [id number] Som Serrano is my patient and is under my medical care. Due to 's mental health condition, it's in her best interest to be placed permanently in a different position. I recommend a non-customer interactive position ot help alleviate heightened levels of anxiety and mental stress - that would invoke additional physical health attributes that would be detrimental to her overall health, work performance, and job. In addition, a requirement for Justindevon Shmuel, was to enroll in counseling therapy consistent in conjunction with my recommendation of a non-customer interaction position. She is compliant with this requirement and is meeting this portion of her medical plan of care. If there are questions or concerns please have the patient contact our office.  
 
 
 
Respectfully, 
 
 
 
 
 
Peter Blunt MD 
 
 
 
Sincerely, 
 
 
Peter Blunt MD

## 2021-06-11 NOTE — LETTER
6/15/2021 Ms. Josy Syed [address] [facility] [address] To Whom It May Concern: 
KELY BASHIR EMILY [facility] [address] [place], [address] [facility] Re: Event# [id number] Josy Syed is my patient and is under my medical care. Due to 's mental health condition, it's in her best interest to be placed permanently in a different position. I recommend a non-customer interactive position ot help alleviate heightened levels of anxiety and mental stress - that would invoke additional physical health attributes that would be detrimental to her overall health, work performance, and job. In addition, a requirement for Crissy Dunne, was to enroll in counseling therapy consistent in conjunction with my recommendation of a non-customer interaction position. She is compliant with this requirement and is meeting this portion of her medical plan of care. If there are questions or concerns please have the patient contact our office.  
 
 
 
Respectfully, 
 
 
 
 
 
Elly Metcalf MD

## 2021-06-11 NOTE — Clinical Note
Please print this out for me to sign and please have it faxed to: [phone] ([address], [address], [place], [address] [facility]).     Thanks,  Dr. Jean Marie Bocanegra  [facility] [place], [facility], [address]  Phone: [phone]  Fax: [phone]

## 2021-06-14 NOTE — TELEPHONE ENCOUNTER
Please have her come in tomorrow to  her letter.     Respectfully,  Dr. Glasgow Senters  [facility] of [facility], [address] [place] [address], [address] [place] [address] Str.  Phone: [phone]  Fax: [phone]

## 2021-06-16 ENCOUNTER — OFFICE VISIT (OUTPATIENT)
Dept: NEUROLOGY | Age: 61
End: 2021-06-16
Payer: COMMERCIAL

## 2021-06-16 DIAGNOSIS — F34.1 DYSTHYMIA: ICD-10-CM

## 2021-06-16 DIAGNOSIS — R41.840 ATTENTION DEFICIT: ICD-10-CM

## 2021-06-16 DIAGNOSIS — R41.81 AGE-RELATED COGNITIVE DECLINE: Primary | ICD-10-CM

## 2021-06-16 DIAGNOSIS — F41.9 ANXIETY: ICD-10-CM

## 2021-06-16 DIAGNOSIS — Z91.49 HISTORY OF PSYCHOLOGICAL TRAUMA: ICD-10-CM

## 2021-06-16 PROCEDURE — 96133 NRPSYC TST EVAL PHYS/QHP EA: CPT | Performed by: PSYCHOLOGIST

## 2021-06-16 PROCEDURE — 96137 PSYCL/NRPSYC TST PHY/QHP EA: CPT | Performed by: PSYCHOLOGIST

## 2021-06-16 PROCEDURE — 96132 NRPSYC TST EVAL PHYS/QHP 1ST: CPT | Performed by: PSYCHOLOGIST

## 2021-06-16 PROCEDURE — 96136 PSYCL/NRPSYC TST PHY/QHP 1ST: CPT | Performed by: PSYCHOLOGIST

## 2021-06-16 PROCEDURE — 96139 PSYCL/NRPSYC TST TECH EA: CPT | Performed by: PSYCHOLOGIST

## 2021-06-16 PROCEDURE — 96138 PSYCL/NRPSYC TECH 1ST: CPT | Performed by: PSYCHOLOGIST
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
## 2021-06-30 ENCOUNTER — TELEPHONE (OUTPATIENT)
Dept: NEUROLOGY | Age: 61
End: 2021-06-30

## 2021-06-30 NOTE — TELEPHONE ENCOUNTER
Patient dropped off a letter for Dr. Montrell Ayala to the office. Patient did not want to give her  she wrote her name on the envelope and stated it was only for Dr. Montrell Ayala eyes.

## 2021-07-13 ENCOUNTER — TELEPHONE (OUTPATIENT)
Dept: INTERNAL MEDICINE CLINIC | Age: 61
End: 2021-07-13

## 2021-07-13 ENCOUNTER — VIRTUAL VISIT (OUTPATIENT)
Dept: NEUROLOGY | Age: 61
End: 2021-07-13
Payer: COMMERCIAL

## 2021-07-13 DIAGNOSIS — Z91.49 HISTORY OF PSYCHOLOGICAL TRAUMA: ICD-10-CM

## 2021-07-13 DIAGNOSIS — R41.840 ATTENTION DEFICIT: ICD-10-CM

## 2021-07-13 DIAGNOSIS — F34.1 DYSTHYMIA: ICD-10-CM

## 2021-07-13 DIAGNOSIS — R41.81 AGE-RELATED COGNITIVE DECLINE: Primary | ICD-10-CM

## 2021-07-13 DIAGNOSIS — F41.9 ANXIETY: ICD-10-CM

## 2021-07-13 PROCEDURE — 90832 PSYTX W PT 30 MINUTES: CPT | Performed by: PSYCHOLOGIST

## 2021-07-13 NOTE — PROGRESS NOTES
Interactive Psychotherapy/office feedback        Interactive office feedback session with Ms. Chely Quigley. I reviewed the results of the recent Neuropsychological Evaluation  including the observed areas of neurocognitive strengths and weaknesses. Education was provided regarding my diagnostic impressions, and treatment plan/options were discussed. I also answered numerous questions related to the clinical findings, including the various methods to improve cognition and mood. CBT, psychoeducation, and supportive psychotherapy techniques were utilized. Prior to seeing the patient I reviewed the records, including the previously completed report, the records in [place], and any updated visits from other providers since I saw the patient last.       Diagnoses:      1. Age-related cognitive decline  2. Attention Deficit  3. Dysthymia  4. Anxiety R/O Phobic Anxiety Disorder  5. H/O Psychological Trauma     The patient will follow up with the referring provider, and reported being very pleased with the services provided. Follow up with [facility] prn.         [address] psychotherapy 30 Minutes      This note was created using voice recognition software. Despite editing, there may be syntax errors. Kayy Monson is a 63 y.o. female who was evaluated by an audio-video encounter for concerns as above. Patient identification was verified prior to start of the visit. Pursuant to the emergency declaration under the [facility], 1135 waiver authority and the [employer] General Act, this Virtual Visit was conducted, with patient's (and/or legal guardian's) consent, to reduce the patient's risk of exposure to COVID-19 and provide necessary medical care. Services were provided through a synchronous discussion virtually to substitute for in-person clinic visit. I was at home. The patient was at home.

## 2021-07-15 NOTE — TELEPHONE ENCOUNTER
Patient returned call and she states that she is concerned about this issue that she has had since march. Patient states that she feels like \"something is eating me up inside\". Patient states that she feels like this started after her 2nd MRI. Asked patient where she is feeling this at exactly, and she stated its her chest and back intermittently. Patient states \" I don't want to act like this is nothing and I have stage 3 or 4 cancer inside of me\". Patient advised that I would send to the doctor for her recommendation.

## 2021-07-16 NOTE — TELEPHONE ENCOUNTER
Patient called back and wanted to add that she is having a dry cough for over a year now. Patient thinks it could be allergies, but she isn't sure.

## 2021-07-19 DIAGNOSIS — I11.9 BENIGN HYPERTENSIVE HEART DISEASE WITHOUT HEART FAILURE: ICD-10-CM

## 2021-07-19 NOTE — TELEPHONE ENCOUNTER
----- Message from Trinidad Stanton MD sent at 9/1/2017  7:48 AM EDT -----  Please let the pt know that her urinalysis is normal. Her CBC is normal. No additional evaluation is warranted at this time.     Dr. Gabbie Prabhakar  [facility] [place] [address]  Phone: [phone]  Fax: [phone] [place]

## 2021-07-20 RX ORDER — SPIRONOLACTONE 25 MG/1
TABLET ORAL
Qty: 90 TABLET | Refills: 1 | Status: SHIPPED | OUTPATIENT
Start: 2021-07-20 | End: 2021-10-25 | Stop reason: SDUPTHER

## 2021-07-21 ENCOUNTER — HOSPITAL ENCOUNTER (OUTPATIENT)
Dept: VASCULAR SURGERY | Age: 61
Discharge: HOME | End: 2021-07-21
Attending: INTERNAL MEDICINE
Payer: COMMERCIAL

## 2021-07-21 PROCEDURE — 93922 UPR/L XTREMITY ART 2 LEVELS: CPT

## 2021-07-22 LAB
LEFT ABI: 1.13
LEFT ANTERIOR TIBIAL: 136 MMHG
LEFT ARM BP: 125 MMHG
LEFT POSTERIOR TIBIAL: 141 MMHG
RIGHT ABI: 1.13
RIGHT ANTERIOR TIBIAL: 138 MMHG
RIGHT ARM BP: 125 MMHG
RIGHT POSTERIOR TIBIAL: 141 MMHG

## 2021-07-22 NOTE — PROGRESS NOTES
Please let her know that her PILAR study shows normal circulation to her legs.     Dr. Myah Kirk  [facility] of [facility], [address] [facility], [facility] [address]  Phone: [phone]  Fax: [phone]

## 2021-07-27 ENCOUNTER — TELEPHONE (OUTPATIENT)
Dept: INTERNAL MEDICINE CLINIC | Age: 61
End: 2021-07-27

## 2021-07-27 NOTE — TELEPHONE ENCOUNTER
Please schedule her for a 30 min in office apt for follow up.     Dr. Lilia Moulton  [facility], [address] [facility], [address] [place] [address] Str.  Phone: [phone]  Fax: [phone]

## 2021-07-27 NOTE — TELEPHONE ENCOUNTER
----- Message from Fahad Hendricks MD sent at 7/22/2021  4:57 PM EDT -----  Please let her know that her PILAR study shows normal circulation to her legs.     Dr. Jean Members  [facility] [address], [address] [place], [place] [address]  Phone: [phone]  Fax: [phone]

## 2021-07-30 ENCOUNTER — OFFICE VISIT (OUTPATIENT)
Dept: INTERNAL MEDICINE CLINIC | Age: 61
End: 2021-07-30
Payer: COMMERCIAL

## 2021-07-30 VITALS
HEART RATE: 61 BPM | SYSTOLIC BLOOD PRESSURE: 128 MMHG | TEMPERATURE: 97 F | RESPIRATION RATE: 18 BRPM | DIASTOLIC BLOOD PRESSURE: 81 MMHG | BODY MASS INDEX: 29.79 KG/M2 | HEIGHT: 61 IN | WEIGHT: 157.8 LBS | OXYGEN SATURATION: 97 %

## 2021-07-30 DIAGNOSIS — K75.81 NASH (NONALCOHOLIC STEATOHEPATITIS): ICD-10-CM

## 2021-07-30 DIAGNOSIS — R07.89 ATYPICAL CHEST PAIN: ICD-10-CM

## 2021-07-30 DIAGNOSIS — F32.A DEPRESSION, UNSPECIFIED DEPRESSION TYPE: Primary | ICD-10-CM

## 2021-07-30 PROCEDURE — 99214 OFFICE O/P EST MOD 30 MIN: CPT | Performed by: INTERNAL MEDICINE

## 2021-07-30 RX ORDER — LANSOPRAZOLE 30 MG/1
30 CAPSULE, DELAYED RELEASE ORAL
COMMUNITY
Start: 2021-06-15

## 2021-07-30 RX ORDER — MOMETASONE FUROATE 50 UG/1
SPRAY, METERED NASAL
COMMUNITY
Start: 2021-07-19 | End: 2022-02-19

## 2021-07-30 RX ORDER — CITALOPRAM 20 MG/1
20 TABLET, FILM COATED ORAL DAILY
Qty: 30 TABLET | Refills: 6 | Status: SHIPPED
Start: 2021-07-30 | End: 2021-10-08

## 2021-07-30 NOTE — PROGRESS NOTES
[facility]:  2021, MRN: [id number]      Reyes Davidson is a 63 y.o. female and presents to clinic for Follow-up (pt states discomfort in chest and back since 2021) and Results      Subjective:   Pt is a 67yo AAF with h/o chronic bronchitis, LUCY, porphyria (per EHR), allergic rhinitis, NAFLD, cervical spinal stenosis, multiple food allergies (per lab work), HTN, HLD, constipation, gallbladder polyps (suggested by CT scan 10/13/16), and GERD. 1. Anxiety: Her position changed at her job to a non-customer interactive position. \"The stress went down immensely. \" She is still doing counseling with a therapist. She is also pursuing counseling via her Gnosticism. She continues to have depression sx. She identifies as Druze. She feels a stigma is attached to having depression. She is doing better with her drinking but admits to drinking 1-2 glasses of wine each day on the weekends. She used to take anxiety rx yrs ago when her mother . \"I didn't like the way it made me feel. \"     2. NAFLD: She saw Veronica Vivas. He recommended no ETOH. 3. Chest Pain: \"It feels like something's coming in.\" She continues to have pain off/on since February and it radiates to her back. No SOB. No palpitations. No triggers are known. She has an associated cough and ear pain sometimes. CP will last 5-20 min. It resolves on its own per her hx. She had an unremarkable cardiac w/u in the past. Pain is b/l along her anterior chest. The last episode occurred while she was working at her desk at work - earlier this wk. Earlier this year, she met with Ho Milligan with Cardiology who did not suspect that she was having cardiac related chest pain.     **S/p recent allergy testing**        Patient Active Problem List    Diagnosis Date Noted    Cervical spinal stenosis 2021    MURPHY (nonalcoholic steatohepatitis) 2019    Allergic rhinitis 10/04/2017    Gallbladder polyp - suggested by findings on CT scan from 10/13/16 03/01/2017    Hypersomnia with sleep apnea 03/13/2014    Constipation 01/16/2012    Essential hypertension     Hyperlipidemia     GERD (gastroesophageal reflux disease)        Current Outpatient Medications   Medication Sig Dispense Refill    lansoprazole (PREVACID) 30 mg capsule       mometasone (NASONEX) 50 mcg/actuation nasal spray       spironolactone (ALDACTONE) 25 mg tablet TAKE 1 TABLET DAILY 90 Tablet 1    amLODIPine (NORVASC) 2.5 mg tablet TAKE ONE TABLET BY MOUTH DAILY 90 Tab 3    potassium chloride (KLOR-CON) 20 mEq pack       pravastatin (PRAVACHOL) 80 mg tablet Take 1 Tab by mouth daily. 90 Tab 3    atenoloL (TENORMIN) 25 mg tablet Take 0.5 Tabs by mouth two (2) times a day. 90 Tab 3    CLOBETASOL PROPIONATE, BULK, 0.05 % by Does Not Apply route two (2) times a day.  fluticasone (FLONASE) 50 mcg/actuation nasal spray 2 Sprays by Both Nostrils route daily. Indications: Allergic Rhinitis 1 Bottle 11    potassium chloride (KAON 10%) 20 mEq/15 mL solution Patient states she take 1 tablespoon daily  powder      glucose blood VI test strips (BLOOD GLUCOSE TEST) strip Use to check glucose daily 1 Package 11    co-enzyme Q-10 (CO Q-10) 100 mg capsule Take 200 mg by mouth daily.  aspirin delayed-release (ECOTRIN LOW STRENGTH) 81 mg tablet Take 81 mg by mouth daily.  omega-3 fatty acids-vitamin e (FISH OIL) 1,000 mg cap Take 2 Caps by mouth daily.  melatonin 3 mg tablet Take 3 mg by mouth nightly as needed.  Dexlansoprazole (DEXILANT) 60 mg CpDM Take 1 Cap by mouth every Monday, Wednesday, Friday.  MULTIVITAMIN W-MINERALS/LUTEIN (CENTRUM SILVER PO) Take 1 Tab by mouth daily.  GARLIC PO Take 2 Caps by mouth daily. (Patient not taking: Reported on 6/7/2021)      sucralfate (CARAFATE) 1 gram tablet Take 1 g by mouth daily as needed.  (Patient not taking: Reported on 6/7/2021)         Allergies   Allergen Reactions    Ibuprofen Other (comments)    Nsaids (Non-Steroidal Anti-Inflammatory Drug) Other (comments)    Amoxicillin Unable to Obtain and Other (comments)    Lidocaine Swelling     Oral Solution    Lipitor [Atorvastatin] Myalgia           Lopressor [Metoprolol Tartrate] Other (comments)     Lightheaded and cp    Percocet [Oxycodone-Acetaminophen] Unable to Obtain and Other (comments)    Vicodin [Hydrocodone-Acetaminophen] Unable to Obtain and Other (comments)       Past Medical History:   Diagnosis Date    Allergic rhinitis     Cardiac Agatston CAC score, <100 04/30/2014    Coronary calcium score 0.    Cardiac Holter monitoring 01/25/2017    Sinus rhythm, avg HR 73 bpm (range ). Benign Holter study.  Cardiac nuclear imaging test 01/11/2013    No convincing evidence for ischemia or infarction in the setting of significant chest wall artifact. EF 62%. No RWMA. Neg EKG on max EST. Ex time 9 min 50 sec.  Cardiac stress echo, normal 05/05/2016    Normal maximal stress echo w/reproduction of atypical chest discomfort. Ex time 11 min 3 sec. EF 55%.  Cardiovascular LLE venous duplex 09/28/2016    Left leg:  No DVT.     Chest pain     GERD (gastroesophageal reflux disease)     Heel pain, bilateral     Hiatal hernia     denies this    HTN (hypertension)     Hyperlipidemia     Insulin resistance     follows with endocrinology for this    Night sweats     PVC (premature ventricular contraction)     Right low back pain     S/P colonoscopy 2009, 2014    normal per patient    Unsteady gait     due to heel pain       Past Surgical History:   Procedure Laterality Date    D/C SUCTION  2011    HX OTHER SURGICAL  2014    colonscopy    HX OTHER SURGICAL      endoscopy    HX OTHER SURGICAL      wisdom teeth removal x1       Family History   Problem Relation Age of Onset    Hypertension Mother     Heart defect Mother         anuerysm    Heart Attack Mother     Asthma Father     Heart Attack Maternal Uncle     Heart Attack Maternal Uncle     Heart Disease Brother     Hypertension Brother        Social History     Tobacco Use    Smoking status: Former Smoker     Packs/day: 0.50     Years: 11.00     Pack years: 5.50     Types: Cigarettes     Quit date: 2006     Years since quittin.5    Smokeless tobacco: Former User     Quit date: 1/10/1993    Tobacco comment: 6-7 cigs per day   Substance Use Topics    Alcohol use:  Yes     Alcohol/week: 0.0 standard drinks     Comment: 1-2 glasses wine 3-4 times a week       ROS   ROS    Objective     Vitals:    21 1242   BP: 128/81   Pulse: 61   Resp: 18   Temp: 97 °F (36.1 °C)   TempSrc: Temporal   SpO2: 97%   Weight: 157 lb 12.8 oz (71.6 kg)   Height: 5' 1\" (1.549 m)   PainSc:   0 - No pain       Physical Exam    LABS   Data Review:   Lab Results   Component Value Date/Time    WBC 4.8 2021 01:36 PM    Hemoglobin, POC 12.2 2013 08:19 PM    HGB 13.9 2021 01:36 PM    Hematocrit, POC 36 2013 08:19 PM    HCT 42.8 2021 01:36 PM    PLATELET 400  01:36 PM    MCV 88.8 2021 01:36 PM       Lab Results   Component Value Date/Time    Sodium 141 2021 01:36 PM    Potassium 4.0 2021 01:36 PM    Chloride 109 2021 01:36 PM    CO2 29 2021 01:36 PM    Anion gap 3 2021 01:36 PM    Glucose 82 2021 01:36 PM    BUN 13 2021 01:36 PM    Creatinine 1.13 2021 01:36 PM    BUN/Creatinine ratio 12 2021 01:36 PM    GFR est AA 60 (L) 2021 01:36 PM    GFR est non-AA 49 (L) 2021 01:36 PM    Calcium 9.4 2021 01:36 PM       Lab Results   Component Value Date/Time    Cholesterol, total 172 2020 02:28 PM    HDL Cholesterol 73 2020 02:28 PM    LDL-CHOLESTEROL 87 2020 02:28 PM    LDL, calculated 82.8 2019 11:49 AM    VLDL, calculated 7.2 2019 11:49 AM    Triglyceride 43 2020 02:28 PM    CHOL/HDL Ratio 2.0 2019 11:49 AM    Cholesterol/HDL ratio 2.4 2020 02:28 PM Lab Results   Component Value Date/Time    Hemoglobin A1c 5.3 09/11/2020 02:28 PM    Hemoglobin A1c (POC) 5.6 09/19/2013 02:30 PM    Hemoglobin A1c, External 5.8 10/08/2015 12:00 AM       Assessment/Plan:   1. Depression: +Anxiety  We will start a course of medicinecitalopram.  I instructed her to notify me if develop any adverse side effects. I encouraged her to stop all alcohol beverage consumption. I encouraged her to continue with talk therapy services and to pray in accordance with her underlying belief system as a means to reduce her stress. I will follow-up with her to assess her response to Celexa in 4 to 6 weeks. ORDERS:  - citalopram (CELEXA) 20 mg tablet; Take 1 Tablet by mouth daily. Dispense: 30 Tablet; Refill: 6    2. Chest Pain: Atypical.  Asymptomatic today. Okay to take Tylenol as needed. Observation for now. 3. NAFLD:  I encouraged her to stop drinking alcohol beverages as did her GI team.  All questions were answered regarding this diagnosis. Health Maintenance Due   Topic Date Due    Shingrix Vaccine Age 49> (1 of 2) Never done    Colorectal Cancer Screening Combo  03/26/2021    PAP AKA CERVICAL CYTOLOGY  08/06/2021       Lab review: labs are reviewed in the EHR    I have discussed the diagnosis with the patient and the intended plan as seen in the above orders. The patient has received an after-visit summary and questions were answered concerning future plans. I have discussed medication side effects and warnings with the patient as well. I have reviewed the plan of care with the patient, accepted their input and they are in agreement with the treatment goals. All questions were answered. The patient understands the plan of care. Handouts provided today with above information. Pt instructed if symptoms worsen to call the office or report to the ED for continued care. Greater than 50% of the visit time was spent in counseling and/or coordination of care. Voice recognition was used to generate this report, which may have resulted in some phonetic based errors in grammar and contents. Even though attempts were made to correct all the mistakes, some may have been missed, and remained in the body of the document.           Tim Elena MD

## 2021-07-30 NOTE — PATIENT INSTRUCTIONS
Recovering From Depression: Care Instructions  Your Care Instructions     Taking good care of yourself is important as you recover from depression. In time, your symptoms will fade as your treatment takes hold. Do not give up. Instead, focus your energy on getting better. Your mood will improve. It just takes some time. Focus on things that can help you feel better, such as being with friends and family, eating well, and getting enough rest. But take things slowly. Do not do too much too soon. You will begin to feel better gradually. Follow-up care is a key part of your treatment and safety. Be sure to make and go to all appointments, and call your doctor if you are having problems. It's also a good idea to know your test results and keep a list of the medicines you take. How can you care for yourself at home? Be realistic  · If you have a large task to do, break it up into smaller steps you can handle, and just do what you can. · You may want to put off important decisions until your depression has lifted. If you have plans that will have a major impact on your life, such as marriage, divorce, or a job change, try to wait a bit. Talk it over with friends and loved ones who can help you look at the overall picture first.  · Reaching out to people for help is important. Do not isolate yourself. Let your family and friends help you. Find someone you can trust and confide in, and talk to that person. · Be patient, and be kind to yourself. Remember that depression is not your fault and is not something you can overcome with willpower alone. Treatment is important for depression, just like for any other illness. Feeling better takes time, and your mood will improve little by little. Stay active  · Stay busy and get outside. Take a walk, or try some other light exercise. · Talk with your doctor about an exercise program. Exercise can help with mild depression. · Go to a movie or concert.  Take part in a Roman Catholic activity or other social gathering. Go to a [employer] game. · Ask a friend to have dinner with you. Take care of yourself  · Eat a balanced diet with plenty of fresh fruits and vegetables, whole grains, and lean protein. If you have lost your appetite, eat small snacks rather than large meals. · Avoid using illegal drugs or marijuana and drinking alcohol. Do not take medicines that have not been prescribed for you. They may interfere with medicines you may be taking for depression, or they may make your depression worse. · Take your medicines exactly as they are prescribed. You may start to feel better within 1 to 3 weeks of taking antidepressant medicine. But it can take as many as 6 to 8 weeks to see more improvement. If you have questions or concerns about your medicines, or if you do not notice any improvement by 3 weeks, talk to your doctor. · Continue to take your medicine after your symptoms improve. Taking your medicine for at least 6 months after you feel better can help keep you from getting depressed again. If this isn't the first time you have been depressed, your doctor may recommend you to take medicine even longer. · If you have any side effects from your medicine, tell your doctor. Many side effects are mild and will go away on their own after you have been taking the medicine for a few weeks. Some may last longer. Talk to your doctor if side effects are bothering you too much. You might be able to try a different medicine. · Continue counseling. It may help prevent depression from returning, especially if you've had multiple episodes of depression. Talk with your counselor if you are having a hard time attending your sessions or you think the sessions aren't working. Don't just stop going. · Get enough sleep. Talk to your doctor if you are having problems sleeping. · Avoid sleeping pills unless they are prescribed by the doctor treating your depression.  Sleeping pills may make you groggy during the day, and they may interact with other medicine you are taking. · If you have any other illnesses, such as diabetes, heart disease, or high blood pressure, make sure to continue with your treatment. Tell your doctor about all of the medicines you take, including those with or without a prescription. · If you or someone you know talks about suicide, self-harm, or feeling hopeless, get help right away. Call the [address] [place] [address] at 1-800-273-talk (1-773.587.1746) or text HOME to 588408 to access the Crisis Text Line. Consider saving these numbers in your phone. When should you call for help? Call 911 anytime you think you may need emergency care. For example, call if:    · You feel like hurting yourself or someone else.     · Someone you know has depression and is about to attempt or is attempting suicide. Call your doctor now or seek immediate medical care if:    · You hear voices.     · Someone you know has depression and:  ? Starts to give away his or her possessions. ? Uses illegal drugs or drinks alcohol heavily. ? Talks or writes about death, including writing suicide notes or talking about guns, knives, or pills. ? Starts to spend a lot of time alone. ? Acts very aggressively or suddenly appears calm. Watch closely for changes in your health, and be sure to contact your doctor if:    · You do not get better as expected. Where can you learn more? Go to http://www.gray.com/  Enter N529 in the search box to learn more about \"Recovering From Depression: Care Instructions. \"  Current as of: September 23, 2020               Content Version: 12.8  © 7268-7119 Healthwise, Incorporated. Care instructions adapted under license by [employer] (which disclaims liability or warranty for this information).  If you have questions about a medical condition or this instruction, always ask your healthcare professional. Gianluca Malone disclaims any warranty or liability for your use of this information.

## 2021-07-30 NOTE — PROGRESS NOTES
Depression Screening:  3 most recent PHQ Screens 7/30/2021   PHQ Not Done -   Little interest or pleasure in doing things Not at all   Feeling down, depressed, irritable, or hopeless Not at all   Total Score PHQ 2 0       Learning Assessment:  Learning Assessment 8/15/2019   PRIMARY LEARNER Patient   HIGHEST LEVEL OF EDUCATION - PRIMARY LEARNER  4 YEARS Teagan PRIMARY LEARNER NONE   CO-LEARNER CAREGIVER No   PRIMARY LANGUAGE ENGLISH   LEARNER PREFERENCE PRIMARY DEMONSTRATION     -   ANSWERED BY patient   RELATIONSHIP SELF       Abuse Screening:  Abuse Screening Questionnaire 4/28/2021   Do you ever feel afraid of your partner? N   Are you in a relationship with someone who physically or mentally threatens you? N   Is it safe for you to go home? Y       Fall Risk  Fall Risk Assessment, last 12 mths 6/3/2020   Able to walk? Yes   Fall in past 12 months? No       ADL  No flowsheet data found. Travel Screening:    Travel Screening     Question   Response    In the last month, have you been in contact with someone who was confirmed or suspected to have Coronavirus / COVID-19? No / Unsure    Have you had a COVID-19 viral test in the last 14 days? No    Do you have any of the following new or worsening symptoms? None of these    Have you traveled internationally or domestically in the last month? No      Travel History   Travel since 06/30/21      Location Start Date End Date       [facility]/25/21 07/25/21      [facility] [address] 07/09/21 07/11/21           Health Maintenance reviewed and discussed and ordered per Provider. Health Maintenance Due   Topic Date Due    Shingrix Vaccine Age 49> (1 of 2) Never done    Colorectal Cancer Screening Combo  03/26/2021    PAP AKA CERVICAL CYTOLOGY  08/06/2021   .     Abel Cartagena presents today for   Chief Complaint   Patient presents with    Follow-up     pt states discomfort in chest and back since 2/2021    Results       Is someone accompanying this pt? no    Is the patient using any DME equipment during OV? no    Coordination of Care:  1. Have you been to the ER, urgent care clinic since your last visit? Hospitalized since your last visit? no    2. Have you seen or consulted any other health care providers outside of the [address] [place] [address] since your last visit? Include any pap smears or colon screening.  no

## 2021-08-20 ENCOUNTER — TELEPHONE (OUTPATIENT)
Dept: INTERNAL MEDICINE CLINIC | Age: 61
End: 2021-08-20

## 2021-08-20 NOTE — LETTER
8/23/2021 3:22 PM 
 
Ms. Tito Rainey [address] [facility] [address] To Whom It May Concern: 
KELY BASHIR EMILY [facility] [employer] [address] [place], [address] [facility] Re: Event# [id number] 
  
Tito Rainey is my patient and is under my medical care. 1. Does  have a physical or mental impairment that limits her ability to engage in a major life activity such as the ability to work; care for herself; perform manual tasks; walk, see, hear, eat, sleep; ect.,? If yes, what is the nature of the impairment and applicable symptoms. Yes -  has a physical and/or mental impairment that limits her ability to engage in a major life activity. 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 She can have a non-customer interactive position though with no other limitations. She is getting ongoing treatment for depression and anxiety, related to her history of direct customer interactions. 2. If the answer to question 1 is yes, does the impairment currently affect 's ability to perform one or more of the essential functions of a non-injury ? Yes. s impairment does affect her ability to perform the essential functions of her position when but only when she is directly interacting with customers. UNDUE STRESSORS RESTRICTION: In my letter dated 6/15/21, it was indicated: \"Recommend non-customer interactive position to help alleviate heightened level os anxiety and mental stress. \" Please clarify restriction: She is not to have direct customer interaction. It is understood that \"stress\" and what is Dagmar Dates" is essentially very personal and unique to each person. It is necessary for the accomodation discussions to have clarity as to what has caused the Organization (non-customer interactive position) to be stressful for : She has had significant impairment from working directly with customers. (I) Is she able to work on the phones all day? No. She should not have direct customer interaction as it only worsens her mental health symptoms. (ii) How may hours per day can she work on the phone? She can work a \"full\" day as long as it does not include direct customer interaction. She should not work any hours per day with direct customer interactions. Due to 's mental health condition, it's in her best interest to be placed permanently in a different position. I recommend a non-customer interactive position ot help alleviate heightened levels of anxiety and mental stress - that would invoke additional physical health attributes that would be detrimental to her overall health, work performance, and job.  
  
In addition, a requirement for [employer], was to enroll in counseling therapy consistent in conjunction with my recommendation of a non-customer interaction position. She is compliant with this requirement and is meeting this portion of her medical plan of care.  
  
If there are questions or concerns please have the patient contact our office.  
 
 
 
Sincerely, 
 
 
 
 
 
 
 
Gianluca Taylor MD

## 2021-08-20 NOTE — TELEPHONE ENCOUNTER
Pt asking for nurse April to return her call.      Stated it is regarding a letter she dropped off Wednesday for Dr Nisreen Gaitan and the status

## 2021-09-30 NOTE — PROGRESS NOTES
Chandler Hajluisalethea presents today for   Chief Complaint   Patient presents with    Arm Pain     Left axillary pain x 2 months     Cyst     patient reports right leg cyst behind the knee. Pain in the area. Patient denies any redness or swelling.  Other     Cold feeling in the face. Coordination of Care:  1. Have you been to the ER, urgent care clinic since your last visit? Hospitalized since your last visit? no    2. Have you seen or consulted any other health care providers outside of the [address] [place] [address] since your last visit? Include any pap smears or colon screening.  yes

## 2021-10-01 ENCOUNTER — HOSPITAL ENCOUNTER (OUTPATIENT)
Dept: LAB | Age: 61
Discharge: HOME | End: 2021-10-01
Payer: COMMERCIAL

## 2021-10-01 ENCOUNTER — OFFICE VISIT (OUTPATIENT)
Dept: INTERNAL MEDICINE CLINIC | Age: 61
End: 2021-10-01
Payer: COMMERCIAL

## 2021-10-01 VITALS
HEART RATE: 73 BPM | HEIGHT: 61 IN | TEMPERATURE: 97.8 F | BODY MASS INDEX: 29.83 KG/M2 | DIASTOLIC BLOOD PRESSURE: 78 MMHG | OXYGEN SATURATION: 97 % | WEIGHT: 158 LBS | RESPIRATION RATE: 16 BRPM | SYSTOLIC BLOOD PRESSURE: 119 MMHG

## 2021-10-01 DIAGNOSIS — N64.4 BREAST PAIN: ICD-10-CM

## 2021-10-01 DIAGNOSIS — M79.622 LEFT AXILLARY PAIN: ICD-10-CM

## 2021-10-01 DIAGNOSIS — E78.5 HYPERLIPIDEMIA, UNSPECIFIED HYPERLIPIDEMIA TYPE: ICD-10-CM

## 2021-10-01 DIAGNOSIS — M25.561 ACUTE PAIN OF RIGHT KNEE: Primary | ICD-10-CM

## 2021-10-01 DIAGNOSIS — I10 ESSENTIAL HYPERTENSION: ICD-10-CM

## 2021-10-01 LAB
ALBUMIN SERPL-MCNC: 3.8 G/DL (ref 3.4–5)
ALBUMIN/GLOB SERPL: 1.1 {RATIO} (ref 0.8–1.7)
ALP SERPL-CCNC: 39 U/L (ref 45–117)
ALT SERPL-CCNC: 37 U/L (ref 13–56)
AST SERPL-CCNC: 22 U/L (ref 10–38)
BILIRUB DIRECT SERPL-MCNC: 0.2 MG/DL (ref 0–0.2)
BILIRUB SERPL-MCNC: 0.7 MG/DL (ref 0.2–1)
CHOLEST SERPL-MCNC: 172 MG/DL
GLOBULIN SER CALC-MCNC: 3.5 G/DL (ref 2–4)
HDLC SERPL-MCNC: 86 MG/DL (ref 40–60)
HDLC SERPL: 2 {RATIO} (ref 0–5)
LDLC SERPL CALC-MCNC: 79.4 MG/DL (ref 0–100)
LIPID PROFILE,FLP: ABNORMAL
PROT SERPL-MCNC: 7.3 G/DL (ref 6.4–8.2)
TRIGL SERPL-MCNC: 33 MG/DL (ref ?–150)
VLDLC SERPL CALC-MCNC: 6.6 MG/DL

## 2021-10-01 PROCEDURE — 80076 HEPATIC FUNCTION PANEL: CPT

## 2021-10-01 PROCEDURE — 36415 COLL VENOUS BLD VENIPUNCTURE: CPT

## 2021-10-01 PROCEDURE — 80061 LIPID PANEL: CPT

## 2021-10-01 PROCEDURE — 99214 OFFICE O/P EST MOD 30 MIN: CPT | Performed by: INTERNAL MEDICINE

## 2021-10-01 NOTE — PROGRESS NOTES
[facility]NISTS OF [facility]:  10/1/2021, MRN: [id number]      Alona Arora is a 63 y.o. female and presents to clinic for Arm Pain (Left axillary pain x 2 months ), Cyst (patient reports right leg cyst behind the knee. Pain in the area. Patient denies any redness or swelling. ), and Other (Cold feeling in the face. )      Subjective:   Pt is a 65yo AAF with h/o chronic bronchitis, LUCY, porphyria (per EHR), allergic rhinitis, NAFLD, cervical spinal stenosis, multiple food allergies (per lab work), HTN, HLD, constipation, gallbladder polyps (suggested by CT scan 10/13/16), and GERD. 1. Depression: Earlier this yr, she reported work related stress that resulted in excessive ETOH intake. Today she reports: She has 2 glasses of wine per day on the weekend. She does not drink during the weekend. 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 She is attending weekly counseling services via her [employer]. \"My job is not stressful. \"     2. Left Axillary Pain: Present x 2 months. Evaluated by her GYN team. Sx are off/on. No triggers are known. \"It goes away on its own. \" It will last 5-10 min. She was told to get a mammogram. She is asymptomatic. Her last bout of pain: about a wk ago. Her GYN recommended that she wear a bra. 3. Right Baker's Cyst: She has had this for yrs off/on. She is interested in having this area drained. She wears compression stockings. Pain: present along right popliteal fossa. 4. Facial Discomfort:  She experienced b/l paresthesias from just lateral to her eyes down to her chin. Sx occur randomly. Episodes last 30 minutes to an hour. No hearing/vision changes. No pain. Quality of discomfort: freezing. She is in the process of scheduling with Neurology    5. HTN/HLD: VSS. Overdue for a lipid panel to be checked. Taking: aldactone, norvasc, potassium, atenolol, and pravastatin. No adverse side effects.      BP Readings from Last 3 Encounters:   10/01/21 119/78   07/30/21 128/81   06/07/21 125/79           Patient Active Problem List    Diagnosis Date Noted    Cervical spinal stenosis 04/28/2021    MURPHY (nonalcoholic steatohepatitis) 09/03/2019    Allergic rhinitis 10/04/2017    Gallbladder polyp - suggested by findings on CT scan from 10/13/16 03/01/2017    Hypersomnia with sleep apnea 03/13/2014    Constipation 01/16/2012    Essential hypertension     Hyperlipidemia     GERD (gastroesophageal reflux disease)        Current Outpatient Medications   Medication Sig Dispense Refill    lansoprazole (PREVACID) 30 mg capsule       spironolactone (ALDACTONE) 25 mg tablet TAKE 1 TABLET DAILY 90 Tablet 1    amLODIPine (NORVASC) 2.5 mg tablet TAKE ONE TABLET BY MOUTH DAILY 90 Tab 3    potassium chloride (KLOR-CON) 20 mEq pack       pravastatin (PRAVACHOL) 80 mg tablet Take 1 Tab by mouth daily. 90 Tab 3    atenoloL (TENORMIN) 25 mg tablet Take 0.5 Tabs by mouth two (2) times a day. 90 Tab 3    glucose blood VI test strips (BLOOD GLUCOSE TEST) strip Use to check glucose daily 1 Package 11    co-enzyme Q-10 (CO Q-10) 100 mg capsule Take 200 mg by mouth daily.  aspirin delayed-release (ECOTRIN LOW STRENGTH) 81 mg tablet Take 81 mg by mouth daily.  omega-3 fatty acids-vitamin e (FISH OIL) 1,000 mg cap Take 2 Caps by mouth daily.  melatonin 3 mg tablet Take 3 mg by mouth nightly as needed.  MULTIVITAMIN W-MINERALS/LUTEIN (CENTRUM SILVER PO) Take 1 Tab by mouth daily.  mometasone (NASONEX) 50 mcg/actuation nasal spray       citalopram (CELEXA) 20 mg tablet Take 1 Tablet by mouth daily. (Patient not taking: Reported on 10/1/2021) 30 Tablet 6    CLOBETASOL PROPIONATE, BULK, 0.05 % by Does Not Apply route two (2) times a day. (Patient not taking: Reported on 10/1/2021)      fluticasone (FLONASE) 50 mcg/actuation nasal spray 2 Sprays by Both Nostrils route daily. Indications:  Allergic Rhinitis (Patient not taking: Reported on 10/1/2021) 1 Bottle 11    GARLIC PO Take 2 Caps by mouth daily. (Patient not taking: Reported on 6/7/2021)      Dexlansoprazole (DEXILANT) 60 mg CpDM Take 1 Cap by mouth every Monday, Wednesday, Friday. (Patient not taking: Reported on 10/1/2021)      sucralfate (CARAFATE) 1 gram tablet Take 1 g by mouth daily as needed. (Patient not taking: Reported on 6/7/2021)         Allergies   Allergen Reactions    Ibuprofen Other (comments)    Nsaids (Non-Steroidal Anti-Inflammatory Drug) Other (comments)    Amoxicillin Unable to Obtain and Other (comments)    Lidocaine Swelling     Oral Solution    Lipitor [Atorvastatin] Myalgia           Lopressor [Metoprolol Tartrate] Other (comments)     Lightheaded and cp    Percocet [Oxycodone-Acetaminophen] Unable to Obtain and Other (comments)    Vicodin [Hydrocodone-Acetaminophen] Unable to Obtain and Other (comments)       Past Medical History:   Diagnosis Date    Allergic rhinitis     Cardiac Agatston CAC score, <100 04/30/2014    Coronary calcium score 0.    Cardiac Holter monitoring 01/25/2017    Sinus rhythm, avg HR 73 bpm (range ). Benign Holter study.  Cardiac nuclear imaging test 01/11/2013    No convincing evidence for ischemia or infarction in the setting of significant chest wall artifact. EF 62%. No RWMA. Neg EKG on max EST. Ex time 9 min 50 sec.  Cardiac stress echo, normal 05/05/2016    Normal maximal stress echo w/reproduction of atypical chest discomfort. Ex time 11 min 3 sec. EF 55%.  Cardiovascular LLE venous duplex 09/28/2016    Left leg:  No DVT.     Chest pain     GERD (gastroesophageal reflux disease)     Heel pain, bilateral     Hiatal hernia     denies this    HTN (hypertension)     Hyperlipidemia     Insulin resistance     follows with endocrinology for this    Night sweats     PVC (premature ventricular contraction)     Right low back pain     S/P colonoscopy 2009, 2014    normal per patient    Unsteady gait     due to heel pain       Past Surgical History: Procedure Laterality Date    D/C SUCTION  2011    HX OTHER SURGICAL  2014    colonscopy    HX OTHER SURGICAL      endoscopy    HX OTHER SURGICAL      wisdom teeth removal x1       Family History   Problem Relation Age of Onset    Hypertension Mother     Heart defect Mother         anuerysm    Heart Attack Mother     Asthma Father     Heart Attack Maternal Uncle     Heart Attack Maternal Uncle     Heart Disease Brother     Hypertension Brother        Social History     Tobacco Use    Smoking status: Former Smoker     Packs/day: 0.50     Years: 11.00     Pack years: 5.50     Types: Cigarettes     Quit date: 2006     Years since quittin.7    Smokeless tobacco: Former User     Quit date: 1/10/1993    Tobacco comment: 6-7 cigs per day   Substance Use Topics    Alcohol use: Yes     Alcohol/week: 0.0 standard drinks     Comment: 1-2 glasses wine 3-4 times a week       ROS   Review of Systems   Constitutional: Negative for chills and fever. HENT: Negative for ear pain and sore throat. Eyes: Negative for blurred vision and pain. Respiratory: Negative for cough and shortness of breath. Cardiovascular: Negative for chest pain. Gastrointestinal: Negative for abdominal pain, blood in stool and melena. Genitourinary: Negative for dysuria and hematuria. Musculoskeletal: Positive for joint pain. Negative for myalgias. Skin: Negative for rash. Neurological: Negative for headaches. Endo/Heme/Allergies: Does not bruise/bleed easily. Psychiatric/Behavioral: Negative for substance abuse. Objective     Vitals:    10/01/21 1403   BP: 119/78   Pulse: 73   Resp: 16   Temp: 97.8 °F (36.6 °C)   TempSrc: Temporal   SpO2: 97%   Weight: 158 lb (71.7 kg)   Height: 5' 1\" (1.549 m)   PainSc:   0 - No pain       Physical Exam  Vitals and nursing note reviewed. HENT:      Head: Normocephalic and atraumatic.       Right Ear: External ear normal.      Left Ear: External ear normal.   Eyes: General: No scleral icterus. Right eye: No discharge. Left eye: No discharge. Conjunctiva/sclera: Conjunctivae normal.   Cardiovascular:      Rate and Rhythm: Normal rate and regular rhythm. Heart sounds: Normal heart sounds. No murmur heard. No friction rub. No gallop. Pulmonary:      Effort: Pulmonary effort is normal. No respiratory distress. Breath sounds: Normal breath sounds. No wheezing or rales. Abdominal:      General: Bowel sounds are normal. There is no distension. Palpations: Abdomen is soft. There is no mass. Tenderness: There is no abdominal tenderness. There is no guarding or rebound. Musculoskeletal:         General: No swelling (BUE) or tenderness (BUE). Cervical back: Neck supple. Comments: She has pain with palpation of her right popliteal fossa   Lymphadenopathy:      Cervical: No cervical adenopathy. Right cervical: No superficial, deep or posterior cervical adenopathy. Upper Body:      Right upper body: No supraclavicular or axillary adenopathy. Comments: Right axilla is NTTP   Skin:     General: Skin is warm and dry. Findings: No erythema or rash. Neurological:      Mental Status: She is alert. Motor: No abnormal muscle tone.       Gait: Gait normal.   Psychiatric:         Mood and Affect: Mood normal.         LABS   Data Review:   Lab Results   Component Value Date/Time    WBC 4.8 03/08/2021 01:36 PM    Hemoglobin, POC 12.2 01/09/2013 08:19 PM    HGB 13.9 03/08/2021 01:36 PM    Hematocrit, POC 36 01/09/2013 08:19 PM    HCT 42.8 03/08/2021 01:36 PM    PLATELET 799 61/51/7222 01:36 PM    MCV 88.8 03/08/2021 01:36 PM       Lab Results   Component Value Date/Time    Sodium 141 03/08/2021 01:36 PM    Potassium 4.0 03/08/2021 01:36 PM    Chloride 109 03/08/2021 01:36 PM    CO2 29 03/08/2021 01:36 PM    Anion gap 3 03/08/2021 01:36 PM    Glucose 82 03/08/2021 01:36 PM    BUN 13 03/08/2021 01:36 PM    Creatinine 1.13 03/08/2021 01:36 PM    BUN/Creatinine ratio 12 03/08/2021 01:36 PM    GFR est AA 60 (L) 03/08/2021 01:36 PM    GFR est non-AA 49 (L) 03/08/2021 01:36 PM    Calcium 9.4 03/08/2021 01:36 PM       Lab Results   Component Value Date/Time    Cholesterol, total 172 09/11/2020 02:28 PM    HDL Cholesterol 73 09/11/2020 02:28 PM    LDL-CHOLESTEROL 87 09/11/2020 02:28 PM    LDL, calculated 82.8 08/03/2019 11:49 AM    VLDL, calculated 7.2 08/03/2019 11:49 AM    Triglyceride 43 09/11/2020 02:28 PM    CHOL/HDL Ratio 2.0 08/03/2019 11:49 AM    Cholesterol/HDL ratio 2.4 09/11/2020 02:28 PM       Lab Results   Component Value Date/Time    Hemoglobin A1c 5.3 09/11/2020 02:28 PM    Hemoglobin A1c (POC) 5.6 09/19/2013 02:30 PM    Hemoglobin A1c, External 5.8 10/08/2015 12:00 AM       Assessment/Plan:   1. Acute pain of right knee  - Referral to Orthopedics for evaluation. She is agreeable to having this area drained and/or removed it it is indeed a Baker's cyst    ORDERS:  - REFERRAL TO ORTHOPEDICS    2. Hyperlipidemia: +HTN. Stable. Her BG was elevated once per review of the EHR.   - Checking a lipid panel now. - Checking labs in 6 months. - C/w rx as prescribed. ORDERS:  - LIPID PANEL; Future    3. Breast/Axillary Pain:   - Ordering an ultrasound and mammogram.     ORDERS:  - HUBER MAMMO LT DX INCL CAD; Future  - US BREAST LT LIMITED=<3 QUAD; Future    4. Depression:  - C/w counseling services. - Notify me if sx worsen    5. Facial Paresthesias:   - I encouraged her to f/u with Neurology for evaluation of this sx      Health Maintenance Due   Topic Date Due    Shingrix Vaccine Age 49> (1 of 2) Never done    Colorectal Cancer Screening Combo  03/26/2021    Lipid Screen  09/11/2021         Lab review: labs are reviewed in the EHR and ordered as mentioned above    I have discussed the diagnosis with the patient and the intended plan as seen in the above orders.   The patient has received an after-visit summary and questions were answered concerning future plans. I have discussed medication side effects and warnings with the patient as well. I have reviewed the plan of care with the patient, accepted their input and they are in agreement with the treatment goals. All questions were answered. The patient understands the plan of care. Handouts provided today with above information. Pt instructed if symptoms worsen to call the office or report to the ED for continued care. Greater than 50% of the visit time was spent in counseling and/or coordination of care. Voice recognition was used to generate this report, which may have resulted in some phonetic based errors in grammar and contents. Even though attempts were made to correct all the mistakes, some may have been missed, and remained in the body of the document.           Kia Bowser MD

## 2021-10-03 NOTE — PROGRESS NOTES
Please let her know that her total cholesterol is unchanged from a year ago measuring 172. Triglycerides are 33. HDL is 86. LDL is 79. No additional medications are warranted for these findings at this time. She should continue taking all medications as prescribed.     Dr. Purvi Moraes  [facility], [address] [place] [address], [address] [place] [address] Str.  Phone: [phone]  Fax: [phone]

## 2021-10-05 ENCOUNTER — TELEPHONE (OUTPATIENT)
Dept: INTERNAL MEDICINE CLINIC | Age: 61
End: 2021-10-05

## 2021-10-05 NOTE — TELEPHONE ENCOUNTER
----- Message from Palmer Fry MD sent at 10/3/2021  7:29 PM EDT -----  Please let her know that her total cholesterol is unchanged from a year ago measuring 172. Triglycerides are 33. HDL is 86. LDL is 79. No additional medications are warranted for these findings at this time. She should continue taking all medications as prescribed.     Dr. Marysol Alaniz  [facility] of [address], [address] [place] [address] Str.  Phone: [phone]  Fax: [phone]

## 2021-10-13 ENCOUNTER — TELEPHONE (OUTPATIENT)
Dept: INTERNAL MEDICINE CLINIC | Age: 61
End: 2021-10-13

## 2021-10-13 DIAGNOSIS — E78.5 HYPERLIPIDEMIA, UNSPECIFIED HYPERLIPIDEMIA TYPE: Primary | ICD-10-CM

## 2021-10-13 DIAGNOSIS — K75.81 NASH (NONALCOHOLIC STEATOHEPATITIS): ICD-10-CM

## 2021-10-13 NOTE — TELEPHONE ENCOUNTER
Pt is requesting a referral to a dietician. Secondly, she wants to know if the glucose reading on her wellness form was from her most recent labs or was it from labs done about 3-4 months ago? Please advise her on her TEMPORARY phone #  [phone] (do not add this number to her demographics or remove any numbers already there).

## 2021-10-13 NOTE — TELEPHONE ENCOUNTER
Patient reached and informed that her labs were from 3-4 months ago. She is also aware that a nutrition referral may not be covered by her insurance without a diagnosis of diabetes.

## 2021-10-19 ENCOUNTER — TELEPHONE (OUTPATIENT)
Dept: INTERNAL MEDICINE CLINIC | Age: 61
End: 2021-10-19

## 2021-10-19 NOTE — TELEPHONE ENCOUNTER
Pt stated she has to come in tomorrow and drop a [place] letter off to Dr. Justina Douglas. She would like for the nurse to give her a call at  [phone]. Please advise.  Thank you!!!

## 2021-10-19 NOTE — TELEPHONE ENCOUNTER
Patient reached and she just wanted to give DR. Capo Ramirez a heads up that she has more fmla forms to be completed.

## 2021-10-20 DIAGNOSIS — I11.9 BENIGN HYPERTENSIVE HEART DISEASE WITHOUT HEART FAILURE: ICD-10-CM

## 2021-10-20 RX ORDER — SPIRONOLACTONE 25 MG/1
25 TABLET ORAL DAILY
Qty: 90 TABLET | Refills: 1 | Status: CANCELLED | OUTPATIENT
Start: 2021-10-20

## 2021-10-22 NOTE — TELEPHONE ENCOUNTER
Pt calling asking to speak to nurse. Says she does not need the papers she brought in the other day filled out. Her job is giving her new papers. Wants to know if Dr. Delmi Razo can see her next week to discuss the new paperwork and things she will be needing at work.

## 2021-10-25 ENCOUNTER — TELEPHONE (OUTPATIENT)
Dept: INTERNAL MEDICINE CLINIC | Age: 61
End: 2021-10-25

## 2021-10-25 DIAGNOSIS — I11.9 BENIGN HYPERTENSIVE HEART DISEASE WITHOUT HEART FAILURE: ICD-10-CM

## 2021-10-25 RX ORDER — SPIRONOLACTONE 25 MG/1
TABLET ORAL
Qty: 90 TABLET | Refills: 1 | Status: SHIPPED | OUTPATIENT
Start: 2021-10-25 | End: 2022-06-22

## 2021-10-25 NOTE — TELEPHONE ENCOUNTER
Pt called said according to [facility] pharmacy they are saying there are no more refills for spironolactone  Pt asking for a refill to be sent please advise pt said her last doseof medication was on 10/22 she is completely out

## 2021-10-25 NOTE — TELEPHONE ENCOUNTER
Rx sent to [facility]. Please let her know.     Dr. Crow Alligator  [facility], [address] [facility], [address]  Phone: [phone]  Fax: [phone]

## 2021-10-26 ENCOUNTER — HOSPITAL ENCOUNTER (OUTPATIENT)
Dept: ULTRASOUND IMAGING | Age: 61
Discharge: HOME | End: 2021-10-26
Attending: INTERNAL MEDICINE
Payer: COMMERCIAL

## 2021-10-26 ENCOUNTER — HOSPITAL ENCOUNTER (OUTPATIENT)
Dept: MAMMOGRAPHY | Age: 61
Discharge: HOME | End: 2021-10-26
Attending: INTERNAL MEDICINE
Payer: COMMERCIAL

## 2021-10-26 PROCEDURE — 76642 ULTRASOUND BREAST LIMITED: CPT

## 2021-10-26 PROCEDURE — 77065 DX MAMMO INCL CAD UNI: CPT

## 2021-10-27 ENCOUNTER — TELEPHONE (OUTPATIENT)
Dept: INTERNAL MEDICINE CLINIC | Age: 61
End: 2021-10-27

## 2021-10-27 NOTE — PROGRESS NOTES
1. Have you been to the ER, urgent care clinic since your last visit? Hospitalized since your last visit? No    2. Have you seen or consulted any other health care providers outside of the [address] [place] [address] since your last visit? Include any pap smears or colon screening.  No Authored by Resident/PA/NP

## 2021-10-27 NOTE — TELEPHONE ENCOUNTER
Pt calling very apologetic. Claudia Pepe it is completely her fault. Stated she misunderstood and thought when her employer stated they needed Job analysis form they no longer needed the letter. Stated she had a meeting today at work and they need the letter as well as form for job analysis to make her position from temporary to permanent. She has 7 days to get this done or she will lose her job.      She will be coming in early tomorrow morning to drop off job analysis  form and letter is the one turned in prior you no longer have

## 2021-11-03 NOTE — TELEPHONE ENCOUNTER
Patient stating she dropped these forms off last week. She gave to Romana Chance in a yellow envelope. She wants to know when she will be able to pick them up.

## 2021-11-04 ENCOUNTER — OFFICE VISIT (OUTPATIENT)
Dept: CARDIOLOGY CLINIC | Age: 61
End: 2021-11-04
Payer: COMMERCIAL

## 2021-11-04 VITALS
SYSTOLIC BLOOD PRESSURE: 126 MMHG | HEART RATE: 68 BPM | HEIGHT: 61 IN | WEIGHT: 158 LBS | OXYGEN SATURATION: 99 % | BODY MASS INDEX: 29.83 KG/M2 | DIASTOLIC BLOOD PRESSURE: 80 MMHG

## 2021-11-04 DIAGNOSIS — R07.9 CHEST PAIN, UNSPECIFIED TYPE: ICD-10-CM

## 2021-11-04 DIAGNOSIS — I10 ESSENTIAL HYPERTENSION: ICD-10-CM

## 2021-11-04 DIAGNOSIS — E78.5 HYPERLIPIDEMIA, UNSPECIFIED HYPERLIPIDEMIA TYPE: ICD-10-CM

## 2021-11-04 DIAGNOSIS — R00.2 PALPITATIONS: Primary | ICD-10-CM

## 2021-11-04 PROCEDURE — 99215 OFFICE O/P EST HI 40 MIN: CPT | Performed by: INTERNAL MEDICINE

## 2021-11-04 NOTE — PROGRESS NOTES
Argenis Ruiz    Chief Complaint   Patient presents with    Follow-up     8 month follow up- no complaints        HPI    Argenis Ruiz is a 63 y.o. AAF with no known coronary disease, here for follow up of several complaints. Records from 3/8/21 reviewed, she r/o for ACS and told to follow up outpt. EKG there was sinus isi and her symptoms already resolved. Again the pain was like an \"arrow\" going through her chest to her back, but hasnt recurred. It was at rest and no obvious assoc symptoms. She has a list of questions. Been undergoing workup for issues with her neck. Was having pain on her scalp she thinks is related Lennox Muhammad). She also has hair loss from alopecia, has followup with derm in few weeks. Memory loss, word finding/ paraphrasing issues. Has seen neuro and still undergoing eval for sleep apnea. She is a prior pt of . He has seen her for atypical noncardiac CP, palpitations over the years. She has done well on Atenolol + Norvasc + Spironolactone 25 mg (which she takes also for derm). I had her go for CAC, which was 0. She comes today with a list of questions, no CV complaints. Only complaint really is a pain in her armpit (and has workup going for that already). Past Medical History:   Diagnosis Date    Allergic rhinitis     Cardiac Agatston CAC score, <100 04/30/2014    Coronary calcium score 0.    Cardiac Holter monitoring 01/25/2017    Sinus rhythm, avg HR 73 bpm (range ). Benign Holter study.  Cardiac nuclear imaging test 01/11/2013    No convincing evidence for ischemia or infarction in the setting of significant chest wall artifact. EF 62%. No RWMA. Neg EKG on max EST. Ex time 9 min 50 sec.  Cardiac stress echo, normal 05/05/2016    Normal maximal stress echo w/reproduction of atypical chest discomfort. Ex time 11 min 3 sec. EF 55%.  Cardiovascular LLE venous duplex 09/28/2016    Left leg:  No DVT.     Chest pain     GERD (gastroesophageal reflux disease)     Heel pain, bilateral     Hiatal hernia     denies this    HTN (hypertension)     Hyperlipidemia     Insulin resistance     follows with endocrinology for this    Night sweats     PVC (premature ventricular contraction)     Right low back pain     S/P colonoscopy 2009, 2014    normal per patient    Unsteady gait     due to heel pain       Past Surgical History:   Procedure Laterality Date    D/C SUCTION  2011    HX OTHER SURGICAL  2014    colonscopy    HX OTHER SURGICAL      endoscopy    HX OTHER SURGICAL      wisdom teeth removal x1       Current Outpatient Medications   Medication Sig Dispense Refill    spironolactone (ALDACTONE) 25 mg tablet TAKE 1 TABLET DAILY 90 Tablet 1    lansoprazole (PREVACID) 30 mg capsule       mometasone (NASONEX) 50 mcg/actuation nasal spray       amLODIPine (NORVASC) 2.5 mg tablet TAKE ONE TABLET BY MOUTH DAILY 90 Tab 3    potassium chloride (KLOR-CON) 20 mEq pack       pravastatin (PRAVACHOL) 80 mg tablet Take 1 Tab by mouth daily. 90 Tab 3    atenoloL (TENORMIN) 25 mg tablet Take 0.5 Tabs by mouth two (2) times a day. 90 Tab 3    glucose blood VI test strips (BLOOD GLUCOSE TEST) strip Use to check glucose daily 1 Package 11    co-enzyme Q-10 (CO Q-10) 100 mg capsule Take 200 mg by mouth daily.  aspirin delayed-release (ECOTRIN LOW STRENGTH) 81 mg tablet Take 81 mg by mouth daily.  omega-3 fatty acids-vitamin e (FISH OIL) 1,000 mg cap Take 2 Caps by mouth daily.  melatonin 3 mg tablet Take 3 mg by mouth nightly as needed.  MULTIVITAMIN W-MINERALS/LUTEIN (CENTRUM SILVER PO) Take 1 Tab by mouth daily.  GARLIC PO Take 2 Caps by mouth daily.  (Patient not taking: Reported on 6/7/2021)         Allergies   Allergen Reactions    Ibuprofen Other (comments)    Nsaids (Non-Steroidal Anti-Inflammatory Drug) Other (comments)    Amoxicillin Unable to Obtain and Other (comments)    Lidocaine Swelling     Oral Solution    Lipitor [Atorvastatin] Myalgia           Lopressor [Metoprolol Tartrate] Other (comments)     Lightheaded and cp    Percocet [Oxycodone-Acetaminophen] Unable to Obtain and Other (comments)    Vicodin [Hydrocodone-Acetaminophen] Unable to Obtain and Other (comments)       Social History     Socioeconomic History    Marital status: SINGLE     Spouse name: Not on file    Number of children: Not on file    Years of education: Not on file    Highest education level: Not on file   Occupational History    Occupation: student IT      Employer: NOT EMPLOYED   Tobacco Use    Smoking status: Former Smoker     Packs/day: 0.50     Years: 11.00     Pack years: 5.50     Types: Cigarettes     Quit date: 2006     Years since quittin.8    Smokeless tobacco: Former User     Quit date: 1/10/1993    Tobacco comment: 6-7 cigs per day   Substance and Sexual Activity    Alcohol use: Yes     Alcohol/week: 0.0 standard drinks     Comment: 1-2 glasses wine 3-4 times a week    Drug use: No     Types: Prescription, OTC    Sexual activity: Never   Other Topics Concern    Not on file   Social History Narrative    Not Currently working, 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 Social Determinants of Health     Financial Resource Strain:     Difficulty of Paying Living Expenses: Not on file   Food Insecurity:     Worried About Running Out of Food in the Last Year: Not on file    Orion of Food in the Last Year: Not on file   Transportation Needs:     Lack of Transportation (Medical): Not on file    Lack of Transportation (Non-Medical):  Not on file   Physical Activity:     Days of Exercise per Week: Not on file    Minutes of Exercise per Session: Not on file   Stress:     Feeling of Stress : Not on file   Social Connections:     Frequency of Communication with Friends and Family: Not on file    Frequency of Social Gatherings with Friends and Family: Not on file    Attends [employer] Services: Not on file   [place]leigh Active Member of Clubs or Organizations: Not on file    Attends Club or Organization Meetings: Not on file    Marital Status: Not on file   Intimate Partner Violence:     Fear of Current or Ex-Partner: Not on file    Emotionally Abused: Not on file    Physically Abused: Not on file    Sexually Abused: Not on file   Housing Stability:     Unable to Pay for Housing in the Last Year: Not on file    Number of [place] in the Last Year: Not on file    Unstable Housing in the Last Year: Not on file    no children, \"I have a niece. \" loves to walk    FH: +premature ASCVD    Review of Systems    14 pt Review of Systems is negative unless otherwise mentioned in the HPI. Wt Readings from Last 3 Encounters:   11/04/21 71.7 kg (158 lb)   10/01/21 71.7 kg (158 lb)   07/30/21 71.6 kg (157 lb 12.8 oz)     Temp Readings from Last 3 Encounters:   10/01/21 97.8 °F (36.6 °C) (Temporal)   07/30/21 97 °F (36.1 °C) (Temporal)   06/07/21 98.1 °F (36.7 °C) (Temporal)     BP Readings from Last 3 Encounters:   11/04/21 122/74   10/01/21 119/78   07/30/21 128/81     Pulse Readings from Last 3 Encounters:   11/04/21 68   10/01/21 73   07/30/21 61       Physical Exam:    Visit Vitals  /74 (BP 1 Location: Right upper arm, BP Patient Position: Sitting, BP Cuff Size: Adult)   Pulse 68   Ht 5' 1\" (1.549 m)   Wt 71.7 kg (158 lb)   SpO2 99%   BMI 29.85 kg/m²      Physical Exam  HENT:      Head: Normocephalic and atraumatic. Eyes:      Pupils: Pupils are equal, round, and reactive to light. Cardiovascular:      Rate and Rhythm: Normal rate and regular rhythm. Heart sounds: Normal heart sounds. No murmur heard. No friction rub. No gallop. Pulmonary:      Effort: Pulmonary effort is normal. No respiratory distress. Breath sounds: Normal breath sounds. No wheezing or rales. Chest:      Chest wall: No tenderness. Abdominal:      General: Bowel sounds are normal.      Palpations: Abdomen is soft. Musculoskeletal:         General: No tenderness. Skin:     General: Skin is warm and dry. Neurological:      Mental Status: She is alert and oriented to person, place, and time. Psychiatric:         Mood and Affect: Mood is anxious. Cognition and Memory: Memory is impaired. EKG last: sinus isi    Lab Results   Component Value Date/Time    Sodium 141 03/08/2021 01:36 PM    Potassium 4.0 03/08/2021 01:36 PM    Chloride 109 03/08/2021 01:36 PM    CO2 29 03/08/2021 01:36 PM    Anion gap 3 03/08/2021 01:36 PM    Glucose 82 03/08/2021 01:36 PM    BUN 13 03/08/2021 01:36 PM    Creatinine 1.13 03/08/2021 01:36 PM    BUN/Creatinine ratio 12 03/08/2021 01:36 PM    GFR est AA 60 (L) 03/08/2021 01:36 PM    GFR est non-AA 49 (L) 03/08/2021 01:36 PM    Calcium 9.4 03/08/2021 01:36 PM    Bilirubin, total 0.7 10/01/2021 03:58 PM    Alk.  phosphatase 39 (L) 10/01/2021 03:58 PM    Protein, total 7.3 10/01/2021 03:58 PM    Albumin 3.8 10/01/2021 03:58 PM    Globulin 3.5 10/01/2021 03:58 PM    A-G Ratio 1.1 10/01/2021 03:58 PM    ALT (SGPT) 37 10/01/2021 03:58 PM    AST (SGOT) 22 10/01/2021 03:58 PM     Lab Results   Component Value Date/Time    WBC 4.8 03/08/2021 01:36 PM    Hemoglobin, POC 12.2 01/09/2013 08:19 PM    HGB 13.9 03/08/2021 01:36 PM    Hematocrit, POC 36 01/09/2013 08:19 PM    HCT 42.8 03/08/2021 01:36 PM    PLATELET 185 05/40/0229 01:36 PM    MCV 88.8 03/08/2021 01:36 PM     Lab Results   Component Value Date/Time    TSH 1.91 08/20/2018 06:08 PM     Lab Results   Component Value Date/Time    Hemoglobin A1c 5.3 09/11/2020 02:28 PM    Hemoglobin A1c (POC) 5.6 09/19/2013 02:30 PM    Hemoglobin A1c, External 5.8 10/08/2015 12:00 AM     Lab Results   Component Value Date/Time    Cholesterol, total 172 10/01/2021 03:44 PM    HDL Cholesterol 86 (H) 10/01/2021 03:44 PM    LDL-CHOLESTEROL 87 09/11/2020 02:28 PM    LDL, calculated 79.4 10/01/2021 03:44 PM    VLDL, calculated 6.6 10/01/2021 03:44 PM Triglyceride 33 10/01/2021 03:44 PM    CHOL/HDL Ratio 2.0 10/01/2021 03:44 PM    Cholesterol/HDL ratio 2.4 09/11/2020 02:28 PM     Lab Results   Component Value Date/Time     03/08/2021 01:36 PM    CK - MB 1.6 03/08/2021 01:36 PM    CK-MB Index 1.0 03/08/2021 01:36 PM    Troponin-I, QT <0.02 03/08/2021 04:40 PM    B-type Natriuretic Peptide 51.6 03/10/2015 12:00 AM    B-type Natriuretic Peptide CANCELED 03/10/2015 12:00 AM       Impression and Plan:  Juliet Watson is a 63 y.o. with:    1.) Atypical CP, doubt ACS, likely noncardiac, (neg SE 2016)  2.) HTN, on Norvasc + Spironolactone 25  3.) Palpitations,PVCs/ PACs, (holter 2019) managed with Atenolol  4.) BB-induced sinus isi  5.) Frontal Fibrosing Alopecia, known  6.) Neck pain, cervical degenerative findings on MRI  7.) Memory loss    1.) No documentation of AFib to date (this was specifically a question of hers)  2.) No contraindications for minoxidil from CV standpoint  3.) Can increase Spironolactone if needed from derm standpoint  4.) Heart healthy diet dw pt at length  5.) Heart scan obtained and dw pt today was 0  6.) RTC yearly    >50 mins spent,  Requests her BP to be checked twice despite WNL  Requests repeat EKG which I did not pursue as wouldn't change plan and no symptoms/ vitals all WNL  Reassurance given from CV standpoint, she should focus on psych eval as appears major barrier for her    Thank you for allowing me to participate in the care of your patient, please do not hesitate to call with questions or concerns.     [address],    Karyn Kan, DO

## 2021-11-04 NOTE — PROGRESS NOTES
Saeed Byrd presents today for   Chief Complaint   Patient presents with    Follow-up     8 month follow up- no complaints        Saeed Byrd preferred language for health care discussion is english/other. Is someone accompanying this pt? no    Is the patient using any DME equipment during [address]? no    Depression Screening:  3 most recent PHQ Screens 11/4/2021   PHQ Not Done -   Little interest or pleasure in doing things Not at all   Feeling down, depressed, irritable, or hopeless Not at all   Total Score PHQ 2 0       Learning Assessment:  Learning Assessment 8/15/2019   PRIMARY LEARNER Patient   HIGHEST LEVEL OF EDUCATION - PRIMARY LEARNER  4 YEARS [facility] PRIMARY LEARNER NONE   CO-LEARNER CAREGIVER No   PRIMARY LANGUAGE ENGLISH   LEARNER PREFERENCE PRIMARY DEMONSTRATION     -   ANSWERED BY patient   RELATIONSHIP SELF       Abuse Screening:  Abuse Screening Questionnaire 11/4/2021   Do you ever feel afraid of your partner? N   Are you in a relationship with someone who physically or mentally threatens you? N   Is it safe for you to go home? Y       Fall Risk  Fall Risk Assessment, last 12 mths 6/3/2020   Able to walk? Yes   Fall in past 12 months? No       Pt currently taking Anticoagulant therapy? no    Coordination of Care:  1. Have you been to the ER, urgent care clinic since your last visit? Hospitalized since your last visit? no    2. Have you seen or consulted any other health care providers outside of the [address] [place] [address] since your last visit? Include any pap smears or colon screening.  no

## 2021-11-04 NOTE — TELEPHONE ENCOUNTER
Paperwork completed.     Dr. Paolo Bess  [facility], [address] [facility], [address]  Phone: [phone]  Fax: [phone]

## 2021-11-05 ENCOUNTER — TELEPHONE (OUTPATIENT)
Dept: INTERNAL MEDICINE CLINIC | Age: 61
End: 2021-11-05

## 2021-11-05 RX ORDER — AMLODIPINE BESYLATE 2.5 MG/1
TABLET ORAL
Qty: 90 TABLET | Refills: 3 | Status: SHIPPED | OUTPATIENT
Start: 2021-11-05

## 2021-11-05 RX ORDER — PRAVASTATIN SODIUM 80 MG/1
80 TABLET ORAL DAILY
Qty: 90 TABLET | Refills: 3 | Status: SHIPPED | OUTPATIENT
Start: 2021-11-05

## 2021-11-05 RX ORDER — ATENOLOL 25 MG/1
12.5 TABLET ORAL 2 TIMES DAILY
Qty: 90 TABLET | Refills: 3 | Status: SHIPPED | OUTPATIENT
Start: 2021-11-05

## 2021-12-06 ENCOUNTER — TELEPHONE (OUTPATIENT)
Dept: INTERNAL MEDICINE CLINIC | Age: 61
End: 2021-12-06

## 2021-12-06 DIAGNOSIS — R42 DIZZINESS: Primary | ICD-10-CM

## 2021-12-06 NOTE — TELEPHONE ENCOUNTER
----- Message from [facility] sent at 12/6/2021 10:16 AM EST -----  Subject: Message to Provider    QUESTIONS  Information for Provider? Pt is having some lightheadedness, dizziness   bloating, gas, and nausea. This has been going on since 12/2/2021. Pt has   an appt on the 12/14/2021 and would like to know if there are any test the   pt can have done to find out what is going on with the pt body prior to   the pt appt. Pt can be reached at [phone].  ---------------------------------------------------------------------------  --------------  CALL BACK INFO  What is the best way for the office to contact you? Do not leave any   message, patient will call back for answer  Preferred Call Back Phone Number? [phone]  ---------------------------------------------------------------------------  --------------  SCRIPT ANSWERS  Relationship to Patient?  Self

## 2021-12-08 ENCOUNTER — HOSPITAL ENCOUNTER (OUTPATIENT)
Dept: LAB | Age: 61
Discharge: HOME | End: 2021-12-08
Payer: COMMERCIAL

## 2021-12-08 DIAGNOSIS — R42 DIZZINESS: ICD-10-CM

## 2021-12-08 LAB
ALBUMIN SERPL-MCNC: 3.8 G/DL (ref 3.4–5)
ALBUMIN/GLOB SERPL: 1 {RATIO} (ref 0.8–1.7)
ALP SERPL-CCNC: 39 U/L (ref 45–117)
ALT SERPL-CCNC: 26 U/L (ref 13–56)
ANION GAP SERPL CALC-SCNC: 1 MMOL/L (ref 3–18)
AST SERPL-CCNC: 22 U/L (ref 10–38)
BASOPHILS # BLD: 0.1 K/UL (ref 0–0.1)
BASOPHILS NFR BLD: 1 % (ref 0–2)
BILIRUB SERPL-MCNC: 0.6 MG/DL (ref 0.2–1)
BUN SERPL-MCNC: 16 MG/DL (ref 7–18)
BUN/CREAT SERPL: 15 (ref 12–20)
CALCIUM SERPL-MCNC: 9.3 MG/DL (ref 8.5–10.1)
CHLORIDE SERPL-SCNC: 104 MMOL/L (ref 100–111)
CO2 SERPL-SCNC: 32 MMOL/L (ref 21–32)
CREAT SERPL-MCNC: 1.08 MG/DL (ref 0.6–1.3)
DIFFERENTIAL METHOD BLD: NORMAL
EOSINOPHIL # BLD: 0.1 K/UL (ref 0–0.4)
EOSINOPHIL NFR BLD: 1 % (ref 0–5)
ERYTHROCYTE [DISTWIDTH] IN BLOOD BY AUTOMATED COUNT: 13.2 % (ref 11.6–14.5)
GLOBULIN SER CALC-MCNC: 3.7 G/DL (ref 2–4)
GLUCOSE SERPL-MCNC: 71 MG/DL (ref 74–99)
HCT VFR BLD AUTO: 42.4 % (ref 35–45)
HGB BLD-MCNC: 13.6 G/DL (ref 12–16)
IMM GRANULOCYTES # BLD AUTO: 0 K/UL (ref 0–0.04)
IMM GRANULOCYTES NFR BLD AUTO: 0 % (ref 0–0.5)
LYMPHOCYTES # BLD: 1.7 K/UL (ref 0.9–3.6)
LYMPHOCYTES NFR BLD: 29 % (ref 21–52)
MCH RBC QN AUTO: 28.3 PG (ref 24–34)
MCHC RBC AUTO-ENTMCNC: 32.1 G/DL (ref 31–37)
MCV RBC AUTO: 88.3 FL (ref 78–100)
MONOCYTES # BLD: 0.5 K/UL (ref 0.05–1.2)
MONOCYTES NFR BLD: 8 % (ref 3–10)
NEUTS SEG # BLD: 3.5 K/UL (ref 1.8–8)
NEUTS SEG NFR BLD: 61 % (ref 40–73)
NRBC # BLD: 0 K/UL (ref 0–0.01)
NRBC BLD-RTO: 0 PER 100 WBC
PLATELET # BLD AUTO: 268 K/UL (ref 135–420)
PMV BLD AUTO: 10.4 FL (ref 9.2–11.8)
POTASSIUM SERPL-SCNC: 4 MMOL/L (ref 3.5–5.5)
PROT SERPL-MCNC: 7.5 G/DL (ref 6.4–8.2)
RBC # BLD AUTO: 4.8 M/UL (ref 4.2–5.3)
SODIUM SERPL-SCNC: 137 MMOL/L (ref 136–145)
WBC # BLD AUTO: 5.7 K/UL (ref 4.6–13.2)

## 2021-12-08 PROCEDURE — 85025 COMPLETE CBC W/AUTO DIFF WBC: CPT

## 2021-12-08 PROCEDURE — 36415 COLL VENOUS BLD VENIPUNCTURE: CPT

## 2021-12-08 PROCEDURE — 80053 COMPREHEN METABOLIC PANEL: CPT

## 2021-12-08 NOTE — TELEPHONE ENCOUNTER
Have her get labs before her apt. Orders placed per previous message.     Dr. Jackie Damon  [facility], [facility], [address] [place] [address] Str.  Phone: [phone]  Fax: [phone]

## 2021-12-09 ENCOUNTER — TELEPHONE (OUTPATIENT)
Dept: INTERNAL MEDICINE CLINIC | Age: 61
End: 2021-12-09

## 2021-12-10 ENCOUNTER — HOSPITAL ENCOUNTER (OUTPATIENT)
Dept: LAB | Age: 61
Discharge: HOME | End: 2021-12-10
Payer: COMMERCIAL

## 2021-12-10 LAB
25(OH)D3 SERPL-MCNC: 67.6 NG/ML (ref 30–100)
ALBUMIN SERPL-MCNC: 3.7 G/DL (ref 3.4–5)
ANION GAP SERPL CALC-SCNC: 7 MMOL/L (ref 3–18)
BUN SERPL-MCNC: 17 MG/DL (ref 7–18)
BUN/CREAT SERPL: 17 (ref 12–20)
CALCIUM SERPL-MCNC: 9.3 MG/DL (ref 8.5–10.1)
CALCIUM SERPL-MCNC: 9.4 MG/DL (ref 8.5–10.1)
CHLORIDE SERPL-SCNC: 105 MMOL/L (ref 100–111)
CO2 SERPL-SCNC: 26 MMOL/L (ref 21–32)
CREAT SERPL-MCNC: 1.03 MG/DL (ref 0.6–1.3)
CREAT UR-MCNC: <13 MG/DL (ref 30–125)
GLUCOSE SERPL-MCNC: 86 MG/DL (ref 74–99)
HCT VFR BLD AUTO: 41.7 % (ref 35–45)
HGB BLD-MCNC: 13.7 G/DL (ref 12–16)
MICROALBUMIN UR-MCNC: <0.5 MG/DL (ref 0–3)
MICROALBUMIN/CREAT UR-RTO: ABNORMAL MG/G (ref 0–30)
PHOSPHATE SERPL-MCNC: 3.5 MG/DL (ref 2.5–4.9)
POTASSIUM SERPL-SCNC: 3.9 MMOL/L (ref 3.5–5.5)
PTH-INTACT SERPL-MCNC: 97.8 PG/ML (ref 18.4–88)
SODIUM SERPL-SCNC: 138 MMOL/L (ref 136–145)

## 2021-12-10 PROCEDURE — 83970 ASSAY OF PARATHORMONE: CPT

## 2021-12-10 PROCEDURE — 80069 RENAL FUNCTION PANEL: CPT

## 2021-12-10 PROCEDURE — 82043 UR ALBUMIN QUANTITATIVE: CPT

## 2021-12-10 PROCEDURE — 82306 VITAMIN D 25 HYDROXY: CPT

## 2021-12-10 PROCEDURE — 36415 COLL VENOUS BLD VENIPUNCTURE: CPT

## 2021-12-10 PROCEDURE — 85018 HEMOGLOBIN: CPT

## 2021-12-10 NOTE — TELEPHONE ENCOUNTER
Please let her know that her kidney and liver function labs are normal.  Her blood counts are normal.  I will discuss some more in depth at her upcoming appointment.     Dr. Dariel Quintanilla  [facility] [address], [address] [place] [address] Str.  Phone: [phone]  Fax: [phone]

## 2021-12-10 NOTE — TELEPHONE ENCOUNTER
Patient arrived at office today, given results verbally by front office, along with copy of labs as requested.

## 2021-12-13 ENCOUNTER — TRANSCRIBE ORDER (OUTPATIENT)
Dept: SCHEDULING | Age: 61
End: 2021-12-13

## 2021-12-13 DIAGNOSIS — K70.0 FATTY LIVER DUE TO ALCOHOLISM: ICD-10-CM

## 2021-12-13 DIAGNOSIS — R11.0 NAUSEA: ICD-10-CM

## 2021-12-13 DIAGNOSIS — R07.89 ATYPICAL CHEST PAIN: ICD-10-CM

## 2021-12-13 DIAGNOSIS — K21.9 GASTROESOPHAGEAL REFLUX DISEASE WITHOUT ESOPHAGITIS: ICD-10-CM

## 2021-12-13 DIAGNOSIS — K58.9 IRRITABLE BOWEL SYNDROME: ICD-10-CM

## 2021-12-13 DIAGNOSIS — R55 SYNCOPE AND COLLAPSE: Primary | ICD-10-CM

## 2021-12-13 NOTE — PROGRESS NOTES
Kalin Cheema presents today for   Chief Complaint   Patient presents with    Follow-up    Labs     12-8-21    Dizziness     w/nausea patient discussed with [facility] yesterday and was advised to follow up with cardiology. Appt with cardiology is thursday.  Arm Pain     bilateral axillary pain x 6 months. Pain is worse on the left side. 1. \"Have you been to the ER, urgent care clinic since your last visit? Hospitalized since your last visit? \" {no    2. \"Have you seen or consulted any other health care providers outside of the [address] [place] [address] since your last visit? \" yes     3. For patients aged 39-70: Has the patient had a colonoscopy? Yes due    If the patient is female:    4. For patients aged 41-77: Has the patient had a mammogram within the past 2 years? yes    5.  For patients aged 21-65: Has the patient had a pap smear? yes

## 2021-12-14 ENCOUNTER — OFFICE VISIT (OUTPATIENT)
Dept: INTERNAL MEDICINE CLINIC | Age: 61
End: 2021-12-14
Payer: COMMERCIAL

## 2021-12-14 ENCOUNTER — TELEPHONE (OUTPATIENT)
Dept: INTERNAL MEDICINE CLINIC | Age: 61
End: 2021-12-14

## 2021-12-14 VITALS
BODY MASS INDEX: 29.27 KG/M2 | SYSTOLIC BLOOD PRESSURE: 128 MMHG | RESPIRATION RATE: 16 BRPM | TEMPERATURE: 97.6 F | HEIGHT: 61 IN | OXYGEN SATURATION: 96 % | WEIGHT: 155 LBS | HEART RATE: 68 BPM | DIASTOLIC BLOOD PRESSURE: 77 MMHG

## 2021-12-14 DIAGNOSIS — E78.5 HYPERLIPIDEMIA, UNSPECIFIED HYPERLIPIDEMIA TYPE: ICD-10-CM

## 2021-12-14 DIAGNOSIS — M79.629 AXILLARY PAIN, UNSPECIFIED LATERALITY: ICD-10-CM

## 2021-12-14 DIAGNOSIS — E21.3 HYPERPARATHYROIDISM (HCC): ICD-10-CM

## 2021-12-14 DIAGNOSIS — Z12.31 ENCOUNTER FOR SCREENING MAMMOGRAM FOR BREAST CANCER: ICD-10-CM

## 2021-12-14 DIAGNOSIS — I10 ESSENTIAL HYPERTENSION: ICD-10-CM

## 2021-12-14 DIAGNOSIS — R42 DIZZINESS: Primary | ICD-10-CM

## 2021-12-14 PROCEDURE — 99214 OFFICE O/P EST MOD 30 MIN: CPT | Performed by: INTERNAL MEDICINE

## 2021-12-14 RX ORDER — ZOSTER VACCINE RECOMBINANT, ADJUVANTED 50 MCG/0.5
0.5 KIT INTRAMUSCULAR
Qty: 1 ML | Refills: 0 | Status: SHIPPED | OUTPATIENT
Start: 2021-12-14 | End: 2022-02-14

## 2021-12-14 NOTE — PROGRESS NOTES
INTERNISTS OF [facility]:  12/14/2021, MRN: [id number]      Jennifer Paez is a 63 y.o. female and presents to clinic for Follow-up, Labs (12-8-21), Dizziness (w/nausea patient discussed with GLST yesterday and was advised to follow up with cardiology. Appt with cardiology is thursday. ), and Arm Pain (bilateral axillary pain x 6 months. Pain is worse on the left side. )      Subjective:   Pt is a 67yo AAF with h/o chronic bronchitis, LUCY, porphyria (per EHR), allergic rhinitis s/p allergy testing, NAFLD, alcohol use, anxiety/depression, cervical spinal stenosis, multiple food allergies (per lab work), HTN, HLD, constipation, gallbladder polyps (suggested by CT scan 10/13/16), and GERD. 1. B/l Axillary Pain: Reported earlier this year. She had a diagnostic mammogram of her left breast.  There are no suspicious findings. She reports that pain is more severe along her left versus right side. Sx are unrelated to taking a statin.     2. Dizziness:  About a wk ago, she had an episode of dizziness//lightheadedness while sitting. Associated sx: nausea. Not relieved with po intake. Belching helped to relieve the nausea. Sx of nausea and dizziness are off/on. No CP, palpitations, and SOB. She is drinking lots of water. +Sober since Thanksgiving. No caffeine intake. S/p a recent visit with GI. A RUQ ultrasound was ordered to investigate her h/o NAFLD. No bleeding. No rash. No changes to her hearing/vision. No weakness/paresthesias. She sees an endocrinologist. She was worked up for hypoglycemia in the past. Her BG was 71 earlier this month. She has abdominal pain along her epigastric area. Her blood pressure is 128/77, on atenolol, spironolactone, and amlodipine. No adverse effects. She is also on a statin. 3. Elevated PTH: Her most recent labs show that her PTH level is mildly elevated. Her calcium is normal.  Her kidney function labs are unremarkable. Her vitamin D level is 67.  She sees .         Patient Active Problem List    Diagnosis Date Noted    Cervical spinal stenosis 04/28/2021    MURPHY (nonalcoholic steatohepatitis) 09/03/2019    Allergic rhinitis 10/04/2017    Gallbladder polyp - suggested by findings on CT scan from 10/13/16 03/01/2017    Hypersomnia with sleep apnea 03/13/2014    Constipation 01/16/2012    Essential hypertension     Hyperlipidemia     GERD (gastroesophageal reflux disease)        Current Outpatient Medications   Medication Sig Dispense Refill    pravastatin (PRAVACHOL) 80 mg tablet Take 1 Tablet by mouth daily. 90 Tablet 3    atenoloL (TENORMIN) 25 mg tablet Take 0.5 Tablets by mouth two (2) times a day. (Patient taking differently: Take 12.5 mg by mouth two (2) times a day. Patient taking once daily.) 90 Tablet 3    amLODIPine (NORVASC) 2.5 mg tablet TAKE ONE TABLET BY MOUTH DAILY 90 Tablet 3    spironolactone (ALDACTONE) 25 mg tablet TAKE 1 TABLET DAILY 90 Tablet 1    lansoprazole (PREVACID) 30 mg capsule       mometasone (NASONEX) 50 mcg/actuation nasal spray       potassium chloride (KLOR-CON) 20 mEq pack       glucose blood VI test strips (BLOOD GLUCOSE TEST) strip Use to check glucose daily 1 Package 11    co-enzyme Q-10 (CO Q-10) 100 mg capsule Take 200 mg by mouth daily.  aspirin delayed-release (ECOTRIN LOW STRENGTH) 81 mg tablet Take 81 mg by mouth daily.  omega-3 fatty acids-vitamin e (FISH OIL) 1,000 mg cap Take 2 Caps by mouth daily.  MULTIVITAMIN W-MINERALS/LUTEIN (CENTRUM SILVER PO) Take 1 Tab by mouth daily.  GARLIC PO Take 2 Caps by mouth daily. (Patient not taking: Reported on 6/7/2021)      melatonin 3 mg tablet Take 3 mg by mouth nightly as needed.  (Patient not taking: Reported on 12/14/2021)         Allergies   Allergen Reactions    Ibuprofen Other (comments)    Nsaids (Non-Steroidal Anti-Inflammatory Drug) Other (comments)    Amoxicillin Unable to Obtain and Other (comments)    Lidocaine Swelling Oral Solution    Lipitor [Atorvastatin] Myalgia           Lopressor [Metoprolol Tartrate] Other (comments)     Lightheaded and cp    Percocet [Oxycodone-Acetaminophen] Unable to Obtain and Other (comments)    Vicodin [Hydrocodone-Acetaminophen] Unable to Obtain and Other (comments)       Past Medical History:   Diagnosis Date    Allergic rhinitis     Cardiac Agatston CAC score, <100 04/30/2014    Coronary calcium score 0.    Cardiac Holter monitoring 01/25/2017    Sinus rhythm, avg HR 73 bpm (range ). Benign Holter study.  Cardiac nuclear imaging test 01/11/2013    No convincing evidence for ischemia or infarction in the setting of significant chest wall artifact. EF 62%. No RWMA. Neg EKG on max EST. Ex time 9 min 50 sec.  Cardiac stress echo, normal 05/05/2016    Normal maximal stress echo w/reproduction of atypical chest discomfort. Ex time 11 min 3 sec. EF 55%.  Cardiovascular LLE venous duplex 09/28/2016    Left leg:  No DVT.     Chest pain     GERD (gastroesophageal reflux disease)     Heel pain, bilateral     Hiatal hernia     denies this    HTN (hypertension)     Hyperlipidemia     Insulin resistance     follows with endocrinology for this    Night sweats     PVC (premature ventricular contraction)     Right low back pain     S/P colonoscopy 2009, 2014    normal per patient    Unsteady gait     due to heel pain       Past Surgical History:   Procedure Laterality Date    D/C SUCTION  2011    HX OTHER SURGICAL  2014    colonscopy    HX OTHER SURGICAL      endoscopy    HX OTHER SURGICAL      wisdom teeth removal x1       Family History   Problem Relation Age of Onset    Hypertension Mother     Heart defect Mother         anuerysm    Heart Attack Mother     Asthma Father     Heart Attack Maternal Uncle     Heart Attack Maternal Uncle     Heart Disease Brother     Hypertension Brother        Social History     Tobacco Use    Smoking status: Former Smoker Packs/day: 0.50     Years: 11.00     Pack years: 5.50     Types: Cigarettes     Quit date: 2006     Years since quittin.9    Smokeless tobacco: Former User     Quit date: 1/10/1993    Tobacco comment: 6-7 cigs per day   Substance Use Topics    Alcohol use: Yes     Alcohol/week: 0.0 standard drinks     Comment: 1-2 glasses wine 3-4 times a week       ROS   Review of Systems   Constitutional: Negative for chills and fever. HENT: Negative for ear pain and sore throat. Eyes: Negative for blurred vision and pain. Respiratory: Negative for cough and shortness of breath. Cardiovascular: Negative for chest pain. +Axillary pain   Gastrointestinal: Negative for abdominal pain (none today but see HPI), blood in stool and melena. Genitourinary: Negative for dysuria and hematuria. Musculoskeletal: Positive for neck pain. Negative for joint pain. Skin: Negative for rash. Neurological: Positive for dizziness and headaches. Endo/Heme/Allergies: Does not bruise/bleed easily. Psychiatric/Behavioral: Negative for substance abuse. Objective     Vitals:    21 1227   Resp: 16   Temp: 97.6 °F (36.4 °C)   TempSrc: Temporal   Weight: 155 lb (70.3 kg)   Height: 5' 1\" (1.549 m)   PainSc:   4       Physical Exam  Vitals and nursing note reviewed. HENT:      Head: Normocephalic and atraumatic. Right Ear: External ear normal.      Left Ear: External ear normal.   Eyes:      General: No scleral icterus. Right eye: No discharge. Left eye: No discharge. Conjunctiva/sclera: Conjunctivae normal.   Cardiovascular:      Rate and Rhythm: Normal rate and regular rhythm. Heart sounds: Normal heart sounds. No murmur heard. No friction rub. No gallop. Pulmonary:      Effort: Pulmonary effort is normal. No respiratory distress. Breath sounds: Normal breath sounds. No wheezing or rales.    Chest:   Breasts:      Right: No axillary adenopathy or supraclavicular adenopathy. Left: No axillary adenopathy or supraclavicular adenopathy. Abdominal:      General: Bowel sounds are normal. There is no distension. Palpations: Abdomen is soft. There is no mass. Tenderness: There is no abdominal tenderness. There is no guarding or rebound. Musculoskeletal:         General: No swelling (BUE) or tenderness (BUE). Cervical back: Neck supple. Lymphadenopathy:      Cervical: No cervical adenopathy. Upper Body:      Right upper body: No supraclavicular or axillary adenopathy. Left upper body: No supraclavicular or axillary adenopathy. Comments: B/l axilla are NTTP   Skin:     General: Skin is warm and dry. Findings: No erythema or rash. Neurological:      Mental Status: She is alert. Motor: No abnormal muscle tone.       Gait: Gait normal.   Psychiatric:         Mood and Affect: Mood normal.         LABS   Data Review:   Lab Results   Component Value Date/Time    WBC 5.7 12/08/2021 04:34 PM    Hemoglobin, POC 12.2 01/09/2013 08:19 PM    HGB 13.7 12/10/2021 10:38 AM    Hematocrit, POC 36 01/09/2013 08:19 PM    HCT 41.7 12/10/2021 10:38 AM    PLATELET 536 75/09/0896 04:34 PM    MCV 88.3 12/08/2021 04:34 PM       Lab Results   Component Value Date/Time    Sodium 138 12/10/2021 10:38 AM    Potassium 3.9 12/10/2021 10:38 AM    Chloride 105 12/10/2021 10:38 AM    CO2 26 12/10/2021 10:38 AM    Anion gap 7 12/10/2021 10:38 AM    Glucose 86 12/10/2021 10:38 AM    BUN 17 12/10/2021 10:38 AM    Creatinine 1.03 12/10/2021 10:38 AM    BUN/Creatinine ratio 17 12/10/2021 10:38 AM    GFR est AA >60 12/10/2021 10:38 AM    GFR est non-AA 55 (L) 12/10/2021 10:38 AM    Calcium 9.4 12/10/2021 10:38 AM    Calcium 9.3 12/10/2021 10:38 AM       Lab Results   Component Value Date/Time    Cholesterol, total 172 10/01/2021 03:44 PM    HDL Cholesterol 86 (H) 10/01/2021 03:44 PM    LDL-CHOLESTEROL 87 09/11/2020 02:28 PM    LDL, calculated 79.4 10/01/2021 03:44 PM VLDL, calculated 6.6 10/01/2021 03:44 PM    Triglyceride 33 10/01/2021 03:44 PM    CHOL/HDL Ratio 2.0 10/01/2021 03:44 PM    Cholesterol/HDL ratio 2.4 09/11/2020 02:28 PM       Lab Results   Component Value Date/Time    Hemoglobin A1c 5.3 09/11/2020 02:28 PM    Hemoglobin A1c (POC) 5.6 09/19/2013 02:30 PM    Hemoglobin A1c, External 5.8 10/08/2015 12:00 AM       Assessment/Plan:   1. Hyperparathyroidism: Per recent labs  Referral to Endocrinology for evaluation  Checking labs next year. 2. Dizziness: Etiology is unknown. I encouraged her to keep her upcoming appointment with her cardiologist to rule out cardiogenic etiologies. She stay hydrated by drinking water throughout the day, to avoid dehydration    3. Axillary pain: Previous studies are reassuring. We will continue with routine breast cancer screening. Her PE findings are reassuring today. Ordering a mammogram to screen for breast cancer   I encouraged her to notify me if symptoms worsen. ORDERS:  - HUBER 3D JENA W MAMMO BI SCREENING INCL CAD; Future    4. HTN/HLD: Stable. Continue medication as prescribed. I will check labs before her follow-up appointment. Health Maintenance Due   Topic Date Due    Shingrix Vaccine Age 49> (1 of 2) Never done    Colorectal Cancer Screening Combo  03/26/2021    COVID-19 Vaccine (3 - Booster for Dora Hammed series) 11/12/2021     Lab review: labs are reviewed in the EHR    I have discussed the diagnosis with the patient and the intended plan as seen in the above orders. The patient has received an after-visit summary and questions were answered concerning future plans. I have discussed medication side effects and warnings with the patient as well. I have reviewed the plan of care with the patient, accepted their input and they are in agreement with the treatment goals. All questions were answered. The patient understands the plan of care. Handouts provided today with above information.  Pt instructed if symptoms worsen to call the office or report to the ED for continued care. Greater than 50% of the visit time was spent in counseling and/or coordination of care. Voice recognition was used to generate this report, which may have resulted in some phonetic based errors in grammar and contents. Even though attempts were made to correct all the mistakes, some may have been missed, and remained in the body of the document.           iKa Bowser MD

## 2021-12-15 ENCOUNTER — TELEPHONE (OUTPATIENT)
Dept: INTERNAL MEDICINE CLINIC | Age: 61
End: 2021-12-15

## 2021-12-15 ENCOUNTER — OFFICE VISIT (OUTPATIENT)
Dept: ORTHOPEDIC SURGERY | Age: 61
End: 2021-12-15
Payer: COMMERCIAL

## 2021-12-15 DIAGNOSIS — M54.2 NECK PAIN: ICD-10-CM

## 2021-12-15 DIAGNOSIS — M79.18 CERVICAL MYOFASCIAL PAIN SYNDROME: ICD-10-CM

## 2021-12-15 DIAGNOSIS — M48.02 CERVICAL SPINAL STENOSIS: Primary | ICD-10-CM

## 2021-12-15 PROCEDURE — 99213 OFFICE O/P EST LOW 20 MIN: CPT | Performed by: NURSE PRACTITIONER

## 2021-12-15 NOTE — PROGRESS NOTES
Chief complaint   Chief Complaint   Patient presents with    Back Pain    Neck Pain     Head Pain        History of Present Illness:  Bo Land is a  63 y.o.  female who comes in today after last being seen by Dr. Slime Jordan March 25, 2021. At that time she was referred to pain management for cervical epidural steroid injection due to her cervical stenosis. She states she never did go have that done. She states she comes back in today because she still gets pain at the top of her head that radiates from her neck. She also states when she scratches the back of her lower neck she gets a sharp pain and she is not sure what is causing it. She denies any radiating arm pain. She states she is using a high-back chair at her work where she is in the office again now. 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 She states physical therapy has helped her in the past and she would like to have that again. She denies fever bowel bladder dysfunction. Physical Exam: Patient is a 68-year-old female well-developed well-nourished who is alert and oriented with a normal mood and affect. She has a full weightbearing nonantalgic gait. She did not use any assist device. She has 5 out of 5 strength of her lower extremities. Negative Ermelinda's on the right but it is positive on the left. She has normal tandem gait. Assessment and Plan: This is a patient has cervical stenosis. I will go ahead and put in a order for physical therapy. She states she wants to research some facilities and will call us when she decides which when she wants to go to. When she does I can redo the order and put in the facility. We will see her back after PT. Medications:  Current Outpatient Medications   Medication Sig Dispense Refill    varicella-zoster recombinant, PF, (Shingrix, PF,) 50 mcg/0.5 mL susr injection 0.5 mL by IntraMUSCular route every two (2) months for 2 doses.  1 mL 0    pravastatin (PRAVACHOL) 80 mg tablet Take 1 Tablet by mouth daily. 90 Tablet 3    amLODIPine (NORVASC) 2.5 mg tablet TAKE ONE TABLET BY MOUTH DAILY 90 Tablet 3    spironolactone (ALDACTONE) 25 mg tablet TAKE 1 TABLET DAILY 90 Tablet 1    lansoprazole (PREVACID) 30 mg capsule       mometasone (NASONEX) 50 mcg/actuation nasal spray       potassium chloride (KLOR-CON) 20 mEq pack       glucose blood VI test strips (BLOOD GLUCOSE TEST) strip Use to check glucose daily 1 Package 11    co-enzyme Q-10 (CO Q-10) 100 mg capsule Take 200 mg by mouth daily.  aspirin delayed-release (ECOTRIN LOW STRENGTH) 81 mg tablet Take 81 mg by mouth daily.  omega-3 fatty acids-vitamin e (FISH OIL) 1,000 mg cap Take 2 Caps by mouth daily.  MULTIVITAMIN W-MINERALS/LUTEIN (CENTRUM SILVER PO) Take 1 Tab by mouth daily.  atenoloL (TENORMIN) 25 mg tablet Take 0.5 Tablets by mouth two (2) times a day. (Patient not taking: Reported on 12/15/2021) 90 Tablet 3    GARLIC PO Take 2 Caps by mouth daily. (Patient not taking: Reported on 6/7/2021)      melatonin 3 mg tablet Take 3 mg by mouth nightly as needed. (Patient not taking: Reported on 12/14/2021)             Review of systems:    Past Medical History:   Diagnosis Date    Allergic rhinitis     Cardiac Agatston CAC score, <100 04/30/2014    Coronary calcium score 0.    Cardiac Holter monitoring 01/25/2017    Sinus rhythm, avg HR 73 bpm (range ). Benign Holter study.  Cardiac nuclear imaging test 01/11/2013    No convincing evidence for ischemia or infarction in the setting of significant chest wall artifact. EF 62%. No RWMA. Neg EKG on max EST. Ex time 9 min 50 sec.  Cardiac stress echo, normal 05/05/2016    Normal maximal stress echo w/reproduction of atypical chest discomfort. Ex time 11 min 3 sec. EF 55%.  Cardiovascular LLE venous duplex 09/28/2016    Left leg:  No DVT.     Chest pain     GERD (gastroesophageal reflux disease)     Heel pain, bilateral     Hiatal hernia     denies this    HTN (hypertension)     Hyperlipidemia     Insulin resistance     follows with endocrinology for this    Night sweats     PVC (premature ventricular contraction)     Right low back pain     S/P colonoscopy ,     normal per patient    Unsteady gait     due to heel pain     Past Surgical History:   Procedure Laterality Date    D/C SUCTION  2011    HX OTHER SURGICAL  2014    colonscopy    HX OTHER SURGICAL      endoscopy    HX OTHER SURGICAL      wisdom teeth removal x1     Social History     Socioeconomic History    Marital status: SINGLE     Spouse name: Not on file    Number of children: Not on file    Years of education: Not on file    Highest education level: Not on file   Occupational History    Occupation: student IT      Employer: NOT EMPLOYED   Tobacco Use    Smoking status: Former Smoker     Packs/day: 0.50     Years: 11.00     Pack years: 5.50     Types: Cigarettes     Quit date: 2006     Years since quittin.9    Smokeless tobacco: Former User     Quit date: 1/10/1993    Tobacco comment: 6-7 cigs per day   Substance and Sexual Activity    Alcohol use: Yes     Alcohol/week: 0.0 standard drinks     Comment: 1-2 glasses wine 3-4 times a week    Drug use: No     Types: Prescription, OTC    Sexual activity: Never   Other Topics Concern    Not on file   Social History Narrative    Not Currently working, 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 Social Determinants of Health     Financial Resource Strain:     Difficulty of Paying Living Expenses: Not on file   Food Insecurity:     Worried About Running Out of Food in the Last Year: Not on file    Orion of Food in the Last Year: Not on file   Transportation Needs:     Lack of Transportation (Medical): Not on file    Lack of Transportation (Non-Medical):  Not on file   Physical Activity:     Days of Exercise per Week: Not on file    Minutes of Exercise per Session: Not on file Stress:     Feeling of Stress : Not on file   Social Connections:     Frequency of Communication with Friends and Family: Not on file    Frequency of Social Gatherings with Friends and Family: Not on file    Attends Druze Services: Not on file    Active Member of [address] [place] [address] [employer] or Organizations: Not on file    Attends Club or Organization Meetings: Not on file    Marital Status: Not on file   Intimate Partner Violence:     Fear of Current or Ex-Partner: Not on file    Emotionally Abused: Not on file    Physically Abused: Not on file    Sexually Abused: Not on file   Housing Stability:     Unable to Pay for Housing in the Last Year: Not on file    Number of [place] in the Last Year: Not on file    Unstable Housing in the Last Year: Not on file     Family History   Problem Relation Age of Onset    Hypertension Mother     Heart defect Mother         anuerysm    Heart Attack Mother     Asthma Father     Heart Attack Maternal Uncle     Heart Attack Maternal Uncle     Heart Disease Brother     Hypertension Brother        Physical Exam:  There were no vitals taken for this visit. Pain Scale: /10       has been . reviewed and is appropriate          Diagnoses and all orders for this visit:    1. Cervical spinal stenosis  -     REFERRAL TO PHYSICAL THERAPY    2. Cervical myofascial pain syndrome  -     REFERRAL TO PHYSICAL THERAPY    3. Neck pain  -     REFERRAL TO PHYSICAL THERAPY            Follow-up and Dispositions    · Return for after PT.              We have informed Efrain Corey to notify us for immediate appointment if she has any worsening neurogical symptoms or if an emergency situation presents, then call [phone]

## 2021-12-15 NOTE — TELEPHONE ENCOUNTER
Pt says she was seen yesterday and was supposed to get a referral for Dr. Kaity Park. She called them today but they haven't received. Please update her when referral sent so she can call to schedule appt.

## 2021-12-16 ENCOUNTER — OFFICE VISIT (OUTPATIENT)
Dept: CARDIOLOGY CLINIC | Age: 61
End: 2021-12-16
Payer: COMMERCIAL

## 2021-12-16 VITALS
SYSTOLIC BLOOD PRESSURE: 120 MMHG | OXYGEN SATURATION: 100 % | HEIGHT: 61 IN | WEIGHT: 153 LBS | DIASTOLIC BLOOD PRESSURE: 60 MMHG | BODY MASS INDEX: 28.89 KG/M2 | HEART RATE: 68 BPM

## 2021-12-16 DIAGNOSIS — R07.9 CHEST PAIN, UNSPECIFIED TYPE: ICD-10-CM

## 2021-12-16 DIAGNOSIS — R42 DIZZINESS: ICD-10-CM

## 2021-12-16 DIAGNOSIS — R55 SYNCOPE, UNSPECIFIED SYNCOPE TYPE: ICD-10-CM

## 2021-12-16 DIAGNOSIS — I10 ESSENTIAL HYPERTENSION: Primary | ICD-10-CM

## 2021-12-16 PROCEDURE — 93000 ELECTROCARDIOGRAM COMPLETE: CPT | Performed by: INTERNAL MEDICINE

## 2021-12-16 PROCEDURE — 99215 OFFICE O/P EST HI 40 MIN: CPT | Performed by: INTERNAL MEDICINE

## 2021-12-16 NOTE — PROGRESS NOTES
Abdulaziz Muñoz    Chief Complaint   Patient presents with    Follow-up     add on for lightheadedness and dizziness        HPI    Abdulaziz Muñoz is a 63 y.o. AAF with no known coronary disease, here for follow up of several complaints. She is still feeling dizzy, nauseated, can have substernal CP. Has seen GI etc, and asked to come back to r/o cardiac etiology. Reviewed below,     Records from 3/8/21 reviewed, she r/o for ACS and told to follow up outpt. EKG there was sinus isi and her symptoms already resolved. Again the pain was like an \"arrow\" going through her chest to her back, but hasnt recurred. It was at rest and no obvious assoc symptoms. She has a list of questions. Been undergoing workup for issues with her neck. Was having pain on her scalp she thinks is related Donta Revering). She also has hair loss from alopecia, has followup with derm in few weeks. Memory loss, word finding/ paraphrasing issues. Has seen neuro and still undergoing eval for sleep apnea. She is a prior pt of . He has seen her for atypical noncardiac CP, palpitations over the years. She has done well on Atenolol + Norvasc + Spironolactone 25 mg (which she takes also for derm). I had her go for CAC, which was 0. She comes today with a list of questions, no CV complaints. Only complaint really is a pain in her armpit (and has workup going for that already). Past Medical History:   Diagnosis Date    Allergic rhinitis     Cardiac Agatston CAC score, <100 04/30/2014    Coronary calcium score 0.    Cardiac Holter monitoring 01/25/2017    Sinus rhythm, avg HR 73 bpm (range ). Benign Holter study.  Cardiac nuclear imaging test 01/11/2013    No convincing evidence for ischemia or infarction in the setting of significant chest wall artifact. EF 62%. No RWMA. Neg EKG on max EST. Ex time 9 min 50 sec.     Cardiac stress echo, normal 05/05/2016    Normal maximal stress echo w/reproduction of atypical chest discomfort. Ex time 11 min 3 sec. EF 55%.  Cardiovascular LLE venous duplex 09/28/2016    Left leg:  No DVT.  Chest pain     GERD (gastroesophageal reflux disease)     Heel pain, bilateral     Hiatal hernia     denies this    HTN (hypertension)     Hyperlipidemia     Insulin resistance     follows with endocrinology for this    Night sweats     PVC (premature ventricular contraction)     Right low back pain     S/P colonoscopy 2009, 2014    normal per patient    Unsteady gait     due to heel pain       Past Surgical History:   Procedure Laterality Date    D/C SUCTION  2011    HX OTHER SURGICAL  2014    colonscopy    HX OTHER SURGICAL      endoscopy    HX OTHER SURGICAL      wisdom teeth removal x1       Current Outpatient Medications   Medication Sig Dispense Refill    varicella-zoster recombinant, PF, (Shingrix, PF,) 50 mcg/0.5 mL susr injection 0.5 mL by IntraMUSCular route every two (2) months for 2 doses. 1 mL 0    pravastatin (PRAVACHOL) 80 mg tablet Take 1 Tablet by mouth daily. 90 Tablet 3    atenoloL (TENORMIN) 25 mg tablet Take 0.5 Tablets by mouth two (2) times a day. 90 Tablet 3    amLODIPine (NORVASC) 2.5 mg tablet TAKE ONE TABLET BY MOUTH DAILY 90 Tablet 3    spironolactone (ALDACTONE) 25 mg tablet TAKE 1 TABLET DAILY 90 Tablet 1    lansoprazole (PREVACID) 30 mg capsule       mometasone (NASONEX) 50 mcg/actuation nasal spray       potassium chloride (KLOR-CON) 20 mEq pack       glucose blood VI test strips (BLOOD GLUCOSE TEST) strip Use to check glucose daily 1 Package 11    co-enzyme Q-10 (CO Q-10) 100 mg capsule Take 200 mg by mouth daily.  GARLIC PO Take 2 Capsules by mouth daily.  aspirin delayed-release (ECOTRIN LOW STRENGTH) 81 mg tablet Take 81 mg by mouth daily.  omega-3 fatty acids-vitamin e (FISH OIL) 1,000 mg cap Take 2 Caps by mouth daily.  melatonin 3 mg tablet Take 3 mg by mouth nightly as needed.       MULTIVITAMIN W-MINERALS/LUTEIN (CENTRUM SILVER PO) Take 1 Tab by mouth daily. Allergies   Allergen Reactions    Ibuprofen Other (comments)    Nsaids (Non-Steroidal Anti-Inflammatory Drug) Other (comments)    Amoxicillin Unable to Obtain and Other (comments)    Lidocaine Swelling     Oral Solution    Lipitor [Atorvastatin] Myalgia           Lopressor [Metoprolol Tartrate] Other (comments)     Lightheaded and cp    Percocet [Oxycodone-Acetaminophen] Unable to Obtain and Other (comments)    Vicodin [Hydrocodone-Acetaminophen] Unable to Obtain and Other (comments)       Social History     Socioeconomic History    Marital status: SINGLE     Spouse name: Not on file    Number of children: Not on file    Years of education: Not on file    Highest education level: Not on file   Occupational History    Occupation: student IT      Employer: NOT EMPLOYED   Tobacco Use    Smoking status: Former Smoker     Packs/day: 0.50     Years: 11.00     Pack years: 5.50     Types: Cigarettes     Quit date: 2006     Years since quittin.9    Smokeless tobacco: Former User     Quit date: 1/10/1993    Tobacco comment: 6-7 cigs per day   Substance and Sexual Activity    Alcohol use: Yes     Alcohol/week: 0.0 standard drinks     Comment: 1-2 glasses wine 3-4 times a week    Drug use: No     Types: Prescription, OTC    Sexual activity: Never   Other Topics Concern    Not on file   Social History Narrative    Not Currently working, 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 Social Determinants of Health     Financial Resource Strain:     Difficulty of Paying Living Expenses: Not on file   Food Insecurity:     Worried About Running Out of Food in the Last Year: Not on file    Orion of Food in the Last Year: Not on file   Transportation Needs:     Lack of Transportation (Medical): Not on file    Lack of Transportation (Non-Medical):  Not on file   Physical Activity:     Days of Exercise per Week: Not on file    Minutes of Exercise per Session: Not on file   Stress:     Feeling of Stress : Not on file   Social Connections:     Frequency of Communication with Friends and Family: Not on file    Frequency of Social Gatherings with Friends and Family: Not on file    Attends Restorationism Services: Not on file    Active Member of Clubs or Organizations: Not on file    Attends Club or Organization Meetings: Not on file    Marital Status: Not on file   Intimate Partner Violence:     Fear of Current or Ex-Partner: Not on file    Emotionally Abused: Not on file    Physically Abused: Not on file    Sexually Abused: Not on file   Housing Stability:     Unable to Pay for Housing in the Last Year: Not on file    Number of [place] in the Last Year: Not on file    Unstable Housing in the Last Year: Not on file    no children, \"I have a niece. \" loves to walk, mother  of aneurysm? In her stomach age 59 (she thinks she had a \"mild heart attack\" in her 46s, and she had HTN), brother  in 46s, she believes father  of asthma    FH: +premature ASCVD    Review of Systems    14 pt Review of Systems is negative unless otherwise mentioned in the HPI. Wt Readings from Last 3 Encounters:   21 69.4 kg (153 lb)   21 70.3 kg (155 lb)   21 71.7 kg (158 lb)     Temp Readings from Last 3 Encounters:   21 97.6 °F (36.4 °C) (Temporal)   10/01/21 97.8 °F (36.6 °C) (Temporal)   21 97 °F (36.1 °C) (Temporal)     BP Readings from Last 3 Encounters:   21 120/60   21 128/77   21 126/80     Pulse Readings from Last 3 Encounters:   21 68   21 68   21 68       Physical Exam:    Visit Vitals  /60 (BP 1 Location: Left upper arm, BP Patient Position: Sitting, BP Cuff Size: Adult)   Pulse 68   Ht 5' 1\" (1.549 m)   Wt 69.4 kg (153 lb)   SpO2 100%   BMI 28.91 kg/m²      Physical Exam  HENT:      Head: Normocephalic and atraumatic. Eyes:      Pupils: Pupils are equal, round, and reactive to light. Cardiovascular:      Rate and Rhythm: Normal rate and regular rhythm. Heart sounds: Normal heart sounds. No murmur heard. No friction rub. No gallop. Pulmonary:      Effort: Pulmonary effort is normal. No respiratory distress. Breath sounds: Normal breath sounds. No wheezing or rales. Chest:      Chest wall: No tenderness. Abdominal:      General: Bowel sounds are normal.      Palpations: Abdomen is soft. Musculoskeletal:         General: No tenderness. Skin:     General: Skin is warm and dry. Neurological:      Mental Status: She is alert and oriented to person, place, and time. Psychiatric:         Mood and Affect: Mood is anxious. Cognition and Memory: Memory is impaired. EKG last: sinus isi    Lab Results   Component Value Date/Time    Sodium 138 12/10/2021 10:38 AM    Potassium 3.9 12/10/2021 10:38 AM    Chloride 105 12/10/2021 10:38 AM    CO2 26 12/10/2021 10:38 AM    Anion gap 7 12/10/2021 10:38 AM    Glucose 86 12/10/2021 10:38 AM    BUN 17 12/10/2021 10:38 AM    Creatinine 1.03 12/10/2021 10:38 AM    BUN/Creatinine ratio 17 12/10/2021 10:38 AM    GFR est AA >60 12/10/2021 10:38 AM    GFR est non-AA 55 (L) 12/10/2021 10:38 AM    Calcium 9.4 12/10/2021 10:38 AM    Calcium 9.3 12/10/2021 10:38 AM    Bilirubin, total 0.6 12/08/2021 04:34 PM    Alk.  phosphatase 39 (L) 12/08/2021 04:34 PM    Protein, total 7.5 12/08/2021 04:34 PM    Albumin 3.7 12/10/2021 10:38 AM    Globulin 3.7 12/08/2021 04:34 PM    A-G Ratio 1.0 12/08/2021 04:34 PM    ALT (SGPT) 26 12/08/2021 04:34 PM    AST (SGOT) 22 12/08/2021 04:34 PM     Lab Results   Component Value Date/Time    WBC 5.7 12/08/2021 04:34 PM    Hemoglobin, POC 12.2 01/09/2013 08:19 PM    HGB 13.7 12/10/2021 10:38 AM    Hematocrit, POC 36 01/09/2013 08:19 PM    HCT 41.7 12/10/2021 10:38 AM    PLATELET 338 20/92/3189 04:34 PM    MCV 88.3 12/08/2021 04:34 PM     Lab Results   Component Value Date/Time    TSH 1.91 08/20/2018 06:08 PM Lab Results   Component Value Date/Time    Hemoglobin A1c 5.3 09/11/2020 02:28 PM    Hemoglobin A1c (POC) 5.6 09/19/2013 02:30 PM    Hemoglobin A1c, External 5.8 10/08/2015 12:00 AM     Lab Results   Component Value Date/Time    Cholesterol, total 172 10/01/2021 03:44 PM    HDL Cholesterol 86 (H) 10/01/2021 03:44 PM    LDL-CHOLESTEROL 87 09/11/2020 02:28 PM    LDL, calculated 79.4 10/01/2021 03:44 PM    VLDL, calculated 6.6 10/01/2021 03:44 PM    Triglyceride 33 10/01/2021 03:44 PM    CHOL/HDL Ratio 2.0 10/01/2021 03:44 PM    Cholesterol/HDL ratio 2.4 09/11/2020 02:28 PM     Lab Results   Component Value Date/Time     03/08/2021 01:36 PM    CK - MB 1.6 03/08/2021 01:36 PM    CK-MB Index 1.0 03/08/2021 01:36 PM    Troponin-I, QT <0.02 03/08/2021 04:40 PM    B-type Natriuretic Peptide 51.6 03/10/2015 12:00 AM    B-type Natriuretic Peptide CANCELED 03/10/2015 12:00 AM       Impression and Plan:  Kate Ryan is a 63 y.o. with:    1.) Atypical CP, doubt ACS, likely noncardiac, (neg SE 2016)  2.) HTN, on Norvasc + Spironolactone 25  3.) Palpitations,PVCs/ PACs, (holter 2019) managed with Atenolol  4.) BB-induced sinus isi  5.) Frontal Fibrosing Alopecia, known  6.) Neck pain, cervical degenerative findings on MRI  7.) Memory loss    1.) Stress echo   2.) Will call with results, if neg/ low risk, can see me yearly    >50 mins spent,  Doubt symptoms CV etiology, more likely autonomic dysfunction but with RFs and her family hx, should r/o ACS    Thank you for allowing me to participate in the care of your patient, please do not hesitate to call with questions or concerns. Follow-up and Dispositions    · Return in about 1 year (around 12/16/2022).      [address] [facility],    Gwen Rojas DO

## 2021-12-16 NOTE — PATIENT INSTRUCTIONS
STRESS ECHO  If you have not heard from the central scheduler to schedule your testing in 48 hours, please call [phone].

## 2021-12-16 NOTE — LETTER
12/16/2021    Patient: Juliet Watson   YOB: 1960   Date of Visit: 12/16/2021     Naun Mcgarry, [address]  [facility] [address]  [address] [place] [address]  Via In Basket    Dear Naun Mcgarry MD,      Thank you for referring Ms. Graciela Li to [address] [place] [address] for evaluation. My notes for this consultation are attached. If you have questions, please do not hesitate to call me. I look forward to following your patient along with you.       Sincerely,    Karyn Kan, DO

## 2021-12-17 ENCOUNTER — HOSPITAL ENCOUNTER (OUTPATIENT)
Dept: ULTRASOUND IMAGING | Age: 61
Discharge: HOME | End: 2021-12-17
Attending: INTERNAL MEDICINE
Payer: COMMERCIAL

## 2021-12-17 DIAGNOSIS — K21.9 GASTROESOPHAGEAL REFLUX DISEASE WITHOUT ESOPHAGITIS: ICD-10-CM

## 2021-12-17 DIAGNOSIS — R07.89 ATYPICAL CHEST PAIN: ICD-10-CM

## 2021-12-17 DIAGNOSIS — K70.0 FATTY LIVER DUE TO ALCOHOLISM: ICD-10-CM

## 2021-12-17 DIAGNOSIS — K58.9 IRRITABLE BOWEL SYNDROME: ICD-10-CM

## 2021-12-17 DIAGNOSIS — R55 SYNCOPE AND COLLAPSE: ICD-10-CM

## 2021-12-17 DIAGNOSIS — R11.0 NAUSEA: ICD-10-CM

## 2021-12-17 PROCEDURE — 76705 ECHO EXAM OF ABDOMEN: CPT

## 2021-12-20 ENCOUNTER — VIRTUAL VISIT (OUTPATIENT)
Dept: INTERNAL MEDICINE CLINIC | Age: 61
End: 2021-12-20
Payer: COMMERCIAL

## 2021-12-20 ENCOUNTER — TELEPHONE (OUTPATIENT)
Dept: INTERNAL MEDICINE CLINIC | Age: 61
End: 2021-12-20

## 2021-12-20 ENCOUNTER — HOSPITAL ENCOUNTER (OUTPATIENT)
Dept: LAB | Age: 61
Discharge: HOME | End: 2021-12-20
Payer: COMMERCIAL

## 2021-12-20 DIAGNOSIS — R31.9 HEMATURIA, UNSPECIFIED TYPE: Primary | ICD-10-CM

## 2021-12-20 DIAGNOSIS — R31.9 HEMATURIA, UNSPECIFIED TYPE: ICD-10-CM

## 2021-12-20 LAB
APPEARANCE UR: CLEAR
BILIRUB UR QL: NEGATIVE
COLOR UR: YELLOW
GLUCOSE UR STRIP.AUTO-MCNC: NEGATIVE MG/DL
HGB UR QL STRIP: NEGATIVE
KETONES UR QL STRIP.AUTO: NEGATIVE MG/DL
LEUKOCYTE ESTERASE UR QL STRIP.AUTO: NEGATIVE
NITRITE UR QL STRIP.AUTO: NEGATIVE
PH UR STRIP: 5.5 [PH] (ref 5–8)
PROT UR STRIP-MCNC: NEGATIVE MG/DL
SP GR UR REFRACTOMETRY: 1 (ref 1–1.03)
UROBILINOGEN UR QL STRIP.AUTO: 0.2 EU/DL (ref 0.2–1)

## 2021-12-20 PROCEDURE — 99213 OFFICE O/P EST LOW 20 MIN: CPT | Performed by: INTERNAL MEDICINE

## 2021-12-20 PROCEDURE — 87086 URINE CULTURE/COLONY COUNT: CPT

## 2021-12-20 PROCEDURE — 81003 URINALYSIS AUTO W/O SCOPE: CPT

## 2021-12-20 NOTE — TELEPHONE ENCOUNTER
Pt states she only sees blood in her urine with 1st urination in the morning. She did have a urine test 2 weeks ago with nephrologist but never got result.     Please advise her at [phone]

## 2021-12-20 NOTE — TELEPHONE ENCOUNTER
Patient not under care of [facility]. Patient did ask if she is supposed to be seeing endocrinology for anything.

## 2021-12-20 NOTE — PROGRESS NOTES
Eduardo Leventhal is a 63 y.o. female who was seen by synchronous (real-time) audio-video technology on 12/20/2021. Assessment & Plan:   Hematuria: per hx. Ordering a urine culture, urinalysis, and retroperitoneal ultrasound. ORDERS:  - URINALYSIS W/ RFLX MICROSCOPIC; Future  - CULTURE, URINE; Future  - US RETROPERITONEUM COMP; Future        Lab review: labs are reviewed in the EHR and ordered as mentioned above. I have discussed the diagnosis with the patient and the intended plan as seen in the above orders. I have discussed medication side effects and warnings with the patient as well. I have reviewed the plan of care with the patient, accepted their input and they are in agreement with the treatment goals. All questions were answered. The patient understands the plan of care. Pt instructed if symptoms worsen to call the office or report to the ED for continued care. Greater than 50% of the visit time was spent in counseling and/or coordination of care. Voice recognition was used to generate this report, which may have resulted in some phonetic based errors in grammar and contents. Even though attempts were made to correct all the mistakes, some may have been missed, and remained in the body of the document. Subjective:   Eduardo Leventhal was seen for   Chief Complaint   Patient presents with    Blood in Urine     Pt is a 67yo AAF with h/o chronic bronchitis, LUCY, porphyria (per EHR), allergic rhinitis, NAFLD, cervical spinal stenosis, multiple food allergies (per lab work), HTN, HLD, constipation, gallbladder polyps (suggested by CT scan 10/13/16), and GERD. Hematuria:  She saw a red drop of blood in her urine. \"It's smaller than my fingernail. \" No new abdominal pain. No fever. Her nephrologist did a UA last wk. Results are pending. No abdominal pain. No dysuria or urinary frequency. Prior to Admission medications    Medication Sig Start Date End Date Taking?  Authorizing Provider   varicella-zoster recombinant, PF, (Shingrix, PF,) 50 mcg/0.5 mL susr injection 0.5 mL by IntraMUSCular route every two (2) months for 2 doses. 12/14/21 2/14/22  Marlin Dunaway MD   pravastatin (PRAVACHOL) 80 mg tablet Take 1 Tablet by mouth daily. 11/5/21   Stephen ANGUIANO DO   atenoloL (TENORMIN) 25 mg tablet Take 0.5 Tablets by mouth two (2) times a day. 11/5/21   Stephen ANGUIANO DO   amLODIPine (NORVASC) 2.5 mg tablet TAKE ONE TABLET BY MOUTH DAILY 11/5/21   Stephen ANGUIANO DO   spironolactone (ALDACTONE) 25 mg tablet TAKE 1 TABLET DAILY 10/25/21   Marlin Dunaway MD   lansoprazole (PREVACID) 30 mg capsule  6/15/21   Provider, Historical   mometasone (NASONEX) 50 mcg/actuation nasal spray  7/19/21   Provider, Historical   potassium chloride (KLOR-CON) 20 mEq pack  4/14/21   Provider, Historical   glucose blood VI test strips (BLOOD GLUCOSE TEST) strip Use to check glucose daily 4/20/15   Daniel CLEMENTS, DO   co-enzyme Q-10 (CO Q-10) 100 mg capsule Take 200 mg by mouth daily. Provider, Historical   GARLIC PO Take 2 Capsules by mouth daily. Provider, Historical   aspirin delayed-release (ECOTRIN LOW STRENGTH) 81 mg tablet Take 81 mg by mouth daily. Provider, Historical   omega-3 fatty acids-vitamin e (FISH OIL) 1,000 mg cap Take 2 Caps by mouth daily. Provider, Historical   melatonin 3 mg tablet Take 3 mg by mouth nightly as needed. Provider, Historical   MULTIVITAMIN W-MINERALS/LUTEIN (CENTRUM SILVER PO) Take 1 Tab by mouth daily.     Provider, Historical     Allergies   Allergen Reactions    Ibuprofen Other (comments)    Nsaids (Non-Steroidal Anti-Inflammatory Drug) Other (comments)    Amoxicillin Unable to Obtain and Other (comments)    Lidocaine Swelling     Oral Solution    Lipitor [Atorvastatin] Myalgia           Lopressor [Metoprolol Tartrate] Other (comments)     Lightheaded and cp    Percocet [Oxycodone-Acetaminophen] Unable to Obtain and Other (comments)    Vicodin [Hydrocodone-Acetaminophen] Unable to Obtain and Other (comments)     Past Medical History:   Diagnosis Date    Allergic rhinitis     Cardiac Agatston CAC score, <100 04/30/2014    Coronary calcium score 0.    Cardiac Holter monitoring 01/25/2017    Sinus rhythm, avg HR 73 bpm (range ). Benign Holter study.  Cardiac nuclear imaging test 01/11/2013    No convincing evidence for ischemia or infarction in the setting of significant chest wall artifact. EF 62%. No RWMA. Neg EKG on max EST. Ex time 9 min 50 sec.  Cardiac stress echo, normal 05/05/2016    Normal maximal stress echo w/reproduction of atypical chest discomfort. Ex time 11 min 3 sec. EF 55%.  Cardiovascular LLE venous duplex 09/28/2016    Left leg:  No DVT.     Chest pain     GERD (gastroesophageal reflux disease)     Heel pain, bilateral     Hiatal hernia     denies this    HTN (hypertension)     Hyperlipidemia     Insulin resistance     follows with endocrinology for this    Night sweats     PVC (premature ventricular contraction)     Right low back pain     S/P colonoscopy 2009, 2014    normal per patient    Unsteady gait     due to heel pain     Past Surgical History:   Procedure Laterality Date    D/C SUCTION  2011    HX OTHER SURGICAL  2014    colonscopy    HX OTHER SURGICAL      endoscopy    HX OTHER SURGICAL      wisdom teeth removal x1     Family History   Problem Relation Age of Onset    Hypertension Mother     Heart defect Mother         anuerysm    Heart Attack Mother     Asthma Father     Heart Attack Maternal Uncle     Heart Attack Maternal Uncle     Heart Disease Brother     Hypertension Brother      Social History     Socioeconomic History    Marital status: SINGLE   Occupational History    Occupation: student IT      Employer: NOT EMPLOYED   Tobacco Use    Smoking status: Former Smoker     Packs/day: 0.50     Years: 11.00     Pack years: 5.50     Types: Cigarettes     Quit date: 2006     Years since quittin.9    Smokeless tobacco: Former User     Quit date: 1/10/1993    Tobacco comment: 6-7 cigs per day   Substance and Sexual Activity    Alcohol use: Yes     Alcohol/week: 0.0 standard drinks     Comment: 1-2 glasses wine 3-4 times a week    Drug use: No     Types: Prescription, OTC    Sexual activity: Never   Social History Narrative    Not Currently working, 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 ROS:  Gen: No fever/chills  HEENT: No sore throat, eye pain, ear pain, or congestion.  No HA  CV: No CP  Resp: No cough/SOB  GI: No abdominal pain  : see HPI  Derm: No rash  Neuro: No new paresthesias/weakness  Musc: No new myalgias/jt pain  Psych: No depression sx      Objective:     General: alert, cooperative, no distress   Mental  status: mental status: alert, oriented to person, place, and time, normal mood, behavior, speech, dress, motor activity, and thought processes   Resp: resp: normal effort and no respiratory distress   Neuro: neuro: no gross deficits   Skin: skin: no discoloration or lesions of concern on visible areas     LABS:  Lab Results   Component Value Date/Time    Sodium 138 12/10/2021 10:38 AM    Potassium 3.9 12/10/2021 10:38 AM    Chloride 105 12/10/2021 10:38 AM    CO2 26 12/10/2021 10:38 AM    Anion gap 7 12/10/2021 10:38 AM    Glucose 86 12/10/2021 10:38 AM    BUN 17 12/10/2021 10:38 AM    Creatinine 1.03 12/10/2021 10:38 AM    BUN/Creatinine ratio 17 12/10/2021 10:38 AM    GFR est AA >60 12/10/2021 10:38 AM    GFR est non-AA 55 (L) 12/10/2021 10:38 AM    Calcium 9.4 12/10/2021 10:38 AM    Calcium 9.3 12/10/2021 10:38 AM       Lab Results   Component Value Date/Time    Cholesterol, total 172 10/01/2021 03:44 PM    HDL Cholesterol 86 (H) 10/01/2021 03:44 PM    LDL-CHOLESTEROL 87 2020 02:28 PM    LDL, calculated 79.4 10/01/2021 03:44 PM    VLDL, calculated 6.6 10/01/2021 03:44 PM    Triglyceride 33 10/01/2021 03:44 PM    CHOL/HDL Ratio 2.0 10/01/2021 03:44 PM    Cholesterol/HDL ratio 2.4 09/11/2020 02:28 PM       Lab Results   Component Value Date/Time    WBC 5.7 12/08/2021 04:34 PM    Hemoglobin, POC 12.2 01/09/2013 08:19 PM    HGB 13.7 12/10/2021 10:38 AM    Hematocrit, POC 36 01/09/2013 08:19 PM    HCT 41.7 12/10/2021 10:38 AM    PLATELET 101 85/05/0399 04:34 PM    MCV 88.3 12/08/2021 04:34 PM       Lab Results   Component Value Date/Time    Hemoglobin A1c 5.3 09/11/2020 02:28 PM    Hemoglobin A1c (POC) 5.6 09/19/2013 02:30 PM    Hemoglobin A1c, External 5.8 10/08/2015 12:00 AM       Lab Results   Component Value Date/Time    TSH 1.91 08/20/2018 06:08 PM           Due to this being a TeleHealth  evaluation, many elements of the physical examination are unable to be assessed. The pt was seen by synchronous (real-time) audio-video technology, and/or her healthcare decision maker, is aware that this patient-initiated, Telehealth encounter is a billable service, with coverage as determined by her insurance carrier. She is aware that she may receive a bill and has provided verbal consent to proceed: Yes. The pt is being evaluated by a video visit encounter for concerns as above. A caregiver was present when appropriate. Due to this being a TeleHealth encounter (During [facility] public health emergency), evaluation of the following organ systems was limited: Vitals/Constitutional/EENT/Resp/CV/GI//MS/Neuro/Skin/Heme-Lymph-Imm. Pursuant to the emergency declaration under the [facility], 1135 waiver authority and the [employer] and [employer] General Act, this Virtual  Visit was conducted, with patient's (and/or legal guardian's) consent, to reduce the patient's risk of exposure to COVID-19 and provide necessary medical care. Services were provided through a video synchronous discussion virtually to substitute for in-person clinic visit.  Patient and provider were located at their individual home/office respectively. We discussed the expected course, resolution and complications of the diagnosis(es) in detail. Medication risks, benefits, costs, interactions, and alternatives were discussed as indicated. I advised her to contact the office if her condition worsens, changes or fails to improve as anticipated. She expressed understanding with the diagnosis(es) and plan.      Froylan Lombard, MD

## 2021-12-21 ENCOUNTER — HOSPITAL ENCOUNTER (OUTPATIENT)
Dept: ULTRASOUND IMAGING | Age: 61
Discharge: HOME | End: 2021-12-21
Attending: INTERNAL MEDICINE
Payer: COMMERCIAL

## 2021-12-21 ENCOUNTER — TELEPHONE (OUTPATIENT)
Dept: INTERNAL MEDICINE CLINIC | Age: 61
End: 2021-12-21

## 2021-12-21 LAB
BACTERIA SPEC CULT: NORMAL
SERVICE CMNT-IMP: NORMAL

## 2021-12-21 PROCEDURE — 76770 US EXAM ABDO BACK WALL COMP: CPT

## 2021-12-21 NOTE — PROGRESS NOTES
Please let her know that her urinalysis is normal.    Dr. Francesco Orlando  [facility], [address] [facility], [facility] [address]  Phone: [phone]  Fax: [phone]

## 2021-12-21 NOTE — PROGRESS NOTES
Please let her know that her ultrasound of her kidneys and bladder is normal.    Dr. Evette Clark  [facility], [address] [facility], [address] [place] [address] Str[address]  Phone: [phone]  Fax: [phone]

## 2021-12-21 NOTE — TELEPHONE ENCOUNTER
----- Message from Timi Huff MD sent at 12/21/2021  4:26 AM EST -----  Please let her know that her urinalysis is normal.    Dr. Yesica Pham  [facility] [address], [address] [place] [address] Str.  Phone: [phone]  Fax: [phone]

## 2021-12-22 ENCOUNTER — TELEPHONE (OUTPATIENT)
Dept: INTERNAL MEDICINE CLINIC | Age: 61
End: 2021-12-22

## 2021-12-22 NOTE — TELEPHONE ENCOUNTER
Billie Brown 7 minutes ago (9:01 AM)            Patient given msg but still would like a call back from April

## 2021-12-27 ENCOUNTER — HOSPITAL ENCOUNTER (OUTPATIENT)
Dept: NON INVASIVE DIAGNOSTICS | Age: 61
Discharge: HOME | End: 2021-12-27
Attending: INTERNAL MEDICINE
Payer: COMMERCIAL

## 2021-12-27 DIAGNOSIS — R55 SYNCOPE, UNSPECIFIED SYNCOPE TYPE: ICD-10-CM

## 2021-12-27 DIAGNOSIS — R07.9 CHEST PAIN, UNSPECIFIED TYPE: ICD-10-CM

## 2021-12-27 DIAGNOSIS — R42 DIZZINESS: ICD-10-CM

## 2021-12-27 LAB
STRESS ANGINA INDEX: 0
STRESS BASELINE HR: 64 BPM
STRESS BASELINE ST DEPRESSION: 0 MM
STRESS ESTIMATED WORKLOAD: 10.1 METS
STRESS EXERCISE DUR MIN: NORMAL
STRESS PEAK DIAS BP: 90 MMHG
STRESS PEAK SYS BP: 170 MMHG
STRESS PERCENT HR ACHIEVED: 89 %
STRESS POST PEAK HR: 144 BPM
STRESS RATE PRESSURE PRODUCT: NORMAL BPM*MMHG
STRESS SR DUKE TREADMILL SCORE: 0
STRESS ST DEPRESSION: 0 MM
STRESS TARGET HR: 161 BPM

## 2021-12-27 PROCEDURE — 93320 DOPPLER ECHO COMPLETE: CPT

## 2021-12-28 ENCOUNTER — TELEPHONE (OUTPATIENT)
Dept: CARDIOLOGY CLINIC | Age: 61
End: 2021-12-28

## 2021-12-28 NOTE — PROGRESS NOTES
Per your last note\" April Fraire is a 63 y.o. with:     1.) Atypical CP, doubt ACS, likely noncardiac, (neg SE 2016)  2.) HTN, on Norvasc + Spironolactone 25  3.) Palpitations,PVCs/ PACs, (holter 2019) managed with Atenolol  4.) BB-induced sinus isi  5.) Frontal Fibrosing Alopecia, known  6.) Neck pain, cervical degenerative findings on MRI  7.) Memory loss     1.) Stress echo   2.) Will call with results, if neg/ low risk, can see me yearly     >50 mins spent,  Doubt symptoms CV etiology, more likely autonomic dysfunction but with RFs and her family hx, should r/o ACS

## 2021-12-28 NOTE — TELEPHONE ENCOUNTER
----- Message from Jeremías Guzman DO sent at 12/28/2021  2:58 PM EST -----  Negative normal stress test  ----- Message -----  From: Krista Harris LPN  Sent: [id number]   8:30 AM EST  To: Jeremías Guzman DO    Per your last note\" April Fraire is a 63 y.o. with:     1.) Atypical CP, doubt ACS, likely noncardiac, (neg SE 2016)  2.) HTN, on Norvasc + Spironolactone 25  3.) Palpitations,PVCs/ PACs, (holter 2019) managed with Atenolol  4.) BB-induced sinus isi  5.) Frontal Fibrosing Alopecia, known  6.) Neck pain, cervical degenerative findings on MRI  7.) Memory loss     1.) Stress echo   2.) Will call with results, if neg/ low risk, can see me yearly     >50 mins spent,  Doubt symptoms CV etiology, more likely autonomic dysfunction but with RFs and her family hx, should r/o ACS

## 2021-12-28 NOTE — LETTER
12/29/2021 11:50 AM    Ms. Marli [facility]  [id number]      We have been unable to get in contact with you to give you your results. Please get in contact with our office so we can go over results and any recommendations.         Sincerely,        Pato Pardo, DO

## 2021-12-30 ENCOUNTER — TELEPHONE (OUTPATIENT)
Dept: INTERNAL MEDICINE CLINIC | Age: 61
End: 2021-12-30

## 2021-12-30 NOTE — TELEPHONE ENCOUNTER
Patient reached she reports having lightheadedness on 12-25-21, and went [facility]. Patient then saw neurology this week and prescribed meclizine. Patient has not started the medication yet. Patient is at her endocrinology appointment now, without any lightheadedness. Second BP reading given was 146/79. Patient advised to take medication as prescribed for the lightheadedness, and if she is experiencing any sob or chest to go to ER or call 911. Patient verbalizes understanding.

## 2022-01-13 ENCOUNTER — TELEPHONE (OUTPATIENT)
Dept: INTERNAL MEDICINE CLINIC | Age: 62
End: 2022-01-13

## 2022-01-17 NOTE — TELEPHONE ENCOUNTER
Called [phone], unable to leave , mailbox full
Called patient on [phone], no answer, unable to leave VM, rang more than 12 times without VM answering.
Called patient on [phone], no answer, unable to leave VM, just kept ringing
Patient called back, requesting to speak to a nurse or Dr. Francheska Renee , has some further questions that she would like to discuss, mainly about her medications. She did state that Dr. Shadi Vergara adjusted her amlodipine from 2.5 to 5mg.      Please advise, thank you
Spoke w pt at 1140p    She had onset of 'sob' after having her meal tonight, comings 'in waves', not clearly exertional  No cp, pnd, orthopnea, palpitations, edema  She has ca score 0 4/21 and had neg stress echo 2016 and 12/21, sees Dr Les Mota  bp has been controlled when she checks and on review of most recent readings in office, meds adjusted by Dr Fabienne Delvalle most recently  Denied any pleurisy, unilat leg swelling, hr 88, pulse ox 98% on her machine  No wheezing, coughing, fever, uri complaints  Denied heartburn/dyspepsia, kenneth reflux, she is on ppi  Pt sounded comfortable in the conversation  Told pt reason for sx unclear, would suggest ER eval if persistent to r/o lung etiology ie.  PE, etc
Name band;

## 2022-01-28 DIAGNOSIS — Z12.31 ENCOUNTER FOR SCREENING MAMMOGRAM FOR BREAST CANCER: ICD-10-CM

## 2022-01-28 DIAGNOSIS — M79.629 AXILLARY PAIN, UNSPECIFIED LATERALITY: ICD-10-CM

## 2022-02-07 ENCOUNTER — TELEPHONE (OUTPATIENT)
Dept: INTERNAL MEDICINE CLINIC | Age: 62
End: 2022-02-07

## 2022-02-07 NOTE — TELEPHONE ENCOUNTER
Pt calling to say she is having cramping in her legs. Appt scheduled tomorrow but she wanted Dr. Juan Amato to be aware in a message.

## 2022-02-07 NOTE — PROGRESS NOTES
Lola Overton presents today for   Chief Complaint   Patient presents with    Leg Pain     Patient reports leg cramps bilateral that is constant. 1. \"Have you been to the ER, urgent care clinic since your last visit? Hospitalized since your last visit? \" no    2. \"Have you seen or consulted any other health care providers outside of the [address] [place] [address] since your last visit? \" yes     3. For patients aged 39-70: Has the patient had a colonoscopy / FIT/ Cologuard? Yes - Care Gap present. OVERDUE! If the patient is female:    4. For patients aged 41-77: Has the patient had a mammogram within the past 2 years? Yes - no Care Gap present  See top three    5. For patients aged 21-65: Has the patient had a pap smear?  Yes - no Care Gap present

## 2022-02-08 ENCOUNTER — TELEPHONE (OUTPATIENT)
Dept: INTERNAL MEDICINE CLINIC | Age: 62
End: 2022-02-08

## 2022-02-08 ENCOUNTER — OFFICE VISIT (OUTPATIENT)
Dept: INTERNAL MEDICINE CLINIC | Age: 62
End: 2022-02-08
Payer: COMMERCIAL

## 2022-02-08 VITALS
HEART RATE: 72 BPM | HEIGHT: 61 IN | RESPIRATION RATE: 16 BRPM | TEMPERATURE: 97.1 F | SYSTOLIC BLOOD PRESSURE: 111 MMHG | DIASTOLIC BLOOD PRESSURE: 75 MMHG | BODY MASS INDEX: 27.94 KG/M2 | OXYGEN SATURATION: 98 % | WEIGHT: 148 LBS

## 2022-02-08 DIAGNOSIS — R73.03 PREDIABETES: ICD-10-CM

## 2022-02-08 DIAGNOSIS — I10 ESSENTIAL HYPERTENSION: Primary | ICD-10-CM

## 2022-02-08 DIAGNOSIS — E78.5 HYPERLIPIDEMIA, UNSPECIFIED HYPERLIPIDEMIA TYPE: ICD-10-CM

## 2022-02-08 DIAGNOSIS — M79.10 MYALGIA: ICD-10-CM

## 2022-02-08 DIAGNOSIS — I10 ESSENTIAL HYPERTENSION: ICD-10-CM

## 2022-02-08 DIAGNOSIS — G62.9 NEUROPATHY: Primary | ICD-10-CM

## 2022-02-08 PROCEDURE — 99214 OFFICE O/P EST MOD 30 MIN: CPT | Performed by: INTERNAL MEDICINE

## 2022-02-08 RX ORDER — MECLIZINE HCL 12.5 MG 12.5 MG/1
TABLET ORAL
COMMUNITY
Start: 2021-12-28 | End: 2022-10-19

## 2022-02-08 RX ORDER — DEXLANSOPRAZOLE 60 MG/1
1 CAPSULE, DELAYED RELEASE ORAL
COMMUNITY
End: 2022-10-19

## 2022-02-08 NOTE — TELEPHONE ENCOUNTER
----- Message from Amalia Arreola MD sent at 2/8/2022  9:07 AM EST -----  Regarding: Lab apt Monday and Medication instructions - call needed! Please let her know that I would like to check a follow-up lab on Monday to assess one of her hormone levels. Please have her hold her potassium and spironolactone Friday, Saturday, and Sunday, as this medication interacts with the lab that I am ordering. She can restart this medication after her blood is drawn Monday morning. Please make sure that she is scheduled for labs Monday morning.     Dr. Tayler Juares  [facility], [address] [facility], [facility] [address]  Phone: [phone]  Fax: [phone]

## 2022-02-08 NOTE — PROGRESS NOTES
[facility] [place]:  2/8/2022, MRN: [id number]      Kaylene Saunders is a 63 y.o. female and presents to clinic for Leg Pain (Patient reports leg cramps bilateral that is constant. )      Subjective:   Pt is a 65yo AAF with h/o chronic bronchitis, LUCY, porphyria (per EHR), allergic rhinitis, NAFLD, cervical spinal stenosis, prediabetes, multiple food allergies (per lab work), HTN, HLD, constipation, gallbladder polyps (suggested by CT scan 10/13/16), and GERD. 1. Myalgias, HTN, and HLD: Present along BLE but mostly along her right leg. She stopped her pravastatin yesterday. She is wearing compression stockings off/on. She tries to keep her BLE elevated at work. The cramps are spreading to her thighs. She stays hydrated well. She is eating healthy. No caffeine intake. No ETOH. She has a history of hypertension. Blood pressure is 111/75, on atenolol, amlodipine, and spironolactone. She is also on supplemental potassium. 2. Prediabetes: She did a GTT with her endocrinologist in December. She was told that she is \"borderline\" prediabetic. She is scheduled with her Endocrinologist in March. Patient Active Problem List    Diagnosis Date Noted    Cervical spinal stenosis 04/28/2021    MURPHY (nonalcoholic steatohepatitis) 09/03/2019    Allergic rhinitis 10/04/2017    Gallbladder polyp - suggested by findings on CT scan from 10/13/16 03/01/2017    Hypersomnia with sleep apnea 03/13/2014    Constipation 01/16/2012    Essential hypertension     Hyperlipidemia     GERD (gastroesophageal reflux disease)        Current Outpatient Medications   Medication Sig Dispense Refill    meclizine (ANTIVERT) 12.5 mg tablet TAKE 1 TABLET BY MOUTH TWICE A DAY AS NEEDED FOR DIZZINESS**NOT COV BY INS      pravastatin (PRAVACHOL) 80 mg tablet Take 1 Tablet by mouth daily. 90 Tablet 3    atenoloL (TENORMIN) 25 mg tablet Take 0.5 Tablets by mouth two (2) times a day.  90 Tablet 3    amLODIPine (NORVASC) 2.5 mg tablet TAKE ONE TABLET BY MOUTH DAILY 90 Tablet 3    spironolactone (ALDACTONE) 25 mg tablet TAKE 1 TABLET DAILY 90 Tablet 1    lansoprazole (PREVACID) 30 mg capsule       mometasone (NASONEX) 50 mcg/actuation nasal spray       potassium chloride (KLOR-CON) 20 mEq pack       glucose blood VI test strips (BLOOD GLUCOSE TEST) strip Use to check glucose daily 1 Package 11    co-enzyme Q-10 (CO Q-10) 100 mg capsule Take 200 mg by mouth daily.  GARLIC PO Take 2 Capsules by mouth daily.  aspirin delayed-release (ECOTRIN LOW STRENGTH) 81 mg tablet Take 81 mg by mouth daily.  omega-3 fatty acids-vitamin e (FISH OIL) 1,000 mg cap Take 2 Caps by mouth daily.  MULTIVITAMIN W-MINERALS/LUTEIN (CENTRUM SILVER PO) Take 1 Tab by mouth daily.  dexlansoprazole (DEXILANT) 60 mg CpDB capsule (delayed release) Take 1 Capsule by mouth. (Patient not taking: Reported on 2/8/2022)      varicella-zoster recombinant, PF, (Shingrix, PF,) 50 mcg/0.5 mL susr injection 0.5 mL by IntraMUSCular route every two (2) months for 2 doses. (Patient not taking: Reported on 2/8/2022) 1 mL 0    melatonin 3 mg tablet Take 3 mg by mouth nightly as needed. (Patient not taking: Reported on 2/8/2022)         Allergies   Allergen Reactions    Ibuprofen Other (comments)    Nsaids (Non-Steroidal Anti-Inflammatory Drug) Other (comments)    Amoxicillin Unable to Obtain and Other (comments)    Lidocaine Swelling     Oral Solution    Lipitor [Atorvastatin] Myalgia           Lopressor [Metoprolol Tartrate] Other (comments)     Lightheaded and cp    Percocet [Oxycodone-Acetaminophen] Unable to Obtain and Other (comments)    Vicodin [Hydrocodone-Acetaminophen] Unable to Obtain and Other (comments)       Past Medical History:   Diagnosis Date    Allergic rhinitis     Cardiac Agatston CAC score, <100 04/30/2014    Coronary calcium score 0.    Cardiac Holter monitoring 01/25/2017    Sinus rhythm, avg HR 73 bpm (range ).   Benign Holter study.  Cardiac nuclear imaging test 01/11/2013    No convincing evidence for ischemia or infarction in the setting of significant chest wall artifact. EF 62%. No RWMA. Neg EKG on max EST. Ex time 9 min 50 sec.  Cardiac stress echo, normal 05/05/2016    Normal maximal stress echo w/reproduction of atypical chest discomfort. Ex time 11 min 3 sec. EF 55%.  Cardiovascular LLE venous duplex 09/28/2016    Left leg:  No DVT.  Chest pain     GERD (gastroesophageal reflux disease)     Heel pain, bilateral     Hiatal hernia     denies this    HTN (hypertension)     Hyperlipidemia     Insulin resistance     follows with endocrinology for this    Night sweats     PVC (premature ventricular contraction)     Right low back pain     S/P colonoscopy 2009, 2014    normal per patient    Unsteady gait     due to heel pain       Past Surgical History:   Procedure Laterality Date    D/C SUCTION  2011    HX OTHER SURGICAL  2014    colonscopy    HX OTHER SURGICAL      endoscopy    HX OTHER SURGICAL      wisdom teeth removal x1       Family History   Problem Relation Age of Onset    Hypertension Mother     Heart defect Mother         anuerysm    Heart Attack Mother     Asthma Father     Heart Attack Maternal Uncle     Heart Attack Maternal Uncle     Heart Disease Brother     Hypertension Brother        Social History     Tobacco Use    Smoking status: Former Smoker     Packs/day: 0.50     Years: 11.00     Pack years: 5.50     Types: Cigarettes     Quit date: 12/31/2006     Years since quitting: 15.1    Smokeless tobacco: Former User     Quit date: 1/10/1993    Tobacco comment: 6-7 cigs per day   Substance Use Topics    Alcohol use: Yes     Alcohol/week: 0.0 standard drinks     Comment: 1-2 glasses wine 3-4 times a week       ROS   Review of Systems   Constitutional: Negative for chills and fever. HENT: Negative for ear pain and sore throat.     Eyes: Negative for blurred vision and pain.   Respiratory: Negative for cough and shortness of breath. Cardiovascular: Negative for chest pain. Gastrointestinal: Negative for abdominal pain, blood in stool and melena. Genitourinary: Negative for dysuria and hematuria. Musculoskeletal: Positive for myalgias. Negative for joint pain. Skin: Negative for rash. Neurological: Negative for headaches. Endo/Heme/Allergies: Does not bruise/bleed easily. Psychiatric/Behavioral: Negative for substance abuse. Objective     Vitals:    02/08/22 0803   BP: 111/75   Pulse: 72   Resp: 16   Temp: 97.1 °F (36.2 °C)   TempSrc: Temporal   SpO2: 98%   Weight: 148 lb (67.1 kg)   Height: 5' 1\" (1.549 m)   PainSc:   8   PainLoc: Leg       Physical Exam  Vitals and nursing note reviewed. HENT:      Head: Normocephalic and atraumatic. Right Ear: External ear normal.      Left Ear: External ear normal.   Eyes:      General: No scleral icterus. Right eye: No discharge. Left eye: No discharge. Conjunctiva/sclera: Conjunctivae normal.   Cardiovascular:      Rate and Rhythm: Normal rate and regular rhythm. Heart sounds: Normal heart sounds. No murmur heard. No friction rub. No gallop. Pulmonary:      Effort: Pulmonary effort is normal. No respiratory distress. Breath sounds: Normal breath sounds. No wheezing or rales. Abdominal:      General: Bowel sounds are normal. There is no distension. Palpations: Abdomen is soft. There is no mass. Tenderness: There is no abdominal tenderness. There is no guarding or rebound. Musculoskeletal:         General: No swelling (BUE) or tenderness (BUE). Cervical back: Neck supple. Comments: BLE are NTTP except along her right calf. BLE are w/o edema today   Lymphadenopathy:      Cervical: No cervical adenopathy. Skin:     General: Skin is warm and dry. Findings: No erythema or rash. Neurological:      Mental Status: She is alert.       Motor: No abnormal muscle tone. Gait: Gait normal.   Psychiatric:         Mood and Affect: Mood normal.         LABS   Data Review:   Lab Results   Component Value Date/Time    WBC 5.7 12/08/2021 04:34 PM    Hemoglobin, POC 12.2 01/09/2013 08:19 PM    HGB 13.7 12/10/2021 10:38 AM    Hematocrit, POC 36 01/09/2013 08:19 PM    HCT 41.7 12/10/2021 10:38 AM    PLATELET 498 66/95/0020 04:34 PM    MCV 88.3 12/08/2021 04:34 PM       Lab Results   Component Value Date/Time    Sodium 138 12/10/2021 10:38 AM    Potassium 3.9 12/10/2021 10:38 AM    Chloride 105 12/10/2021 10:38 AM    CO2 26 12/10/2021 10:38 AM    Anion gap 7 12/10/2021 10:38 AM    Glucose 86 12/10/2021 10:38 AM    BUN 17 12/10/2021 10:38 AM    Creatinine 1.03 12/10/2021 10:38 AM    BUN/Creatinine ratio 17 12/10/2021 10:38 AM    GFR est AA >60 12/10/2021 10:38 AM    GFR est non-AA 55 (L) 12/10/2021 10:38 AM    Calcium 9.4 12/10/2021 10:38 AM    Calcium 9.3 12/10/2021 10:38 AM       Lab Results   Component Value Date/Time    Cholesterol, total 172 10/01/2021 03:44 PM    HDL Cholesterol 86 (H) 10/01/2021 03:44 PM    LDL-CHOLESTEROL 87 09/11/2020 02:28 PM    LDL, calculated 79.4 10/01/2021 03:44 PM    VLDL, calculated 6.6 10/01/2021 03:44 PM    Triglyceride 33 10/01/2021 03:44 PM    CHOL/HDL Ratio 2.0 10/01/2021 03:44 PM    Cholesterol/HDL ratio 2.4 09/11/2020 02:28 PM       Lab Results   Component Value Date/Time    Hemoglobin A1c 5.3 09/11/2020 02:28 PM    Hemoglobin A1c (POC) 5.6 09/19/2013 02:30 PM    Hemoglobin A1c, External 5.8 10/08/2015 12:00 AM       Assessment/Plan:   1. Myalgias, HTN, and HLD: +Statin. Okay to continue with medication as prescribed but we will hold her pravastatin for the next week. She was instructed to let me know if her muscle cramps resolve over the next 7 days. If they do, her muscle cramps are likely from pravastatin. If they do not, she should restart this medication. We will check a BMP and CPK today.   I encouraged her to stay hydrated with water. I will check labs on Monday. I will have her hold her spironolactone and potassium for 3 days prior to her labs being checked. We will check a renin and aldosterone panel. ORDERS:  - METABOLIC PANEL, BASIC; Future  - CK; Future    2. Prediabetes:   I encouraged her to follow-up with her endocrinologist for repeat testing later this year to reassess her prediabetes. I encouraged her to maintain a low-carb diet and to portion control. She is also encouraged to reduce her weight      Health Maintenance Due   Topic Date Due    Shingrix Vaccine Age 49> (1 of 2) Never done    Colorectal Cancer Screening Combo  03/26/2021    COVID-19 Vaccine (3 - Booster for Ebony Leyla series) 10/12/2021         Lab review: labs are reviewed in the EHR and ordered as mentioned above    I have discussed the diagnosis with the patient and the intended plan as seen in the above orders. The patient has received an after-visit summary and questions were answered concerning future plans. I have discussed medication side effects and warnings with the patient as well. I have reviewed the plan of care with the patient, accepted their input and they are in agreement with the treatment goals. All questions were answered. The patient understands the plan of care. Handouts provided today with above information. Pt instructed if symptoms worsen to call the office or report to the ED for continued care. Greater than 50% of the visit time was spent in counseling and/or coordination of care. Voice recognition was used to generate this report, which may have resulted in some phonetic based errors in grammar and contents. Even though attempts were made to correct all the mistakes, some may have been missed, and remained in the body of the document.           Cathy Vo MD

## 2022-02-08 NOTE — PATIENT INSTRUCTIONS
Muscle Aches: Care Instructions  Your Care Instructions     Muscle aches have many possible causes. Some common ones are overuse, tension, and injuries such as a strained muscle. An infection such as the flu can cause muscle aches. Or the aches may be caused by some medicines, such as antipsychotics. Muscle aches may also be a symptom of a disease like lupus or fibromyalgia. Myalgia is the medical term for muscle aches. The doctor will do a physical exam and ask questions to try to find what is causing your pain. You may also have tests such as blood tests or imaging tests like X-rays. These can help find or rule out serious problems. The doctor has checked you carefully, but problems can develop later. If you notice any problems or new symptoms, get medical treatment right away. Follow-up care is a key part of your treatment and safety. Be sure to make and go to all appointments, and call your doctor if you are having problems. It's also a good idea to know your test results and keep a list of the medicines you take. How can you care for yourself at home? · Rest the area that hurts. You may need to stop or reduce the activity that causes your symptoms. Then you can return to it slowly. · Put ice or a cold pack on the area for 10 to 20 minutes at a time to ease pain. Put a thin cloth between the ice and your skin. · Take an over-the-counter pain medicine, such as acetaminophen (Tylenol), ibuprofen (Advil, Motrin), or naproxen (Aleve). Be safe with medicines. Read and follow all instructions on the label. When should you call for help? Call your doctor now or seek immediate medical care if:    · Your pain gets worse.     · You have new symptoms, such as a fever, swelling, or a rash. Watch closely for changes in your health, and be sure to contact your doctor if:    · You do not get better as expected. Where can you learn more?   Go to http://www.gray.com/  Enter G355 in the search box to learn more about \"Muscle Aches: Care Instructions. \"  Current as of: April 8, 2021               Content Version: 13.0  © 8511-6590 [employer]. Care instructions adapted under license by [employer] (which disclaims liability or warranty for this information). If you have questions about a medical condition or this instruction, always ask your healthcare professional. [place] [address] any warranty or liability for your use of this information.

## 2022-02-10 NOTE — TELEPHONE ENCOUNTER
Patient reached and given instructions, patient verbalizes understanding. Patient also wanted to note that she saw podiatry yesterday and was diagnosed with neuropathy. Patient prescribed lyrica. She is asking if this could also be causing her leg pain.

## 2022-02-10 NOTE — TELEPHONE ENCOUNTER
Is she having lower back pain? If so, I would recommend that she get a lumbar xray series - to see if her leg pain is coming from lumbar disc disease along her lower back. If she is not having neck or back pain, I would recommend that she see a Neurologist for EMG testing - and a referral order would need to be generated.     Dr. Laura Vera  [facility], [address] [place] [address], [address] [place] [address] Str.  Phone: [phone]  Fax: [phone]

## 2022-02-10 NOTE — TELEPHONE ENCOUNTER
Patient reached and given message. She denies any neck or back pain, but will call her neurologist office for EMG testing. Patient under care at DR. Han's office. She will call back if referral is needed.

## 2022-02-14 NOTE — TELEPHONE ENCOUNTER
Pt calling, says she called Dr. Romayne Canter office and they told her they need a referral before they can order any tests.  Fax the referral to them at [phone]

## 2022-02-18 ENCOUNTER — TELEPHONE (OUTPATIENT)
Dept: INTERNAL MEDICINE CLINIC | Age: 62
End: 2022-02-18

## 2022-02-18 NOTE — TELEPHONE ENCOUNTER
Pt states she went to podiatrist JAYESH [facility]) last Thurs and he gave her rx for Lyrica 75 mg. However, it made her sick on her stomach so pharmacist said it comes in lower dose of 25 or 50 mg. She contacted podiatrist and he won't re-write a lower dose unless she comes back in office to be seen. She said she just saw him last week, and the co-pays are expensive since she hasn't met her deductible so she isn't going to go back to him next week. She is asking if Dr Evaristo Cottrell would send in lower dose for her.     Please advise her at [phone]

## 2022-02-19 ENCOUNTER — HOSPITAL ENCOUNTER (EMERGENCY)
Age: 62
Discharge: HOME | End: 2022-02-19
Attending: EMERGENCY MEDICINE
Payer: COMMERCIAL

## 2022-02-19 VITALS
RESPIRATION RATE: 18 BRPM | TEMPERATURE: 97.9 F | DIASTOLIC BLOOD PRESSURE: 84 MMHG | HEART RATE: 58 BPM | BODY MASS INDEX: 27.05 KG/M2 | WEIGHT: 147 LBS | OXYGEN SATURATION: 99 % | HEIGHT: 62 IN | SYSTOLIC BLOOD PRESSURE: 132 MMHG

## 2022-02-19 DIAGNOSIS — R00.2 PALPITATIONS: Primary | ICD-10-CM

## 2022-02-19 LAB
ALBUMIN SERPL-MCNC: 3.6 G/DL (ref 3.4–5)
ALBUMIN/GLOB SERPL: 1 {RATIO} (ref 0.8–1.7)
ALP SERPL-CCNC: 34 U/L (ref 45–117)
ALT SERPL-CCNC: 25 U/L (ref 13–56)
ANION GAP SERPL CALC-SCNC: 5 MMOL/L (ref 3–18)
AST SERPL-CCNC: 20 U/L (ref 10–38)
BASOPHILS # BLD: 0.1 K/UL (ref 0–0.1)
BASOPHILS NFR BLD: 1 % (ref 0–2)
BILIRUB SERPL-MCNC: 0.5 MG/DL (ref 0.2–1)
BUN SERPL-MCNC: 18 MG/DL (ref 7–18)
BUN/CREAT SERPL: 15 (ref 12–20)
CALCIUM SERPL-MCNC: 9.3 MG/DL (ref 8.5–10.1)
CHLORIDE SERPL-SCNC: 109 MMOL/L (ref 100–111)
CO2 SERPL-SCNC: 29 MMOL/L (ref 21–32)
CREAT SERPL-MCNC: 1.23 MG/DL (ref 0.6–1.3)
DIFFERENTIAL METHOD BLD: NORMAL
EOSINOPHIL # BLD: 0.1 K/UL (ref 0–0.4)
EOSINOPHIL NFR BLD: 2 % (ref 0–5)
ERYTHROCYTE [DISTWIDTH] IN BLOOD BY AUTOMATED COUNT: 13.9 % (ref 11.6–14.5)
GLOBULIN SER CALC-MCNC: 3.6 G/DL (ref 2–4)
GLUCOSE SERPL-MCNC: 87 MG/DL (ref 74–99)
HCT VFR BLD AUTO: 41.9 % (ref 35–45)
HGB BLD-MCNC: 14 G/DL (ref 12–16)
IMM GRANULOCYTES # BLD AUTO: 0 K/UL (ref 0–0.04)
IMM GRANULOCYTES NFR BLD AUTO: 0 % (ref 0–0.5)
LYMPHOCYTES # BLD: 1.4 K/UL (ref 0.9–3.6)
LYMPHOCYTES NFR BLD: 27 % (ref 21–52)
MAGNESIUM SERPL-MCNC: 2.3 MG/DL (ref 1.6–2.6)
MCH RBC QN AUTO: 28.9 PG (ref 24–34)
MCHC RBC AUTO-ENTMCNC: 33.4 G/DL (ref 31–37)
MCV RBC AUTO: 86.6 FL (ref 78–100)
MONOCYTES # BLD: 0.3 K/UL (ref 0.05–1.2)
MONOCYTES NFR BLD: 5 % (ref 3–10)
NEUTS SEG # BLD: 3.3 K/UL (ref 1.8–8)
NEUTS SEG NFR BLD: 64 % (ref 40–73)
NRBC # BLD: 0 K/UL (ref 0–0.01)
NRBC BLD-RTO: 0 PER 100 WBC
PLATELET # BLD AUTO: 277 K/UL (ref 135–420)
PMV BLD AUTO: 10.3 FL (ref 9.2–11.8)
POTASSIUM SERPL-SCNC: 3.6 MMOL/L (ref 3.5–5.5)
PROT SERPL-MCNC: 7.2 G/DL (ref 6.4–8.2)
RBC # BLD AUTO: 4.84 M/UL (ref 4.2–5.3)
SODIUM SERPL-SCNC: 143 MMOL/L (ref 136–145)
TROPONIN-HIGH SENSITIVITY: 49 NG/L (ref 0–54)
TSH SERPL DL<=0.05 MIU/L-ACNC: 1.55 UIU/ML (ref 0.36–3.74)
WBC # BLD AUTO: 5.2 K/UL (ref 4.6–13.2)

## 2022-02-19 PROCEDURE — 85025 COMPLETE CBC W/AUTO DIFF WBC: CPT

## 2022-02-19 PROCEDURE — 84443 ASSAY THYROID STIM HORMONE: CPT

## 2022-02-19 PROCEDURE — 93005 ELECTROCARDIOGRAM TRACING: CPT

## 2022-02-19 PROCEDURE — 83735 ASSAY OF MAGNESIUM: CPT

## 2022-02-19 PROCEDURE — 84484 ASSAY OF TROPONIN QUANT: CPT

## 2022-02-19 PROCEDURE — 80053 COMPREHEN METABOLIC PANEL: CPT

## 2022-02-19 PROCEDURE — 99284 EMERGENCY DEPT VISIT MOD MDM: CPT

## 2022-02-19 NOTE — PROGRESS NOTES
7:15pm oncall. Pt states she takes atenolol 25mg 1/2 tab daily for palpitations. Today after work her palpitations worsened, suspects due to anxiety, so she took another 1/2 tab. She feels ok right now but wanted someone to know she took atenolol twice today. I informed her she can take [id number] bid as prescribed. After some discussion, she felt better with less anxiety and has decided to go meditate before going to bed. /67  HR 74.

## 2022-02-19 NOTE — ED PROVIDER NOTES
EMERGENCY DEPARTMENT HISTORY AND PHYSICAL EXAM    3:49 PM seen at this time in triage room      Date: 2/19/2022  Patient Name: Mark Aranda    History of Presenting Illness     Chief Complaint   Patient presents with    Palpitations    Chest Pain    Leg Problem    Shortness of Breath         History Provided By: patient    Additional History (Context): Mark Aranda is a 63 y.o. female presents with hypertension and hypercholesterolemia, noted what she calls PVCs, fluttering in her chest, palpitations today while walking in the grocery store. Her palpitations were absent when she sits down or while she was driving but went to another facility and again had the palpitations with walking. On review of systems she notes some very minor chest pain which she perceives to be exertional.  Symptoms started today. Early in the morning. She sees a doctor regularly. Recently stopped statin therapy due to leg pains and has been off it for 10 days. Negative echo stress in December. PCP: Mikael Pagan MD    Chief Complaint:   Duration:    Timing:    Location:   Quality:   Severity:   Modifying Factors:   Associated Symptoms:       Current Outpatient Medications   Medication Sig Dispense Refill    dexlansoprazole (DEXILANT) 60 mg CpDB capsule (delayed release) Take 1 Capsule by mouth.  meclizine (ANTIVERT) 12.5 mg tablet TAKE 1 TABLET BY MOUTH TWICE A DAY AS NEEDED FOR DIZZINESS**NOT COV BY INS      pravastatin (PRAVACHOL) 80 mg tablet Take 1 Tablet by mouth daily. 90 Tablet 3    atenoloL (TENORMIN) 25 mg tablet Take 0.5 Tablets by mouth two (2) times a day.  90 Tablet 3    amLODIPine (NORVASC) 2.5 mg tablet TAKE ONE TABLET BY MOUTH DAILY 90 Tablet 3    spironolactone (ALDACTONE) 25 mg tablet TAKE 1 TABLET DAILY 90 Tablet 1    lansoprazole (PREVACID) 30 mg capsule       potassium chloride (KLOR-CON) 20 mEq pack       glucose blood VI test strips (BLOOD GLUCOSE TEST) strip Use to check glucose daily 1 Package 11    co-enzyme Q-10 (CO Q-10) 100 mg capsule Take 200 mg by mouth daily.  GARLIC PO Take 2 Capsules by mouth daily.  aspirin delayed-release (ECOTRIN LOW STRENGTH) 81 mg tablet Take 81 mg by mouth daily.  omega-3 fatty acids-vitamin e (FISH OIL) 1,000 mg cap Take 2 Caps by mouth daily.  MULTIVITAMIN W-MINERALS/LUTEIN (CENTRUM SILVER PO) Take 1 Tab by mouth daily. Past History     Past Medical History:  Past Medical History:   Diagnosis Date    Acute cerebrovascular accident (CVA) due to thrombosis of left middle cerebral artery (HCC)     Age-related cognitive decline     Allergic rhinitis     Anxiety     Attention deficit     Cardiac Agatston CAC score, <100 04/30/2014    Coronary calcium score 0.    Cardiac Holter monitoring 01/25/2017    Sinus rhythm, avg HR 73 bpm (range ). Benign Holter study.  Cardiac nuclear imaging test 01/11/2013    No convincing evidence for ischemia or infarction in the setting of significant chest wall artifact. EF 62%. No RWMA. Neg EKG on max EST. Ex time 9 min 50 sec.  Cardiac stress echo, normal 05/05/2016    Normal maximal stress echo w/reproduction of atypical chest discomfort. Ex time 11 min 3 sec. EF 55%.  Cardiovascular LLE venous duplex 09/28/2016    Left leg:  No DVT.     Cervical spinal stenosis     Chest pain     Dysthymia     GERD (gastroesophageal reflux disease)     Heel pain, bilateral     Hiatal hernia     denies this    HTN (hypertension)     Hyperlipidemia     IBS (irritable bowel syndrome)     Insulin resistance     follows with endocrinology for this    MURPHY (nonalcoholic steatohepatitis)     Night sweats     PVC (premature ventricular contraction)     Right low back pain     S/P colonoscopy 2009, 2014    normal per patient    Subacromial bursitis     Unsteady gait     due to heel pain       Past Surgical History:  Past Surgical History:   Procedure Laterality Date    D/C SUCTION  2011    HX OTHER SURGICAL  2014    colonscopy    HX OTHER SURGICAL      endoscopy    HX OTHER SURGICAL      wisdom teeth removal x1       Family History:  Family History   Problem Relation Age of Onset    Hypertension Mother     Heart defect Mother         anuerysm    Heart Attack Mother     Asthma Father     Heart Attack Maternal Uncle     Heart Attack Maternal Uncle     Heart Disease Brother     Hypertension Brother        Social History:  Social History     Tobacco Use    Smoking status: Former Smoker     Packs/day: 0.50     Years: 11.00     Pack years: 5.50     Types: Cigarettes     Quit date: 12/31/2006     Years since quitting: 15.1    Smokeless tobacco: Former User     Quit date: 1/10/1993    Tobacco comment: 6-7 cigs per day   Substance Use Topics    Alcohol use: Yes     Alcohol/week: 0.0 standard drinks     Comment: 1-2 glasses wine 3-4 times a week    Drug use: No     Types: Prescription, OTC       Allergies: Allergies   Allergen Reactions    Ibuprofen Other (comments)    Nsaids (Non-Steroidal Anti-Inflammatory Drug) Other (comments)    Amoxicillin Unable to Obtain and Other (comments)    Lidocaine Swelling     Oral Solution    Lipitor [Atorvastatin] Myalgia           Lopressor [Metoprolol Tartrate] Other (comments)     Lightheaded and cp    Percocet [Oxycodone-Acetaminophen] Unable to Obtain and Other (comments)    Vicodin [Hydrocodone-Acetaminophen] Unable to Obtain and Other (comments)         Review of Systems     Review of Systems   Constitutional: Negative for diaphoresis and fever. HENT: Negative for congestion and sore throat. Eyes: Negative for pain and itching. Respiratory: Negative for cough and shortness of breath. Cardiovascular: Positive for chest pain and palpitations. Gastrointestinal: Negative for abdominal pain and diarrhea. Endocrine: Negative for polydipsia and polyuria. Genitourinary: Negative for dysuria and hematuria. Musculoskeletal: Negative for arthralgias and myalgias. Skin: Negative for rash and wound. Neurological: Negative for seizures and syncope. Hematological: Does not bruise/bleed easily. Psychiatric/Behavioral: Negative for agitation and hallucinations. Physical Exam       Patient Vitals for the past 12 hrs:   Temp Pulse Resp BP SpO2   02/19/22 1540 97.9 °F (36.6 °C) (!) 58 18 132/84 99 %       IPVITALS  Patient Vitals for the past 24 hrs:   BP Temp Pulse Resp SpO2 Height Weight   02/19/22 1540 132/84 97.9 °F (36.6 °C) (!) 58 18 99 % 5' 2\" (1.575 m) 66.7 kg (147 lb)       Physical Exam  Vitals and nursing note reviewed. Constitutional:       Appearance: She is well-developed. HENT:      Head: Normocephalic and atraumatic. Eyes:      General: No scleral icterus. Conjunctiva/sclera: Conjunctivae normal.   Neck:      Vascular: No JVD. Cardiovascular:      Rate and Rhythm: Normal rate and regular rhythm. Heart sounds: Normal heart sounds. Comments: 4 intact extremity pulses  Pulmonary:      Effort: Pulmonary effort is normal.      Breath sounds: Normal breath sounds. Abdominal:      Palpations: Abdomen is soft. There is no mass. Tenderness: There is no abdominal tenderness. Musculoskeletal:         General: Normal range of motion. Cervical back: Normal range of motion and neck supple. Lymphadenopathy:      Cervical: No cervical adenopathy. Skin:     General: Skin is warm and dry. Neurological:      Mental Status: She is alert.            Diagnostic Study Results   Labs -  Recent Results (from the past 24 hour(s))   EKG, 12 LEAD, INITIAL    Collection Time: 02/19/22  3:34 PM   Result Value Ref Range    Ventricular Rate 59 BPM    Atrial Rate 59 BPM    P-R Interval 140 ms    QRS Duration 82 ms    Q-T Interval 388 ms    QTC Calculation (Bezet) 384 ms    Calculated P Axis 47 degrees    Calculated R Axis 26 degrees    Calculated T Axis 53 degrees    Diagnosis Sinus bradycardia  Otherwise normal ECG  When compared with ECG of 08-MAR-2021 13:36,  No significant change was found     CBC WITH AUTOMATED DIFF    Collection Time: 02/19/22  3:58 PM   Result Value Ref Range    WBC 5.2 4.6 - 13.2 K/uL    RBC 4.84 4.20 - 5.30 M/uL    HGB 14.0 12.0 - 16.0 g/dL    HCT 41.9 35.0 - 45.0 %    MCV 86.6 78.0 - 100.0 FL    MCH 28.9 24.0 - 34.0 PG    MCHC 33.4 31.0 - 37.0 g/dL    RDW 13.9 11.6 - 14.5 %    PLATELET 334 531 - 463 K/uL    MPV 10.3 9.2 - 11.8 FL    NRBC 0.0 0  WBC    ABSOLUTE NRBC 0.00 0.00 - 0.01 K/uL    NEUTROPHILS 64 40 - 73 %    LYMPHOCYTES 27 21 - 52 %    MONOCYTES 5 3 - 10 %    EOSINOPHILS 2 0 - 5 %    BASOPHILS 1 0 - 2 %    IMMATURE GRANULOCYTES 0 0.0 - 0.5 %    ABS. NEUTROPHILS 3.3 1.8 - 8.0 K/UL    ABS. LYMPHOCYTES 1.4 0.9 - 3.6 K/UL    ABS. MONOCYTES 0.3 0.05 - 1.2 K/UL    ABS. EOSINOPHILS 0.1 0.0 - 0.4 K/UL    ABS. BASOPHILS 0.1 0.0 - 0.1 K/UL    ABS. IMM. GRANS. 0.0 0.00 - 0.04 K/UL    DF AUTOMATED     MAGNESIUM    Collection Time: 02/19/22  3:58 PM   Result Value Ref Range    Magnesium 2.3 1.6 - 2.6 mg/dL   METABOLIC PANEL, COMPREHENSIVE    Collection Time: 02/19/22  3:58 PM   Result Value Ref Range    Sodium 143 136 - 145 mmol/L    Potassium 3.6 3.5 - 5.5 mmol/L    Chloride 109 100 - 111 mmol/L    CO2 29 21 - 32 mmol/L    Anion gap 5 3.0 - 18 mmol/L    Glucose 87 74 - 99 mg/dL    BUN 18 7.0 - 18 MG/DL    Creatinine 1.23 0.6 - 1.3 MG/DL    BUN/Creatinine ratio 15 12 - 20      GFR est AA 54 (L) >60 ml/min/1.73m2    GFR est non-AA 45 (L) >60 ml/min/1.73m2    Calcium 9.3 8.5 - 10.1 MG/DL    Bilirubin, total 0.5 0.2 - 1.0 MG/DL    ALT (SGPT) 25 13 - 56 U/L    AST (SGOT) 20 10 - 38 U/L    Alk.  phosphatase 34 (L) 45 - 117 U/L    Protein, total 7.2 6.4 - 8.2 g/dL    Albumin 3.6 3.4 - 5.0 g/dL    Globulin 3.6 2.0 - 4.0 g/dL    A-G Ratio 1.0 0.8 - 1.7     TROPONIN-HIGH SENSITIVITY    Collection Time: 02/19/22  3:58 PM   Result Value Ref Range    Troponin-High Sensitivity 49 0 - 54 ng/L   TSH 3RD GENERATION    Collection Time: 02/19/22  3:58 PM   Result Value Ref Range    TSH 1.55 0.36 - 3.74 uIU/mL       Radiologic Studies -   No orders to display     No results found. Medications ordered:   Medications - No data to display      Medical Decision Making   Initial Medical Decision Making and DDx:  Alarming findings, twelve-lead EKG with no predisposition to alarming arrhythmias and nothing on telemetry while she is here no alarming electrolyte abnormalities disturbance of thyroid axis and negative troponin thorough discussion with the patient she will follow up with cardiology. She is happy with the plan for discharge at this point    ED Course: Progress Notes, Reevaluation, and Consults:  ED Course as of 02/19/22 1809   Sat Feb 19, 2022   1552 Twelve-lead EKG sinus bradycardia rate of 59 no acute process [CB]      ED Course User Index  [CB] Sammie Santillan MD         I am the first provider for this patient. I reviewed the vital signs, available nursing notes, past medical history, past surgical history, family history and social history. Patient Vitals for the past 12 hrs:   Temp Pulse Resp BP SpO2   02/19/22 1540 97.9 °F (36.6 °C) (!) 58 18 132/84 99 %       Vital Signs-Reviewed the patient's vital signs. Pulse Oximetry Analysis, Cardiac Monitor, 12 lead ekg:      Interpreted by the EP. Records Reviewed: Nursing notes reviewed (Time of Review: 3:49 PM)    Procedures:   Critical Care Time:   Aspirin: (was aspirin given for stroke?)    Diagnosis     Clinical Impression:   1.  Palpitations        Disposition: Discharged      Follow-up Information     Follow up With Specialties Details Why Contact Info    Kaur Guzmán MD Family Medicine In 3 days  [address]  [address]  [phone]             Patient's Medications   Start Taking    No medications on file   Continue Taking    AMLODIPINE (NORVASC) 2.5 MG TABLET TAKE ONE TABLET BY MOUTH DAILY    ASPIRIN DELAYED-RELEASE (ECOTRIN LOW STRENGTH) 81 MG TABLET    Take 81 mg by mouth daily. ATENOLOL (TENORMIN) 25 MG TABLET    Take 0.5 Tablets by mouth two (2) times a day. CO-ENZYME Q-10 (CO Q-10) 100 MG CAPSULE    Take 200 mg by mouth daily. DEXLANSOPRAZOLE (DEXILANT) 60 MG CPDB CAPSULE (DELAYED RELEASE)    Take 1 Capsule by mouth. GARLIC PO    Take 2 Capsules by mouth daily. GLUCOSE BLOOD VI TEST STRIPS (BLOOD GLUCOSE TEST) STRIP    Use to check glucose daily    LANSOPRAZOLE (PREVACID) 30 MG CAPSULE        MECLIZINE (ANTIVERT) 12.5 MG TABLET    TAKE 1 TABLET BY MOUTH TWICE A DAY AS NEEDED FOR DIZZINESS**NOT COV BY INS    MULTIVITAMIN W-MINERALS/LUTEIN (CENTRUM SILVER PO)    Take 1 Tab by mouth daily. OMEGA-3 FATTY ACIDS-VITAMIN E (FISH OIL) 1,000 MG CAP    Take 2 Caps by mouth daily. POTASSIUM CHLORIDE (KLOR-CON) 20 MEQ PACK        PRAVASTATIN (PRAVACHOL) 80 MG TABLET    Take 1 Tablet by mouth daily. SPIRONOLACTONE (ALDACTONE) 25 MG TABLET    TAKE 1 TABLET DAILY   These Medications have changed    No medications on file   Stop Taking    MELATONIN 3 MG TABLET    Take 3 mg by mouth nightly as needed.     MOMETASONE (NASONEX) 50 MCG/ACTUATION NASAL SPRAY         _______________________________    Notes:    Ladarius Huertas MD using Dragon dictation      _______________________________

## 2022-02-20 LAB
ATRIAL RATE: 59 BPM
CALCULATED P AXIS, ECG09: 47 DEGREES
CALCULATED R AXIS, ECG10: 26 DEGREES
CALCULATED T AXIS, ECG11: 53 DEGREES
DIAGNOSIS, 93000: NORMAL
P-R INTERVAL, ECG05: 140 MS
Q-T INTERVAL, ECG07: 388 MS
QRS DURATION, ECG06: 82 MS
QTC CALCULATION (BEZET), ECG08: 384 MS
VENTRICULAR RATE, ECG03: 59 BPM

## 2022-02-23 ENCOUNTER — TELEPHONE (OUTPATIENT)
Dept: CARDIOLOGY CLINIC | Age: 62
End: 2022-02-23

## 2022-02-23 NOTE — TELEPHONE ENCOUNTER
Patient went to the ER for concerns about her racing and pvc's . Patient states she did call her PCP and was instructed to increase her atenolol to help with the palps and pvc's. Patient states the PVC's /palps start with increased physical activities. She has been doing some new exericises and noticed them getting worse. Patient is also still having weakness in her lower extremities. Patient is seeing Neurology for some of these symptoms. Verbal order and read back per Suukmar Durant, DO  Patient will need to continue to monitor symptoms. Recent stress and echo in Dec were normal.   Patient encouraged to find ways to deal with stress to see if that will help. Patient verbalized understanding   Patient will continue to monitor and call if she has any other concerns.

## 2022-03-04 ENCOUNTER — TELEPHONE (OUTPATIENT)
Dept: INTERNAL MEDICINE CLINIC | Age: 62
End: 2022-03-04

## 2022-03-07 ENCOUNTER — TELEPHONE (OUTPATIENT)
Dept: CARDIOLOGY CLINIC | Age: 62
End: 2022-03-07

## 2022-03-07 NOTE — TELEPHONE ENCOUNTER
Patient called to state that her endocrinologist diagnosis her with type two diabetes. Patient was not given medication for this new diagnosis but was given a medication for her neuropathy in her feet. Medication is a supplement called EB-N3. Patient would like to know if it is ok from a cardiac standpoint to take this supplement. I will forward to Dr Deandre Gonsales for further review.

## 2022-03-08 NOTE — TELEPHONE ENCOUNTER
Patient reached, she states that she recently was placed on a supplement called EB-N3 for her neuropathy and also labs were done with endocrinology. Patient would like for DR. Stephanie Diego to review. Called DR. Torres's office and requested last office notes/labs.

## 2022-03-14 NOTE — TELEPHONE ENCOUNTER
Verbal order and read back per Will Snider, DO    Yes (I had to look it up), I dont see any serious cardiac side effects   Most common side effects are upset stomach and headache

## 2022-03-19 PROBLEM — R73.03 PREDIABETES: Status: ACTIVE | Noted: 2022-02-08

## 2022-03-19 PROBLEM — K75.81 NASH (NONALCOHOLIC STEATOHEPATITIS): Status: ACTIVE | Noted: 2019-09-03

## 2022-03-19 PROBLEM — M48.02 CERVICAL SPINAL STENOSIS: Status: ACTIVE | Noted: 2021-04-28

## 2022-03-20 PROBLEM — K82.4 GALLBLADDER POLYP: Status: ACTIVE | Noted: 2017-03-01

## 2022-03-20 PROBLEM — J30.9 ALLERGIC RHINITIS: Status: ACTIVE | Noted: 2017-10-04

## 2022-03-21 ENCOUNTER — TELEPHONE (OUTPATIENT)
Dept: INTERNAL MEDICINE CLINIC | Age: 62
End: 2022-03-21

## 2022-03-25 ENCOUNTER — TELEPHONE (OUTPATIENT)
Dept: INTERNAL MEDICINE CLINIC | Age: 62
End: 2022-03-25

## 2022-03-25 NOTE — TELEPHONE ENCOUNTER
Patient called to report that she was prescribed a new supplement from DR. Torres and it sometimes causes nausea/vomitting. Patient states that she did have vomiting with strainging this morning, and then she noticed little red spots on her face. Patients that she was diagnosed with Petechiae. Patient asking how this goes away. Patient informed that this will eventually fade and to call back if she experiences any other symptoms.

## 2022-03-29 NOTE — TELEPHONE ENCOUNTER
Called patient to inform her that the form has been faxed and original is at the  for . No answer and no machine to leave message.
Patient dropped off FMLA form to be completed. Placed in Dr. Adair Will box.
Prophylactic measure

## 2022-03-30 ENCOUNTER — TELEPHONE (OUTPATIENT)
Dept: INTERNAL MEDICINE CLINIC | Age: 62
End: 2022-03-30

## 2022-03-30 DIAGNOSIS — U07.1 COVID-19: Primary | ICD-10-CM

## 2022-03-30 NOTE — TELEPHONE ENCOUNTER
Referral placed formally for assistance from our clinical pharmacy team.    Dr. Elyssa Donahue  [facility], [address] [place] [address], [address] [place] [address] Str.  Phone: [phone]  Fax: [phone]

## 2022-03-30 NOTE — TELEPHONE ENCOUNTER
Received needed call from patient at 11:35 PM last night. Reported that she low-grade fever, cough, sore throat for the past 2 days and tested positive this evening for COVID-19 using a home test.  Denied any dyspnea or productive cough. Discussed implications of positive test results including need for isolation. Discussed conservative measures including Tylenol, Mucinex DM, rest and fluids. Discussed possible treatment with Paxlovid as she would meet criteria and within time frame for treatment. Patient stated that she will research medication online and call the office to discuss with Dr. Moo Mccarty in order to decide if she will wish to proceed.

## 2022-03-30 NOTE — TELEPHONE ENCOUNTER
Per DR. Francheska Renee, will need to add the patient on today as a virtual visit. Tried reaching patient several times, phone number just rings, no voicemail. Will continue to try and reach the patient for a virtual visit.

## 2022-03-30 NOTE — TELEPHONE ENCOUNTER
Patient called back , she spoke to Dr Mary Acevedo yesterday discussed taking plaxlovid , and would like to be treated with it .   Pt also asking if it is ok for her to use her Prorr- HFR  Pt is also asking if she should get tested again   Please advise good contact today [phone]  Pt asking Rx be sent to [facility] [address] [facility]

## 2022-03-30 NOTE — TELEPHONE ENCOUNTER
Returned call to patient and offered same day virtual visit today per DR. Oscar Pulido. Patient states that she isn't able to do the virtual today, but she can do one tomorrow. Please advise.

## 2022-03-30 NOTE — TELEPHONE ENCOUNTER
If Dr. Shayna Perera decides to proceed with Paxlovid, patient will need to be on a reduced dose due to GFR less than 60 mL/min. The regimen will be nirmatrelvir 150 mg + ritonavir 100 mg BID x 5 days. Please include GFR on the prescription so the pharmacy can verify the correct dose when dispensing. Recommend that patient hold garlic supplement during therapy. Monitor for low blood pressure/swelling as Paxlovid can increase effect of amlodipine - consider 50% dose decrease if needed. Pravastatin okay to continue. No other interactions noted. [facility] [address] has it in stock as of 3/30 at 4 PM.       Thank you,  Dennie Britain. RODDY Almaguer          For Pharmacy Admin Tracking Only     CPA in place:  Yes   Recommendation Provided To: Provider: 1 via Note to Provider    Intervention Detail: New Rx: 1, reason: Needs Additional Therapy   Gap Closed?: No   Intervention Accepted By: Provider: 1   Time Spent (min): 30

## 2022-03-31 NOTE — TELEPHONE ENCOUNTER
Dr Corrinne Ink per your note you wanted to see her virtually today, Thurs at 10:15AM    As you are out of the office today, is there another day/time you want to offer?   Thank you

## 2022-04-01 ENCOUNTER — VIRTUAL VISIT (OUTPATIENT)
Dept: INTERNAL MEDICINE CLINIC | Age: 62
End: 2022-04-01
Payer: COMMERCIAL

## 2022-04-01 ENCOUNTER — TELEPHONE (OUTPATIENT)
Dept: INTERNAL MEDICINE CLINIC | Age: 62
End: 2022-04-01

## 2022-04-01 DIAGNOSIS — U07.1 COVID-19: Primary | ICD-10-CM

## 2022-04-01 DIAGNOSIS — U07.1 COVID: Primary | ICD-10-CM

## 2022-04-01 PROCEDURE — 99214 OFFICE O/P EST MOD 30 MIN: CPT | Performed by: INTERNAL MEDICINE

## 2022-04-01 RX ORDER — NIRMATRELVIR AND RITONAVIR 300-100 MG
KIT ORAL
Qty: 1 BOX | Refills: 0 | Status: SHIPPED | OUTPATIENT
Start: 2022-04-01 | End: 2022-04-29 | Stop reason: ALTCHOICE

## 2022-04-01 NOTE — TELEPHONE ENCOUNTER
Returned call. No interaction; patient expressed understanding. Thank you,  Mendel Barrera. RODDY Beck              For Pharmacy Admin Tracking Only     CPA in place:  Yes   Recommendation Provided To: Patient/Caregiver: 0 via Telephone   Intervention Accepted By: Patient/Caregiver: 0   Time Spent (min): 10

## 2022-04-01 NOTE — TELEPHONE ENCOUNTER
Pharmacy Progress Note - Telephone Encounter    S/O: Ms. Neris Angeles 63 y.o. female, referred by Dr. Shannon Mata MD, was contacted via an outbound telephone call to discuss Paxlovid medication today. Verified patients identifiers (name & ) per HIPAA policy.     - Patient has a confirmed positive test for COVID-19 and symptoms started within 5 days     - Patient had visit with PCP and was deemed appropriate for treatment with Paxlovid     Medications reviewed for interactions and discussed with PCP  · Moderate interaction with amlodipine (increased concentration on amlodipine) - counseled on monitoring BP at home and patient to notify office if she experiences any increased symptoms of hypotension or swelling  · Severe Interaction with garlic supplement (increased concentration of Paxlovid) - Patient not taking supplement anymore; will d/c    A/P:  - Will send in Paxlovid (nirmatrelvir 150 mg + ritonavir 100 mg) 2 tablets every 12 hours x 5 days. Reduced dose due to GFR less than 60 mL/min  - Patient counseled on potential side effects and appropriate use   - Patient endorses understanding to the provided information. All questions answered at this time. Medications Discontinued During This Encounter   Medication Reason    GARLIC PO LIST CLEANUP     Orders Placed This Encounter    nirmatrelvir-ritonavir (Paxlovid, EUA,) 150 mg x 2- 100 mg tablet     Sig: Take 2 tablets (one nirmatrelvir 150 mg and one ritonavir 100 mg tablet) by mouth every 12 hours for 5 days. GFR: 54 mL/min. Dispense:  1 Box     Refill:  0     Order Specific Question:   Does this patient qualify for COVID-19 antiviral treatment based on criteria for treatment? Answer:   Yes         Thank you,  Jaz Haskins. RODDY Merino        For Pharmacy Admin Tracking Only     CPA in place:  Yes   Recommendation Provided To: Patient/Caregiver: 2 via Telephone   Intervention Detail: Discontinued Rx: 1, reason: Therapy Complete and New Rx: 1, reason: Needs Additional Therapy   Gap Closed?: No   Intervention Accepted By: Patient/Caregiver: 2   Time Spent (min): 30

## 2022-04-01 NOTE — PROGRESS NOTES
Charles Alcaraz is a 63 y.o. female who was seen by synchronous (real-time) audio-video technology on 4/1/2022. Assessment & Plan:   Covid-19 Infection: +Former smoker. +NAFLD. +LUCY. She is requesting paxlovid. - Ordering paxlovid. - Asking for assistance from our clinical pharmacy team. Referral placed. - Pt instructed to go to the ED if sx worsen. Lab review: labs are reviewed in the EHR     I have discussed the diagnosis with the patient and the intended plan as seen in the above orders. I have discussed medication side effects and warnings with the patient as well. I have reviewed the plan of care with the patient, accepted their input and they are in agreement with the treatment goals. All questions were answered. The patient understands the plan of care. Pt instructed if symptoms worsen to call the office or report to the ED for continued care. Greater than 50% of the visit time was spent in counseling and/or coordination of care. Voice recognition was used to generate this report, which may have resulted in some phonetic based errors in grammar and contents. Even though attempts were made to correct all the mistakes, some may have been missed, and remained in the body of the document. Subjective:   Charles Alcaraz was seen for   Chief Complaint   Patient presents with    Positive For Covid-19     The pt is a 67yo AAF with h/o chronic bronchitis, LUCY, porphyria (per EHR), allergic rhinitis, NAFLD, cervical spinal stenosis, prediabetes, multiple food allergies (per lab work), HTN, HLD, constipation, gallbladder polyps (suggested by CT scan 10/13/16), and GERD. Covid-19:  Diagnosed earlier this wk. She tested positive on Tuesday. On Saturday, she began to have sore throat and left ear ache. She didn't go to work on Monday 2/2 sx but she ended up returning to work on Tuesday. She had a temperature of 100.4 earlier this wk. No SOB. +CP - but it is not pleuritic.  CP resolves with albuterol. +Coughing - she is taking mucinex. \"I\"m feeling a lot better. \"      Prior to Admission medications    Medication Sig Start Date End Date Taking? Authorizing Provider   albuterol (PROVENTIL HFA, VENTOLIN HFA, PROAIR HFA) 90 mcg/actuation inhaler Take 2 Puffs by inhalation every six (6) hours as needed for Wheezing. 4/5/22   Hillary Devine MD   nirmatrelvir-ritonavir (Paxlovid, EUA,) 150 mg x 2- 100 mg tablet Take 2 tablets (one nirmatrelvir 150 mg and one ritonavir 100 mg tablet) by mouth every 12 hours for 5 days. GFR: 54 mL/min. 4/1/22   Hillary Devine MD   dexlansoprazole (DEXILANT) 60 mg CpDB capsule (delayed release) Take 1 Capsule by mouth. Provider, Historical   meclizine (ANTIVERT) 12.5 mg tablet TAKE 1 TABLET BY MOUTH TWICE A DAY AS NEEDED FOR DIZZINESS**NOT COV BY INS 12/28/21   Provider, Historical   pravastatin (PRAVACHOL) 80 mg tablet Take 1 Tablet by mouth daily. 11/5/21   Humberto ANGUIANO DO   atenoloL (TENORMIN) 25 mg tablet Take 0.5 Tablets by mouth two (2) times a day. 11/5/21   Humberto ANGUIANO DO   amLODIPine (NORVASC) 2.5 mg tablet TAKE ONE TABLET BY MOUTH DAILY 11/5/21   Humberto ANGUIANO DO   spironolactone (ALDACTONE) 25 mg tablet TAKE 1 TABLET DAILY 10/25/21   Hillary Devine MD   lansoprazole (PREVACID) 30 mg capsule  6/15/21   Provider, Historical   potassium chloride (KLOR-CON) 20 mEq pack  4/14/21   Provider, Historical   glucose blood VI test strips (BLOOD GLUCOSE TEST) strip Use to check glucose daily 4/20/15   Gabriel CLEMENTS DO   co-enzyme Q-10 (CO Q-10) 100 mg capsule Take 200 mg by mouth daily. Provider, Historical   aspirin delayed-release (ECOTRIN LOW STRENGTH) 81 mg tablet Take 81 mg by mouth daily. Provider, Historical   omega-3 fatty acids-vitamin e (FISH OIL) 1,000 mg cap Take 2 Caps by mouth daily. Provider, Historical   MULTIVITAMIN W-MINERALS/LUTEIN (CENTRUM SILVER PO) Take 1 Tab by mouth daily.     Provider, Historical     Allergies Allergen Reactions    Ibuprofen Other (comments)    Nsaids (Non-Steroidal Anti-Inflammatory Drug) Other (comments)    Amoxicillin Unable to Obtain and Other (comments)    Lidocaine Swelling     Oral Solution    Lipitor [Atorvastatin] Myalgia           Lopressor [Metoprolol Tartrate] Other (comments)     Lightheaded and cp    Percocet [Oxycodone-Acetaminophen] Unable to Obtain and Other (comments)    Vicodin [Hydrocodone-Acetaminophen] Unable to Obtain and Other (comments)     Past Medical History:   Diagnosis Date    Acute cerebrovascular accident (CVA) due to thrombosis of left middle cerebral artery (HCC)     Age-related cognitive decline     Allergic rhinitis     Anxiety     Attention deficit     Cardiac Agatston CAC score, <100 04/30/2014    Coronary calcium score 0.    Cardiac Holter monitoring 01/25/2017    Sinus rhythm, avg HR 73 bpm (range ). Benign Holter study.  Cardiac nuclear imaging test 01/11/2013    No convincing evidence for ischemia or infarction in the setting of significant chest wall artifact. EF 62%. No RWMA. Neg EKG on max EST. Ex time 9 min 50 sec.  Cardiac stress echo, normal 05/05/2016    Normal maximal stress echo w/reproduction of atypical chest discomfort. Ex time 11 min 3 sec. EF 55%.  Cardiovascular LLE venous duplex 09/28/2016    Left leg:  No DVT.     Cervical spinal stenosis     Chest pain     Dysthymia     GERD (gastroesophageal reflux disease)     Heel pain, bilateral     Hiatal hernia     denies this    HTN (hypertension)     Hyperlipidemia     IBS (irritable bowel syndrome)     Insulin resistance     follows with endocrinology for this    MURPHY (nonalcoholic steatohepatitis)     Night sweats     PVC (premature ventricular contraction)     Right low back pain     S/P colonoscopy 2009, 2014    normal per patient    Subacromial bursitis     Unsteady gait     due to heel pain     Past Surgical History:   Procedure Laterality Date  D/C SUCTION  2011    HX OTHER SURGICAL  2014    colonscopy    HX OTHER SURGICAL      endoscopy    HX OTHER SURGICAL      wisdom teeth removal x1     Family History   Problem Relation Age of Onset    Hypertension Mother     Heart defect Mother         anuerysm    Heart Attack Mother     Asthma Father     Heart Attack Maternal Uncle     Heart Attack Maternal Uncle     Heart Disease Brother     Hypertension Brother      Social History     Socioeconomic History    Marital status: SINGLE   Occupational History    Occupation: student IT      Employer: NOT EMPLOYED   Tobacco Use    Smoking status: Former Smoker     Packs/day: 0.50     Years: 11.00     Pack years: 5.50     Types: Cigarettes     Quit date: 12/31/2006     Years since quitting: 15.2    Smokeless tobacco: Former User     Quit date: 1/10/1993    Tobacco comment: 6-7 cigs per day   Substance and Sexual Activity    Alcohol use: Yes     Alcohol/week: 0.0 standard drinks     Comment: 1-2 glasses wine 3-4 times a week    Drug use: No     Types: Prescription, OTC    Sexual activity: Never   Social History Narrative    Not Currently working, 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 ROS:  Gen: +Fever hx.   HEENT: see HPI  CV: No CP  Resp: See HPI  GI: No abdominal pain  : No hematuria/dysuria  Derm: No rash  Neuro: No new paresthesias/weakness  Musc: No new myalgias/jt pain  Psych: No depression sx      Objective:     General: alert, cooperative, no distress   Mental  status: mental status: alert, oriented to person, place, and time, normal mood, behavior, speech, dress, motor activity, and thought processes   Resp: resp: normal effort and no respiratory distress   Neuro: neuro: no gross deficits   Skin: skin: no discoloration or lesions of concern on visible areas     LABS:  Lab Results   Component Value Date/Time    Sodium 143 02/19/2022 03:58 PM    Potassium 3.6 02/19/2022 03:58 PM    Chloride 109 02/19/2022 03:58 PM    CO2 29 02/19/2022 03:58 PM Anion gap 5 02/19/2022 03:58 PM    Glucose 87 02/19/2022 03:58 PM    BUN 18 02/19/2022 03:58 PM    Creatinine 1.23 02/19/2022 03:58 PM    BUN/Creatinine ratio 15 02/19/2022 03:58 PM    GFR est AA 54 (L) 02/19/2022 03:58 PM    GFR est non-AA 45 (L) 02/19/2022 03:58 PM    Calcium 9.3 02/19/2022 03:58 PM       Lab Results   Component Value Date/Time    Cholesterol, total 172 10/01/2021 03:44 PM    HDL Cholesterol 86 (H) 10/01/2021 03:44 PM    LDL-CHOLESTEROL 87 09/11/2020 02:28 PM    LDL, calculated 79.4 10/01/2021 03:44 PM    VLDL, calculated 6.6 10/01/2021 03:44 PM    Triglyceride 33 10/01/2021 03:44 PM    CHOL/HDL Ratio 2.0 10/01/2021 03:44 PM    Cholesterol/HDL ratio 2.4 09/11/2020 02:28 PM       Lab Results   Component Value Date/Time    WBC 5.2 02/19/2022 03:58 PM    Hemoglobin, POC 12.2 01/09/2013 08:19 PM    HGB 14.0 02/19/2022 03:58 PM    Hematocrit, POC 36 01/09/2013 08:19 PM    HCT 41.9 02/19/2022 03:58 PM    PLATELET 564 71/15/0371 03:58 PM    MCV 86.6 02/19/2022 03:58 PM       Lab Results   Component Value Date/Time    Hemoglobin A1c 5.3 09/11/2020 02:28 PM    Hemoglobin A1c (POC) 5.6 09/19/2013 02:30 PM    Hemoglobin A1c, External 5.8 10/08/2015 12:00 AM       Lab Results   Component Value Date/Time    TSH 1.55 02/19/2022 03:58 PM           Due to this being a TeleHealth  evaluation, many elements of the physical examination are unable to be assessed. The pt was seen by synchronous (real-time) audio-video technology, and/or her healthcare decision maker, is aware that this patient-initiated, Telehealth encounter is a billable service, with coverage as determined by her insurance carrier. She is aware that she may receive a bill and has provided verbal consent to proceed: Yes. The patient (or guardian if applicable) is aware that this is a billable service, which includes applicable copays. This virtual visit was conducted with the patient's (and/or legal guardian's) consent.  The pt is located in a state where the provider was licensed to provide care. The pt is being evaluated by a video visit encounter for concerns as above. A caregiver was present when appropriate. Due to this being a TeleHealth encounter (During [facility] public health emergency), evaluation of the following organ systems was limited: Vitals/Constitutional/EENT/Resp/CV/GI//MS/Neuro/Skin/Heme-Lymph-Imm. Pursuant to the emergency declaration under the [address] [place] [address], [facility] waiver authority and the [employer] Act, this Virtual  Visit was conducted, with patient's (and/or legal guardian's) consent, to reduce the patient's risk of exposure to COVID-19 and provide necessary medical care. Services were provided through a video synchronous discussion virtually to substitute for in-person clinic visit. Patient and provider were located at their individual home/office respectively. We discussed the expected course, resolution and complications of the diagnosis(es) in detail. Medication risks, benefits, costs, interactions, and alternatives were discussed as indicated. I advised her to contact the office if her condition worsens, changes or fails to improve as anticipated. She expressed understanding with the diagnosis(es) and plan.      Juan De Leon MD

## 2022-04-04 ENCOUNTER — TELEPHONE (OUTPATIENT)
Dept: INTERNAL MEDICINE CLINIC | Age: 62
End: 2022-04-04

## 2022-04-04 NOTE — TELEPHONE ENCOUNTER
Karuna Rodriguez was given a ProAir HFA inhaler when she went to a [facility] quite some time ago and is requesting a refill be sent to [facility] on [address] in [place].

## 2022-04-05 RX ORDER — ALBUTEROL SULFATE 90 UG/1
2 AEROSOL, METERED RESPIRATORY (INHALATION)
Qty: 1 EACH | Refills: 0 | Status: SHIPPED | OUTPATIENT
Start: 2022-04-05

## 2022-04-05 NOTE — TELEPHONE ENCOUNTER
Prescription sent.     Dr. Aisha Ross  [facility], [address], [address]  Phone: [phone]  Fax: [phone]

## 2022-04-27 ENCOUNTER — TELEPHONE (OUTPATIENT)
Dept: CARDIOLOGY CLINIC | Age: 62
End: 2022-04-27

## 2022-04-27 ENCOUNTER — OFFICE VISIT (OUTPATIENT)
Dept: INTERNAL MEDICINE CLINIC | Age: 62
End: 2022-04-27

## 2022-04-27 DIAGNOSIS — R73.03 PREDIABETES: Primary | ICD-10-CM

## 2022-04-27 NOTE — TELEPHONE ENCOUNTER
[facility] states patient will be getting scheduled for endo and colonoscopy. Verbal order and read back per Krys Torres, DO    low risk from cardiac standpoint.  No medications to hold   [facility]

## 2022-04-27 NOTE — LETTER
4/29/2022 7:02 AM    Ms. Calles [facility]  [id number]      Shahid Vaughn was seen in our office on 12/28/21 for cardiac evaluation. From a cardiac standpoint she low risk for colonoscopy and endoscopy that is not scheduled yet per your note. Please feel free to contact our office if you have any questions regarding this patient.        Sincerely,      Matty Tolbert, DO

## 2022-04-27 NOTE — PATIENT INSTRUCTIONS
Your Visit Summary:     Plan:  - Continue following with Dr. Florentin Osei  - Keep up the great work! Call me with any questions or concerns  Ramona Steffanyankit [phone]    Nutrition:  - When reviewing a nutrition label, focus on the serving size, total calories, fat (and type of fats), total carbohydrates, sugar (and amount of added sugar), amount of fiber (good for your digestive), and amount of protein. Refer to your nutrition label guide for more information.  - For a meal : max 30 grams of carbohydrates  - For a snack: max 15 grams of carbohydrates  - Reduce amount of saturated and trans fat. Consider more unsaturated fat options as they are better for your heart health.    - Have at least 1 serving of lean fat protein with each meal.    - Increase fiber intake slowly to prevent constipation.   - Substitute fruit juices for the whole fruit    Low carb snack ideas (15 grams total carb or less):     String cheese or babybel with 6 crackers   4 peanut butter crackers   3 cups of popcorn   1 cup raw vegetables with hummus or ranch dip (just need to watch how much dip you use)   Nuts   2 rice cakes   Celery with peanut butter or cream cheese   String cheese with 1 serving of fruit   Greek yogurt (look at label to make sure < 15 gram carb)   Plain greek yogurt with fresh berries added   Nature valley protein bar   Whisps parmesan cheese crisps   Hard boiled egg   Cottage cheese   Tuna salad lettuce roll-ups   Deli meat roll-ups with slice of cheese   Sugar Free Jello   Glucerna shake (16 grams)    Glucerna hunger smart shake (16 grams)   Ensure protein max shake   Fruit (1 serving/15 grams)   1/2 banana, robel, or grapefruit    1/3 melon (small cantaloupe)   1 slice or 1 cup of honeydew melon   1 slice or 1 and 1/4 cups of watermelon    1 small apple, peach, orange or pear   2 small tangerines   1 cup of raspberries   3/4 cup of blackberries, blueberries or pineapples   1/2 cup of fruit juice, pears, applesauce, or mangos   17 small grapes   12 cherries    Be careful with the glucerna products as they differ in the total carbs depending on the product (some are intended as meal replacements not snacks). Make sure you look at the total carbs on the label as products can differ. Physical Activity:  - Aim for 30 minutes of consistent, moderately intensive, physical activity a day for 5 days or an average of 150 minutes per week. - Start slow, increase as tolerated. For example: Walk every day, working up to 30 minutes of brisk walking, 5 days a weekor split the 30 minutes into two 15-minute or three 10-minute walks. - If you sit for a long time, get up and move/stretch every 90 minutes. Other recommendations:  - Schedule an annual eye exam.  - Check your feet daily for any signs of open wounds, cuts, or sores. - Given your risk factors, the following vaccines are recommended: annual flu shot, age-based pneumococcal vaccines (Pneumovax, Prevnar 13). In addition to taking your medications as directed, improving your blood sugar involves modifying your nutrition and maximizing the amount of physical activity.

## 2022-04-29 VITALS — WEIGHT: 134.2 LBS | BODY MASS INDEX: 24.55 KG/M2

## 2022-04-29 RX ORDER — B2/B6 PHOS/LEVOMEF CAL/MECOBAL 1.3-70 MG
1 CAPSULE,DELAYED RELEASE (ENTERIC COATED) ORAL DAILY
COMMUNITY

## 2022-04-29 NOTE — PROGRESS NOTES
Pharmacy Progress Note - Prediabetes Management    Assessment / Plan:   Diabetes Management:  - Per ADA guidelines, Pt's A1c is classified as prediabetes and it has improved from 5.9% to 5.4% per patient report. Congratulated her on this!   - Patient is following with Endocrinology and is off of medications at this time   - Provided further nutrition education today as detailed below and patient will continue following with PCP and Endocrinology for prediabetes management     Nutrition/Lifestyle Modifications:  - Reviewed with patient utilization of the plate method as a way to encourage healthy eating. Reviewed carbohydrate and non-carbohydrate rich foods, carbohydrate content of various foods, appropriate serving sizes for carbohydrates (15 grams = 1 carb serving), reading the nutrition label focusing on total carbohydrates while being mindful of serving size, recommended serving sizes for carbohydrates for meals and snacks (30 - 45 g with meals and less than or equal to 15g for snacks ), and discussion regarding fruits as a carbohydrate. Patient education materials were provided and reviewed with the patient for their future reference and use. - Patient doing a great job with lifestyle modifications. Encouraged her to keep up the great work! S/O: Ms. Elli Keller is a 63 y.o. female, referred by Dr. Sonido Burks MD was seen today for prediabetes management visit. Patient's last A1c was 5.4% in April 2022. Patient is followed by Endocrinology, Dr. Tk Middleton.      Current anti-hyperglycemic regimen include(s):    - Not currently on any medications for lifestyle modifications     ROS:  Today, Pt endorses:  - Symptoms of Hyperglycemia: none  - Symptoms of Hypoglycemia: none    Blood Glucose Monitoring (BGM) or CGM:  - Brought in home glucometer/blood glucose log/CGM reader today:  no  - Not currently monitoring blood sugars at home     Nutrition/Lifestyle Modifications:  - Patient has developed her own nutrition plan that has worked very well for her   - On Monday - Wednesday, she is mostly doing a smoothie for breakfast and lunch that contains kale, fruit, protein mix and maybe beet powder and nuts. She may also have some oatmeal and a small organic apple or pear  - For dinner on Monday - Wednesday, she is usually having a black or isaac bean bowl with onions, spinach or kale, cheese +/- avocado  - On Thursday - Fridays, she usually has hard boiled eggs and 1 sausage igor (plant based) for breakfast and lunch, respectively, then dinner is an impossible burger   - Saturday and Sundays vary. Saturday may be a cheat day but on Sundays she usually has a full meal with a protein, starch and vegetable. For breakfast on these days she may have egg white omelette with cheese or bran oats with fruit   - Snack(s): Simply popcorn, chick peas or fresh fruit   - Beverage(s): Water or zero sugar Powerade     - Physical Activity:   - Patient has been exercising (~45 minute walks or step aerobics) twice daily for the past few months   - She is down 20 lbs since December 2021    The 10-year ASCVD risk score (Emily Mascorro., et al., 2013) is: 5%    Values used to calculate the score:      Age: 63 years      Sex: Female      Is Non- : Yes      Diabetic: No      Tobacco smoker: No      Systolic Blood Pressure: 434 mmHg      Is BP treated: Yes      HDL Cholesterol: 86 MG/DL      Total Cholesterol: 172 MG/DL   - Pravastatin 80 mg daily     Vitals:   Wt Readings from Last 3 Encounters:   02/19/22 147 lb (66.7 kg)   02/08/22 148 lb (67.1 kg)   12/16/21 153 lb (69.4 kg)     BP Readings from Last 3 Encounters:   02/19/22 132/84   02/08/22 111/75   12/16/21 120/60     Pulse Readings from Last 3 Encounters:   02/19/22 (!) 58   02/08/22 72   12/16/21 68       Past Medical History:   Diagnosis Date    Acute cerebrovascular accident (CVA) due to thrombosis of left middle cerebral artery (HCC)     Age-related cognitive decline     Allergic rhinitis     Anxiety     Attention deficit     Cardiac Agatston CAC score, <100 04/30/2014    Coronary calcium score 0.    Cardiac Holter monitoring 01/25/2017    Sinus rhythm, avg HR 73 bpm (range ). Benign Holter study.  Cardiac nuclear imaging test 01/11/2013    No convincing evidence for ischemia or infarction in the setting of significant chest wall artifact. EF 62%. No RWMA. Neg EKG on max EST. Ex time 9 min 50 sec.  Cardiac stress echo, normal 05/05/2016    Normal maximal stress echo w/reproduction of atypical chest discomfort. Ex time 11 min 3 sec. EF 55%.  Cardiovascular LLE venous duplex 09/28/2016    Left leg:  No DVT.     Cervical spinal stenosis     Chest pain     Dysthymia     GERD (gastroesophageal reflux disease)     Heel pain, bilateral     Hiatal hernia     denies this    HTN (hypertension)     Hyperlipidemia     IBS (irritable bowel syndrome)     Insulin resistance     follows with endocrinology for this    MURPHY (nonalcoholic steatohepatitis)     Night sweats     PVC (premature ventricular contraction)     Right low back pain     S/P colonoscopy 2009, 2014    normal per patient    Subacromial bursitis     Unsteady gait     due to heel pain     Allergies   Allergen Reactions    Ibuprofen Other (comments)    Nsaids (Non-Steroidal Anti-Inflammatory Drug) Other (comments)    Amoxicillin Unable to Obtain and Other (comments)    Lidocaine Swelling     Oral Solution    Lipitor [Atorvastatin] Myalgia           Lopressor [Metoprolol Tartrate] Other (comments)     Lightheaded and cp    Percocet [Oxycodone-Acetaminophen] Unable to Obtain and Other (comments)    Vicodin [Hydrocodone-Acetaminophen] Unable to Obtain and Other (comments)       Current Outpatient Medications   Medication Sig    albuterol (PROVENTIL HFA, VENTOLIN HFA, PROAIR HFA) 90 mcg/actuation inhaler Take 2 Puffs by inhalation every six (6) hours as needed for Wheezing.  nirmatrelvir-ritonavir (Paxlovid, EUA,) 150 mg x 2- 100 mg tablet Take 2 tablets (one nirmatrelvir 150 mg and one ritonavir 100 mg tablet) by mouth every 12 hours for 5 days. GFR: 54 mL/min.  dexlansoprazole (DEXILANT) 60 mg CpDB capsule (delayed release) Take 1 Capsule by mouth.  meclizine (ANTIVERT) 12.5 mg tablet TAKE 1 TABLET BY MOUTH TWICE A DAY AS NEEDED FOR DIZZINESS**NOT COV BY INS    pravastatin (PRAVACHOL) 80 mg tablet Take 1 Tablet by mouth daily.  atenoloL (TENORMIN) 25 mg tablet Take 0.5 Tablets by mouth two (2) times a day.  amLODIPine (NORVASC) 2.5 mg tablet TAKE ONE TABLET BY MOUTH DAILY    spironolactone (ALDACTONE) 25 mg tablet TAKE 1 TABLET DAILY    lansoprazole (PREVACID) 30 mg capsule     potassium chloride (KLOR-CON) 20 mEq pack     glucose blood VI test strips (BLOOD GLUCOSE TEST) strip Use to check glucose daily    co-enzyme Q-10 (CO Q-10) 100 mg capsule Take 200 mg by mouth daily.  aspirin delayed-release (ECOTRIN LOW STRENGTH) 81 mg tablet Take 81 mg by mouth daily.  omega-3 fatty acids-vitamin e (FISH OIL) 1,000 mg cap Take 2 Caps by mouth daily.  MULTIVITAMIN W-MINERALS/LUTEIN (CENTRUM SILVER PO) Take 1 Tab by mouth daily. No current facility-administered medications for this visit. Lab Results   Component Value Date/Time    Sodium 143 02/19/2022 03:58 PM    Potassium 3.6 02/19/2022 03:58 PM    Chloride 109 02/19/2022 03:58 PM    CO2 29 02/19/2022 03:58 PM    Anion gap 5 02/19/2022 03:58 PM    Glucose 87 02/19/2022 03:58 PM    BUN 18 02/19/2022 03:58 PM    Creatinine 1.23 02/19/2022 03:58 PM    BUN/Creatinine ratio 15 02/19/2022 03:58 PM    GFR est AA 54 (L) 02/19/2022 03:58 PM    GFR est non-AA 45 (L) 02/19/2022 03:58 PM    Calcium 9.3 02/19/2022 03:58 PM    Bilirubin, total 0.5 02/19/2022 03:58 PM    Alk.  phosphatase 34 (L) 02/19/2022 03:58 PM    Protein, total 7.2 02/19/2022 03:58 PM    Albumin 3.6 02/19/2022 03:58 PM    Globulin 3.6 02/19/2022 03:58 PM    A-G Ratio 1.0 02/19/2022 03:58 PM    ALT (SGPT) 25 02/19/2022 03:58 PM       Lab Results   Component Value Date/Time    Cholesterol, total 172 10/01/2021 03:44 PM    HDL Cholesterol 86 (H) 10/01/2021 03:44 PM    LDL-CHOLESTEROL 87 09/11/2020 02:28 PM    LDL, calculated 79.4 10/01/2021 03:44 PM    VLDL, calculated 6.6 10/01/2021 03:44 PM    Triglyceride 33 10/01/2021 03:44 PM    CHOL/HDL Ratio 2.0 10/01/2021 03:44 PM    Cholesterol/HDL ratio 2.4 09/11/2020 02:28 PM       Lab Results   Component Value Date/Time    WBC 5.2 02/19/2022 03:58 PM    Hemoglobin, POC 12.2 01/09/2013 08:19 PM    HGB 14.0 02/19/2022 03:58 PM    Hematocrit, POC 36 01/09/2013 08:19 PM    HCT 41.9 02/19/2022 03:58 PM    PLATELET 961 21/08/8523 03:58 PM    MCV 86.6 02/19/2022 03:58 PM       Lab Results   Component Value Date/Time    Microalbumin/Creat ratio (mg/g creat) Cannot be calculated 12/10/2021 10:39 AM    Microalbumin,urine random <0.50 12/10/2021 10:39 AM       HbA1c:  Lab Results   Component Value Date/Time    Hemoglobin A1c 5.3 09/11/2020 02:28 PM    Hemoglobin A1c (POC) 5.6 09/19/2013 02:30 PM    Hemoglobin A1c, External 5.8 10/08/2015 12:00 AM     No components found for: 2     Last Point of Care HGB A1C  Hemoglobin A1c (POC)   Date Value Ref Range Status   09/19/2013 5.6 % Final        CrCl cannot be calculated (Unknown ideal weight. ). Medication reconciliation was completed during the visit. Medications Discontinued During This Encounter   Medication Reason    nirmatrelvir-ritonavir (Paxlovid, EUA,) 150 mg x 2- 100 mg tablet Therapy Completed       Patient verbalized understanding of the information presented and all of the patients questions were answered. AVS was handed to the patient. Patient advised to call the office with any additional questions or concerns. Thank you,  Lucila Castillo. RODDY Colby            For Pharmacy Admin Tracking Only     CPA in place:  Yes   Recommendation Provided To: Patient/Caregiver: 0 via In person   Intervention Accepted By: Patient/Caregiver: 0   Time Spent (min): 60

## 2022-05-18 ENCOUNTER — TELEPHONE (OUTPATIENT)
Dept: INTERNAL MEDICINE CLINIC | Age: 62
End: 2022-05-18

## 2022-05-18 NOTE — TELEPHONE ENCOUNTER
Patient c/o   Spot on the thigh close to her  right knee outside has a bump the size of a quarter, painful, itchy  noticed 2 -3 weeks ago.  Patient is asking if you need to see the patient   # [phone]

## 2022-05-20 NOTE — TELEPHONE ENCOUNTER
No answer either # pt's perferred # no voice mail set up re: Scheduling appt for 05/31/22 per dr Radha Delarosa

## 2022-05-20 NOTE — TELEPHONE ENCOUNTER
Spoke with patient she states area on her right knee are is not raised, and states there is no discoloration, and no drainage. Patient states states she has had this for about 3 weeks. Patient states  She has not hit the area on anything and has not been bit by a bug. Patient states the area does not seem to have changed but it is itching more. Please advise if an appointment is needed.

## 2022-05-20 NOTE — TELEPHONE ENCOUNTER
Tried to call patient on cell phone voice mail not set up, unable to leave a message. Called home phone and phone only rings no machine to leave message.

## 2022-05-26 ENCOUNTER — HOSPITAL ENCOUNTER (OUTPATIENT)
Dept: MAMMOGRAPHY | Age: 62
Discharge: HOME | End: 2022-05-26
Attending: INTERNAL MEDICINE
Payer: COMMERCIAL

## 2022-05-26 PROCEDURE — 77063 BREAST TOMOSYNTHESIS BI: CPT

## 2022-05-31 ENCOUNTER — OFFICE VISIT (OUTPATIENT)
Dept: INTERNAL MEDICINE CLINIC | Age: 62
End: 2022-05-31
Payer: COMMERCIAL

## 2022-05-31 VITALS
RESPIRATION RATE: 18 BRPM | OXYGEN SATURATION: 96 % | HEIGHT: 62 IN | BODY MASS INDEX: 24.29 KG/M2 | DIASTOLIC BLOOD PRESSURE: 76 MMHG | TEMPERATURE: 97.8 F | WEIGHT: 132 LBS | HEART RATE: 73 BPM | SYSTOLIC BLOOD PRESSURE: 118 MMHG

## 2022-05-31 DIAGNOSIS — R07.9 CHEST PAIN, UNSPECIFIED TYPE: ICD-10-CM

## 2022-05-31 DIAGNOSIS — Z86.16 HISTORY OF COVID-19: ICD-10-CM

## 2022-05-31 DIAGNOSIS — R22.40 LUMP OF SKIN OF LOWER EXTREMITY, UNSPECIFIED LATERALITY: ICD-10-CM

## 2022-05-31 DIAGNOSIS — S76.319A HAMSTRING STRAIN, UNSPECIFIED LATERALITY, INITIAL ENCOUNTER: ICD-10-CM

## 2022-05-31 DIAGNOSIS — I10 ESSENTIAL HYPERTENSION: ICD-10-CM

## 2022-05-31 DIAGNOSIS — K75.81 NASH (NONALCOHOLIC STEATOHEPATITIS): ICD-10-CM

## 2022-05-31 DIAGNOSIS — M79.10 MYALGIA: Primary | ICD-10-CM

## 2022-05-31 DIAGNOSIS — E78.5 HYPERLIPIDEMIA, UNSPECIFIED HYPERLIPIDEMIA TYPE: ICD-10-CM

## 2022-05-31 DIAGNOSIS — R73.03 PREDIABETES: ICD-10-CM

## 2022-05-31 PROCEDURE — 99214 OFFICE O/P EST MOD 30 MIN: CPT | Performed by: INTERNAL MEDICINE

## 2022-05-31 NOTE — PROGRESS NOTES
Cassi Deshpande presents today for   Chief Complaint   Patient presents with   Murrel Neither [place]     Lump located on right knee x 2 weeks, with pain. Patient denies any injury, swelling, or warmness    Claudication     Patient reports having leg cramps in the left leg    Chest Pain     Patient reports \"feeling like her chest is closing\" Pain only with movement       1. \"Have you been to the ER, urgent care clinic since your last visit? Hospitalized since your last visit? \" no    2. \"Have you seen or consulted any other health care providers outside of the [address] [place] [address] since your last visit? \" yes     3. For patients aged 39-70: Has the patient had a colonoscopy / FIT/ Cologuard? No/due      If the patient is female:    4. For patients aged 41-77: Has the patient had a mammogram within the past 2 years? Yes - no Care Gap present  See top three    5. For patients aged 21-65: Has the patient had a pap smear?  Yes - no Care Gap present

## 2022-05-31 NOTE — PATIENT INSTRUCTIONS
Dehydration: Care Instructions  Your Care Instructions  Dehydration happens when your body loses too much fluid. This might happen when you do not drink enough water or you lose large amounts of fluids from your body because of diarrhea, vomiting, or sweating. Severe dehydration can be life-threatening. Water and minerals called electrolytes help put your body fluids back in balance. Learn the early signs of fluid loss, and drink more fluids to prevent dehydration. Follow-up care is a key part of your treatment and safety. Be sure to make and go to all appointments, and call your doctor if you are having problems. It's also a good idea to know your test results and keep a list of the medicines you take. How can you care for yourself at home? · Drink plenty of fluids. Choose water and other clear liquids until you feel better. If you have kidney, heart, or liver disease and have to limit fluids, talk with your doctor before you increase the amount of fluids you drink. · If you do not feel like eating or drinking, try taking small sips of water, sports drinks, or other rehydration drinks. · Get plenty of rest.  To prevent dehydration  · Add more fluids to your diet and daily routine, unless your doctor has told you not to. · During hot weather, drink more fluids. Drink even more fluids if you exercise a lot. Stay away from drinks with alcohol. · Watch for the symptoms of dehydration. These include:  ? A dry, sticky mouth. ? Not much urine. ? Dry and sunken eyes. ? Feeling very tired. · Learn what problems can lead to dehydration. These include:  ? Diarrhea, fever, and vomiting. ? Any illness with a fever, such as pneumonia or the flu. ? Activities that cause heavy sweating, such as endurance races and heavy outdoor work in hot or humid weather. ? Certain medicines, such as cold and allergy pills (antihistamines), pills that remove water from the body (diuretics), and laxatives. ?  Certain diseases, such as diabetes, cancer, and heart or kidney disease. When should you call for help? Call 911 anytime you think you may need emergency care. For example, call if:    · You passed out (lost consciousness). Call your doctor now or seek immediate medical care if:    · You are confused and cannot think clearly.     · You are dizzy or lightheaded, or you feel like you may faint.     · You have signs of needing more fluids. You have sunken eyes, a dry mouth, and you pass only a little urine.     · You cannot keep fluids down.     · You have diarrhea that lasts for more than a few days. Watch closely for changes in your health, and be sure to contact your doctor if:    · You are not making tears.     · Your skin is very dry and sags slowly back into place after you pinch it.     · Your mouth and eyes are very dry. Where can you learn more? Go to http://www.gray.com/  Enter Q814 in the search box to learn more about \"Dehydration: Care Instructions. \"  Current as of: July 1, 2021               Content Version: 13.2  © 1851-3227 [employer]. Care instructions adapted under license by [employer] (which disclaims liability or warranty for this information). If you have questions about a medical condition or this instruction, always ask your healthcare professional. [address] any warranty or liability for your use of this information.

## 2022-05-31 NOTE — PROGRESS NOTES
INTERNISTS OF [facility]:  5/31/2022, MRN: [id number]      Fransico Qureshi is a 63 y.o. female and presents to clinic for Mass (Lump located on right knee x 2 weeks, with pain. Patient denies any injury, swelling, or warmness), Claudication (Patient reports having leg cramps in the left leg), and Chest Pain (Patient reports \"feeling like her chest is closing\" Pain only with movement)      Subjective: The pt is a 67yo AAF with h/o chronic bronchitis, LUCY, porphyria (per EHR), allergic rhinitis, NAFLD, cervical spinal stenosis, prediabetes, multiple food allergies (per lab work), HTN, HLD, Covid-19 (2022), constipation, gallbladder polyps (suggested by CT scan 10/13/16), and GERD. 1. Right Knee Lump: Present x 2 wks. No h/o trauma. Sx come and go. No swelling or warmth. \"Now of course, it's not there. \" +She has pain and itching along this area. 2. Left Hamstring Muscle Spasm: Sx occur randomly. Pain is tight \"almost like a charley horse. \" No alleviating factors are known. She stays hydrated by drinking lots of water. She had an alcoholic beverage in March. She last had myalgia sx yesterday. She is on pravastatin for HLD. She has had cramps x 7-10 days. Sx are not daily. It occurred yesterday. 3. Chest Pain: She has positional chest pain. She sees Cardiology for routine check ups. Pain is a \"weakening feeling\" in her chest. No triggers are known per her hx. Sx last seconds. No palpitations/SOB. 4. Prediabetes: She follows with Endocrinology later this yr. Her A1C measured 5.4 earlier this yr.     5. H/o Covid-19: Completely recovered since April. S/p 5 days of paxlovid. 6. HTN: BP is 118/76. Taking: atenolol, norvasc, and aldactone.        Patient Active Problem List    Diagnosis Date Noted    Prediabetes 02/08/2022    Cervical spinal stenosis 04/28/2021    MURPHY (nonalcoholic steatohepatitis) 09/03/2019    Allergic rhinitis 10/04/2017    Gallbladder polyp - suggested by findings on CT scan from 10/13/16 03/01/2017    Hypersomnia with sleep apnea 03/13/2014    Constipation 01/16/2012    Essential hypertension     Hyperlipidemia     GERD (gastroesophageal reflux disease)        Current Outpatient Medications   Medication Sig Dispense Refill    B2-B6 phos-levomef armen-mecobal (EB-N3 DR) 1.3-70-6-4 mg cpDR Take 1 Capsule by mouth daily.  albuterol (PROVENTIL HFA, VENTOLIN HFA, PROAIR HFA) 90 mcg/actuation inhaler Take 2 Puffs by inhalation every six (6) hours as needed for Wheezing. 1 Each 0    dexlansoprazole (DEXILANT) 60 mg CpDB capsule (delayed release) Take 1 Capsule by mouth.  pravastatin (PRAVACHOL) 80 mg tablet Take 1 Tablet by mouth daily. 90 Tablet 3    atenoloL (TENORMIN) 25 mg tablet Take 0.5 Tablets by mouth two (2) times a day. 90 Tablet 3    amLODIPine (NORVASC) 2.5 mg tablet TAKE ONE TABLET BY MOUTH DAILY 90 Tablet 3    spironolactone (ALDACTONE) 25 mg tablet TAKE 1 TABLET DAILY 90 Tablet 1    lansoprazole (PREVACID) 30 mg capsule       potassium chloride (KLOR-CON) 20 mEq pack       glucose blood VI test strips (BLOOD GLUCOSE TEST) strip Use to check glucose daily 1 Package 11    co-enzyme Q-10 (CO Q-10) 100 mg capsule Take 200 mg by mouth daily.  aspirin delayed-release (ECOTRIN LOW STRENGTH) 81 mg tablet Take 81 mg by mouth daily.  omega-3 fatty acids-vitamin e (FISH OIL) 1,000 mg cap Take 2 Caps by mouth daily.  MULTIVITAMIN W-MINERALS/LUTEIN (CENTRUM SILVER PO) Take 1 Tab by mouth daily.       meclizine (ANTIVERT) 12.5 mg tablet TAKE 1 TABLET BY MOUTH TWICE A DAY AS NEEDED FOR DIZZINESS**NOT COV BY INS (Patient not taking: Reported on 5/31/2022)         Allergies   Allergen Reactions    Ibuprofen Other (comments)    Nsaids (Non-Steroidal Anti-Inflammatory Drug) Other (comments)    Amoxicillin Unable to Obtain and Other (comments)    Lidocaine Swelling     Oral Solution    Lipitor [Atorvastatin] Myalgia           Lopressor [Metoprolol Tartrate] Other (comments)     Lightheaded and cp    Percocet [Oxycodone-Acetaminophen] Unable to Obtain and Other (comments)    Vicodin [Hydrocodone-Acetaminophen] Unable to Obtain and Other (comments)       Past Medical History:   Diagnosis Date    Acute cerebrovascular accident (CVA) due to thrombosis of left middle cerebral artery (HCC)     Age-related cognitive decline     Allergic rhinitis     Anxiety     Attention deficit     Cardiac Agatston CAC score, <100 04/30/2014    Coronary calcium score 0.    Cardiac Holter monitoring 01/25/2017    Sinus rhythm, avg HR 73 bpm (range ). Benign Holter study.  Cardiac nuclear imaging test 01/11/2013    No convincing evidence for ischemia or infarction in the setting of significant chest wall artifact. EF 62%. No RWMA. Neg EKG on max EST. Ex time 9 min 50 sec.  Cardiac stress echo, normal 05/05/2016    Normal maximal stress echo w/reproduction of atypical chest discomfort. Ex time 11 min 3 sec. EF 55%.  Cardiovascular LLE venous duplex 09/28/2016    Left leg:  No DVT.     Cervical spinal stenosis     Chest pain     Dysthymia     GERD (gastroesophageal reflux disease)     Heel pain, bilateral     Hiatal hernia     denies this    HTN (hypertension)     Hyperlipidemia     IBS (irritable bowel syndrome)     Insulin resistance     follows with endocrinology for this    MURPHY (nonalcoholic steatohepatitis)     Night sweats     PVC (premature ventricular contraction)     Right low back pain     S/P colonoscopy 2009, 2014    normal per patient    Subacromial bursitis     Unsteady gait     due to heel pain       Past Surgical History:   Procedure Laterality Date    D/C SUCTION  2011    HX OTHER SURGICAL  2014    colonscopy    HX OTHER SURGICAL      endoscopy    HX OTHER SURGICAL      wisdom teeth removal x1       Family History   Problem Relation Age of Onset    Hypertension Mother     Heart defect Mother         [place] Petties Heart Attack Mother     Asthma Father     Heart Attack Maternal Uncle     Heart Attack Maternal Uncle     Heart Disease Brother     Hypertension Brother        Social History     Tobacco Use    Smoking status: Former Smoker     Packs/day: 0.50     Years: 11.00     Pack years: 5.50     Types: Cigarettes     Quit date: 12/31/2006     Years since quitting: 15.4    Smokeless tobacco: Former User     Quit date: 1/10/1993    Tobacco comment: 6-7 cigs per day   Substance Use Topics    Alcohol use: Yes     Alcohol/week: 0.0 standard drinks     Comment: 1-2 glasses wine 3-4 times a week       ROS   Review of Systems   Constitutional: Negative for chills and fever. HENT: Negative for ear pain and sore throat. Eyes: Negative for blurred vision and pain. Respiratory: Negative for cough and shortness of breath. Cardiovascular: Positive for chest pain (see HPI). Gastrointestinal: Negative for abdominal pain, blood in stool and melena. Genitourinary: Negative for dysuria and hematuria. Musculoskeletal: Positive for joint pain and myalgias. Skin: Negative for rash. Neurological: Negative for headaches. Endo/Heme/Allergies: Does not bruise/bleed easily. Psychiatric/Behavioral: Negative for substance abuse. Objective     Vitals:    05/31/22 1218   BP: 118/76   Pulse: 73   Resp: 18   Temp: 97.8 °F (36.6 °C)   TempSrc: Temporal   SpO2: 96%   Weight: 132 lb (59.9 kg)   Height: 5' 2\" (1.575 m)   PainSc:   0 - No pain       Physical Exam  Vitals and nursing note reviewed. HENT:      Head: Normocephalic and atraumatic. Right Ear: External ear normal.      Left Ear: External ear normal.   Eyes:      General: No scleral icterus. Right eye: No discharge. Left eye: No discharge. Conjunctiva/sclera: Conjunctivae normal.   Cardiovascular:      Rate and Rhythm: Normal rate and regular rhythm. Heart sounds: Normal heart sounds. No murmur heard. No friction rub. No gallop. Pulmonary:      Effort: Pulmonary effort is normal. No respiratory distress. Breath sounds: Normal breath sounds. No wheezing or rales. Chest:      Chest wall: No tenderness. Abdominal:      General: Bowel sounds are normal. There is no distension. Palpations: Abdomen is soft. There is no mass. Tenderness: There is no abdominal tenderness. There is no guarding or rebound. Musculoskeletal:         General: No swelling (BUE) or tenderness (BUE). Cervical back: Neck supple. Comments: BLE are NTTP. No effusions are present. No suspicious masses along BLE are present on exam   Lymphadenopathy:      Cervical: No cervical adenopathy. Skin:     General: Skin is warm and dry. Findings: No erythema or rash. Neurological:      Mental Status: She is alert. Motor: No abnormal muscle tone.       Gait: Gait normal.   Psychiatric:         Mood and Affect: Mood normal.         LABS   Data Review:   Lab Results   Component Value Date/Time    WBC 5.2 02/19/2022 03:58 PM    Hemoglobin, POC 12.2 01/09/2013 08:19 PM    HGB 14.0 02/19/2022 03:58 PM    Hematocrit, POC 36 01/09/2013 08:19 PM    HCT 41.9 02/19/2022 03:58 PM    PLATELET 861 97/82/4062 03:58 PM    MCV 86.6 02/19/2022 03:58 PM       Lab Results   Component Value Date/Time    Sodium 143 02/19/2022 03:58 PM    Potassium 3.6 02/19/2022 03:58 PM    Chloride 109 02/19/2022 03:58 PM    CO2 29 02/19/2022 03:58 PM    Anion gap 5 02/19/2022 03:58 PM    Glucose 87 02/19/2022 03:58 PM    BUN 18 02/19/2022 03:58 PM    Creatinine 1.23 02/19/2022 03:58 PM    BUN/Creatinine ratio 15 02/19/2022 03:58 PM    GFR est AA 54 (L) 02/19/2022 03:58 PM    GFR est non-AA 45 (L) 02/19/2022 03:58 PM    Calcium 9.3 02/19/2022 03:58 PM       Lab Results   Component Value Date/Time    Cholesterol, total 172 10/01/2021 03:44 PM    HDL Cholesterol 86 (H) 10/01/2021 03:44 PM    LDL-CHOLESTEROL 87 09/11/2020 02:28 PM    LDL, calculated 79.4 10/01/2021 03:44 PM VLDL, calculated 6.6 10/01/2021 03:44 PM    Triglyceride 33 10/01/2021 03:44 PM    CHOL/HDL Ratio 2.0 10/01/2021 03:44 PM    Cholesterol/HDL ratio 2.4 09/11/2020 02:28 PM       Lab Results   Component Value Date/Time    Hemoglobin A1c 5.3 09/11/2020 02:28 PM    Hemoglobin A1c (POC) 5.6 09/19/2013 02:30 PM    Hemoglobin A1c, External 5.8 10/08/2015 12:00 AM       Assessment/Plan:   1. Myalgias: Along her hamstrings. PE findings are reassuring  - She is to stop her pravastatin for 1-2 wks to assess whether her sx resolve. If they don't she needs to restart her rx for HLD  - Checking labs. - I encouraged her to stay hydrated by drinking water. ORDERS:  - METABOLIC PANEL, BASIC; Future  - CK; Future    2. Right Knee Lump: PE findings are reassuring.   - RTC for a f/u exam if the area changes/enlarges. 3. Atypical Chest Pain: Positional and not exertional per her hx. PE findings are reassuring.   - I encouraged her to f/u with Cardiology for routine check ups. - She was instructed to go to the ED for exertional CP sx    4. Prediabetes and HTN: Stable. - C/w rx as prescribed. - Low carb diet recommended. - Checking labs. - Referral placed to a nutritionist.     ORDERS:  - REFERRAL TO NUTRITION    5. Covid-19 Infection: S/p paxlovid. Recovered per her hx. No SOB. Observation. Health Maintenance Due   Topic Date Due    Shingrix Vaccine Age 49> (1 of 2) Never done    Colorectal Cancer Screening Combo  03/26/2021    A1C test (Diabetic or Prediabetic)  09/11/2021    COVID-19 Vaccine (3 - Booster for Moderna series) 10/12/2021         Lab review: labs are reviewed and ordered as mentioned above    I have discussed the diagnosis with the patient and the intended plan as seen in the above orders. The patient has received an after-visit summary and questions were answered concerning future plans. I have discussed medication side effects and warnings with the patient as well.  I have reviewed the plan of care with the patient, accepted their input and they are in agreement with the treatment goals. All questions were answered. The patient understands the plan of care. Handouts provided today with above information. Pt instructed if symptoms worsen to call the office or report to the ED for continued care. Greater than 50% of the visit time was spent in counseling and/or coordination of care. Voice recognition was used to generate this report, which may have resulted in some phonetic based errors in grammar and contents. Even though attempts were made to correct all the mistakes, some may have been missed, and remained in the body of the document.           Deedee Miranda MD

## 2022-06-03 ENCOUNTER — DOCUMENTATION ONLY (OUTPATIENT)
Dept: INTERNAL MEDICINE CLINIC | Age: 62
End: 2022-06-03

## 2022-06-04 NOTE — PROGRESS NOTES
Pharmacy Progress Note       Mailed patient \"Planning Healthy Meals\" nutrition information as requested. Thank you,  Mendel Barrera. KESHA BeckS              For Pharmacy Admin Tracking Only     CPA in place:  Yes   Recommendation Provided To: Patient/Caregiver: 0 via Telephone   Intervention Accepted By: Patient/Caregiver: 0   Time Spent (min): 15

## 2022-06-22 DIAGNOSIS — I11.9 BENIGN HYPERTENSIVE HEART DISEASE WITHOUT HEART FAILURE: ICD-10-CM

## 2022-06-22 RX ORDER — SPIRONOLACTONE 25 MG/1
TABLET ORAL
Qty: 90 TABLET | Refills: 1 | Status: SHIPPED | OUTPATIENT
Start: 2022-06-22

## 2022-06-28 RX ORDER — POTASSIUM CHLORIDE 1.5 G/1.77G
20 POWDER, FOR SOLUTION ORAL DAILY
Qty: 90 PACKET | Refills: 3 | Status: SHIPPED | OUTPATIENT
Start: 2022-06-28

## 2022-06-28 NOTE — TELEPHONE ENCOUNTER
This med is listed as historical. Please input missing prescribing details and sign if appropriate.     Last Visit: 5/31/22 with MD Padilla Manual  Next Appointment: Nette Montaguer to follow-up in 6 weeks    Requested Prescriptions     Pending Prescriptions Disp Refills    potassium chloride (KLOR-CON) 20 mEq pack           For Kane Frey in place:    Recommendation Provided To:    Intervention Detail: New Rx: 1, reason: Patient Preference   Gap Closed?:    Intervention Accepted By:   Natasha Garcia Time Spent (min): 5

## 2022-06-28 NOTE — TELEPHONE ENCOUNTER
Please find out what her dose of potassium chloride is. I did not know that she is taking potassium chloride prescription strength pills.     Dr. Aye Sahu  [facility], [address] [place] [address], [address] [place] [address] Str.  Phone: [phone]  Fax: [phone]

## 2022-06-28 NOTE — TELEPHONE ENCOUNTER
Requested Prescriptions     Pending Prescriptions Disp Refills    potassium chloride (KLOR-CON) 20 mEq pack       Patient is requesting for this to go to [facility] on [address] instead of the mail service.

## 2022-07-01 ENCOUNTER — HOSPITAL ENCOUNTER (OUTPATIENT)
Dept: LAB | Age: 62
Discharge: HOME | End: 2022-07-01
Payer: COMMERCIAL

## 2022-07-01 DIAGNOSIS — M79.10 MYALGIA: ICD-10-CM

## 2022-07-01 LAB
ANION GAP SERPL CALC-SCNC: 4 MMOL/L (ref 3–18)
BUN SERPL-MCNC: 19 MG/DL (ref 7–18)
BUN/CREAT SERPL: 17 (ref 12–20)
CALCIUM SERPL-MCNC: 9 MG/DL (ref 8.5–10.1)
CHLORIDE SERPL-SCNC: 105 MMOL/L (ref 100–111)
CK SERPL-CCNC: 132 U/L (ref 26–192)
CO2 SERPL-SCNC: 31 MMOL/L (ref 21–32)
CREAT SERPL-MCNC: 1.14 MG/DL (ref 0.6–1.3)
GLUCOSE SERPL-MCNC: 80 MG/DL (ref 74–99)
POTASSIUM SERPL-SCNC: 4 MMOL/L (ref 3.5–5.5)
SODIUM SERPL-SCNC: 140 MMOL/L (ref 136–145)

## 2022-07-01 PROCEDURE — 36415 COLL VENOUS BLD VENIPUNCTURE: CPT

## 2022-07-01 PROCEDURE — 82550 ASSAY OF CK (CPK): CPT

## 2022-07-01 PROCEDURE — 80048 BASIC METABOLIC PNL TOTAL CA: CPT

## 2022-07-05 NOTE — PROGRESS NOTES
Please let her know that her labs did not show any evidence of muscle injury. Her electrolytes are unremarkable.     Dr. Che Del Real  [facility] of [facility], [facility], [facility] [address]  Phone: [phone]  Fax: [phone]

## 2022-07-06 ENCOUNTER — TELEPHONE (OUTPATIENT)
Dept: INTERNAL MEDICINE CLINIC | Age: 62
End: 2022-07-06

## 2022-07-06 NOTE — TELEPHONE ENCOUNTER
----- Message from Selene Holloway MD sent at 7/5/2022 12:58 PM EDT -----  Please let her know that her labs did not show any evidence of muscle injury. Her electrolytes are unremarkable.     Dr. Paolo Bess  [facility] of [address], [address] [facility] [address]  Phone: [phone]  Fax: [phone]

## 2022-07-08 NOTE — TELEPHONE ENCOUNTER
Patient is aware of results, and verbalizes understanding. Patient states she is having bilateral leg pain, and notices more when the weather is cold. Patient states there is no redness, swelling, and leg is not warm to the touch. Patient states it could be arthritis. Patient wanted to know if there was anything she could do for this.

## 2022-07-11 NOTE — TELEPHONE ENCOUNTER
She can try topical voltaren cream over the counter along affected areas prn.     Dr. Yesica Pham  [facility] of [facility], [facility], [address] [place] [address] Str.  Phone: [phone]  Fax: [phone]

## 2022-07-26 LAB
CREATININE, EXTERNAL: 1.04
HBA1C MFR BLD HPLC: 5.4 %

## 2022-07-28 ENCOUNTER — TELEPHONE (OUTPATIENT)
Dept: INTERNAL MEDICINE CLINIC | Age: 62
End: 2022-07-28

## 2022-07-28 ENCOUNTER — OFFICE VISIT (OUTPATIENT)
Dept: CARDIOLOGY CLINIC | Age: 62
End: 2022-07-28
Payer: COMMERCIAL

## 2022-07-28 VITALS
OXYGEN SATURATION: 98 % | DIASTOLIC BLOOD PRESSURE: 70 MMHG | BODY MASS INDEX: 24.29 KG/M2 | SYSTOLIC BLOOD PRESSURE: 120 MMHG | WEIGHT: 132 LBS | HEART RATE: 65 BPM | HEIGHT: 62 IN

## 2022-07-28 DIAGNOSIS — R00.2 PALPITATIONS: ICD-10-CM

## 2022-07-28 DIAGNOSIS — R07.9 CHEST PAIN, UNSPECIFIED TYPE: ICD-10-CM

## 2022-07-28 DIAGNOSIS — I10 ESSENTIAL HYPERTENSION: Primary | ICD-10-CM

## 2022-07-28 DIAGNOSIS — R42 DIZZINESS: ICD-10-CM

## 2022-07-28 DIAGNOSIS — E78.5 HYPERLIPIDEMIA, UNSPECIFIED HYPERLIPIDEMIA TYPE: ICD-10-CM

## 2022-07-28 DIAGNOSIS — R42 LIGHTHEADEDNESS: ICD-10-CM

## 2022-07-28 PROCEDURE — 99215 OFFICE O/P EST HI 40 MIN: CPT | Performed by: NURSE PRACTITIONER

## 2022-07-28 NOTE — LETTER
7/29/2022 11:05 AM    Ms. Marli [facility]  [id number]      Dear Ms. Rosa Saleh:    Dorlene Brunner tried to reach you! Please call our office at [phone] and schedule a follow up appointment for your continued care in a month.         Sincerely,      Maribel Freitas MD

## 2022-07-28 NOTE — PROGRESS NOTES
Adams Collins presents today for post-ER evaluation of chest pain. She went to [facility] but was then sent to [facility] ER because of her complaints of chest pain. She states that she has been having chest pain off and on for several days. She also complains of some palpitations and occasional lightheadedness and dizziness. At [facility], her initial troponin was 6 (normal), EKG showed NSR, D-dimer was within normal limits, CXR showed no cardiopulmonary processes. They informed her that they would do a stress test but she did not want to wait so she left. She called the office requesting a stress test.  She was then added on to the schedule. She is a 63year old female with a history of noncardiac chest pain, palpitations, HCVD, hyperlipidemia, and GERD. She had Covid and states that some of her symptoms began to occur after she had it. She has received at least 2 of the Covid vaccines. She was last seen by Dr. Dany Morris on 11/4/21. She underwent a stress echo in Dec. 2021 which was negative. Denies chest pressure, tightness, heaviness, and admits to palpitations. She admits to chest pain that has been occurring off and on for several days. Denies shortness of breath at rest, dyspnea on exertion, orthopnea and PND. Denies abdominal bloating. Admits to lightheadedness, dizziness, and syncope. Denies lower extremity edema and claudication. Denies nausea, vomiting, diarrhea, melena, hematochezia. Denies hematuria, urgency, frequency. Denies fever, chills. Past Medical History:   Diagnosis Date    Acute cerebrovascular accident (CVA) due to thrombosis of left middle cerebral artery (HCC)     Age-related cognitive decline     Allergic rhinitis     Anxiety     Attention deficit     Cardiac Agatston CAC score, <100 04/30/2014    Coronary calcium score 0. Cardiac Holter monitoring 01/25/2017    Sinus rhythm, avg HR 73 bpm (range ). Benign Holter study.     Cardiac nuclear imaging test 01/11/2013    No convincing evidence for ischemia or infarction in the setting of significant chest wall artifact. EF 62%. No RWMA. Neg EKG on max EST. Ex time 9 min 50 sec. Cardiac stress echo, normal 05/05/2016    Normal maximal stress echo w/reproduction of atypical chest discomfort. Ex time 11 min 3 sec. EF 55%. Cardiovascular LLE venous duplex 09/28/2016    Left leg:  No DVT. Cervical spinal stenosis     Chest pain     Dysthymia     GERD (gastroesophageal reflux disease)     Heel pain, bilateral     Hiatal hernia     denies this    HTN (hypertension)     Hyperlipidemia     IBS (irritable bowel syndrome)     Insulin resistance     follows with endocrinology for this    MURPHY (nonalcoholic steatohepatitis)     Night sweats     PVC (premature ventricular contraction)     Right low back pain     S/P colonoscopy 2009, 2014    normal per patient    Subacromial bursitis     Unsteady gait     due to heel pain       Past Surgical History:   Procedure Laterality Date    D/C SUCTION  2011    HX OTHER SURGICAL  2014    colonscopy    HX OTHER SURGICAL      endoscopy    HX OTHER SURGICAL      wisdom teeth removal x1       Family History   Problem Relation Age of Onset    Hypertension Mother     Heart defect Mother         anuerysm    Heart Attack Mother     Asthma Father     Heart Attack Maternal Uncle     Heart Attack Maternal Uncle     Heart Disease Brother     Hypertension Brother        Social History     Tobacco Use    Smoking status: Former     Packs/day: 0.50     Years: 11.00     Pack years: 5.50     Types: Cigarettes     Quit date: 12/31/2006     Years since quitting: 15.5    Smokeless tobacco: Former     Quit date: 1/10/1993    Tobacco comments:     6-7 cigs per day   Substance Use Topics    Alcohol use:  Yes     Alcohol/week: 0.0 standard drinks     Comment: 1-2 glasses wine 3-4 times a week    Drug use: No     Types: Prescription, OTC       Current Outpatient Medications Medication Sig    potassium chloride (KLOR-CON) 20 mEq pack Take 1 Packet by mouth daily. spironolactone (ALDACTONE) 25 mg tablet TAKE 1 TABLET DAILY    B2-B6 phos-levomef armen-mecobal (EB-N3 DR) 1.3-70-6-4 mg cpDR Take 1 Capsule by mouth daily. albuterol (PROVENTIL HFA, VENTOLIN HFA, PROAIR HFA) 90 mcg/actuation inhaler Take 2 Puffs by inhalation every six (6) hours as needed for Wheezing. dexlansoprazole (DEXILANT) 60 mg CpDB capsule (delayed release) Take 1 Capsule by mouth.    meclizine (ANTIVERT) 12.5 mg tablet TAKE 1 TABLET BY MOUTH TWICE A DAY AS NEEDED FOR DIZZINESS**NOT COV BY INS    pravastatin (PRAVACHOL) 80 mg tablet Take 1 Tablet by mouth daily. atenoloL (TENORMIN) 25 mg tablet Take 0.5 Tablets by mouth two (2) times a day. (Patient taking differently: Take 25 mg by mouth two (2) times a day. Pt taking 0.5 tab daily)    amLODIPine (NORVASC) 2.5 mg tablet TAKE ONE TABLET BY MOUTH DAILY    lansoprazole (PREVACID) 30 mg capsule     glucose blood VI test strips (BLOOD GLUCOSE TEST) strip Use to check glucose daily    co-enzyme Q-10 (CO Q-10) 100 mg capsule Take 200 mg by mouth daily. aspirin delayed-release 81 mg tablet Take 81 mg by mouth daily. omega-3 fatty acids-vitamin e 1,000 mg cap Take 2 Caps by mouth daily. MULTIVITAMIN W-MINERALS/LUTEIN (CENTRUM SILVER PO) Take 1 Tab by mouth daily. No current facility-administered medications for this visit.          Allergies   Allergen Reactions    Ibuprofen Other (comments)    Nsaids (Non-Steroidal Anti-Inflammatory Drug) Other (comments)    Amoxicillin Unable to Obtain and Other (comments)    Lidocaine Swelling     Oral Solution    Lipitor [Atorvastatin] Myalgia           Lopressor [Metoprolol Tartrate] Other (comments)     Lightheaded and cp    Percocet [Oxycodone-Acetaminophen] Unable to Obtain and Other (comments)    Vicodin [Hydrocodone-Acetaminophen] Unable to Obtain and Other (comments)         Physical:  Visit Vitals  /70 (BP 1 Location: Right upper arm)   Pulse 65   Ht 5' 2\" (1.575 m)   Wt 59.9 kg (132 lb)   SpO2 98%   BMI 24.14 kg/m²         Neck:  Supple, no JVD, no carotid bruits  CV:  Normal S1 and  S2, no murmurs, rubs, or gallops noted  Lungs:  Clear to ausculation throughout, no wheezes or rales  Abd:  Soft, non-tender, non-distended with good bowel sounds. No hepatosplenomegaly  Extremities:  No edema      EKG:  NSR      LABS:  Lab Results   Component Value Date/Time    Sodium 140 2022 04:46 PM    Potassium 4.0 2022 04:46 PM    Chloride 105 2022 04:46 PM    CO2 31 2022 04:46 PM    Glucose 80 2022 04:46 PM    BUN 19 (H) 2022 04:46 PM    Creatinine 1.14 2022 04:46 PM     Lab Results   Component Value Date/Time    Cholesterol, total 172 10/01/2021 03:44 PM    HDL Cholesterol 86 (H) 10/01/2021 03:44 PM    LDL-CHOLESTEROL 87 2020 02:28 PM    LDL, calculated 79.4 10/01/2021 03:44 PM    Triglyceride 33 10/01/2021 03:44 PM    CHOL/HDL Ratio 2.0 10/01/2021 03:44 PM    Cholesterol/HDL ratio 2.4 2020 02:28 PM     No components found for: GPT    Impression / Plan:  1. Chest pain, atypical  2. HCVD, blood pressure well-controlled  3. Hyperlipidemia, on pravastatin 80mg  4. Palpitations  5. Lightheadedness/dizziness    Ms. Essie Baer was seen today for a post-ER follow-up of chest pain. She presented to [facility] ER this morning but because of the long wait times, she left before being fully evaluated. She did have an EKG, Chest x-ray, and initial set of labs done. EKG showed NSR, chest x-ray was unremarkable, labs were within normal limits (including troponin and D-dimer). She called the office after she left the ER and inquired about having a stress test and she asked about how to evaluate for aneurysms. She states that her mother  from an aneurysm a few days post-op several years ago. She had a stress echo done in Dec. 2021 and it was negative.   She exercised for 10.01 minutes. EKG was NSR with no ectopy. Results were explained to her. Her chest pain sounds atypical and has had similar complaints in the past.  She complains of intermittent palpitations at times associated with lightheadedness and dizziness. She states that she called after hours just the other evening and spoke with Madan Huertas. Ms. Khadra Sifuentes states that she reported fluctuations in her heart rate on her pulse oximeter from the 90's down to the 40's within a few seconds. Will request that she wear a 2 week event monitor to rule out arrhythmias, sick sinus syndrome, pauses. If she continues to have chest pain, may need to consider a nuclear stress test for completeness. She had many questions regarding evaluation for aneurysms, carotids, blood clots, etc.  All of her lab results from her ER visit discussed with her. I discussed the importance of staying to complete evaluations if she commits to going to the ER. Leaving before all recommended testing is completed gives her no answers and testing has a longer wait time if done on an outpatient basis. I spent 60 minutes answering her questions, discussing testing with her (with regards to her questions about aneurysms, blood clots, etc.), and reviewing results with her. She will follow-up with Dr. Shannan José as scheduled and PRN. Kelsi Campos MSN, FNP-BC    Please note:  Portions of this chart were created with Dragon medical speech to text program.  Unrecognized errors may be present.

## 2022-07-28 NOTE — TELEPHONE ENCOUNTER
Patient is calling stating she went to urgent care yesterday and was sent to the ED. She went to [facility]. They ran a bunch of tests and they want her to have another echo stress test done. Stating she can't walk across the parking lot without have chest pain, SOB and feeling like passing out. Stating it started on Monday but it has gotten progressively worse.

## 2022-07-28 NOTE — PROGRESS NOTES
Joaquim Ruiz presents today for   Chief Complaint   Patient presents with    Hospital Follow Up     Post ed     Dizziness    Shortness of Breath     exertion    Chest Pain     Tightness in the center of chest       Joaquim Ruiz preferred language for health care discussion is english/other. Is someone accompanying this pt? no    Is the patient using any DME equipment during [address]? no    Depression Screening:  3 most recent PHQ Screens 7/28/2022   PHQ Not Done -   Little interest or pleasure in doing things Not at all   Feeling down, depressed, irritable, or hopeless Not at all   Total Score PHQ 2 0       Learning Assessment:  Learning Assessment 7/28/2022   PRIMARY LEARNER Patient   HIGHEST LEVEL OF EDUCATION - PRIMARY LEARNER  -   BARRIERS PRIMARY LEARNER -   CO-LEARNER CAREGIVER -   PRIMARY LANGUAGE ENGLISH   LEARNER PREFERENCE PRIMARY DEMONSTRATION     -   ANSWERED BY patient   RELATIONSHIP SELF       Abuse Screening:  Abuse Screening Questionnaire 7/28/2022   Do you ever feel afraid of your partner? N   Are you in a relationship with someone who physically or mentally threatens you? N   Is it safe for you to go home? Y       Fall Risk  Fall Risk Assessment, last 12 mths 6/3/2020   Able to walk? Yes   Fall in past 12 months? No           Pt currently taking Anticoagulant therapy? no    Pt currently taking Antiplatelet therapy ? Aspirin 81 mg daily      Coordination of Care:  1. Have you been to the ER, urgent care clinic since your last visit? Hospitalized since your last visit? yes    2. Have you seen or consulted any other health care providers outside of the [address] [place] [address] since your last visit? Include any pap smears or colon screening.  no

## 2022-07-28 NOTE — PATIENT INSTRUCTIONS
2 week event monitor;  Dx:  palpitations, lightheadedness/dizziness  Follow-up with Dr. Shine Rico as scheduled and as needed  Lipid panel/LFTs (12 hour fast)

## 2022-07-28 NOTE — TELEPHONE ENCOUNTER
Patient reached, and instructed to contact cardiology for a follow up appointment. Patient states that she did call this morning and awaiting a call back to schedule an appt soon.

## 2022-07-30 ENCOUNTER — TRANSCRIBE ORDER (OUTPATIENT)
Dept: SCHEDULING | Age: 62
End: 2022-07-30

## 2022-07-30 DIAGNOSIS — K76.89 LIVER CYST: ICD-10-CM

## 2022-07-30 DIAGNOSIS — K70.0 FATTY LIVER, ALCOHOLIC: Primary | ICD-10-CM

## 2022-08-03 ENCOUNTER — HOSPITAL ENCOUNTER (OUTPATIENT)
Dept: ULTRASOUND IMAGING | Age: 62
Discharge: HOME | End: 2022-08-03
Attending: INTERNAL MEDICINE
Payer: COMMERCIAL

## 2022-08-03 DIAGNOSIS — K76.89 LIVER CYST: ICD-10-CM

## 2022-08-03 DIAGNOSIS — K70.0 FATTY LIVER, ALCOHOLIC: ICD-10-CM

## 2022-08-03 PROCEDURE — 76705 ECHO EXAM OF ABDOMEN: CPT

## 2022-08-21 NOTE — PROGRESS NOTES
Please place a referral for me to sign to Endocrinology for additional recommendations given her elevated PTH. Her PTH is elevated but her calcium is normal. Please let her know. Her renal function labs show no CKD.  Her CBC is normal.    Dr. Lindsey Guard  [facility], [address] [place] [address], [address] [place] [address] Str.  Phone: [phone]  Fax: [phone]
[phone]

## 2022-09-29 ENCOUNTER — TELEPHONE (OUTPATIENT)
Dept: INTERNAL MEDICINE CLINIC | Age: 62
End: 2022-09-29

## 2022-09-29 NOTE — TELEPHONE ENCOUNTER
Tried reaching patient, no voicemail, phone just rings. Please inform patient that the referral has been refaxed today to DR. Cherie Torres.

## 2022-09-29 NOTE — TELEPHONE ENCOUNTER
Patient stating Dr. May Ny office needs a referral before they can see her for the neuropathy testing.

## 2022-10-14 ENCOUNTER — DOCUMENTATION ONLY (OUTPATIENT)
Dept: INTERNAL MEDICINE CLINIC | Age: 62
End: 2022-10-14

## 2022-10-14 ENCOUNTER — OFFICE VISIT (OUTPATIENT)
Dept: INTERNAL MEDICINE CLINIC | Age: 62
End: 2022-10-14

## 2022-10-14 VITALS
RESPIRATION RATE: 16 BRPM | SYSTOLIC BLOOD PRESSURE: 133 MMHG | DIASTOLIC BLOOD PRESSURE: 77 MMHG | HEART RATE: 66 BPM | BODY MASS INDEX: 23.37 KG/M2 | TEMPERATURE: 98.1 F | WEIGHT: 127 LBS | HEIGHT: 62 IN | OXYGEN SATURATION: 99 %

## 2022-10-14 DIAGNOSIS — M79.10 MYALGIA: Primary | ICD-10-CM

## 2022-10-14 RX ORDER — ACARBOSE 50 MG/1
50 TABLET ORAL
COMMUNITY
Start: 2022-08-20
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
## 2022-10-14 NOTE — PROGRESS NOTES
Beni Miles presents today for   Chief Complaint   Patient presents with    Follow-up       1. \"Have you been to the ER, urgent care clinic since your last visit? Hospitalized since your last visit? \" yes    2. \"Have you seen or consulted any other health care providers outside of the [address] [place] [address] since your last visit? \" yes     3. For patients aged 39-70: Has the patient had a colonoscopy / FIT/ Cologuard? Yes - Care Gap present. Most recent result on file      If the patient is female:    4. For patients aged 41-77: Has the patient had a mammogram within the past 2 years? Yes - no Care Gap present  See top three    5. For patients aged 21-65: Has the patient had a pap smear?  Yes - no Care Gap present

## 2022-10-19 ENCOUNTER — OFFICE VISIT (OUTPATIENT)
Dept: CARDIOLOGY CLINIC | Age: 62
End: 2022-10-19
Payer: COMMERCIAL

## 2022-10-19 VITALS
OXYGEN SATURATION: 98 % | HEART RATE: 68 BPM | SYSTOLIC BLOOD PRESSURE: 116 MMHG | DIASTOLIC BLOOD PRESSURE: 78 MMHG | HEIGHT: 62 IN | WEIGHT: 127 LBS | BODY MASS INDEX: 23.37 KG/M2

## 2022-10-19 DIAGNOSIS — R42 DIZZINESS: ICD-10-CM

## 2022-10-19 DIAGNOSIS — R55 SYNCOPE, UNSPECIFIED SYNCOPE TYPE: ICD-10-CM

## 2022-10-19 DIAGNOSIS — I10 ESSENTIAL HYPERTENSION: Primary | ICD-10-CM

## 2022-10-19 DIAGNOSIS — R07.9 CHEST PAIN, UNSPECIFIED TYPE: ICD-10-CM

## 2022-10-19 PROCEDURE — 93000 ELECTROCARDIOGRAM COMPLETE: CPT | Performed by: INTERNAL MEDICINE

## 2022-10-19 PROCEDURE — 99215 OFFICE O/P EST HI 40 MIN: CPT | Performed by: INTERNAL MEDICINE

## 2022-10-19 NOTE — PROGRESS NOTES
Dalila Crum presents today for   Chief Complaint   Patient presents with    Follow-up     1 year follow up       Dalila Crum preferred language for health care discussion is english/other. Is someone accompanying this pt? no    Is the patient using any DME equipment during [address]? no    Depression Screening:  3 most recent PHQ Screens 10/19/2022   PHQ Not Done -   Little interest or pleasure in doing things Not at all   Feeling down, depressed, irritable, or hopeless Not at all   Total Score PHQ 2 0       Learning Assessment:  Learning Assessment 10/19/2022   PRIMARY LEARNER Patient   HIGHEST LEVEL OF EDUCATION - PRIMARY LEARNER  -   BARRIERS PRIMARY LEARNER -   CO-LEARNER CAREGIVER -   PRIMARY LANGUAGE ENGLISH   LEARNER PREFERENCE PRIMARY DEMONSTRATION     -   ANSWERED BY patient   RELATIONSHIP SELF       Abuse Screening:  Abuse Screening Questionnaire 10/19/2022   Do you ever feel afraid of your partner? N   Are you in a relationship with someone who physically or mentally threatens you? N   Is it safe for you to go home? Y       Fall Risk  Fall Risk Assessment, last 12 mths 6/3/2020   Able to walk? Yes   Fall in past 12 months? No           Pt currently taking Anticoagulant therapy? no    Pt currently taking Antiplatelet therapy ? no      Coordination of Care:  1. Have you been to the ER, urgent care clinic since your last visit? Hospitalized since your last visit? yes    2. Have you seen or consulted any other health care providers outside of the [address] [place] [address] since your last visit? Include any pap smears or colon screening.  no

## 2022-10-19 NOTE — PROGRESS NOTES
Hector Kruse    Chief Complaint   Patient presents with    Follow-up     1 year follow up       HPI    Hector Kruse is a 63 y.o. AAF with no known coronary disease, here for follow up. She is a prior pt of . He has seen her for atypical noncardiac CP, palpitations over the years. She has done well on Atenolol + Norvasc + Spironolactone 25 mg (which she takes also for derm). I had her go for CAC, which was 0. We did a stress test last year which was negative for ischemia/ low risk. We went through her list of questions, incl results of her MCT. Only CV concern is she has muscle aches in her legs R>L and asking if it could be due to pravastatin. Past Medical History:   Diagnosis Date    Acute cerebrovascular accident (CVA) due to thrombosis of left middle cerebral artery (HCC)     Age-related cognitive decline     Allergic rhinitis     Anxiety     Attention deficit     Cardiac Agatston CAC score, <100 04/30/2014    Coronary calcium score 0. Cardiac Holter monitoring 01/25/2017    Sinus rhythm, avg HR 73 bpm (range ). Benign Holter study. Cardiac nuclear imaging test 01/11/2013    No convincing evidence for ischemia or infarction in the setting of significant chest wall artifact. EF 62%. No RWMA. Neg EKG on max EST. Ex time 9 min 50 sec. Cardiac stress echo, normal 05/05/2016    Normal maximal stress echo w/reproduction of atypical chest discomfort. Ex time 11 min 3 sec. EF 55%. Cardiovascular LLE venous duplex 09/28/2016    Left leg:  No DVT.     Cervical spinal stenosis     Chest pain     Dysthymia     GERD (gastroesophageal reflux disease)     Heel pain, bilateral     Hiatal hernia     denies this    HTN (hypertension)     Hyperlipidemia     IBS (irritable bowel syndrome)     Insulin resistance     follows with endocrinology for this    MURPHY (nonalcoholic steatohepatitis)     Night sweats     PVC (premature ventricular contraction)     Right low back pain     S/P colonoscopy 2009, 2014    normal per patient    Subacromial bursitis     Unsteady gait     due to heel pain       Past Surgical History:   Procedure Laterality Date    D/C SUCTION  2011    HX OTHER SURGICAL  2014    colonscopy    HX OTHER SURGICAL      endoscopy    HX OTHER SURGICAL      wisdom teeth removal x1       Current Outpatient Medications   Medication Sig Dispense Refill    acarbose (PRECOSE) 50 mg tablet Take 50 mg by mouth three (3) times daily as needed. potassium chloride (KLOR-CON) 20 mEq pack Take 1 Packet by mouth daily. 90 Packet 3    spironolactone (ALDACTONE) 25 mg tablet TAKE 1 TABLET DAILY 90 Tablet 1    B2-B6 phos-levomef armen-mecobal (EB-N3 DR) 1.3-70-6-4 mg cpDR Take 1 Capsule by mouth daily. albuterol (PROVENTIL HFA, VENTOLIN HFA, PROAIR HFA) 90 mcg/actuation inhaler Take 2 Puffs by inhalation every six (6) hours as needed for Wheezing. 1 Each 0    pravastatin (PRAVACHOL) 80 mg tablet Take 1 Tablet by mouth daily. 90 Tablet 3    atenoloL (TENORMIN) 25 mg tablet Take 0.5 Tablets by mouth two (2) times a day. 90 Tablet 3    amLODIPine (NORVASC) 2.5 mg tablet TAKE ONE TABLET BY MOUTH DAILY 90 Tablet 3    lansoprazole (PREVACID) 30 mg capsule Take 30 mg by mouth daily as needed. glucose blood VI test strips (BLOOD GLUCOSE TEST) strip Use to check glucose daily 1 Package 11    co-enzyme Q-10 (CO Q-10) 100 mg capsule Take 200 mg by mouth daily. aspirin delayed-release 81 mg tablet Take 81 mg by mouth daily. MULTIVITAMIN W-MINERALS/LUTEIN (CENTRUM SILVER PO) Take 1 Tab by mouth daily.          Allergies   Allergen Reactions    Ibuprofen Other (comments)    Nsaids (Non-Steroidal Anti-Inflammatory Drug) Other (comments)    Amoxicillin Unable to Obtain and Other (comments)    Lidocaine Swelling     Oral Solution    Lipitor [Atorvastatin] Myalgia           Lopressor [Metoprolol Tartrate] Other (comments)     Lightheaded and cp    Percocet [Oxycodone-Acetaminophen] Unable to Obtain and Other (comments)    Vicodin [Hydrocodone-Acetaminophen] Unable to Obtain and Other (comments)       Social History     Socioeconomic History    Marital status: SINGLE     Spouse name: Not on file    Number of children: Not on file    Years of education: Not on file    Highest education level: Not on file   Occupational History    Occupation: student IT      Employer: NOT EMPLOYED   Tobacco Use    Smoking status: Former     Packs/day: 0.50     Years: 11.00     Pack years: 5.50     Types: Cigarettes     Quit date: 2006     Years since quitting: 15.8    Smokeless tobacco: Former     Quit date: 1/10/1993    Tobacco comments:     6-7 cigs per day   Vaping Use    Vaping Use: Never used   Substance and Sexual Activity    Alcohol use: Yes     Alcohol/week: 0.0 standard drinks     Comment: 1-2 glasses wine 3-4 times a week    Drug use: No     Types: Prescription, OTC    Sexual activity: Never   Other Topics Concern    Not on file   Social History Narrative    Not Currently working, 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 Social Determinants of Health     Financial Resource Strain: Not on file   Food Insecurity: Not on file   Transportation Needs: Not on file   Physical Activity: Not on file   Stress: Not on file   Social Connections: Not on file   Intimate Partner Violence: Not on file   Housing Stability: Not on file    no children, \"I have a niece. \" loves to walk, mother  of aneurysm? In her stomach age 59 (she thinks she had a \"mild heart attack\" in her 46s, and she had HTN), brother  in 46s, she believes father  of asthma    FH: +premature ASCVD    Review of Systems    14 pt Review of Systems is negative unless otherwise mentioned in the HPI.     Wt Readings from Last 3 Encounters:   10/19/22 57.6 kg (127 lb)   10/14/22 57.6 kg (127 lb)   22 59.9 kg (132 lb)     Temp Readings from Last 3 Encounters:   10/14/22 98.1 °F (36.7 °C) (Temporal)   22 97.8 °F (36.6 °C) (Temporal)   22 97.9 °F (36.6 °C)     BP Readings from Last 3 Encounters:   10/19/22 116/78   10/14/22 133/77   07/28/22 120/70     Pulse Readings from Last 3 Encounters:   10/19/22 68   10/14/22 66   07/28/22 65       Physical Exam:    Visit Vitals  /78 (BP 1 Location: Left upper arm, BP Patient Position: Sitting, BP Cuff Size: Small adult)   Pulse 68   Ht 5' 2\" (1.575 m)   Wt 57.6 kg (127 lb)   SpO2 98%   BMI 23.23 kg/m²      Physical Exam  HENT:      Head: Normocephalic and atraumatic. Eyes:      Pupils: Pupils are equal, round, and reactive to light. Cardiovascular:      Rate and Rhythm: Normal rate and regular rhythm. Heart sounds: Normal heart sounds. No murmur heard. No friction rub. No gallop. Pulmonary:      Effort: Pulmonary effort is normal. No respiratory distress. Breath sounds: Normal breath sounds. No wheezing or rales. Chest:      Chest wall: No tenderness. Abdominal:      General: Bowel sounds are normal.      Palpations: Abdomen is soft. Musculoskeletal:         General: No tenderness. Skin:     General: Skin is warm and dry. Neurological:      Mental Status: She is alert and oriented to person, place, and time. Psychiatric:         Mood and Affect: Mood is anxious. Cognition and Memory: Memory is impaired. EKG last: sinus isi    Lab Results   Component Value Date/Time    Sodium 140 07/01/2022 04:46 PM    Potassium 4.0 07/01/2022 04:46 PM    Chloride 105 07/01/2022 04:46 PM    CO2 31 07/01/2022 04:46 PM    Anion gap 4 07/01/2022 04:46 PM    Glucose 80 07/01/2022 04:46 PM    BUN 19 (H) 07/01/2022 04:46 PM    Creatinine 1.14 07/01/2022 04:46 PM    BUN/Creatinine ratio 17 07/01/2022 04:46 PM    GFR est AA 59 (L) 07/01/2022 04:46 PM    GFR est non-AA 49 (L) 07/01/2022 04:46 PM    Calcium 9.0 07/01/2022 04:46 PM    Bilirubin, total 0.5 02/19/2022 03:58 PM    Alk.  phosphatase 34 (L) 02/19/2022 03:58 PM    Protein, total 7.2 02/19/2022 03:58 PM    Albumin 3.6 02/19/2022 03:58 PM Globulin 3.6 02/19/2022 03:58 PM    A-G Ratio 1.0 02/19/2022 03:58 PM    ALT (SGPT) 25 02/19/2022 03:58 PM    AST (SGOT) 20 02/19/2022 03:58 PM     Lab Results   Component Value Date/Time    WBC 5.2 02/19/2022 03:58 PM    Hemoglobin, POC 12.2 01/09/2013 08:19 PM    HGB 14.0 02/19/2022 03:58 PM    Hematocrit, POC 36 01/09/2013 08:19 PM    HCT 41.9 02/19/2022 03:58 PM    PLATELET 759 46/74/0878 03:58 PM    MCV 86.6 02/19/2022 03:58 PM     Lab Results   Component Value Date/Time    TSH 1.55 02/19/2022 03:58 PM     Lab Results   Component Value Date/Time    Hemoglobin A1c 5.3 09/11/2020 02:28 PM    Hemoglobin A1c (POC) 5.6 09/19/2013 02:30 PM    Hemoglobin A1c, External 5.8 10/08/2015 12:00 AM     Lab Results   Component Value Date/Time    Cholesterol, total 172 10/01/2021 03:44 PM    HDL Cholesterol 86 (H) 10/01/2021 03:44 PM    LDL-CHOLESTEROL 87 09/11/2020 02:28 PM    LDL, calculated 79.4 10/01/2021 03:44 PM    VLDL, calculated 6.6 10/01/2021 03:44 PM    Triglyceride 33 10/01/2021 03:44 PM    CHOL/HDL Ratio 2.0 10/01/2021 03:44 PM    Cholesterol/HDL ratio 2.4 09/11/2020 02:28 PM     Lab Results   Component Value Date/Time     07/01/2022 04:46 PM    CK - MB 1.6 03/08/2021 01:36 PM    CK-MB Index 1.0 03/08/2021 01:36 PM    Troponin-I, QT <0.02 03/08/2021 04:40 PM    B-type Natriuretic Peptide 51.6 03/10/2015 12:00 AM    B-type Natriuretic Peptide CANCELED 03/10/2015 12:00 AM       Impression and Plan:  Nat Nageotte is a 63 y.o. with:    1.) Atypical CP, doubt ACS, likely noncardiac, (neg SE 2016)  2.) HTN, on Norvasc + Spironolactone 25  3.) Palpitations,PVCs/ PACs, managed with Atenolol  4.) BB-induced sinus isi  5.) Frontal Fibrosing Alopecia, known  6.) Neck pain, cervical degenerative findings on MRI  7.) Memory loss    1.) Rare palps by MCT- benign and continued on BB  2.) She's asking if statin causing aching in legs- I do not think so, she has an upcoming neuro appt and told her if no other explanation, im fine with \"challenging\" by stopping Prava for couple weeks and if truly pain gone then call me and we'll talk about alternative therapy then but otherwise has managed her lipids well  3.) RTC yearly    45 mins    Thank you for allowing me to participate in the care of your patient, please do not hesitate to call with questions or concerns.       [address],    Tory Pabon, DO

## 2022-10-20 NOTE — PROGRESS NOTES
The patient was given an agreement to complete by our administrative staff, regarding a new [employer]. The patient did not agree with the language/content of his paperwork yet signed it. Once I walked into the exam room, she stated that she signed it under duress, fearing that she would not be seen unless she had signed it. I discussed the fact that she should never signed anything that she does not agree with. I also stated that I would not proceed with a visit today until she has had adequate time to review the paperwork, given by our administrative staff. She was given a phone number to someone she can reach in regards to questions regarding this paperwork. I will gladly see her when she completes this paperwork and when she feels comfortable with the content. All this was discussed with the patient. She agrees with ending today's visit without formally/clinically being seen, until she has had time to review the paperwork and speak to additional administrative staff regarding any questions she has regarding the paperwork.         Dr. Rosie Antonio  [facility] [address]  Phone: [phone]  Fax: [phone]

## 2022-11-16 ENCOUNTER — DOCUMENTATION ONLY (OUTPATIENT)
Dept: INTERNAL MEDICINE CLINIC | Age: 62
End: 2022-11-16

## 2022-11-16 NOTE — PROGRESS NOTES
Patient has spoke to patient advocacy and the  over the practice. Patient was not in agreement to signing the universal form without alterations when speaking to . Will give patient a couple of weeks to call back and give an answer as to whether or not she wants to continue her care here and sign the document or move forward with finding another PCP.

## 2022-11-21 ENCOUNTER — TELEPHONE (OUTPATIENT)
Dept: INTERNAL MEDICINE CLINIC | Age: 62
End: 2022-11-21

## 2022-11-21 NOTE — TELEPHONE ENCOUNTER
Pt requesting appt for soft lump (fist size) on the right side of her back. She just noticed it yesterday. Stated it is not painful. No appt available. She would like to be seen sooner than next available appt.

## 2022-12-02 ENCOUNTER — HOSPITAL ENCOUNTER (OUTPATIENT)
Dept: LAB | Age: 62
Discharge: HOME | End: 2022-12-02
Payer: COMMERCIAL

## 2022-12-02 ENCOUNTER — OFFICE VISIT (OUTPATIENT)
Dept: INTERNAL MEDICINE CLINIC | Age: 62
End: 2022-12-02
Payer: COMMERCIAL

## 2022-12-02 ENCOUNTER — HOSPITAL ENCOUNTER (OUTPATIENT)
Dept: GENERAL RADIOLOGY | Age: 62
End: 2022-12-02
Payer: COMMERCIAL

## 2022-12-02 VITALS
SYSTOLIC BLOOD PRESSURE: 124 MMHG | HEART RATE: 74 BPM | TEMPERATURE: 98.1 F | DIASTOLIC BLOOD PRESSURE: 76 MMHG | RESPIRATION RATE: 16 BRPM | HEIGHT: 62 IN | OXYGEN SATURATION: 98 % | WEIGHT: 129 LBS | BODY MASS INDEX: 23.74 KG/M2

## 2022-12-02 DIAGNOSIS — M25.811 MASS OF JOINT OF RIGHT SHOULDER: ICD-10-CM

## 2022-12-02 DIAGNOSIS — T30.0 BURN: ICD-10-CM

## 2022-12-02 DIAGNOSIS — K75.81 NASH (NONALCOHOLIC STEATOHEPATITIS): ICD-10-CM

## 2022-12-02 DIAGNOSIS — M25.811 MASS OF JOINT OF RIGHT SHOULDER: Primary | ICD-10-CM

## 2022-12-02 DIAGNOSIS — R42 DIZZINESS: ICD-10-CM

## 2022-12-02 LAB
ALBUMIN SERPL-MCNC: 4.1 G/DL (ref 3.4–5)
ALBUMIN/GLOB SERPL: 1.2 {RATIO} (ref 0.8–1.7)
ALP SERPL-CCNC: 37 U/L (ref 45–117)
ALT SERPL-CCNC: 29 U/L (ref 13–56)
ANION GAP SERPL CALC-SCNC: 5 MMOL/L (ref 3–18)
AST SERPL-CCNC: 18 U/L (ref 10–38)
BASOPHILS # BLD: 0 K/UL (ref 0–0.1)
BASOPHILS NFR BLD: 1 % (ref 0–2)
BILIRUB SERPL-MCNC: 0.7 MG/DL (ref 0.2–1)
BUN SERPL-MCNC: 22 MG/DL (ref 7–18)
BUN/CREAT SERPL: 21 (ref 12–20)
CALCIUM SERPL-MCNC: 9.9 MG/DL (ref 8.5–10.1)
CHLORIDE SERPL-SCNC: 107 MMOL/L (ref 100–111)
CHOLEST SERPL-MCNC: 212 MG/DL
CO2 SERPL-SCNC: 30 MMOL/L (ref 21–32)
CREAT SERPL-MCNC: 1.04 MG/DL (ref 0.6–1.3)
DIFFERENTIAL METHOD BLD: NORMAL
EOSINOPHIL # BLD: 0 K/UL (ref 0–0.4)
EOSINOPHIL NFR BLD: 1 % (ref 0–5)
ERYTHROCYTE [DISTWIDTH] IN BLOOD BY AUTOMATED COUNT: 13.9 % (ref 11.6–14.5)
GLOBULIN SER CALC-MCNC: 3.3 G/DL (ref 2–4)
GLUCOSE SERPL-MCNC: 78 MG/DL (ref 74–99)
HCT VFR BLD AUTO: 41.1 % (ref 35–45)
HDLC SERPL-MCNC: 98 MG/DL (ref 40–60)
HDLC SERPL: 2.2 {RATIO} (ref 0–5)
HGB BLD-MCNC: 13.4 G/DL (ref 12–16)
IMM GRANULOCYTES # BLD AUTO: 0 K/UL (ref 0–0.04)
IMM GRANULOCYTES NFR BLD AUTO: 0 % (ref 0–0.5)
LDLC SERPL CALC-MCNC: 107.4 MG/DL (ref 0–100)
LIPID PROFILE,FLP: ABNORMAL
LYMPHOCYTES # BLD: 1.3 K/UL (ref 0.9–3.6)
LYMPHOCYTES NFR BLD: 24 % (ref 21–52)
MCH RBC QN AUTO: 28.5 PG (ref 24–34)
MCHC RBC AUTO-ENTMCNC: 32.6 G/DL (ref 31–37)
MCV RBC AUTO: 87.4 FL (ref 78–100)
MONOCYTES # BLD: 0.3 K/UL (ref 0.05–1.2)
MONOCYTES NFR BLD: 6 % (ref 3–10)
NEUTS SEG # BLD: 3.8 K/UL (ref 1.8–8)
NEUTS SEG NFR BLD: 68 % (ref 40–73)
NRBC # BLD: 0 K/UL (ref 0–0.01)
NRBC BLD-RTO: 0 PER 100 WBC
PLATELET # BLD AUTO: 262 K/UL (ref 135–420)
PMV BLD AUTO: 10.4 FL (ref 9.2–11.8)
POTASSIUM SERPL-SCNC: 3.6 MMOL/L (ref 3.5–5.5)
PROT SERPL-MCNC: 7.4 G/DL (ref 6.4–8.2)
RBC # BLD AUTO: 4.7 M/UL (ref 4.2–5.3)
SODIUM SERPL-SCNC: 142 MMOL/L (ref 136–145)
TRIGL SERPL-MCNC: 33 MG/DL (ref ?–150)
VLDLC SERPL CALC-MCNC: 6.6 MG/DL
WBC # BLD AUTO: 5.5 K/UL (ref 4.6–13.2)

## 2022-12-02 PROCEDURE — 85025 COMPLETE CBC W/AUTO DIFF WBC: CPT

## 2022-12-02 PROCEDURE — 73010 X-RAY EXAM OF SHOULDER BLADE: CPT

## 2022-12-02 PROCEDURE — 80061 LIPID PANEL: CPT

## 2022-12-02 PROCEDURE — 80053 COMPREHEN METABOLIC PANEL: CPT

## 2022-12-02 PROCEDURE — 84450 TRANSFERASE (AST) (SGOT): CPT

## 2022-12-02 PROCEDURE — 36415 COLL VENOUS BLD VENIPUNCTURE: CPT

## 2022-12-02 NOTE — PROGRESS NOTES
Chief Complaint   Patient presents with    Other     Lump on shoulder blade         1. \"Have you been to the ER, urgent care clinic since your last visit? Hospitalized since your last visit? \" No    2. \"Have you seen or consulted any other health care providers outside of the [address] [place] [address] since your last visit? \" No    3. For patients aged 39-70: Has the patient had a colonoscopy? Yes, care gap present    If the patient is female:    4. For patients aged 41-77: Has the patient had a mammogram within the past 2 years? Yes, no care gap present    5. For patients aged 21-65: Has the patient had a pap smear?  Yes, no care gap present

## 2022-12-02 NOTE — PROGRESS NOTES
INTERNISTS OF [facility] [place]:  12/2/2022, MRN: [id number]      Dalila Crum is a 63 y.o. female and presents to clinic for Other (Lump on shoulder blade)      Subjective: The pt is a 65yo AAF with h/o chronic bronchitis, LUCY, porphyria (per EHR), allergic rhinitis, NAFLD, cervical spinal stenosis, prediabetes, multiple food allergies (per lab work), HTN, HLD, Covid-19 (2022), constipation, gallbladder polyps (suggested by CT scan 10/13/16), and GERD. 1. Forehead Bumps and Back Lump: The weekend before Thanksgiving, she bumped her left forehead while getting things out of her car trunk. She then bumped her right forehead a day later. No LOC. No trouble with concentration. That same weekend, she found a lump along her right back. The pain aong her back has no pain. 2. Dizziness: She has been extremely lightheaded/dizzy over the past 2-3 wks. No CP/palpitations. She has chronic SOB that is unchanged. Triggers: changes in position. It happens w/I an hour of her waking up. Sometimes it occurs with eating. Sometimes it occurs w/o eating. If she looks down, she will get lightheaded. Her BP is good at home. No vision/hearing changes. No HAs. No weakness/paresthesias. \"I feel funny in my head. \" She sees her endocrinologist last month: her A1C is 5.4 (the wk before Thanksgiving). 3. Right Forearm Burn: Occurred during Thanksgiving week. Healing well. 4. NAFLD: She had some ETOH last month. She had 10 ounces of red wine over the course of the wk. no other alcoholic beverage consumption. She has a history in which she overindulged in alcohol beverage consumption.       Patient Active Problem List    Diagnosis Date Noted    Prediabetes 02/08/2022    Cervical spinal stenosis 04/28/2021    MURPHY (nonalcoholic steatohepatitis) 09/03/2019    Allergic rhinitis 10/04/2017    Gallbladder polyp - suggested by findings on CT scan from 10/13/16 03/01/2017    Hypersomnia with sleep apnea 03/13/2014    Constipation 01/16/2012    Essential hypertension     Hyperlipidemia     GERD (gastroesophageal reflux disease)        Current Outpatient Medications   Medication Sig Dispense Refill    acarbose (PRECOSE) 50 mg tablet Take 50 mg by mouth three (3) times daily as needed. potassium chloride (KLOR-CON) 20 mEq pack Take 1 Packet by mouth daily. 90 Packet 3    spironolactone (ALDACTONE) 25 mg tablet TAKE 1 TABLET DAILY 90 Tablet 1    B2-B6 phos-levomef armen-mecobal (EB-N3 DR) 1.3-70-6-4 mg cpDR Take 1 Capsule by mouth daily. albuterol (PROVENTIL HFA, VENTOLIN HFA, PROAIR HFA) 90 mcg/actuation inhaler Take 2 Puffs by inhalation every six (6) hours as needed for Wheezing. 1 Each 0    pravastatin (PRAVACHOL) 80 mg tablet Take 1 Tablet by mouth daily. 90 Tablet 3    atenoloL (TENORMIN) 25 mg tablet Take 0.5 Tablets by mouth two (2) times a day. 90 Tablet 3    amLODIPine (NORVASC) 2.5 mg tablet TAKE ONE TABLET BY MOUTH DAILY 90 Tablet 3    lansoprazole (PREVACID) 30 mg capsule Take 30 mg by mouth daily as needed. glucose blood VI test strips (BLOOD GLUCOSE TEST) strip Use to check glucose daily 1 Package 11    co-enzyme Q-10 (CO Q-10) 100 mg capsule Take 200 mg by mouth daily. aspirin delayed-release 81 mg tablet Take 81 mg by mouth daily. MULTIVITAMIN W-MINERALS/LUTEIN (CENTRUM SILVER PO) Take 1 Tab by mouth daily.          Allergies   Allergen Reactions    Ibuprofen Other (comments)    Nsaids (Non-Steroidal Anti-Inflammatory Drug) Other (comments)    Amoxicillin Unable to Obtain and Other (comments)    Lidocaine Swelling     Oral Solution    Lipitor [Atorvastatin] Myalgia           Lopressor [Metoprolol Tartrate] Other (comments)     Lightheaded and cp    Percocet [Oxycodone-Acetaminophen] Unable to Obtain and Other (comments)    Vicodin [Hydrocodone-Acetaminophen] Unable to Obtain and Other (comments)       Past Medical History:   Diagnosis Date    Acute cerebrovascular accident (CVA) due to thrombosis of left middle cerebral artery (HCC)     Age-related cognitive decline     Allergic rhinitis     Anxiety     Attention deficit     Cardiac Agatston CAC score, <100 04/30/2014    Coronary calcium score 0. Cardiac Holter monitoring 01/25/2017    Sinus rhythm, avg HR 73 bpm (range ). Benign Holter study. Cardiac nuclear imaging test 01/11/2013    No convincing evidence for ischemia or infarction in the setting of significant chest wall artifact. EF 62%. No RWMA. Neg EKG on max EST. Ex time 9 min 50 sec. Cardiac stress echo, normal 05/05/2016    Normal maximal stress echo w/reproduction of atypical chest discomfort. Ex time 11 min 3 sec. EF 55%. Cardiovascular LLE venous duplex 09/28/2016    Left leg:  No DVT.     Cervical spinal stenosis     Chest pain     Dysthymia     GERD (gastroesophageal reflux disease)     Heel pain, bilateral     Hiatal hernia     denies this    HTN (hypertension)     Hyperlipidemia     IBS (irritable bowel syndrome)     Insulin resistance     follows with endocrinology for this    MURPHY (nonalcoholic steatohepatitis)     Night sweats     PVC (premature ventricular contraction)     Right low back pain     S/P colonoscopy 2009, 2014    normal per patient    Subacromial bursitis     Unsteady gait     due to heel pain       Past Surgical History:   Procedure Laterality Date    D/C SUCTION  2011    HX OTHER SURGICAL  2014    colonscopy    HX OTHER SURGICAL      endoscopy    HX OTHER SURGICAL      wisdom teeth removal x1       Family History   Problem Relation Age of Onset    Hypertension Mother     Heart defect Mother         anuerysm    Heart Attack Mother     Asthma Father     Heart Attack Maternal Uncle     Heart Attack Maternal Uncle     Heart Disease Brother     Hypertension Brother        Social History     Tobacco Use    Smoking status: Former     Packs/day: 0.50     Years: 11.00     Pack years: 5.50     Types: Cigarettes     Quit date: 12/31/2006     Years since quitting: 15.9 Smokeless tobacco: Former     Quit date: 1/10/1993    Tobacco comments:     6-7 cigs per day   Substance Use Topics    Alcohol use: Yes     Alcohol/week: 0.0 standard drinks     Comment: 1-2 glasses wine 3-4 times a week       ROS   Review of Systems   Constitutional:  Negative for chills and fever. HENT:  Negative for ear pain and sore throat. Eyes:  Negative for blurred vision and pain. Respiratory:  Positive for shortness of breath (chronic/unchanged). Negative for cough. Cardiovascular:  Negative for chest pain and palpitations. Gastrointestinal:  Negative for abdominal pain, blood in stool and melena. Genitourinary:  Negative for dysuria and hematuria. Musculoskeletal:  Negative for joint pain and myalgias. Skin:         See HPI   Neurological:  Positive for dizziness. Negative for headaches. Endo/Heme/Allergies:  Does not bruise/bleed easily. Psychiatric/Behavioral:  Negative for substance abuse. Objective     Vitals:    12/02/22 1447   BP: 124/76   Pulse: 74   Resp: 16   Temp: 98.1 °F (36.7 °C)   TempSrc: Temporal   SpO2: 98%   Weight: 129 lb (58.5 kg)   Height: 5' 2\" (1.575 m)   PainSc:   0 - No pain       Physical Exam  Vitals and nursing note reviewed. HENT:      Head: Normocephalic and atraumatic. Comments: Her forehead is not tender to palpation. Right Ear: External ear normal.      Left Ear: External ear normal.   Eyes:      General: No scleral icterus. Right eye: No discharge. Left eye: No discharge. Conjunctiva/sclera: Conjunctivae normal.   Cardiovascular:      Rate and Rhythm: Normal rate and regular rhythm. Heart sounds: Normal heart sounds. No murmur heard. No friction rub. No gallop. Pulmonary:      Effort: Pulmonary effort is normal. No respiratory distress. Breath sounds: Normal breath sounds. No wheezing or rales. Comments:  There is a cervical/oval-shaped nonerythematous nodule along her right scapula, nontender to palpation  Chest:      Chest wall: No tenderness. Abdominal:      General: Bowel sounds are normal. There is no distension. Palpations: Abdomen is soft. There is no mass. Tenderness: There is no abdominal tenderness. There is no guarding or rebound. Musculoskeletal:         General: No swelling (BUE) or tenderness (BUE). Cervical back: Neck supple. Lymphadenopathy:      Cervical: No cervical adenopathy. Skin:     General: Skin is warm and dry. Findings: No erythema or rash. Comments: +Right forearm burn site is healing well w/o erythema   Neurological:      Mental Status: She is alert. Motor: No abnormal muscle tone. Gait: Gait normal.      Comments: Negative orthostatics today.    Psychiatric:         Mood and Affect: Mood normal.       LABS   Data Review:   Lab Results   Component Value Date/Time    WBC 5.2 02/19/2022 03:58 PM    Hemoglobin, POC 12.2 01/09/2013 08:19 PM    HGB 14.0 02/19/2022 03:58 PM    Hematocrit, POC 36 01/09/2013 08:19 PM    HCT 41.9 02/19/2022 03:58 PM    PLATELET 398 41/65/8377 03:58 PM    MCV 86.6 02/19/2022 03:58 PM       Lab Results   Component Value Date/Time    Sodium 140 07/01/2022 04:46 PM    Potassium 4.0 07/01/2022 04:46 PM    Chloride 105 07/01/2022 04:46 PM    CO2 31 07/01/2022 04:46 PM    Anion gap 4 07/01/2022 04:46 PM    Glucose 80 07/01/2022 04:46 PM    BUN 19 (H) 07/01/2022 04:46 PM    Creatinine 1.14 07/01/2022 04:46 PM    BUN/Creatinine ratio 17 07/01/2022 04:46 PM    GFR est AA 59 (L) 07/01/2022 04:46 PM    GFR est non-AA 49 (L) 07/01/2022 04:46 PM    Calcium 9.0 07/01/2022 04:46 PM       Lab Results   Component Value Date/Time    Cholesterol, total 172 10/01/2021 03:44 PM    HDL Cholesterol 86 (H) 10/01/2021 03:44 PM    LDL-CHOLESTEROL 87 09/11/2020 02:28 PM    LDL, calculated 79.4 10/01/2021 03:44 PM    VLDL, calculated 6.6 10/01/2021 03:44 PM    Triglyceride 33 10/01/2021 03:44 PM    CHOL/HDL Ratio 2.0 10/01/2021 03:44 PM Cholesterol/HDL ratio 2.4 09/11/2020 02:28 PM       Lab Results   Component Value Date/Time    Hemoglobin A1c 5.3 09/11/2020 02:28 PM    Hemoglobin A1c (POC) 5.6 09/19/2013 02:30 PM    Hemoglobin A1c, External 5.4 07/26/2022 12:00 AM       Assessment/Plan:   1. Mass of joint of right shoulder: Along the posterior aspect of her scapula. -Ordering a scapula x-ray series.  - Ordering an ultrasound of the area as well    ORDERS:  - XR SCAPULA RT; Future  - US CHEST PORTABLE; Future    2. Dizziness: Etiology is unknown. No vertigo. No hearing changes. No headaches.  -Checking labs. -Orthostatics were done in our office and negative. -May pursue carotid artery PVL studies if her symptoms persist/worsen in the absence of any neurological symptoms other than presyncope, this study is not warranted per the American Academy of Neurology guidelines    ORDERS:  - METABOLIC PANEL, COMPREHENSIVE; Future  - CBC WITH AUTOMATED DIFF; Future    3. MURPHY (nonalcoholic steatohepatitis): She is really continue alcohol beverages.  -I encouraged her to avoid all alcohol beverage consumption.  - Ordering a FibroSure test.    ORDERS:  - KATHERINE Gray; Future    4. Burn: Healing well.  -Continue routine wound care. -The patient was instructed to notify me if she develops any signs of redness, swelling, or increased pain. ICD-10-CM ICD-9-CM    1. Mass of joint of right shoulder  M25.811 719.61 XR SCAPULA RT      US CHEST PORTABLE      2. Dizziness  J13 754.6 METABOLIC PANEL, COMPREHENSIVE      CBC WITH AUTOMATED DIFF      3. MURPHY (nonalcoholic steatohepatitis)  K75.81 571.8 MURPHY FIBROSURE      LIPID PANEL      4.  Burn  T30.0 949.0             Health Maintenance Due   Topic Date Due    Shingrix Vaccine Age 49> (1 of 2) Never done    Colorectal Cancer Screening Combo  03/26/2021    COVID-19 Vaccine (3 - Booster for Connie Charnley series) 07/07/2021    Lipid Screen  10/01/2022     Lab review: labs are reviewed in the EHR and ordered as mentioned above. I have discussed the diagnosis with the patient and the intended plan as seen in the above orders. The patient has received an after-visit summary and questions were answered concerning future plans. I have discussed medication side effects and warnings with the patient as well. I have reviewed the plan of care with the patient, accepted their input and they are in agreement with the treatment goals. All questions were answered. The patient understands the plan of care. Handouts provided today with above information. Pt instructed if symptoms worsen to call the office or report to the ED for continued care. Greater than 50% of the visit time was spent in counseling and/or coordination of care. Voice recognition was used to generate this report, which may have resulted in some phonetic based errors in grammar and contents. Even though attempts were made to correct all the mistakes, some may have been missed, and remained in the body of the document.           Maria Dolores Pickens MD

## 2022-12-05 ENCOUNTER — TELEPHONE (OUTPATIENT)
Dept: INTERNAL MEDICINE CLINIC | Age: 62
End: 2022-12-05

## 2022-12-05 NOTE — TELEPHONE ENCOUNTER
Patient reached and given results, and also provided number to central scheduling to setup ultrasound.

## 2022-12-05 NOTE — TELEPHONE ENCOUNTER
Please let her know that her scapula x-ray series is unremarkable. Please have her get the ultrasound I ordered at her last visit to evaluate soft tissue along this area.     Dr. Zach Pineda  [facility] of [address], [address] [place] [address] Str.  Phone: [phone]  Fax: [phone]

## 2022-12-06 LAB
A2 MACROGLOB SERPL-MCNC: 136 MG/DL (ref 110–276)
ALT (SGPT) P5P, 001547: 25 IU/L (ref 0–40)
APO A-I SERPL-MCNC: 181 MG/DL (ref 116–209)
AST SERPL W P-5'-P-CCNC: 24 IU/L (ref 0–40)
BILIRUB SERPL-MCNC: 0.5 MG/DL (ref 0–1.2)
CHOLEST SERPL-MCNC: 206 MG/DL (ref 100–199)
COMMENT:: ABNORMAL
FIBROSIS SCORING:, 550107: ABNORMAL
FIBROSIS STAGE SERPL QL: ABNORMAL
GGT SERPL-CCNC: 21 IU/L (ref 0–60)
GLUCOSE SERPL-MCNC: 80 MG/DL (ref 70–99)
HAPTOGLOB SERPL-MCNC: 22 MG/DL (ref 33–346)
HEIGHT (NASH), 13912: 129
INTERPRETATIONS:, 550143: ABNORMAL
LIVER FIBR SCORE SERPL CALC.FIBROSURE: 0.18 (ref 0–0.21)
NASH SCORING, 550144: ABNORMAL
NECROINFLAMMATORY ACT GRADE SERPL QL: ABNORMAL
NECROINFLAMMATORY ACT SCORE SERPL: 0.25
SERVICE CMNT-IMP: ABNORMAL
STEATOSIS GRADE, 550153: ABNORMAL
STEATOSIS GRADING, 550189: ABNORMAL
STEATOSIS SCORE, 550149: 0.15 (ref 0–0.3)
TRIGL SERPL-MCNC: 34 MG/DL (ref 0–149)
WEIGHT (NASH): 62

## 2022-12-06 NOTE — PROGRESS NOTES
Please let her know that her fatty liver disease per her recent lab work seems to have resolved with dietary changes. Her CBC is unremarkable. Her kidney and liver function labs are normal.  Her total cholesterol was 212, up from 172 in October of last year. Her triglycerides are unchanged at 33. HDL is 98. Her LDL is up to 107 from 79. No additional medication is warranted for these findings. She should continue taking all her medications as prescribed and make sure that she tries to maintain a heart healthy diet.     Dr. Ron Hagan  [facility] of [facility], [address] [place] [address], [address] [place] [address] Str.  Phone: [phone]  Fax: [phone]

## 2022-12-07 ENCOUNTER — TELEPHONE (OUTPATIENT)
Dept: INTERNAL MEDICINE CLINIC | Age: 62
End: 2022-12-07

## 2022-12-07 NOTE — TELEPHONE ENCOUNTER
----- Message from Faby Prajapati MD sent at 12/6/2022  2:11 PM EST -----  Please let her know that her fatty liver disease per her recent lab work seems to have resolved with dietary changes. Her CBC is unremarkable. Her kidney and liver function labs are normal.  Her total cholesterol was 212, up from 172 in October of last year. Her triglycerides are unchanged at 33. HDL is 98. Her LDL is up to 107 from 79. No additional medication is warranted for these findings. She should continue taking all her medications as prescribed and make sure that she tries to maintain a heart healthy diet.     Dr. Velma Arndt  [facility] of [facility], [address] [place] [address], [address] [place], [place] [address]  Phone: [phone]  Fax: [phone]

## 2022-12-07 NOTE — LETTER
12/12/2022    Ms. Marcial Arreola  [address] [address]        Dear Ms. Shaw [employer],    We have been unable to reach you by phone to notify you of your test results. Please call our office at [phone] and ask to speak with my nurse in order to explain these results to you and advise you of any recommendations.       Sincerely,      Kavon Maya MD

## 2022-12-08 ENCOUNTER — HOSPITAL ENCOUNTER (OUTPATIENT)
Dept: LAB | Age: 62
Discharge: HOME | End: 2022-12-08
Payer: COMMERCIAL

## 2022-12-08 ENCOUNTER — HOSPITAL ENCOUNTER (OUTPATIENT)
Dept: ULTRASOUND IMAGING | Age: 62
End: 2022-12-08
Attending: INTERNAL MEDICINE
Payer: COMMERCIAL

## 2022-12-08 DIAGNOSIS — M25.811 MASS OF JOINT OF RIGHT SHOULDER: ICD-10-CM

## 2022-12-08 LAB
ALBUMIN SERPL-MCNC: 3.9 G/DL (ref 3.4–5)
ANION GAP SERPL CALC-SCNC: 4 MMOL/L (ref 3–18)
APPEARANCE UR: CLEAR
BACTERIA URNS QL MICRO: NEGATIVE /HPF
BILIRUB UR QL: NEGATIVE
BUN SERPL-MCNC: 24 MG/DL (ref 7–18)
BUN/CREAT SERPL: 22 (ref 12–20)
CALCIUM SERPL-MCNC: 9.8 MG/DL (ref 8.5–10.1)
CHLORIDE SERPL-SCNC: 104 MMOL/L (ref 100–111)
CO2 SERPL-SCNC: 29 MMOL/L (ref 21–32)
COLOR UR: YELLOW
CREAT SERPL-MCNC: 1.09 MG/DL (ref 0.6–1.3)
CREAT UR-MCNC: 22 MG/DL (ref 30–125)
EPITH CASTS URNS QL MICRO: NORMAL /LPF (ref 0–5)
GLUCOSE SERPL-MCNC: 70 MG/DL (ref 74–99)
GLUCOSE UR STRIP.AUTO-MCNC: NEGATIVE MG/DL
HGB UR QL STRIP: NEGATIVE
KETONES UR QL STRIP.AUTO: NEGATIVE MG/DL
LEUKOCYTE ESTERASE UR QL STRIP.AUTO: NEGATIVE
MAGNESIUM SERPL-MCNC: 2.4 MG/DL (ref 1.6–2.6)
MICROALBUMIN UR-MCNC: <0.5 MG/DL (ref 0–3)
MICROALBUMIN/CREAT UR-RTO: ABNORMAL MG/G (ref 0–30)
NITRITE UR QL STRIP.AUTO: NEGATIVE
PH UR STRIP: 5.5 [PH] (ref 5–8)
PHOSPHATE SERPL-MCNC: 3.9 MG/DL (ref 2.5–4.9)
POTASSIUM SERPL-SCNC: 4.3 MMOL/L (ref 3.5–5.5)
PROT UR STRIP-MCNC: NEGATIVE MG/DL
RBC #/AREA URNS HPF: NEGATIVE /HPF (ref 0–5)
SODIUM SERPL-SCNC: 137 MMOL/L (ref 136–145)
SP GR UR REFRACTOMETRY: <1.005 (ref 1–1.03)
UROBILINOGEN UR QL STRIP.AUTO: 0.2 EU/DL (ref 0.2–1)
WBC URNS QL MICRO: NORMAL /HPF (ref 0–4)

## 2022-12-08 PROCEDURE — 80069 RENAL FUNCTION PANEL: CPT

## 2022-12-08 PROCEDURE — 83735 ASSAY OF MAGNESIUM: CPT

## 2022-12-08 PROCEDURE — 82043 UR ALBUMIN QUANTITATIVE: CPT

## 2022-12-08 PROCEDURE — 81001 URINALYSIS AUTO W/SCOPE: CPT

## 2022-12-08 PROCEDURE — 76882 US LMTD JT/FCL EVL NVASC XTR: CPT

## 2022-12-13 NOTE — TELEPHONE ENCOUNTER
ASSESSMENT: Called patient twice to let her know she is overdue for labs and an appointment and she has not returned my call.   Patient returned call and given results.

## 2022-12-15 ENCOUNTER — TELEPHONE (OUTPATIENT)
Dept: INTERNAL MEDICINE CLINIC | Age: 62
End: 2022-12-15

## 2022-12-16 NOTE — TELEPHONE ENCOUNTER
Please let her know that her ultrasound result do not show any suspicious findings for cancer. No additional studies are warranted at this time since she does not have any pain along the affected area.     Dr. Zach Pineda  [facility] of [address], [address] [place] [address] Str.  Phone: [phone]  Fax: [phone]

## 2022-12-30 DIAGNOSIS — I11.9 BENIGN HYPERTENSIVE HEART DISEASE WITHOUT HEART FAILURE: ICD-10-CM

## 2022-12-30 RX ORDER — SPIRONOLACTONE 25 MG/1
TABLET ORAL
Qty: 90 TABLET | Refills: 1 | Status: SHIPPED | OUTPATIENT
Start: 2022-12-30

## 2023-01-03 RX ORDER — AMLODIPINE BESYLATE 2.5 MG/1
TABLET ORAL
Qty: 90 TABLET | Refills: 3 | Status: SHIPPED | OUTPATIENT
Start: 2023-01-03

## 2023-01-13 ENCOUNTER — TELEPHONE (OUTPATIENT)
Dept: INTERNAL MEDICINE CLINIC | Age: 63
End: 2023-01-13

## 2023-01-13 NOTE — TELEPHONE ENCOUNTER
Tried reaching patient, unable to leave voicemail, no mailbox set up. Will try again at a later time.

## 2023-01-13 NOTE — TELEPHONE ENCOUNTER
Patient is calling and only wants to speak with Dr. Kirit Shetty or April. Stating in the last 48 hours any movement or exertion causes her chest to hurt. Sometimes it's in the middle, sometimes on the right side. It's also hurts some when she breathes. Stating it comes and goes.

## 2023-01-18 ENCOUNTER — OFFICE VISIT (OUTPATIENT)
Dept: INTERNAL MEDICINE CLINIC | Age: 63
End: 2023-01-18
Payer: COMMERCIAL

## 2023-01-18 VITALS
TEMPERATURE: 98 F | BODY MASS INDEX: 23 KG/M2 | HEIGHT: 62 IN | HEART RATE: 81 BPM | WEIGHT: 125 LBS | RESPIRATION RATE: 16 BRPM | SYSTOLIC BLOOD PRESSURE: 116 MMHG | OXYGEN SATURATION: 97 % | DIASTOLIC BLOOD PRESSURE: 71 MMHG

## 2023-01-18 DIAGNOSIS — R73.03 PREDIABETES: ICD-10-CM

## 2023-01-18 DIAGNOSIS — Z12.31 ENCOUNTER FOR SCREENING MAMMOGRAM FOR BREAST CANCER: ICD-10-CM

## 2023-01-18 DIAGNOSIS — M25.511 RIGHT SHOULDER PAIN, UNSPECIFIED CHRONICITY: ICD-10-CM

## 2023-01-18 DIAGNOSIS — E78.5 HYPERLIPIDEMIA, UNSPECIFIED HYPERLIPIDEMIA TYPE: ICD-10-CM

## 2023-01-18 DIAGNOSIS — R10.33 PERIUMBILICAL ABDOMINAL PAIN: ICD-10-CM

## 2023-01-18 DIAGNOSIS — R07.89 ATYPICAL CHEST PAIN: ICD-10-CM

## 2023-01-18 DIAGNOSIS — I10 ESSENTIAL HYPERTENSION: Primary | ICD-10-CM

## 2023-01-18 PROCEDURE — 3074F SYST BP LT 130 MM HG: CPT | Performed by: INTERNAL MEDICINE

## 2023-01-18 PROCEDURE — 3078F DIAST BP <80 MM HG: CPT | Performed by: INTERNAL MEDICINE

## 2023-01-18 PROCEDURE — 99214 OFFICE O/P EST MOD 30 MIN: CPT | Performed by: INTERNAL MEDICINE

## 2023-01-18 NOTE — PROGRESS NOTES
INTERNISTS OF [facility]:  1/18/2023, MRN: [id number]      Mary Pink is a 63 y.o. female and presents to clinic for Follow-up      Subjective: The pt is a 67yo AAF with h/o chronic bronchitis, LUCY, porphyria (per EHR), allergic rhinitis, NAFLD - resolved, cervical spinal stenosis, prediabetes, multiple food allergies (per lab work), HTN, HLD, Covid-19 (2022), constipation, gallbladder polyps (suggested by CT scan 10/13/16), and GERD. 1.  NAFLD and Abdominal Pain: Her most recent labs show normal LFTs. Her FibroSure test in December was unremarkable. She is scheduled soon to see GI. She has generalized abdominal pain after eating. \"It takes a couple of hours to go away. \"  Sx began the last wk of December. No alleviating factors are known. 2.  Hyperlipidemia: Her most recent labs show: Her total cholesterol was 212, up from 172 in October of last year. Her triglycerides are unchanged at 33. HDL is 98. Her LDL is up to 107 from 79. +Pravastatin. 3.  Hypertension: Blood pressure is stable on Norvasc, spironolactone, potassium, and atenolol. No adverse side effects. Her most recent labs show a normal creatinine. 4. Right Sided Back Nodule: Reported at her last appointment. Status post ultrasound and x-ray symptoms. Today she reports: she has had intermittent right shoulder pain since October. 12/8/22 Scapula Ultrasound: No suspicious sonographic finding. 12/2/22 Scapular (Right) Xray: No acute fracture-dislocation    5. Atypical CP: Occurred twice in between apts. \"It was tender to the touch. \" She talked with a family member who is a doctor and she told her it was from costochondritis. No SOB. It was not exertional but is was positional. Sx relieved themselves with use of a cold compress. She reached out to Cardiology and they did not suspect a cardiac etiology. Sx began while doing some weight lifting.  +Left axillary pain for >1 yr. Her last mammogram was done in May of 2022 and WNL.                 ***1. Forehead Bumps and Back Lump: The weekend before Thanksgiving, she bumped her left forehead while getting things out of her car trunk. She then bumped her right forehead a day later. No LOC. No trouble with concentration. That same weekend, she found a lump along her right back. The pain aong her back has no pain. 2. Dizziness: She has been extremely lightheaded/dizzy over the past 2-3 wks. No CP/palpitations. She has chronic SOB that is unchanged. Triggers: changes in position. It happens w/I an hour of her waking up. Sometimes it occurs with eating. Sometimes it occurs w/o eating. If she looks down, she will get lightheaded. Her BP is good at home. No vision/hearing changes. No HAs. No weakness/paresthesias. \"I feel funny in my head. \" She sees her endocrinologist last month: her A1C is 5.4 (the wk before Thanksgiving). 3. Right Forearm Burn: Occurred during Thanksgiving week. Healing well. 4. NAFLD: She had some ETOH last month. She had 10 ounces of red wine over the course of the wk. no other alcoholic beverage consumption. She has a history in which she overindulged in alcohol beverage consumption. Patient Active Problem List    Diagnosis Date Noted    Prediabetes 02/08/2022    Cervical spinal stenosis 04/28/2021    MURPHY (nonalcoholic steatohepatitis) 09/03/2019    Allergic rhinitis 10/04/2017    Gallbladder polyp - suggested by findings on CT scan from 10/13/16 03/01/2017    Hypersomnia with sleep apnea 03/13/2014    Constipation 01/16/2012    Essential hypertension     Hyperlipidemia     GERD (gastroesophageal reflux disease)        Current Outpatient Medications   Medication Sig Dispense Refill    amLODIPine (NORVASC) 2.5 mg tablet TAKE 1 TABLET DAILY 90 Tablet 3    spironolactone (ALDACTONE) 25 mg tablet TAKE 1 TABLET DAILY 90 Tablet 1    acarbose (PRECOSE) 50 mg tablet Take 50 mg by mouth three (3) times daily as needed.       potassium chloride (KLOR-CON) 20 mEq pack Take 1 Packet by mouth daily. 90 Packet 3    B2-B6 phos-levomef armen-mecobal (EB-N3 DR) 1.3-70-6-4 mg cpDR Take 1 Capsule by mouth daily. albuterol (PROVENTIL HFA, VENTOLIN HFA, PROAIR HFA) 90 mcg/actuation inhaler Take 2 Puffs by inhalation every six (6) hours as needed for Wheezing. 1 Each 0    pravastatin (PRAVACHOL) 80 mg tablet Take 1 Tablet by mouth daily. 90 Tablet 3    atenoloL (TENORMIN) 25 mg tablet Take 0.5 Tablets by mouth two (2) times a day. 90 Tablet 3    lansoprazole (PREVACID) 30 mg capsule Take 30 mg by mouth daily as needed. glucose blood VI test strips (BLOOD GLUCOSE TEST) strip Use to check glucose daily 1 Package 11    co-enzyme Q-10 (CO Q-10) 100 mg capsule Take 200 mg by mouth daily. aspirin delayed-release 81 mg tablet Take 81 mg by mouth daily. MULTIVITAMIN W-MINERALS/LUTEIN (CENTRUM SILVER PO) Take 1 Tab by mouth daily. Allergies   Allergen Reactions    Ibuprofen Other (comments)    Nsaids (Non-Steroidal Anti-Inflammatory Drug) Other (comments)    Amoxicillin Unable to Obtain and Other (comments)    Lidocaine Swelling     Oral Solution    Lipitor [Atorvastatin] Myalgia           Lopressor [Metoprolol Tartrate] Other (comments)     Lightheaded and cp    Percocet [Oxycodone-Acetaminophen] Unable to Obtain and Other (comments)    Vicodin [Hydrocodone-Acetaminophen] Unable to Obtain and Other (comments)       Past Medical History:   Diagnosis Date    Acute cerebrovascular accident (CVA) due to thrombosis of left middle cerebral artery (HCC)     Age-related cognitive decline     Allergic rhinitis     Anxiety     Attention deficit     Cardiac Agatston CAC score, <100 04/30/2014    Coronary calcium score 0. Cardiac Holter monitoring 01/25/2017    Sinus rhythm, avg HR 73 bpm (range ). Benign Holter study.     Cardiac nuclear imaging test 01/11/2013    No convincing evidence for ischemia or infarction in the setting of significant chest wall artifact. EF 62%. No RWMA. Neg EKG on max EST. Ex time 9 min 50 sec. Cardiac stress echo, normal 2016    Normal maximal stress echo w/reproduction of atypical chest discomfort. Ex time 11 min 3 sec. EF 55%. Cardiovascular LLE venous duplex 2016    Left leg:  No DVT. Cervical spinal stenosis     Chest pain     Dysthymia     GERD (gastroesophageal reflux disease)     Heel pain, bilateral     Hiatal hernia     denies this    HTN (hypertension)     Hyperlipidemia     IBS (irritable bowel syndrome)     Insulin resistance     follows with endocrinology for this    MURPHY (nonalcoholic steatohepatitis)     Night sweats     PVC (premature ventricular contraction)     Right low back pain     S/P colonoscopy ,     normal per patient    Subacromial bursitis     Unsteady gait     due to heel pain       Past Surgical History:   Procedure Laterality Date    D/C SUCTION      HX OTHER SURGICAL      colonscopy    HX OTHER SURGICAL      endoscopy    HX OTHER SURGICAL      wisdom teeth removal x1       Family History   Problem Relation Age of Onset    Hypertension Mother     Heart defect Mother         anuerysm    Heart Attack Mother     Asthma Father     Heart Attack Maternal Uncle     Heart Attack Maternal Uncle     Heart Disease Brother     Hypertension Brother        Social History     Tobacco Use    Smoking status: Former     Packs/day: 0.50     Years: 11.00     Pack years: 5.50     Types: Cigarettes     Quit date: 2006     Years since quittin.0    Smokeless tobacco: Former     Quit date: 1/10/1993    Tobacco comments:     6-7 cigs per day   Substance Use Topics    Alcohol use:  Yes     Alcohol/week: 0.0 standard drinks     Comment: 1-2 glasses wine 3-4 times a week       ROS   ROS    Objective     Vitals:    23 1405   Resp: 16   Temp: 98 °F (36.7 °C)   TempSrc: Temporal   Weight: 125 lb (56.7 kg)   Height: 5' 2\" (1.575 m)   PainSc:   6   PainLoc: Shoulder Physical Exam  Vitals and nursing note reviewed. HENT:      Head: Normocephalic and atraumatic. Right Ear: External ear normal.      Left Ear: External ear normal.   Eyes:      General: No scleral icterus. Right eye: No discharge. Left eye: No discharge. Conjunctiva/sclera: Conjunctivae normal.   Cardiovascular:      Rate and Rhythm: Normal rate and regular rhythm. Heart sounds: Normal heart sounds. No murmur heard. No friction rub. No gallop. Pulmonary:      Effort: Pulmonary effort is normal. No respiratory distress. Breath sounds: Normal breath sounds. No wheezing or rales. Chest:      Chest wall: No tenderness. Abdominal:      General: Bowel sounds are normal. There is no distension. Palpations: Abdomen is soft. There is no mass. Tenderness: There is no abdominal tenderness. There is no guarding or rebound. Musculoskeletal:         General: No swelling (BUE) or tenderness (BUE). Cervical back: Neck supple. Comments: She has full ROM along her right shoulder jt   Lymphadenopathy:      Cervical: No cervical adenopathy. Skin:     General: Skin is warm and dry. Coloration: Skin is not pale. Findings: No erythema or rash. Neurological:      Mental Status: She is alert. Motor: No abnormal muscle tone.       Gait: Gait normal.   Psychiatric:         Mood and Affect: Mood normal.       LABS   Data Review:   Lab Results   Component Value Date/Time    WBC 5.5 12/02/2022 04:05 PM    Hemoglobin, POC 12.2 01/09/2013 08:19 PM    HGB 13.4 12/02/2022 04:05 PM    Hematocrit, POC 36 01/09/2013 08:19 PM    HCT 41.1 12/02/2022 04:05 PM    PLATELET 526 43/29/8827 04:05 PM    MCV 87.4 12/02/2022 04:05 PM       Lab Results   Component Value Date/Time    Sodium 137 12/08/2022 02:59 PM    Potassium 4.3 12/08/2022 02:59 PM    Chloride 104 12/08/2022 02:59 PM    CO2 29 12/08/2022 02:59 PM    Anion gap 4 12/08/2022 02:59 PM    Glucose 70 (L) 12/08/2022 02:59 PM    BUN 24 (H) 12/08/2022 02:59 PM    Creatinine 1.09 12/08/2022 02:59 PM    BUN/Creatinine ratio 22 (H) 12/08/2022 02:59 PM    GFR est AA 59 (L) 07/01/2022 04:46 PM    GFR est non-AA 49 (L) 07/01/2022 04:46 PM    Calcium 9.8 12/08/2022 02:59 PM       Lab Results   Component Value Date/Time    Cholesterol, total 206 (H) 12/02/2022 04:05 PM    Cholesterol, total 212 (H) 12/02/2022 04:05 PM    HDL Cholesterol 98 (H) 12/02/2022 04:05 PM    LDL-CHOLESTEROL 87 09/11/2020 02:28 PM    LDL, calculated 107.4 (H) 12/02/2022 04:05 PM    VLDL, calculated 6.6 12/02/2022 04:05 PM    Triglyceride 34 12/02/2022 04:05 PM    Triglyceride 33 12/02/2022 04:05 PM    CHOL/HDL Ratio 2.2 12/02/2022 04:05 PM    Cholesterol/HDL ratio 2.4 09/11/2020 02:28 PM       Lab Results   Component Value Date/Time    Hemoglobin A1c 5.3 09/11/2020 02:28 PM    Hemoglobin A1c (POC) 5.6 09/19/2013 02:30 PM    Hemoglobin A1c, External 5.4 07/26/2022 12:00 AM       Assessment/Plan:   There are no diagnoses linked to this encounter. Health Maintenance Due   Topic Date Due    Shingles Vaccine (1 of 2) Never done    Colorectal Cancer Screening Combo  03/26/2021    COVID-19 Vaccine (3 - Booster for Wallie Salon series) 07/07/2021         Lab review: {lab reviewed:168292}    I have discussed the diagnosis with the patient and the intended plan as seen in the above orders. The patient has received an after-visit summary and questions were answered concerning future plans. I have discussed medication side effects and warnings with the patient as well. I have reviewed the plan of care with the patient, accepted their input and they are in agreement with the treatment goals. All questions were answered. The patient understands the plan of care. Handouts provided today with above information. ***Pt instructed if symptoms worsen to call the office or report to the ED for continued care.   ***Greater than 50% of the visit time was spent in counseling and/or coordination of care. ***The patient was counseled on the dangers of tobacco use, and was {MU SMOKING CESSATION COUNSELIN::\"advised to quit\"}. Reviewed strategies to maximize success, including {techniques:}. 3-10 minutes were spent on smoking/tobacco cessation    Voice recognition was used to generate this report, which may have resulted in some phonetic based errors in grammar and contents. Even though attempts were made to correct all the mistakes, some may have been missed, and remained in the body of the document.           Flakito Nicole MD

## 2023-01-18 NOTE — PROGRESS NOTES
Jessica Burdick presents today for   Chief Complaint   Patient presents with    Follow-up       1. \"Have you been to the ER, urgent care clinic since your last visit? Hospitalized since your last visit? \" no    2. \"Have you seen or consulted any other health care providers outside of the [address] [place] [address] since your last visit? \" yes     3. For patients aged 39-70: Has the patient had a colonoscopy / FIT/ Cologuard? Yes - Care Gap present. Most recent result on file      If the patient is female:    4. For patients aged 41-77: Has the patient had a mammogram within the past 2 years? Yes - no Care Gap present  See top three    5. For patients aged 21-65: Has the patient had a pap smear?  Yes - no Care Gap present

## 2023-01-18 NOTE — PATIENT INSTRUCTIONS
Body Mass Index: Care Instructions  Your Care Instructions     Body mass index (BMI) can help you see if your weight is raising your risk for health problems. It uses a formula to compare how much you weigh with how tall you are. A BMI lower than 18.5 is considered underweight. A BMI between 18.5 and 24.9 is considered healthy. A BMI between 25 and 29.9 is considered overweight. A BMI of 30 or higher is considered obese. If your BMI is in the normal range, it means that you have a lower risk for weight-related health problems. If your BMI is in the overweight or obese range, you may be at increased risk for weight-related health problems, such as high blood pressure, heart disease, stroke, arthritis or joint pain, and diabetes. If your BMI is in the underweight range, you may be at increased risk for health problems such as fatigue, lower protection (immunity) against illness, muscle loss, bone loss, hair loss, and hormone problems. BMI is just one measure of your risk for weight-related health problems. You may be at higher risk for health problems if you are not active, you eat an unhealthy diet, or you drink too much alcohol or use tobacco products. Follow-up care is a key part of your treatment and safety. Be sure to make and go to all appointments, and call your doctor if you are having problems. It's also a good idea to know your test results and keep a list of the medicines you take. How can you care for yourself at home? Practice healthy eating habits. This includes eating plenty of fruits, vegetables, whole grains, lean protein, and low-fat dairy. If your doctor recommends it, get more exercise. Walking is a good choice. Bit by bit, increase the amount you walk every day. Try for at least 30 minutes on most days of the week. Do not smoke. Smoking can increase your risk for health problems. If you need help quitting, talk to your doctor about stop-smoking programs and medicines.  These can increase your chances of quitting for good. Limit alcohol to 2 drinks a day for men and 1 drink a day for women. Too much alcohol can cause health problems. If you have a BMI higher than 25  Your doctor may do other tests to check your risk for weight-related health problems. This may include measuring the distance around your waist. A waist measurement of more than 40 inches in men or 35 inches in women can increase the risk of weight-related health problems. Talk with your doctor about steps you can take to stay healthy or improve your health. You may need to make lifestyle changes to lose weight and stay healthy, such as changing your diet and getting regular exercise. If you have a BMI lower than 18.5  Your doctor may do other tests to check your risk for health problems. Talk with your doctor about steps you can take to stay healthy or improve your health. You may need to make lifestyle changes to gain or maintain weight and stay healthy, such as getting more healthy foods in your diet and doing exercises to build muscle. Where can you learn more? Go to http://www.galvin.com/  Enter S176 in the search box to learn more about \"Body Mass Index: Care Instructions. \"  Current as of: December 27, 2021               Content Version: 13.4  © 1628-4518 [employer]. Care instructions adapted under license by [employer] (which disclaims liability or warranty for this information). If you have questions about a medical condition or this instruction, always ask your healthcare professional. [place] [address] any warranty or liability for your use of this information.

## 2023-01-24 ENCOUNTER — HOSPITAL ENCOUNTER (OUTPATIENT)
Dept: ULTRASOUND IMAGING | Age: 63
Discharge: HOME | End: 2023-01-24
Attending: INTERNAL MEDICINE
Payer: COMMERCIAL

## 2023-01-24 PROCEDURE — 76700 US EXAM ABDOM COMPLETE: CPT

## 2023-01-26 ENCOUNTER — TELEPHONE (OUTPATIENT)
Dept: INTERNAL MEDICINE CLINIC | Age: 63
End: 2023-01-26

## 2023-01-26 NOTE — TELEPHONE ENCOUNTER
----- Message from Gretchen Bentley MD sent at 1/26/2023  1:24 PM EST -----  Please let her know that her abdominal ultrasound is unremarkable.     Dr. Tonya Santamaria  [facility] [address], [address] [place] [address] Str.  Phone: [phone]  Fax: [phone]

## 2023-01-26 NOTE — LETTER
1/31/2023    Ms. Wander Gomez  [address] [address]        Dear Ms. Aren Tonny,    We have been unable to reach you by phone to notify you of your test results. Please call our office at [phone] and ask to speak with my nurse in order to explain these results to you and advise you of any recommendations.       Sincerely,      Toby Schreiber MD

## 2023-02-01 DIAGNOSIS — Z12.31 ENCOUNTER FOR SCREENING MAMMOGRAM FOR BREAST CANCER: Primary | ICD-10-CM

## 2023-02-02 LAB — CREATININE, EXTERNAL: 1.09

## 2023-02-05 DIAGNOSIS — Z12.31 ENCOUNTER FOR SCREENING MAMMOGRAM FOR BREAST CANCER: Primary | ICD-10-CM

## 2023-02-05 DIAGNOSIS — R73.03 PREDIABETES: Primary | ICD-10-CM

## 2023-02-05 DIAGNOSIS — E78.5 HYPERLIPIDEMIA, UNSPECIFIED HYPERLIPIDEMIA TYPE: ICD-10-CM

## 2023-02-05 DIAGNOSIS — E78.5 HYPERLIPIDEMIA, UNSPECIFIED HYPERLIPIDEMIA TYPE: Primary | ICD-10-CM

## 2023-02-05 DIAGNOSIS — I10 ESSENTIAL HYPERTENSION: Primary | ICD-10-CM

## 2023-02-15 ENCOUNTER — HOSPITAL ENCOUNTER (OUTPATIENT)
Facility: HOSPITAL | Age: 63
Discharge: HOME | End: 2023-02-18
Payer: COMMERCIAL

## 2023-02-15 DIAGNOSIS — M25.511 RIGHT SHOULDER PAIN, UNSPECIFIED CHRONICITY: ICD-10-CM

## 2023-02-15 LAB
25(OH)D3 SERPL-MCNC: 64.1 NG/ML (ref 30–100)
HBA1C MFR BLD: 5.3 % (ref 4.2–5.6)

## 2023-02-15 PROCEDURE — 73030 X-RAY EXAM OF SHOULDER: CPT

## 2023-02-15 PROCEDURE — 83036 HEMOGLOBIN GLYCOSYLATED A1C: CPT

## 2023-02-15 PROCEDURE — 36415 COLL VENOUS BLD VENIPUNCTURE: CPT

## 2023-02-15 PROCEDURE — 82306 VITAMIN D 25 HYDROXY: CPT

## 2023-02-17 ENCOUNTER — OFFICE VISIT (OUTPATIENT)
Age: 63
End: 2023-02-17

## 2023-02-17 ENCOUNTER — TELEPHONE (OUTPATIENT)
Age: 63
End: 2023-02-17

## 2023-02-17 VITALS — BODY MASS INDEX: 22.86 KG/M2 | RESPIRATION RATE: 18 BRPM | HEIGHT: 62 IN

## 2023-02-17 DIAGNOSIS — M75.21 TENDINITIS OF LONG HEAD OF BICEPS BRACHII OF RIGHT SHOULDER: ICD-10-CM

## 2023-02-17 DIAGNOSIS — M75.101 ROTATOR CUFF SYNDROME OF RIGHT SHOULDER: Primary | ICD-10-CM

## 2023-02-17 NOTE — PROGRESS NOTES
Patient: Eliane Spatz                MRN: [id number]       SSN: [id number]  YOB: 1960        AGE: 63 y.o. SEX: female  Body mass index is 22.86 kg/m². PCP: Thania Zhou MD  02/17/23    CHIEF COMPLAINT: Right shoulder pain    HPI: Eliane Spatz is a 63 y.o. female patient who complains of right shoulder pain 5/10 for the past several months. She says this began in the fall 2022. During that time she did increase her activity in terms of her working out and exercising to try to help with weight loss. She has noticed some increasing pain in her right shoulder during that time. She did stop and the pain is gotten better. Recently she went back to exercising and she noticed some slight pain. She denies any injury or trauma. She does not currently take any medications for this. She cannot take oral anti-inflammatories due to a history of DVT and currently on aspirin. Past Medical History:   Diagnosis Date    Abnormal stress echo 05/05/2016    Normal maximal stress echo w/reproduction of atypical chest discomfort. Ex time 11 min 3 sec. EF 55%. Acute cerebrovascular accident (CVA) due to thrombosis of left middle cerebral artery (HCC)     Agatston CAC score, <100 04/30/2014    Coronary calcium score 0. Age-related cognitive decline     Allergic rhinitis     Anxiety     Attention deficit     Cervical spinal stenosis     Chest pain     DV[facility]deep venous thrombosis) [facility] 09/28/2016    Left leg:  No DVT. Dysthymia     GERD (gastroesophageal reflux disease)     Heel pain, bilateral     Hiatal hernia     denies this    History of Holter monitoring 01/25/2017    Sinus rhythm, avg HR 73 bpm (range ). Benign Holter study. History of myocardial perfusion scan 01/11/2013    No convincing evidence for ischemia or infarction in the setting of significant chest wall artifact. EF 62%. No RWMA. Neg EKG on max EST. Ex time 9 min 50 sec.     HTN (hypertension) Hyperlipidemia     IBS (irritable bowel syndrome)     Insulin resistance     follows with endocrinology for this    COCHRAN (nonalcoholic steatohepatitis)     Night sweats     PVC (premature ventricular contraction)     Right low back pain     S/P colonoscopy 2009, 2014    normal per patient    Subacromial bursitis     Unsteady gait     due to heel pain       Family History   Problem Relation Age of Onset    Heart Attack Mother     Hypertension Brother     Heart Disease Brother     Heart Attack Maternal Uncle     Heart Attack Maternal Uncle     Asthma Father     Heart Defect Mother         anuerysm    Hypertension Mother        Current Outpatient Medications   Medication Sig Dispense Refill    diclofenac sodium (VOLTAREN) 1 % GEL Apply 4 g topically 4 times daily 100 g 2    acarbose (PRECOSE) 50 MG tablet Take 50 mg by mouth 3 times daily as needed      albuterol sulfate HFA (PROVENTIL;VENTOLIN;PROAIR) 108 (90 Base) MCG/ACT inhaler Inhale 2 puffs into the lungs every 6 hours as needed      amLODIPine (NORVASC) 2.5 MG tablet TAKE 1 TABLET DAILY      aspirin 81 MG EC tablet Take 81 mg by mouth daily      atenolol (TENORMIN) 25 MG tablet Take 12.5 mg by mouth 2 times daily      coenzyme Q10 100 MG CAPS capsule Take 200 mg by mouth daily      lansoprazole (PREVACID) 30 MG delayed release capsule Take 30 mg by mouth daily as needed      potassium chloride (KLOR-CON) 20 MEQ packet Take 20 mEq by mouth daily      pravastatin (PRAVACHOL) 80 MG tablet Take 80 mg by mouth daily      spironolactone (ALDACTONE) 25 MG tablet TAKE 1 TABLET DAILY       No current facility-administered medications for this visit.        Allergies   Allergen Reactions    Ibuprofen Other (See Comments)    Nsaids Other (See Comments)    Amoxicillin Other (See Comments)     Other reaction(s): Unable to Obtain    Atorvastatin Myalgia    Hydrocodone-Acetaminophen Other (See Comments)     Other reaction(s): Unable to Obtain    Lidocaine Swelling     Oral Solution    Metoprolol Other (See Comments)     Lightheaded and cp    Oxycodone-Acetaminophen Other (See Comments)     Other reaction(s): Unable to Obtain       Past Surgical History:   Procedure Laterality Date    D/C SUCTION      OTHER SURGICAL HISTORY      colonscopy    OTHER SURGICAL HISTORY      endoscopy    OTHER SURGICAL HISTORY      wisdom teeth removal x1       Social History     Socioeconomic History    Marital status: Single     Spouse name: Not on file    Number of children: Not on file    Years of education: Not on file    Highest education level: Not on file   Occupational History    Not on file   Tobacco Use    Smoking status: Former     Packs/day: 0.50     Types: Cigarettes     Quit date: 2006     Years since quittin.1    Smokeless tobacco: Former     Quit date: 1/10/1993   Substance and Sexual Activity    Alcohol use: Yes     Alcohol/week: 0.0 standard drinks    Drug use: No     Types: OTC, Prescription    Sexual activity: Not on file   Other Topics Concern    Not on file   Social History Narrative    Not Currently working, 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 Social Determinants of Health     Financial Resource Strain: Not on file   Food Insecurity: Not on file   Transportation Needs: Not on file   Physical Activity: Not on file   Stress: Not on file   Social Connections: Not on file   Intimate Partner Violence: Not on file   Housing Stability: Not on file       REVIEW OF SYSTEMS:    14 point review of systems on the intake form is negative except as noted in the HPI    PHYSICAL EXAMINATION:  Resp 18   Ht 5' 2\" (1.575 m)   BMI 22.86 kg/m²   Body mass index is 22.86 kg/m². GENERAL: Alert and oriented x3, in no acute distress, well-developed, well-nourished. HEENT: Normocephalic, atraumatic.     Shoulder Examination     R   L  ROM   FF  Full   Full  ER  Full   Full   IR  Full   Full  Rotator Cuff Pain   Supra  +   -   Infra  -   -   Subscap -   -  Crepitus  -   -  Effusion  -   -  Warmth  -   -   Erythema  -   -  Instability  -   -  AC Joint TTP  -   -  Clavicle   Deformity -   -   TTP  -   -  Proximal Humerus   Deformity -   -   TTP  -   -  Deltoid Strength 5   5  Biceps Strength 5   5  Biceps Deformity -   -  Biceps Groove Pain +   -  Impingement Sign -   -       IMAGING:  Imaging read by myself and interpreted as follows:  Previous taken x-rays of the right shoulder from her primary care office were reviewed. My interpretation is x-rays is no arthritic change, no acute or chronic bony abnormalities. ASSESSMENT & PLAN  Diagnosis: Right shoulder rotator cuff and biceps pain    I have recommended for Coral Mello home exercise program along with a topical anti-inflammatory which I prescribed today. I do not see anything on her x-rays or examination that I would order an MRI for today. I would like for her to try the home exercises along with gradually increasing her exercises in the gym to see if this is able to manage her pain and help with the symptoms going away along with the topical anti-inflammatory. I would like to see her back in 6 to 8 weeks if her symptoms persist as needed. Prescription medication management discussed. Electronically signed by: Meet Elizabeth MD, MD    Note: This note was completed using voice recognition software.   Any typographical/name errors or mistakes are unintentional.

## 2023-02-17 NOTE — TELEPHONE ENCOUNTER
----- Message from Luciana Kaplan MD sent at 2/17/2023  8:44 AM EST -----  Please let her know that her right shoulder x-ray shows findings consistent with mild osteoarthritis.     Dr. Luciana Kaplan  [facility] [address], [address] [place] [address] Str.  Phone: [phone]  Fax: [phone]

## 2023-02-17 NOTE — TELEPHONE ENCOUNTER
Tried to call patient unable to leave a message, voice mail full. Re:  ----- Message from Monty Rojas MD sent at 2/17/2023  8:44 AM EST -----  Please let her know that her right shoulder x-ray shows findings consistent with mild osteoarthritis.

## 2023-03-03 RX ORDER — PRAVASTATIN SODIUM 80 MG/1
TABLET ORAL
Qty: 90 TABLET | Refills: 3 | Status: SHIPPED | OUTPATIENT
Start: 2023-03-03

## 2023-03-03 RX ORDER — PRAVASTATIN SODIUM 80 MG/1
80 TABLET ORAL DAILY
Qty: 90 TABLET | Refills: 3 | OUTPATIENT
Start: 2023-03-03

## 2023-03-27 ENCOUNTER — TELEPHONE (OUTPATIENT)
Age: 63
End: 2023-03-27

## 2023-03-27 NOTE — TELEPHONE ENCOUNTER
Pt calling asking if she can be seen on Cydney Nieves or Friday for right Knee pain. Says it has a tremble in it.     She would prefer Friday if possible

## 2023-03-30 ENCOUNTER — OFFICE VISIT (OUTPATIENT)
Age: 63
End: 2023-03-30
Payer: COMMERCIAL

## 2023-03-30 ENCOUNTER — TELEPHONE (OUTPATIENT)
Age: 63
End: 2023-03-30

## 2023-03-30 VITALS
TEMPERATURE: 98.1 F | DIASTOLIC BLOOD PRESSURE: 82 MMHG | SYSTOLIC BLOOD PRESSURE: 137 MMHG | OXYGEN SATURATION: 98 % | HEART RATE: 67 BPM | RESPIRATION RATE: 18 BRPM | BODY MASS INDEX: 22.82 KG/M2 | HEIGHT: 62 IN | WEIGHT: 124 LBS

## 2023-03-30 DIAGNOSIS — L30.9 DERMATITIS: ICD-10-CM

## 2023-03-30 DIAGNOSIS — M25.561 RIGHT KNEE PAIN, UNSPECIFIED CHRONICITY: Primary | ICD-10-CM

## 2023-03-30 DIAGNOSIS — R73.03 PREDIABETES: ICD-10-CM

## 2023-03-30 DIAGNOSIS — F43.9 STRESS: ICD-10-CM

## 2023-03-30 DIAGNOSIS — I10 ESSENTIAL (PRIMARY) HYPERTENSION: ICD-10-CM

## 2023-03-30 PROCEDURE — 3078F DIAST BP <80 MM HG: CPT | Performed by: INTERNAL MEDICINE

## 2023-03-30 PROCEDURE — 99214 OFFICE O/P EST MOD 30 MIN: CPT | Performed by: INTERNAL MEDICINE

## 2023-03-30 PROCEDURE — 3074F SYST BP LT 130 MM HG: CPT | Performed by: INTERNAL MEDICINE

## 2023-03-30 SDOH — ECONOMIC STABILITY: FOOD INSECURITY: WITHIN THE PAST 12 MONTHS, YOU WORRIED THAT YOUR FOOD WOULD RUN OUT BEFORE YOU GOT MONEY TO BUY MORE.: PATIENT DECLINED

## 2023-03-30 SDOH — ECONOMIC STABILITY: HOUSING INSECURITY
IN THE LAST 12 MONTHS, WAS THERE A TIME WHEN YOU DID NOT HAVE A STEADY PLACE TO SLEEP OR SLEPT IN A SHELTER (INCLUDING NOW)?: PATIENT REFUSED

## 2023-03-30 SDOH — ECONOMIC STABILITY: INCOME INSECURITY: HOW HARD IS IT FOR YOU TO PAY FOR THE VERY BASICS LIKE FOOD, HOUSING, MEDICAL CARE, AND HEATING?: PATIENT DECLINED

## 2023-03-30 SDOH — ECONOMIC STABILITY: FOOD INSECURITY: WITHIN THE PAST 12 MONTHS, THE FOOD YOU BOUGHT JUST DIDN'T LAST AND YOU DIDN'T HAVE MONEY TO GET MORE.: PATIENT DECLINED

## 2023-03-30 NOTE — PROGRESS NOTES
Sarath Keen presents today for   Chief Complaint   Patient presents with    Knee Pain     Right x 6 days. Patient denies any injury, swelling, or discoloration. Patient reports that she washed her car and the next day the she felt a throbbing like pain. The pain is intermittent. Skin Problem     Patient reports itching skin lower right leg 1 month ago. Patient reports the feeling was like a warm sensation in the area. Patient concerned with skin discoloration at the site. No itching or rash noted since. 1. \"Have you been to the ER, urgent care clinic since your last visit? Hospitalized since your last visit? \" no    2. \"Have you seen or consulted any other health care providers outside of the [address] [place] [address] since your last visit? \" yes     3. For patients aged 39-70: Has the patient had a colonoscopy / FIT/ Cologuard? Yes - Care Gap present. Most recent result on file      If the patient is female:    4. For patients aged 41-77: Has the patient had a mammogram within the past 2 years? Yes - no Care Gap present      5. For patients aged 21-65: Has the patient had a pap smear?  Yes - no Care Gap present
Mood and Affect: Mood normal.         LABS   Data Review:   Lab Results   Component Value Date/Time    WBC 5.5 12/02/2022 04:05 PM    HGB 13.4 12/02/2022 04:05 PM    HCT 41.1 12/02/2022 04:05 PM     12/02/2022 04:05 PM    MCV 87.4 12/02/2022 04:05 PM       Lab Results   Component Value Date/Time     12/08/2022 02:59 PM    K 4.3 12/08/2022 02:59 PM     12/08/2022 02:59 PM    CO2 29 12/08/2022 02:59 PM    BUN 24 12/08/2022 02:59 PM    GFRAA 59 07/01/2022 04:46 PM       Lab Results   Component Value Date/Time    CHOL 212 12/02/2022 04:05 PM    HDL 98 12/02/2022 04:05 PM       No results found for: HBA1C, [id number]    Assessment/Plan:   1. Right Knee Pain: PE findings are reassuring. Sx are likely from strain  - I encouraged her to stretch prior to doing any strenuous exercise. - Activity as tolerated. 2. Dermatitis: Along her RLE  - Sunscreen recommended. - Observation. Ok to try hydroquinone cream prn scar use    3. Prediabetes: Resolved per recent labs. - I encouraged her to maintain a low-carb diet. - I will check a follow-up A1c in 6 months. 4. HTN: Stable. -Continue and Amlodipine 2.5 mg daily, atenolol 12.5 mg twice daily,and aldactone 25mg daily  - Checking labs later this yr in 6 months    5. Anxiety/Stress: Stable. Observation. **[facility] paperwork completed today**      ICD-10-CM    1. Right knee pain, unspecified chronicity  M25.561       2. Essential (primary) hypertension  I10       3. Prediabetes  R73.03       4. Dermatitis  L30.9       5. Stress  F43.9             Health Maintenance Due   Topic Date Due    Shingles vaccine (1 of 2) Never done    Colorectal Cancer Screen  03/26/2021    COVID-19 Vaccine (3 - Booster for Birtha Asper series) 07/07/2021         Lab review: labs are reviewed in the EHR and ordered as mentioned above    I have discussed the diagnosis with the patient and the intended plan as seen in the above orders.   The patient has received an after-visit

## 2023-03-30 NOTE — TELEPHONE ENCOUNTER
Please schedule for labs 1 wk before her 6 month apt. 40 min. Per pt she will call back to schedule.  She didn't have her calander with her

## 2023-04-05 ASSESSMENT — ENCOUNTER SYMPTOMS
ANAL BLEEDING: 0
EYE PAIN: 0
ABDOMINAL PAIN: 0
SHORTNESS OF BREATH: 0
SORE THROAT: 0
COUGH: 0
BLOOD IN STOOL: 0

## 2023-05-24 ENCOUNTER — TELEPHONE (OUTPATIENT)
Age: 63
End: 2023-05-24

## 2023-05-24 NOTE — TELEPHONE ENCOUNTER
Patient called requesting an appt for right leg pain, and is also requesting to have her urine tested, states that for about the past 2 weeks it has smelt funny and is a light yellow color. No other symptoms to report at this time. No appts available, please advise. Patient can be reached at [phone] but won't be available for the next 2 hours.

## 2023-05-26 ENCOUNTER — HOSPITAL ENCOUNTER (OUTPATIENT)
Facility: HOSPITAL | Age: 63
Setting detail: SPECIMEN
End: 2023-05-26
Payer: COMMERCIAL

## 2023-05-26 LAB
APPEARANCE UR: CLEAR
BACTERIA URNS QL MICRO: ABNORMAL /HPF
BILIRUB UR QL: NEGATIVE
COLOR UR: YELLOW
EPITH CASTS URNS QL MICRO: ABNORMAL /LPF (ref 0–5)
GLUCOSE UR STRIP.AUTO-MCNC: NEGATIVE MG/DL
HGB UR QL STRIP: NEGATIVE
KETONES UR QL STRIP.AUTO: ABNORMAL MG/DL
LEUKOCYTE ESTERASE UR QL STRIP.AUTO: ABNORMAL
NITRITE UR QL STRIP.AUTO: NEGATIVE
PH UR STRIP: 5.5 (ref 5–8)
PROT UR STRIP-MCNC: NEGATIVE MG/DL
RBC #/AREA URNS HPF: ABNORMAL /HPF (ref 0–5)
SP GR UR REFRACTOMETRY: 1.02 (ref 1–1.03)
UROBILINOGEN UR QL STRIP.AUTO: 0.2 EU/DL (ref 0.2–1)
WBC URNS QL MICRO: ABNORMAL /HPF (ref 0–4)

## 2023-05-26 PROCEDURE — 81001 URINALYSIS AUTO W/SCOPE: CPT

## 2023-05-26 PROCEDURE — 36415 COLL VENOUS BLD VENIPUNCTURE: CPT

## 2023-05-26 PROCEDURE — 87086 URINE CULTURE/COLONY COUNT: CPT

## 2023-05-27 DIAGNOSIS — Z12.31 ENCOUNTER FOR SCREENING MAMMOGRAM FOR BREAST CANCER: ICD-10-CM

## 2023-05-27 LAB
BACTERIA SPEC CULT: NORMAL
CC UR VC: NORMAL
SERVICE CMNT-IMP: NORMAL

## 2023-06-07 ENCOUNTER — HOSPITAL ENCOUNTER (OUTPATIENT)
Facility: HOSPITAL | Age: 63
Discharge: HOME | End: 2023-06-10
Payer: COMMERCIAL

## 2023-06-07 DIAGNOSIS — Z12.31 ENCOUNTER FOR SCREENING MAMMOGRAM FOR BREAST CANCER: ICD-10-CM

## 2023-06-07 PROCEDURE — 77063 BREAST TOMOSYNTHESIS BI: CPT

## 2023-06-21 ENCOUNTER — HOSPITAL ENCOUNTER (OUTPATIENT)
Facility: HOSPITAL | Age: 63
Discharge: HOME | End: 2023-06-24
Attending: INTERNAL MEDICINE
Payer: COMMERCIAL

## 2023-06-21 ENCOUNTER — HOSPITAL ENCOUNTER (OUTPATIENT)
Facility: HOSPITAL | Age: 63
End: 2023-06-21
Attending: INTERNAL MEDICINE
Payer: COMMERCIAL

## 2023-06-21 ENCOUNTER — HOSPITAL ENCOUNTER (OUTPATIENT)
Facility: HOSPITAL | Age: 63
Discharge: HOME | End: 2023-06-23
Attending: INTERNAL MEDICINE
Payer: COMMERCIAL

## 2023-06-21 DIAGNOSIS — G45.9 TIA (TRANSIENT ISCHEMIC ATTACK): ICD-10-CM

## 2023-06-21 DIAGNOSIS — R22.41 NODULE OF SKIN OF RIGHT LOWER LEG: ICD-10-CM

## 2023-06-21 DIAGNOSIS — R20.2 FACIAL PARESTHESIA: ICD-10-CM

## 2023-06-21 LAB
VAS LEFT CCA DIST EDV: 16 CM/S
VAS LEFT CCA DIST PSV: 54.8 CM/S
VAS LEFT CCA MID EDV: 20.84 CM/S
VAS LEFT CCA MID PSV: 83.83 CM/S
VAS LEFT CCA PROX EDV: 16 CM/S
VAS LEFT CCA PROX PSV: 93.5 CM/S
VAS LEFT ECA EDV: 7.58 CM/S
VAS LEFT ECA PSV: 45 CM/S
VAS LEFT ICA DIST EDV: 25.6 CM/S
VAS LEFT ICA DIST PSV: 75.8 CM/S
VAS LEFT ICA MID EDV: 21.1 CM/S
VAS LEFT ICA MID PSV: 51.2 CM/S
VAS LEFT ICA PROX EDV: 13.1 CM/S
VAS LEFT ICA PROX PSV: 44 CM/S
VAS LEFT ICA/CCA PSV: 1.38 NO UNITS
VAS LEFT SUBCLAVIAN PROX EDV: 0 CM/S
VAS LEFT SUBCLAVIAN PROX PSV: 88.7 CM/S
VAS LEFT VERTEBRAL EDV: 17.48 CM/S
VAS LEFT VERTEBRAL PSV: 65.8 CM/S
VAS RIGHT CCA DIST EDV: 14.1 CM/S
VAS RIGHT CCA DIST PSV: 59.4 CM/S
VAS RIGHT CCA MID EDV: 14.11 CM/S
VAS RIGHT CCA MID PSV: 74.93 CM/S
VAS RIGHT CCA PROX EDV: 15.4 CM/S
VAS RIGHT CCA PROX PSV: 68.5 CM/S
VAS RIGHT ECA EDV: 6.34 CM/S
VAS RIGHT ECA PSV: 62 CM/S
VAS RIGHT ICA DIST EDV: 22.5 CM/S
VAS RIGHT ICA DIST PSV: 66.7 CM/S
VAS RIGHT ICA MID EDV: 12.2 CM/S
VAS RIGHT ICA MID PSV: 40.3 CM/S
VAS RIGHT ICA PROX EDV: 18 CM/S
VAS RIGHT ICA PROX PSV: 48.9 CM/S
VAS RIGHT ICA/CCA PSV: 1.1 NO UNITS
VAS RIGHT SUBCLAVIAN PROX EDV: 0 CM/S
VAS RIGHT SUBCLAVIAN PROX PSV: 102.3 CM/S
VAS RIGHT VERTEBRAL EDV: 11.98 CM/S
VAS RIGHT VERTEBRAL PSV: 51.6 CM/S

## 2023-06-21 PROCEDURE — 93880 EXTRACRANIAL BILAT STUDY: CPT

## 2023-06-21 PROCEDURE — 93880 EXTRACRANIAL BILAT STUDY: CPT | Performed by: INTERNAL MEDICINE

## 2023-06-21 PROCEDURE — 76882 US LMTD JT/FCL EVL NVASC XTR: CPT

## 2023-06-27 ENCOUNTER — TELEPHONE (OUTPATIENT)
Age: 63
End: 2023-06-27

## 2023-07-26 ENCOUNTER — TELEPHONE (OUTPATIENT)
Age: 63
End: 2023-07-26

## 2023-07-26 NOTE — TELEPHONE ENCOUNTER
Pt called stating she was experiencing BLE cramping she dose not believe is related to her statin. Pt also concerned about her most recent A1C being 5.7    Verbal order and read back per [facility]  No concern about A1C from a cardiac standpoint. Called to update Pt, no answer.

## 2023-08-02 ENCOUNTER — OFFICE VISIT (OUTPATIENT)
Age: 63
End: 2023-08-02
Payer: COMMERCIAL

## 2023-08-02 VITALS
BODY MASS INDEX: 23.19 KG/M2 | SYSTOLIC BLOOD PRESSURE: 120 MMHG | WEIGHT: 126 LBS | OXYGEN SATURATION: 98 % | HEART RATE: 64 BPM | TEMPERATURE: 97.2 F | RESPIRATION RATE: 18 BRPM | HEIGHT: 62 IN | DIASTOLIC BLOOD PRESSURE: 79 MMHG

## 2023-08-02 DIAGNOSIS — R22.41 NODULE OF SKIN OF RIGHT LOWER LEG: ICD-10-CM

## 2023-08-02 DIAGNOSIS — E78.5 HYPERLIPIDEMIA, UNSPECIFIED HYPERLIPIDEMIA TYPE: ICD-10-CM

## 2023-08-02 DIAGNOSIS — R73.03 PREDIABETES: ICD-10-CM

## 2023-08-02 DIAGNOSIS — M79.10 MYALGIA: ICD-10-CM

## 2023-08-02 DIAGNOSIS — R20.2 FACIAL PARESTHESIA: ICD-10-CM

## 2023-08-02 DIAGNOSIS — I10 ESSENTIAL (PRIMARY) HYPERTENSION: Primary | ICD-10-CM

## 2023-08-02 PROCEDURE — 3074F SYST BP LT 130 MM HG: CPT | Performed by: INTERNAL MEDICINE

## 2023-08-02 PROCEDURE — 3078F DIAST BP <80 MM HG: CPT | Performed by: INTERNAL MEDICINE

## 2023-08-02 PROCEDURE — 99214 OFFICE O/P EST MOD 30 MIN: CPT | Performed by: INTERNAL MEDICINE

## 2023-08-02 RX ORDER — MINOCYCLINE HYDROCHLORIDE 100 MG/1
100 CAPSULE ORAL ONCE
COMMUNITY

## 2023-08-02 RX ORDER — PANTOPRAZOLE SODIUM 40 MG/1
TABLET, DELAYED RELEASE ORAL
COMMUNITY
Start: 2023-06-15

## 2023-09-20 ENCOUNTER — OFFICE VISIT (OUTPATIENT)
Age: 63
End: 2023-09-20
Payer: COMMERCIAL

## 2023-09-20 VITALS
SYSTOLIC BLOOD PRESSURE: 115 MMHG | TEMPERATURE: 98.4 F | BODY MASS INDEX: 22.45 KG/M2 | HEART RATE: 68 BPM | DIASTOLIC BLOOD PRESSURE: 76 MMHG | RESPIRATION RATE: 18 BRPM | HEIGHT: 62 IN | OXYGEN SATURATION: 99 % | WEIGHT: 122 LBS

## 2023-09-20 DIAGNOSIS — I10 ESSENTIAL (PRIMARY) HYPERTENSION: Primary | ICD-10-CM

## 2023-09-20 DIAGNOSIS — M79.10 MYALGIA: ICD-10-CM

## 2023-09-20 DIAGNOSIS — R73.03 PREDIABETES: ICD-10-CM

## 2023-09-20 DIAGNOSIS — E78.5 HYPERLIPIDEMIA, UNSPECIFIED HYPERLIPIDEMIA TYPE: ICD-10-CM

## 2023-09-20 PROCEDURE — 3078F DIAST BP <80 MM HG: CPT | Performed by: INTERNAL MEDICINE

## 2023-09-20 PROCEDURE — 3074F SYST BP LT 130 MM HG: CPT | Performed by: INTERNAL MEDICINE

## 2023-09-20 PROCEDURE — 99214 OFFICE O/P EST MOD 30 MIN: CPT | Performed by: INTERNAL MEDICINE

## 2023-09-20 RX ORDER — SPIRONOLACTONE 25 MG/1
25 TABLET ORAL DAILY
COMMUNITY

## 2023-09-20 RX ORDER — AMITRIPTYLINE HYDROCHLORIDE 25 MG/1
TABLET, FILM COATED ORAL
COMMUNITY
Start: 2023-07-28

## 2023-09-20 RX ORDER — AMLODIPINE BESYLATE 2.5 MG/1
2.5 TABLET ORAL DAILY
COMMUNITY

## 2023-09-20 RX ORDER — ACETYLCARNITINE 500 MG
500 CAPSULE ORAL DAILY
COMMUNITY

## 2023-09-20 SDOH — ECONOMIC STABILITY: INCOME INSECURITY: HOW HARD IS IT FOR YOU TO PAY FOR THE VERY BASICS LIKE FOOD, HOUSING, MEDICAL CARE, AND HEATING?: NOT VERY HARD

## 2023-09-20 SDOH — ECONOMIC STABILITY: FOOD INSECURITY: WITHIN THE PAST 12 MONTHS, YOU WORRIED THAT YOUR FOOD WOULD RUN OUT BEFORE YOU GOT MONEY TO BUY MORE.: NEVER TRUE

## 2023-09-20 SDOH — ECONOMIC STABILITY: HOUSING INSECURITY
IN THE LAST 12 MONTHS, WAS THERE A TIME WHEN YOU DID NOT HAVE A STEADY PLACE TO SLEEP OR SLEPT IN A SHELTER (INCLUDING NOW)?: NO

## 2023-09-20 SDOH — ECONOMIC STABILITY: FOOD INSECURITY: WITHIN THE PAST 12 MONTHS, THE FOOD YOU BOUGHT JUST DIDN'T LAST AND YOU DIDN'T HAVE MONEY TO GET MORE.: NEVER TRUE

## 2023-09-20 ASSESSMENT — ANXIETY QUESTIONNAIRES
2. NOT BEING ABLE TO STOP OR CONTROL WORRYING: 0
7. FEELING AFRAID AS IF SOMETHING AWFUL MIGHT HAPPEN: 0
6. BECOMING EASILY ANNOYED OR IRRITABLE: 0
1. FEELING NERVOUS, ANXIOUS, OR ON EDGE: 0
5. BEING SO RESTLESS THAT IT IS HARD TO SIT STILL: 0
4. TROUBLE RELAXING: 0
IF YOU CHECKED OFF ANY PROBLEMS ON THIS QUESTIONNAIRE, HOW DIFFICULT HAVE THESE PROBLEMS MADE IT FOR YOU TO DO YOUR WORK, TAKE CARE OF THINGS AT HOME, OR GET ALONG WITH OTHER PEOPLE: NOT DIFFICULT AT ALL
GAD7 TOTAL SCORE: 0
3. WORRYING TOO MUCH ABOUT DIFFERENT THINGS: 0

## 2023-09-20 ASSESSMENT — ENCOUNTER SYMPTOMS
EYE PAIN: 0
SORE THROAT: 0
ABDOMINAL PAIN: 0
ANAL BLEEDING: 0
COUGH: 0
BLOOD IN STOOL: 0
SHORTNESS OF BREATH: 0

## 2023-09-20 ASSESSMENT — PATIENT HEALTH QUESTIONNAIRE - PHQ9
1. LITTLE INTEREST OR PLEASURE IN DOING THINGS: 0
SUM OF ALL RESPONSES TO PHQ QUESTIONS 1-9: 0
SUM OF ALL RESPONSES TO PHQ9 QUESTIONS 1 & 2: 0
2. FEELING DOWN, DEPRESSED OR HOPELESS: 0
SUM OF ALL RESPONSES TO PHQ QUESTIONS 1-9: 0

## 2023-09-20 NOTE — PROGRESS NOTES
intended plan as seen in the above orders. The patient has received an after-visit summary and questions were answered concerning future plans. I have discussed medication side effects and warnings with the patient as well. I have reviewed the plan of care with the patient, accepted their input and they are in agreement with the treatment goals. All questions were answered. The patient understands the plan of care. Handouts provided today with above information. Pt instructed if symptoms worsen to call the office or report to the ED for continued care. Greater than 50% of the visit time was spent in counseling and/or coordination of care. Voice recognition was used to generate this report, which may have resulted in some phonetic based errors in grammar and contents. Even though attempts were made to correct all the mistakes, some may have been missed, and remained in the body of the document. No follow-up provider specified.     Elvie Godinez MD

## 2023-09-26 ASSESSMENT — ENCOUNTER SYMPTOMS
BLOOD IN STOOL: 0
EYE PAIN: 0
SORE THROAT: 0
COUGH: 0
ANAL BLEEDING: 0
SHORTNESS OF BREATH: 0
ABDOMINAL PAIN: 0

## 2023-10-05 NOTE — TELEPHONE ENCOUNTER
Patient reached, she states that she noticed a small lump on the shoulder blade. Patient denies any pain, change in size, discoloration, or warm to touch. Patient advised to go to urgent care if symptoms worsen as described previously. Patient scheduled for first opening. Odomzo Pregnancy And Lactation Text: This medication is Pregnancy Category X and is absolutely contraindicated during pregnancy. It is unknown if it is excreted in breast milk.

## 2023-10-17 ENCOUNTER — HOSPITAL ENCOUNTER (OUTPATIENT)
Facility: HOSPITAL | Age: 63
Discharge: HOME | End: 2023-10-20
Payer: COMMERCIAL

## 2023-10-17 LAB
ALBUMIN SERPL-MCNC: 3.9 G/DL (ref 3.4–5)
ALBUMIN/GLOB SERPL: 1.2 (ref 0.8–1.7)
ALP SERPL-CCNC: 35 U/L (ref 45–117)
ALT SERPL-CCNC: 28 U/L (ref 13–56)
ANION GAP SERPL CALC-SCNC: 2 MMOL/L (ref 3–18)
AST SERPL-CCNC: 20 U/L (ref 10–38)
BILIRUB SERPL-MCNC: 0.8 MG/DL (ref 0.2–1)
BUN SERPL-MCNC: 16 MG/DL (ref 7–18)
BUN/CREAT SERPL: 16 (ref 12–20)
CALCIUM SERPL-MCNC: 8.9 MG/DL (ref 8.5–10.1)
CHLORIDE SERPL-SCNC: 106 MMOL/L (ref 100–111)
CHOLEST SERPL-MCNC: 208 MG/DL
CK SERPL-CCNC: 137 U/L (ref 26–192)
CO2 SERPL-SCNC: 31 MMOL/L (ref 21–32)
CREAT SERPL-MCNC: 1 MG/DL (ref 0.6–1.3)
CREAT UR-MCNC: 187 MG/DL (ref 30–125)
GLOBULIN SER CALC-MCNC: 3.2 G/DL (ref 2–4)
GLUCOSE SERPL-MCNC: 74 MG/DL (ref 74–99)
HDLC SERPL-MCNC: 93 MG/DL (ref 40–60)
HDLC SERPL: 2.2 (ref 0–5)
LDLC SERPL CALC-MCNC: 109.6 MG/DL (ref 0–100)
LIPID PANEL: ABNORMAL
MICROALBUMIN UR-MCNC: 1.11 MG/DL (ref 0–3)
MICROALBUMIN/CREAT UR-RTO: 6 MG/G (ref 0–30)
POTASSIUM SERPL-SCNC: 4 MMOL/L (ref 3.5–5.5)
PROT SERPL-MCNC: 7.1 G/DL (ref 6.4–8.2)
SODIUM SERPL-SCNC: 139 MMOL/L (ref 136–145)
TRIGL SERPL-MCNC: 27 MG/DL
VLDLC SERPL CALC-MCNC: 5.4 MG/DL

## 2023-10-17 PROCEDURE — 80061 LIPID PANEL: CPT

## 2023-10-17 PROCEDURE — 82570 ASSAY OF URINE CREATININE: CPT

## 2023-10-17 PROCEDURE — 82043 UR ALBUMIN QUANTITATIVE: CPT

## 2023-10-17 PROCEDURE — 80053 COMPREHEN METABOLIC PANEL: CPT

## 2023-10-17 PROCEDURE — 36415 COLL VENOUS BLD VENIPUNCTURE: CPT

## 2023-10-17 PROCEDURE — 82550 ASSAY OF CK (CPK): CPT

## 2023-10-19 ENCOUNTER — OFFICE VISIT (OUTPATIENT)
Age: 63
End: 2023-10-19
Payer: COMMERCIAL

## 2023-10-19 VITALS
BODY MASS INDEX: 23 KG/M2 | HEIGHT: 62 IN | SYSTOLIC BLOOD PRESSURE: 122 MMHG | WEIGHT: 125 LBS | OXYGEN SATURATION: 97 % | HEART RATE: 65 BPM | DIASTOLIC BLOOD PRESSURE: 82 MMHG

## 2023-10-19 DIAGNOSIS — R55 SYNCOPE AND COLLAPSE: ICD-10-CM

## 2023-10-19 DIAGNOSIS — I10 ESSENTIAL (PRIMARY) HYPERTENSION: Primary | ICD-10-CM

## 2023-10-19 DIAGNOSIS — R07.9 CHEST PAIN, UNSPECIFIED TYPE: ICD-10-CM

## 2023-10-19 DIAGNOSIS — R42 DIZZINESS AND GIDDINESS: ICD-10-CM

## 2023-10-19 PROCEDURE — 3079F DIAST BP 80-89 MM HG: CPT | Performed by: INTERNAL MEDICINE

## 2023-10-19 PROCEDURE — 93000 ELECTROCARDIOGRAM COMPLETE: CPT | Performed by: INTERNAL MEDICINE

## 2023-10-19 PROCEDURE — 99214 OFFICE O/P EST MOD 30 MIN: CPT | Performed by: INTERNAL MEDICINE

## 2023-10-19 PROCEDURE — 3074F SYST BP LT 130 MM HG: CPT | Performed by: INTERNAL MEDICINE

## 2023-10-19 RX ORDER — PRAVASTATIN SODIUM 80 MG/1
80 TABLET ORAL DAILY
Qty: 90 TABLET | Refills: 3 | Status: SHIPPED | OUTPATIENT
Start: 2023-10-19

## 2023-10-19 RX ORDER — PRAVASTATIN SODIUM 80 MG/1
1 TABLET ORAL DAILY
COMMUNITY
Start: 2017-04-07 | End: 2023-10-19 | Stop reason: SDUPTHER

## 2023-10-19 ASSESSMENT — PATIENT HEALTH QUESTIONNAIRE - PHQ9
SUM OF ALL RESPONSES TO PHQ QUESTIONS 1-9: 0
SUM OF ALL RESPONSES TO PHQ9 QUESTIONS 1 & 2: 0
SUM OF ALL RESPONSES TO PHQ QUESTIONS 1-9: 0
SUM OF ALL RESPONSES TO PHQ QUESTIONS 1-9: 0
1. LITTLE INTEREST OR PLEASURE IN DOING THINGS: 0
SUM OF ALL RESPONSES TO PHQ QUESTIONS 1-9: 0
2. FEELING DOWN, DEPRESSED OR HOPELESS: 0

## 2023-10-19 NOTE — PROGRESS NOTES
Opal Greenville presents today for   Chief Complaint   Patient presents with    Follow-up     1 year f/u     Chest Pain     Left chest sharp pains on/off    Dizziness     Dizzy spells due to meds        Opal Orozco preferred language for health care discussion is english/other. Is someone accompanying this pt? no    Is the patient using any DME equipment during OV? no    Depression Screening:  Depression: Not at risk (10/19/2023)    PHQ-2     PHQ-2 Score: 0        Learning Assessment:  Who is the primary learner? Patient    What is the preferred language for health care of the primary learner? ENGLISH    How does the primary learner prefer to learn new concepts? DEMONSTRATION    Answered By patient    Relationship to Learner SELF           Pt currently taking Anticoagulant therapy? no    Pt currently taking Antiplatelet therapy ? ASA 81 mg 1x daily       Coordination of Care:  1. Have you been to the ER, urgent care clinic since your last visit? Hospitalized since your last visit? yes    2. Have you seen or consulted any other health care providers outside of the [address] [place] [address] since your last visit? Include any pap smears or colon screening.  no\
controled    Thank you for allowing me to participate in the care of your patient, please do not hesitate to call with questions or concerns.       [address] Check, DO

## 2023-10-23 ENCOUNTER — TELEPHONE (OUTPATIENT)
Age: 63
End: 2023-10-23

## 2023-10-23 NOTE — TELEPHONE ENCOUNTER
----- Message from Javy Trujillo MD sent at 10/19/2023  5:02 PM EDT -----  Please let her know that her labs do not show any new findings. Her kidney function labs are normal.  Her cholesterol is about the same as it was 10 months ago. Her total cholesterol is 208. Triglycerides 27. HDL is 93. Her LDL was 109. Her liver test and electrolytes are unremarkable. There is no evidence of muscle inflammation per her recent labs.     Dr. Javy Trujillo  [facility] [address] [place] [address]  Phone: [phone]  Fax: [phone]

## 2023-10-25 NOTE — TELEPHONE ENCOUNTER
Tried to call patient home phone only rings no voice mail. Tried to call on cell phone, voice mail not set up unable to leave a message.

## 2023-10-27 NOTE — TELEPHONE ENCOUNTER
Lab results provided to patient. Pt verbalized understanding. No questions or concerns at this time.

## 2023-10-31 ENCOUNTER — OFFICE VISIT (OUTPATIENT)
Age: 63
End: 2023-10-31
Payer: COMMERCIAL

## 2023-10-31 VITALS
TEMPERATURE: 98 F | HEIGHT: 62 IN | DIASTOLIC BLOOD PRESSURE: 63 MMHG | HEART RATE: 73 BPM | SYSTOLIC BLOOD PRESSURE: 115 MMHG | WEIGHT: 124 LBS | OXYGEN SATURATION: 100 % | RESPIRATION RATE: 16 BRPM | BODY MASS INDEX: 22.82 KG/M2

## 2023-10-31 DIAGNOSIS — M26.622 ARTHRALGIA OF LEFT TEMPOROMANDIBULAR JOINT: Primary | ICD-10-CM

## 2023-10-31 DIAGNOSIS — E78.5 HYPERLIPIDEMIA, UNSPECIFIED HYPERLIPIDEMIA TYPE: ICD-10-CM

## 2023-10-31 DIAGNOSIS — I10 ESSENTIAL (PRIMARY) HYPERTENSION: ICD-10-CM

## 2023-10-31 DIAGNOSIS — R73.03 PREDIABETES: ICD-10-CM

## 2023-10-31 PROCEDURE — 3078F DIAST BP <80 MM HG: CPT | Performed by: INTERNAL MEDICINE

## 2023-10-31 PROCEDURE — 3074F SYST BP LT 130 MM HG: CPT | Performed by: INTERNAL MEDICINE

## 2023-10-31 PROCEDURE — 99214 OFFICE O/P EST MOD 30 MIN: CPT | Performed by: INTERNAL MEDICINE

## 2023-10-31 ASSESSMENT — PATIENT HEALTH QUESTIONNAIRE - PHQ9
1. LITTLE INTEREST OR PLEASURE IN DOING THINGS: 0
SUM OF ALL RESPONSES TO PHQ QUESTIONS 1-9: 0
2. FEELING DOWN, DEPRESSED OR HOPELESS: 0
SUM OF ALL RESPONSES TO PHQ QUESTIONS 1-9: 0
SUM OF ALL RESPONSES TO PHQ9 QUESTIONS 1 & 2: 0

## 2023-11-06 ASSESSMENT — ENCOUNTER SYMPTOMS
ANAL BLEEDING: 0
SHORTNESS OF BREATH: 0
EYE PAIN: 0
BLOOD IN STOOL: 0
ABDOMINAL PAIN: 0
SORE THROAT: 0
COUGH: 0

## 2023-12-15 ENCOUNTER — HOSPITAL ENCOUNTER (OUTPATIENT)
Facility: HOSPITAL | Age: 63
Discharge: HOME | End: 2023-12-15
Payer: COMMERCIAL

## 2023-12-15 LAB
ALBUMIN SERPL-MCNC: 3.9 G/DL (ref 3.4–5)
ANION GAP SERPL CALC-SCNC: 1 MMOL/L (ref 3–18)
APPEARANCE UR: CLEAR
BACTERIA URNS QL MICRO: NEGATIVE /HPF
BILIRUB UR QL: NEGATIVE
BUN SERPL-MCNC: 26 MG/DL (ref 7–18)
BUN/CREAT SERPL: 25 (ref 12–20)
CALCIUM SERPL-MCNC: 9.3 MG/DL (ref 8.5–10.1)
CHLORIDE SERPL-SCNC: 106 MMOL/L (ref 100–111)
CO2 SERPL-SCNC: 32 MMOL/L (ref 21–32)
COLOR UR: YELLOW
CREAT SERPL-MCNC: 1.04 MG/DL (ref 0.6–1.3)
CREAT UR-MCNC: 20 MG/DL (ref 30–125)
EPITH CASTS URNS QL MICRO: NORMAL /LPF (ref 0–5)
GLUCOSE SERPL-MCNC: 86 MG/DL (ref 74–99)
GLUCOSE UR STRIP.AUTO-MCNC: NEGATIVE MG/DL
HGB UR QL STRIP: NEGATIVE
KETONES UR QL STRIP.AUTO: NEGATIVE MG/DL
LEUKOCYTE ESTERASE UR QL STRIP.AUTO: NEGATIVE
MICROALBUMIN UR-MCNC: <0.5 MG/DL (ref 0–3)
MICROALBUMIN/CREAT UR-RTO: ABNORMAL MG/G (ref 0–30)
NITRITE UR QL STRIP.AUTO: NEGATIVE
PH UR STRIP: 5.5 (ref 5–8)
PHOSPHATE SERPL-MCNC: 4.2 MG/DL (ref 2.5–4.9)
POTASSIUM SERPL-SCNC: 3.9 MMOL/L (ref 3.5–5.5)
PROT UR STRIP-MCNC: NEGATIVE MG/DL
RBC #/AREA URNS HPF: NORMAL /HPF (ref 0–5)
SODIUM SERPL-SCNC: 139 MMOL/L (ref 136–145)
SP GR UR REFRACTOMETRY: <1.005 (ref 1–1.03)
UROBILINOGEN UR QL STRIP.AUTO: 0.2 EU/DL (ref 0.2–1)
WBC URNS QL MICRO: NORMAL /HPF (ref 0–4)

## 2023-12-15 PROCEDURE — 81001 URINALYSIS AUTO W/SCOPE: CPT

## 2023-12-15 PROCEDURE — 82570 ASSAY OF URINE CREATININE: CPT

## 2023-12-15 PROCEDURE — 82043 UR ALBUMIN QUANTITATIVE: CPT

## 2023-12-15 PROCEDURE — 80069 RENAL FUNCTION PANEL: CPT

## 2023-12-15 PROCEDURE — 36415 COLL VENOUS BLD VENIPUNCTURE: CPT

## 2023-12-27 ENCOUNTER — TELEPHONE (OUTPATIENT)
Age: 63
End: 2023-12-27

## 2023-12-27 RX ORDER — SPIRONOLACTONE 25 MG/1
25 TABLET ORAL DAILY
Qty: 90 TABLET | Refills: 1 | Status: SHIPPED | OUTPATIENT
Start: 2023-12-27 | End: 2023-12-28 | Stop reason: SDUPTHER

## 2023-12-28 RX ORDER — SPIRONOLACTONE 25 MG/1
25 TABLET ORAL DAILY
Qty: 90 TABLET | Refills: 3 | Status: SHIPPED | OUTPATIENT
Start: 2023-12-28

## 2023-12-28 RX ORDER — AMLODIPINE BESYLATE 2.5 MG/1
2.5 TABLET ORAL DAILY
Qty: 90 TABLET | Refills: 3 | Status: SHIPPED | OUTPATIENT
Start: 2023-12-28

## 2024-02-21 ENCOUNTER — OFFICE VISIT (OUTPATIENT)
Age: 64
End: 2024-02-21
Payer: COMMERCIAL

## 2024-02-21 VITALS
HEIGHT: 62 IN | WEIGHT: 126 LBS | SYSTOLIC BLOOD PRESSURE: 114 MMHG | DIASTOLIC BLOOD PRESSURE: 71 MMHG | RESPIRATION RATE: 17 BRPM | BODY MASS INDEX: 23.19 KG/M2 | OXYGEN SATURATION: 99 % | HEART RATE: 66 BPM | TEMPERATURE: 97.1 F

## 2024-02-21 DIAGNOSIS — R25.2 MUSCLE CRAMPS: Primary | ICD-10-CM

## 2024-02-21 DIAGNOSIS — I10 ESSENTIAL (PRIMARY) HYPERTENSION: ICD-10-CM

## 2024-02-21 DIAGNOSIS — E78.5 HYPERLIPIDEMIA, UNSPECIFIED HYPERLIPIDEMIA TYPE: ICD-10-CM

## 2024-02-21 PROCEDURE — 99214 OFFICE O/P EST MOD 30 MIN: CPT | Performed by: INTERNAL MEDICINE

## 2024-02-21 PROCEDURE — 3074F SYST BP LT 130 MM HG: CPT | Performed by: INTERNAL MEDICINE

## 2024-02-21 PROCEDURE — 3078F DIAST BP <80 MM HG: CPT | Performed by: INTERNAL MEDICINE

## 2024-02-21 RX ORDER — AMOXICILLIN 500 MG
1 CAPSULE ORAL DAILY
COMMUNITY

## 2024-02-21 RX ORDER — M-VIT,TX,IRON,MINS/CALC/FOLIC 27MG-0.4MG
1 TABLET ORAL DAILY
COMMUNITY

## 2024-02-21 RX ORDER — PRAVASTATIN SODIUM 80 MG/1
40 TABLET ORAL DAILY
Qty: 90 TABLET | Refills: 3
Start: 2024-02-21

## 2024-02-21 RX ORDER — TIZANIDINE 2 MG/1
2 TABLET ORAL EVERY 8 HOURS PRN
Qty: 25 TABLET | Refills: 0 | Status: SHIPPED | OUTPATIENT
Start: 2024-02-21

## 2024-02-21 ASSESSMENT — PATIENT HEALTH QUESTIONNAIRE - PHQ9
2. FEELING DOWN, DEPRESSED OR HOPELESS: 0
1. LITTLE INTEREST OR PLEASURE IN DOING THINGS: 0
SUM OF ALL RESPONSES TO PHQ QUESTIONS 1-9: 0
SUM OF ALL RESPONSES TO PHQ9 QUESTIONS 1 & 2: 0
SUM OF ALL RESPONSES TO PHQ QUESTIONS 1-9: 0

## 2024-02-21 ASSESSMENT — ENCOUNTER SYMPTOMS
BLOOD IN STOOL: 0
SHORTNESS OF BREATH: 0
EYE PAIN: 0
ANAL BLEEDING: 0
SORE THROAT: 0
ABDOMINAL PAIN: 0
COUGH: 0

## 2024-02-21 NOTE — PROGRESS NOTES
[facility]:  2/21/2024, MRN: [id number]      Tayler Pena is a 63 y.o. female and presents to clinic for Other (Cramping back of thigh)      Subjective:   The pt is a 62yo AAF with h/o chronic bronchitis, AMARILIS, porphyria (per EHR), allergic rhinitis, NAFLD, cervical spinal stenosis, prediabetes (followed by Endocrinology), multiple food allergies (per lab work), HTN, HLD, Covid-19 (2022), constipation, gallbladder polyps (suggested by CT scan 10/13/16), and GERD.     Leg cramps/HTN/HLD: The patient is reporting;.  She is on pravastatin 80 mg daily given underlying hyperlipidemia.  She is also on spironolactone 25 mg daily as part of her blood pressure regimen (with amlodipine 2.5 mg daily and atenolol 12.5 mg daily).  She is on potassium 20 mill equivalents daily given history of hypokalemia. She was out of potassium x 1 month. She got a new rx and took twice the amt x 1 wk to \"compensate\" for missing this rx for one month. She is back ot taking 1 potassium packet per day. She is reporting left hamstring pain. Pain began about a month ago when she was out of potassium. She notices that pain is intermittent and more noticeable with colder weather. She is drinking lots of water.  She occasionally has bilateral calf pain at night that last 5 to 10 minutes at a time.  She has had most of her hamstring pain when waking up from the morning.  BP is 114/71.        Patient Active Problem List    Diagnosis Date Noted    Prediabetes 02/08/2022    Cervical spinal stenosis 04/28/2021    COCHRAN (nonalcoholic steatohepatitis) 09/03/2019    Essential hypertension     Allergic rhinitis 10/04/2017    Gallbladder polyp 03/01/2017    Hypersomnia with sleep apnea 03/13/2014    Constipation 01/16/2012    Hyperlipidemia     GERD (gastroesophageal reflux disease)        Current Outpatient Medications   Medication Sig Dispense Refill    Omega-3 Fatty Acids (FISH OIL) 1200 MG CAPS Take 1,200 mg by mouth daily      Vitamin D3

## 2024-02-21 NOTE — PROGRESS NOTES
Tayler Pena presents today for   Chief Complaint   Patient presents with    Other     Cramping back of thigh           \"Have you been to the ER, urgent care clinic since your last visit?  Hospitalized since your last visit?\"    NO    “Have you seen or consulted any other health care providers [facility] visit?”    YES - When: approximately 1 months ago.  Where and Why: Endocrinologist-follow up.        “Have you had a pap smear?”    YES - [facility]ANGEL to request most recent records if not in the chart

## 2024-02-23 ENCOUNTER — HOSPITAL ENCOUNTER (OUTPATIENT)
Facility: HOSPITAL | Age: 64
Discharge: HOME | End: 2024-02-23
Payer: COMMERCIAL

## 2024-02-23 LAB
ANION GAP SERPL CALC-SCNC: 3 MMOL/L (ref 3–18)
BUN SERPL-MCNC: 18 MG/DL (ref 7–18)
BUN/CREAT SERPL: 16 (ref 12–20)
CALCIUM SERPL-MCNC: 9.5 MG/DL (ref 8.5–10.1)
CHLORIDE SERPL-SCNC: 107 MMOL/L (ref 100–111)
CK SERPL-CCNC: 132 U/L (ref 26–192)
CO2 SERPL-SCNC: 29 MMOL/L (ref 21–32)
CREAT SERPL-MCNC: 1.13 MG/DL (ref 0.6–1.3)
GLUCOSE SERPL-MCNC: 81 MG/DL (ref 74–99)
POTASSIUM SERPL-SCNC: 4.2 MMOL/L (ref 3.5–5.5)
SODIUM SERPL-SCNC: 139 MMOL/L (ref 136–145)

## 2024-02-23 PROCEDURE — 82550 ASSAY OF CK (CPK): CPT

## 2024-02-23 PROCEDURE — 36415 COLL VENOUS BLD VENIPUNCTURE: CPT

## 2024-02-23 PROCEDURE — 80048 BASIC METABOLIC PNL TOTAL CA: CPT

## 2024-03-05 ENCOUNTER — TELEPHONE (OUTPATIENT)
Age: 64
End: 2024-03-05

## 2024-03-05 NOTE — TELEPHONE ENCOUNTER
----- Message from Roxana Morris MD sent at 2/25/2024  9:11 PM EST -----  Please let her know that her lab work shows normal electrolytes.  Her CPK muscle lab is normal.  She should stretch and stay hydrated in order to prevent additional muscle cramps.  She should also follow the instructions given to her at her last visit regarding her cholesterol medication.    Dr. Roxana Morris  [facility] [address], [address]  [place] [address]  Phone: [phone]  Fax: [phone]

## 2024-04-24 ENCOUNTER — HOSPITAL ENCOUNTER (OUTPATIENT)
Facility: HOSPITAL | Age: 64
Discharge: HOME | End: 2024-04-27

## 2024-04-24 LAB
LABCORP SPECIMEN COLLECTION: NORMAL
LABCORP SPECIMEN COLLECTION: NORMAL

## 2024-04-24 PROCEDURE — 99001 SPECIMEN HANDLING PT-LAB: CPT

## 2024-04-26 ENCOUNTER — OFFICE VISIT (OUTPATIENT)
Age: 64
End: 2024-04-26
Payer: COMMERCIAL

## 2024-04-26 VITALS
TEMPERATURE: 98.7 F | SYSTOLIC BLOOD PRESSURE: 102 MMHG | HEIGHT: 62 IN | RESPIRATION RATE: 18 BRPM | WEIGHT: 128 LBS | HEART RATE: 73 BPM | BODY MASS INDEX: 23.55 KG/M2 | DIASTOLIC BLOOD PRESSURE: 62 MMHG | OXYGEN SATURATION: 100 %

## 2024-04-26 DIAGNOSIS — I10 ESSENTIAL (PRIMARY) HYPERTENSION: ICD-10-CM

## 2024-04-26 DIAGNOSIS — R25.2 MUSCLE CRAMPS: Primary | ICD-10-CM

## 2024-04-26 DIAGNOSIS — R06.02 SOB (SHORTNESS OF BREATH): ICD-10-CM

## 2024-04-26 PROCEDURE — 99213 OFFICE O/P EST LOW 20 MIN: CPT | Performed by: INTERNAL MEDICINE

## 2024-04-26 PROCEDURE — 3078F DIAST BP <80 MM HG: CPT | Performed by: INTERNAL MEDICINE

## 2024-04-26 PROCEDURE — 3074F SYST BP LT 130 MM HG: CPT | Performed by: INTERNAL MEDICINE

## 2024-04-26 RX ORDER — TIZANIDINE 2 MG/1
2 TABLET ORAL EVERY 8 HOURS PRN
Qty: 25 TABLET | Refills: 0 | Status: SHIPPED | OUTPATIENT
Start: 2024-04-26

## 2024-04-26 NOTE — PROGRESS NOTES
Tayler Pena presents today for   Chief Complaint   Patient presents with    Follow-up    Leg Pain           \"Have you been to the ER, urgent care clinic since your last visit?  Hospitalized since your last visit?\"    NO    “Have you seen or consulted any other health care providers outside of [facility] since your last visit?”    NO        “Have you had a pap smear?”    NO-UTD    Date of last Cervical Cancer screen (HPV or PAP): 8/6/2018           
swelling (BUE) or tenderness (BUE/BLE).      Cervical back: Neck supple.   Lymphadenopathy:      Cervical: No cervical adenopathy.   Skin:     General: Skin is warm and dry.      Findings: No erythema.   Neurological:      Mental Status: She is alert. Mental status is at baseline.      Gait: Gait normal.   Psychiatric:         Mood and Affect: Mood normal.         LABS   Data Review:   Lab Results   Component Value Date/Time    WBC 4.5 06/11/2023 03:05 PM    HGB 14.1 06/11/2023 03:05 PM    HCT 41.3 06/11/2023 03:05 PM     06/11/2023 03:05 PM    MCV 87.7 06/11/2023 03:05 PM       Lab Results   Component Value Date/Time     02/23/2024 03:35 PM    K 4.2 02/23/2024 03:35 PM     02/23/2024 03:35 PM    CO2 29 02/23/2024 03:35 PM    BUN 18 02/23/2024 03:35 PM    GFRAA 59 07/01/2022 04:46 PM       Lab Results   Component Value Date/Time    CHOL 208 10/17/2023 04:04 PM    HDL 93 10/17/2023 04:04 PM    .6 10/17/2023 04:04 PM    VLDL 5.4 10/17/2023 04:04 PM       No results found for: \"HBA1C\", \"[id number]\"    Assessment/Plan:   1. Muscle cramps: Idiopathic.  Possibly from pravastatin.  -Activity as tolerated.  - She can try tizanidine 2 mg 3 times daily as needed if it provides any symptomatic relief.  - I encouraged her to continue seeing cardiology for routine checkups given her h/o SOB/CP/palpitation sx.    2. HTN/SOB: BP is stable.  Physical exam findings are reassuring.  - Continue with: spironolactone 25 mg daily, potassium 20 mill equivalents daily, amlodipine 2.5 mg daily, and atenolol 12.5 mg as needed for palpitation symptoms.        ICD-10-CM    1. Muscle cramps  R25.2 tiZANidine (ZANAFLEX) 2 MG tablet            Health Maintenance Due   Topic Date Due   • Shingles vaccine (1 of 2) Never done   • Respiratory Syncytial Virus (RSV) Pregnant or age 60 yrs+ (1 - 1-dose 60+ series) Never done   • Cervical cancer screen  08/06/2023   • COVID-19 Vaccine (3 - 2023-24 season) 09/01/2023   • A1C test

## 2024-05-02 ASSESSMENT — ENCOUNTER SYMPTOMS
COUGH: 0
EYE PAIN: 0
ANAL BLEEDING: 0
SHORTNESS OF BREATH: 1
SORE THROAT: 0
BLOOD IN STOOL: 0
ABDOMINAL PAIN: 0

## 2024-05-21 ENCOUNTER — HOSPITAL ENCOUNTER (OUTPATIENT)
Facility: HOSPITAL | Age: 64
Discharge: HOME | End: 2024-05-24

## 2024-05-21 LAB — LABCORP SPECIMEN COLLECTION: NORMAL

## 2024-05-21 PROCEDURE — 99001 SPECIMEN HANDLING PT-LAB: CPT

## 2024-05-22 ENCOUNTER — OFFICE VISIT (OUTPATIENT)
Age: 64
End: 2024-05-22
Payer: COMMERCIAL

## 2024-05-22 VITALS
WEIGHT: 129 LBS | HEIGHT: 62 IN | DIASTOLIC BLOOD PRESSURE: 76 MMHG | HEART RATE: 70 BPM | BODY MASS INDEX: 23.74 KG/M2 | SYSTOLIC BLOOD PRESSURE: 136 MMHG | OXYGEN SATURATION: 98 %

## 2024-05-22 DIAGNOSIS — E78.5 HYPERLIPIDEMIA, UNSPECIFIED HYPERLIPIDEMIA TYPE: ICD-10-CM

## 2024-05-22 DIAGNOSIS — R06.02 SOB (SHORTNESS OF BREATH): Primary | ICD-10-CM

## 2024-05-22 DIAGNOSIS — R00.2 PALPITATIONS: ICD-10-CM

## 2024-05-22 DIAGNOSIS — I10 ESSENTIAL (PRIMARY) HYPERTENSION: ICD-10-CM

## 2024-05-22 DIAGNOSIS — R42 DIZZINESS AND GIDDINESS: ICD-10-CM

## 2024-05-22 LAB
CHOLEST SERPL-MCNC: 164 MG/DL (ref 100–199)
HDLC SERPL-MCNC: 83 MG/DL
LDLC SERPL CALC-MCNC: 68 MG/DL (ref 0–99)
SPECIMEN STATUS REPORT: NORMAL
TRIGL SERPL-MCNC: 70 MG/DL (ref 0–149)
VLDLC SERPL CALC-MCNC: 13 MG/DL (ref 5–40)

## 2024-05-22 PROCEDURE — 99215 OFFICE O/P EST HI 40 MIN: CPT | Performed by: NURSE PRACTITIONER

## 2024-05-22 PROCEDURE — 3078F DIAST BP <80 MM HG: CPT | Performed by: NURSE PRACTITIONER

## 2024-05-22 PROCEDURE — 93000 ELECTROCARDIOGRAM COMPLETE: CPT | Performed by: NURSE PRACTITIONER

## 2024-05-22 PROCEDURE — 3075F SYST BP GE 130 - 139MM HG: CPT | Performed by: NURSE PRACTITIONER

## 2024-05-22 ASSESSMENT — PATIENT HEALTH QUESTIONNAIRE - PHQ9
SUM OF ALL RESPONSES TO PHQ QUESTIONS 1-9: 0
SUM OF ALL RESPONSES TO PHQ QUESTIONS 1-9: 0
SUM OF ALL RESPONSES TO PHQ9 QUESTIONS 1 & 2: 0
SUM OF ALL RESPONSES TO PHQ QUESTIONS 1-9: 0
2. FEELING DOWN, DEPRESSED OR HOPELESS: NOT AT ALL
SUM OF ALL RESPONSES TO PHQ QUESTIONS 1-9: 0
1. LITTLE INTEREST OR PLEASURE IN DOING THINGS: NOT AT ALL

## 2024-05-22 ASSESSMENT — ANXIETY QUESTIONNAIRES
4. TROUBLE RELAXING: NOT AT ALL
2. NOT BEING ABLE TO STOP OR CONTROL WORRYING: NOT AT ALL
1. FEELING NERVOUS, ANXIOUS, OR ON EDGE: NOT AT ALL
3. WORRYING TOO MUCH ABOUT DIFFERENT THINGS: NOT AT ALL
IF YOU CHECKED OFF ANY PROBLEMS ON THIS QUESTIONNAIRE, HOW DIFFICULT HAVE THESE PROBLEMS MADE IT FOR YOU TO DO YOUR WORK, TAKE CARE OF THINGS AT HOME, OR GET ALONG WITH OTHER PEOPLE: NOT DIFFICULT AT ALL
7. FEELING AFRAID AS IF SOMETHING AWFUL MIGHT HAPPEN: NOT AT ALL
5. BEING SO RESTLESS THAT IT IS HARD TO SIT STILL: NOT AT ALL
6. BECOMING EASILY ANNOYED OR IRRITABLE: NOT AT ALL
GAD7 TOTAL SCORE: 0

## 2024-05-22 ASSESSMENT — ENCOUNTER SYMPTOMS
BLOOD IN STOOL: 0
ABDOMINAL DISTENTION: 0
DIARRHEA: 0
WHEEZING: 0
CHEST TIGHTNESS: 0
SHORTNESS OF BREATH: 1
CONSTIPATION: 0
VOMITING: 0
COUGH: 0
NAUSEA: 0

## 2024-05-22 NOTE — PROGRESS NOTES
Tayler Pena presents today for   Chief Complaint   Patient presents with    Follow-up     sob       Tayler Pena preferred language for health care discussion is english/other.    Is someone accompanying this pt? no    Is the patient using any DME equipment during OV? no    Depression Screening:  Depression: Not at risk (2/21/2024)    PHQ-2     PHQ-2 Score: 0        Learning Assessment:  No question data found.       Pt currently taking Anticoagulant therapy? no    Pt currently taking Antiplatelet therapy ? Aspirin 81mg daily      Coordination of Care:  1. Have you been to the ER, urgent care clinic since your last visit? Hospitalized since your last visit? no    2. Have you seen or consulted any other health care providers outside of the [facility] since your last visit? Include any pap smears or colon screening. no    
Results   Component Value Date/Time    ALT 28 10/17/2023 04:04 PM       Impression / Plan:  1.  Palpitations, history of PVCs/PACs on MCT  2.  HCVD, blood pressure controlled  3.  Hyperlipidemia, on pravastatin 80mg  4.  Shortness of breath, random    Ms. Pena was seen today for evaluation of complaints of random episodes of shortness of breath. She is able to walk 4-5 miles for exercise without any difficulty.  She complains of occasional palpitations (PVCs on her Smart Watch). She wanted us to let her walk down the hutton before we did her EKG to prove that it occurs with activity.  Her EKG showed NSR with 2 PVCs.  PACs and PVCs explained to her.  She states that she occasionally takes a 1/2 tab of atenolol but does not take it often as her heart rate can become low.  Medication teaching done regarding beta blockers and any rate controlling medications. Her questions were answered    She inquired about work-up for PAD.  She was reminded that in 2021, she had a venous study done of the lower extremities as well as ABIs and both were negative.  She had a carotid duplex study done in June 2023 and it was also negative.  A stress echo done in Dec. 2021 was negative.  A MCT showed PVCs and PACs.  All of these test results were reviewed with her.     Her most recent lipid panel results discussed with her.  There is improvement when compared to Oct. 2023.      She needed a lot of reassurance.  Positive reinforcement given for dietary and lifestyle changes that she has made to improve her lipid panel and in general, things she has done to stay healthy.  She was informed that no further testing is necessary at this time.    Time:  40 minutes    She will follow-up with Dr. Bullock as scheduled and PRN.      Nancy Wood MSN, [facility]    Please note:  Portions of this chart were created with Dragon medical speech to text program.  Unrecognized errors may be present.

## 2024-06-19 ENCOUNTER — TELEPHONE (OUTPATIENT)
Facility: CLINIC | Age: 64
End: 2024-06-19

## 2024-06-19 DIAGNOSIS — Z12.31 ENCOUNTER FOR SCREENING MAMMOGRAM FOR BREAST CANCER: Primary | ICD-10-CM

## 2024-06-19 NOTE — TELEPHONE ENCOUNTER
Order placed. Please let her know.    Dr. Roxana Morris  [facility] [address], [address]  [place] [address]  Phone: [phone]  Fax: [phone]

## 2024-06-20 NOTE — TELEPHONE ENCOUNTER
Attempted to call pt to advise mammogram order has been placed. No answer. No VM has been set up. 2nd # unable to leave VM.      MONA Brar LPN

## 2024-06-21 NOTE — TELEPHONE ENCOUNTER
Called pt to notify mammogram order has been placed. Pt verbalized understanding.      MONA Brar LPN

## 2024-07-10 ENCOUNTER — OFFICE VISIT (OUTPATIENT)
Facility: CLINIC | Age: 64
End: 2024-07-10
Payer: COMMERCIAL

## 2024-07-10 ENCOUNTER — HOSPITAL ENCOUNTER (OUTPATIENT)
Facility: HOSPITAL | Age: 64
Discharge: HOME | End: 2024-07-13
Attending: INTERNAL MEDICINE
Payer: COMMERCIAL

## 2024-07-10 VITALS — HEIGHT: 62 IN | BODY MASS INDEX: 23.66 KG/M2

## 2024-07-10 VITALS
HEART RATE: 69 BPM | TEMPERATURE: 97.4 F | HEIGHT: 62 IN | DIASTOLIC BLOOD PRESSURE: 65 MMHG | OXYGEN SATURATION: 98 % | RESPIRATION RATE: 17 BRPM | WEIGHT: 129.4 LBS | BODY MASS INDEX: 23.81 KG/M2 | SYSTOLIC BLOOD PRESSURE: 109 MMHG

## 2024-07-10 DIAGNOSIS — R10.32 BILATERAL GROIN PAIN: Primary | ICD-10-CM

## 2024-07-10 DIAGNOSIS — R10.31 BILATERAL GROIN PAIN: Primary | ICD-10-CM

## 2024-07-10 DIAGNOSIS — Z12.31 ENCOUNTER FOR SCREENING MAMMOGRAM FOR BREAST CANCER: ICD-10-CM

## 2024-07-10 DIAGNOSIS — I10 ESSENTIAL (PRIMARY) HYPERTENSION: ICD-10-CM

## 2024-07-10 PROCEDURE — 99213 OFFICE O/P EST LOW 20 MIN: CPT | Performed by: INTERNAL MEDICINE

## 2024-07-10 PROCEDURE — 3074F SYST BP LT 130 MM HG: CPT | Performed by: INTERNAL MEDICINE

## 2024-07-10 PROCEDURE — 3078F DIAST BP <80 MM HG: CPT | Performed by: INTERNAL MEDICINE

## 2024-07-10 PROCEDURE — 77063 BREAST TOMOSYNTHESIS BI: CPT

## 2024-07-10 NOTE — PROGRESS NOTES
Tayler Pena presents today for   Chief Complaint   Patient presents with    Follow-up           \"Have you been to the ER, urgent care clinic since your last visit?  Hospitalized since your last visit?\"    NO    “Have you seen or consulted any other health care providers outside of [facility] since your last visit?”    YES - When: approximately 1 months ago.  Where and Why: OBGYN-Pain in Pelvis.        “Have you had a pap smear?”    NO-Upcoming appt    Date of last Cervical Cancer screen (HPV or PAP): 8/6/2018           
4.5 06/11/2023 03:05 PM    HGB 14.1 06/11/2023 03:05 PM    HCT 41.3 06/11/2023 03:05 PM     06/11/2023 03:05 PM    MCV 87.7 06/11/2023 03:05 PM       Lab Results   Component Value Date/Time     02/23/2024 03:35 PM    K 4.2 02/23/2024 03:35 PM     02/23/2024 03:35 PM    CO2 29 02/23/2024 03:35 PM    BUN 18 02/23/2024 03:35 PM    GFRAA 59 07/01/2022 04:46 PM       Lab Results   Component Value Date/Time    CHOL 164 05/21/2024 12:00 AM    HDL 83 05/21/2024 12:00 AM    LDL 68 05/21/2024 12:00 AM    .6 10/17/2023 04:04 PM    VLDL 13 05/21/2024 12:00 AM       No results found for: \"HBA1C\", \"[id number]\"    Assessment/Plan:   1. Bilateral groin pain: PE findings are indeterminate.  - Ordering a pelvis CT    2. HTN: Stable.  -Continue with spironolactone 25 mg daily, potassium 20 mill equivalents daily, amlodipine 2.5 mg daily, and atenolol 12.5 mg as needed for palpitation symptoms.         ICD-10-CM    1. Bilateral groin pain  R10.31 CT PELVIS W WO CONTRAST Additional Contrast? Radiologist Recommendation    R10.32       2. Essential (primary) hypertension  I10             Health Maintenance Due   Topic Date Due    Shingles vaccine (1 of 2) Never done    Respiratory Syncytial Virus (RSV) Pregnant or age 60 yrs+ (1 - 1-dose 60+ series) Never done    Cervical cancer screen  08/06/2023    COVID-19 Vaccine (3 - 2023-24 season) 09/01/2023    A1C test (Diabetic or Prediabetic)  02/15/2024         Lab review: labs are reviewed in the EHR     I have discussed the diagnosis with the patient and the intended plan as seen in the above orders.  The patient has received an after-visit summary and questions were answered concerning future plans.  I have discussed medication side effects and warnings with the patient as well. I have reviewed the plan of care with the patient, accepted their input and they are in agreement with the treatment goals. All questions were answered. The patient understands the plan of

## 2024-07-16 ASSESSMENT — ENCOUNTER SYMPTOMS
COUGH: 0
EYE PAIN: 0
ABDOMINAL PAIN: 0
ANAL BLEEDING: 0
SORE THROAT: 0
SHORTNESS OF BREATH: 0
BLOOD IN STOOL: 0

## 2024-07-17 RX ORDER — PRAVASTATIN SODIUM 80 MG/1
40 TABLET ORAL DAILY
Qty: 45 TABLET | Refills: 3 | Status: SHIPPED | OUTPATIENT
Start: 2024-07-17

## 2024-07-24 ENCOUNTER — HOSPITAL ENCOUNTER (OUTPATIENT)
Facility: HOSPITAL | Age: 64
Discharge: HOME | End: 2024-07-27
Attending: INTERNAL MEDICINE
Payer: COMMERCIAL

## 2024-07-24 DIAGNOSIS — R10.31 BILATERAL GROIN PAIN: ICD-10-CM

## 2024-07-24 DIAGNOSIS — R10.32 BILATERAL GROIN PAIN: ICD-10-CM

## 2024-07-24 LAB — CREAT UR-MCNC: 1.1 MG/DL (ref 0.6–1.3)

## 2024-07-24 PROCEDURE — 72193 CT PELVIS W/DYE: CPT

## 2024-07-24 PROCEDURE — 6360000004 HC RX CONTRAST MEDICATION: Performed by: INTERNAL MEDICINE

## 2024-07-24 PROCEDURE — 82565 ASSAY OF CREATININE: CPT

## 2024-07-24 RX ADMIN — IOPAMIDOL 80 ML: 612 INJECTION, SOLUTION INTRAVENOUS at 15:57

## 2024-07-29 RX ORDER — SPIRONOLACTONE 25 MG/1
25 TABLET ORAL DAILY
Qty: 90 TABLET | Refills: 1 | Status: SHIPPED | OUTPATIENT
Start: 2024-07-29

## 2024-07-31 ENCOUNTER — TELEPHONE (OUTPATIENT)
Facility: CLINIC | Age: 64
End: 2024-07-31

## 2024-07-31 ENCOUNTER — TELEPHONE (OUTPATIENT)
Age: 64
End: 2024-07-31

## 2024-07-31 NOTE — TELEPHONE ENCOUNTER
----- Message from Roxana Morris MD sent at 7/31/2024 12:36 PM EDT -----  Please let her know that her pelvic CT study is unremarkable.    Dr. Roxana Morris  [facility] [address], [address]  [place] [address]  Phone: [phone]  Fax: [phone]

## 2024-07-31 NOTE — TELEPHONE ENCOUNTER
Received a fax from [facility] [employer] requesting transfer of patient's mail order prescriptions to [facility]. The fax states that the patient's pharmacist must call [facility] to complete the transfer process.    Attempted to call patient to notify her of the above. No response. Voicemail not set up.

## 2024-08-29 ENCOUNTER — TELEPHONE (OUTPATIENT)
Facility: CLINIC | Age: 64
End: 2024-08-29

## 2024-09-25 ENCOUNTER — OFFICE VISIT (OUTPATIENT)
Facility: CLINIC | Age: 64
End: 2024-09-25
Payer: COMMERCIAL

## 2024-09-25 VITALS
HEART RATE: 65 BPM | BODY MASS INDEX: 23.96 KG/M2 | HEIGHT: 62 IN | DIASTOLIC BLOOD PRESSURE: 77 MMHG | SYSTOLIC BLOOD PRESSURE: 123 MMHG | OXYGEN SATURATION: 98 % | RESPIRATION RATE: 18 BRPM | TEMPERATURE: 97.3 F | WEIGHT: 130.2 LBS

## 2024-09-25 DIAGNOSIS — R10.32 BILATERAL GROIN PAIN: Primary | ICD-10-CM

## 2024-09-25 DIAGNOSIS — I10 ESSENTIAL HYPERTENSION: ICD-10-CM

## 2024-09-25 DIAGNOSIS — R10.31 BILATERAL GROIN PAIN: Primary | ICD-10-CM

## 2024-09-25 PROCEDURE — 99213 OFFICE O/P EST LOW 20 MIN: CPT | Performed by: INTERNAL MEDICINE

## 2024-09-25 PROCEDURE — 3078F DIAST BP <80 MM HG: CPT | Performed by: INTERNAL MEDICINE

## 2024-09-25 PROCEDURE — 3074F SYST BP LT 130 MM HG: CPT | Performed by: INTERNAL MEDICINE

## 2024-09-25 RX ORDER — ESTRADIOL 0.1 MG/G
42.5 CREAM VAGINAL DAILY
COMMUNITY
Start: 2024-09-14

## 2024-09-25 SDOH — ECONOMIC STABILITY: INCOME INSECURITY: HOW HARD IS IT FOR YOU TO PAY FOR THE VERY BASICS LIKE FOOD, HOUSING, MEDICAL CARE, AND HEATING?: NOT HARD AT ALL

## 2024-09-25 SDOH — ECONOMIC STABILITY: FOOD INSECURITY: WITHIN THE PAST 12 MONTHS, YOU WORRIED THAT YOUR FOOD WOULD RUN OUT BEFORE YOU GOT MONEY TO BUY MORE.: NEVER TRUE

## 2024-09-25 SDOH — ECONOMIC STABILITY: FOOD INSECURITY: WITHIN THE PAST 12 MONTHS, THE FOOD YOU BOUGHT JUST DIDN'T LAST AND YOU DIDN'T HAVE MONEY TO GET MORE.: NEVER TRUE

## 2024-09-25 ASSESSMENT — ENCOUNTER SYMPTOMS
ABDOMINAL PAIN: 0
COUGH: 0
SHORTNESS OF BREATH: 0
ANAL BLEEDING: 0
EYE PAIN: 0
SORE THROAT: 0
BLOOD IN STOOL: 0

## 2024-10-15 ENCOUNTER — HOSPITAL ENCOUNTER (OUTPATIENT)
Facility: HOSPITAL | Age: 64
Discharge: HOME | End: 2024-10-18

## 2024-10-15 ENCOUNTER — OFFICE VISIT (OUTPATIENT)
Age: 64
End: 2024-10-15
Payer: COMMERCIAL

## 2024-10-15 ENCOUNTER — HOSPITAL ENCOUNTER (OUTPATIENT)
Facility: HOSPITAL | Age: 64
Discharge: HOME | End: 2024-10-18
Payer: COMMERCIAL

## 2024-10-15 VITALS
WEIGHT: 127 LBS | BODY MASS INDEX: 23.37 KG/M2 | DIASTOLIC BLOOD PRESSURE: 78 MMHG | SYSTOLIC BLOOD PRESSURE: 138 MMHG | OXYGEN SATURATION: 100 % | HEART RATE: 63 BPM | HEIGHT: 62 IN

## 2024-10-15 DIAGNOSIS — E78.00 HYPERCHOLESTEROLEMIA: ICD-10-CM

## 2024-10-15 DIAGNOSIS — R00.2 PALPITATIONS: ICD-10-CM

## 2024-10-15 DIAGNOSIS — R06.02 SOB (SHORTNESS OF BREATH): ICD-10-CM

## 2024-10-15 DIAGNOSIS — R07.9 CHEST PAIN, UNSPECIFIED TYPE: ICD-10-CM

## 2024-10-15 DIAGNOSIS — R07.9 CHEST PAIN, UNSPECIFIED TYPE: Primary | ICD-10-CM

## 2024-10-15 DIAGNOSIS — R42 DIZZINESS AND GIDDINESS: ICD-10-CM

## 2024-10-15 DIAGNOSIS — I10 ESSENTIAL (PRIMARY) HYPERTENSION: ICD-10-CM

## 2024-10-15 LAB
ALBUMIN SERPL-MCNC: 4 G/DL (ref 3.4–5)
ALBUMIN SERPL-MCNC: 4.1 G/DL (ref 3.4–5)
ALBUMIN/GLOB SERPL: 1.2 (ref 0.8–1.7)
ALP SERPL-CCNC: 33 U/L (ref 45–117)
ALT SERPL-CCNC: 30 U/L (ref 13–56)
ANION GAP SERPL CALC-SCNC: 3 MMOL/L (ref 3–18)
ANION GAP SERPL CALC-SCNC: 3 MMOL/L (ref 3–18)
AST SERPL-CCNC: 23 U/L (ref 10–38)
BASOPHILS # BLD: 0.1 K/UL (ref 0–0.1)
BASOPHILS NFR BLD: 1 % (ref 0–2)
BILIRUB SERPL-MCNC: 1.2 MG/DL (ref 0.2–1)
BUN SERPL-MCNC: 16 MG/DL (ref 7–18)
BUN SERPL-MCNC: 16 MG/DL (ref 7–18)
BUN/CREAT SERPL: 16 (ref 12–20)
BUN/CREAT SERPL: 17 (ref 12–20)
CALCIUM SERPL-MCNC: 9.7 MG/DL (ref 8.5–10.1)
CALCIUM SERPL-MCNC: 9.7 MG/DL (ref 8.5–10.1)
CHLORIDE SERPL-SCNC: 103 MMOL/L (ref 100–111)
CHLORIDE SERPL-SCNC: 104 MMOL/L (ref 100–111)
CHOLEST SERPL-MCNC: 172 MG/DL
CO2 SERPL-SCNC: 30 MMOL/L (ref 21–32)
CO2 SERPL-SCNC: 30 MMOL/L (ref 21–32)
CREAT SERPL-MCNC: 0.96 MG/DL (ref 0.6–1.3)
CREAT SERPL-MCNC: 0.97 MG/DL (ref 0.6–1.3)
CREAT UR-MCNC: 151 MG/DL (ref 30–125)
DIFFERENTIAL METHOD BLD: NORMAL
EOSINOPHIL # BLD: 0.1 K/UL (ref 0–0.4)
EOSINOPHIL NFR BLD: 1 % (ref 0–5)
ERYTHROCYTE [DISTWIDTH] IN BLOOD BY AUTOMATED COUNT: 13.8 % (ref 11.6–14.5)
GLOBULIN SER CALC-MCNC: 3.4 G/DL (ref 2–4)
GLUCOSE SERPL-MCNC: 62 MG/DL (ref 74–99)
GLUCOSE SERPL-MCNC: 62 MG/DL (ref 74–99)
HCT VFR BLD AUTO: 44.5 % (ref 35–45)
HDLC SERPL-MCNC: 97 MG/DL (ref 40–60)
HDLC SERPL: 1.8 (ref 0–5)
HGB BLD-MCNC: 14.5 G/DL (ref 12–16)
IMM GRANULOCYTES # BLD AUTO: 0 K/UL (ref 0–0.04)
IMM GRANULOCYTES NFR BLD AUTO: 0 % (ref 0–0.5)
LDLC SERPL CALC-MCNC: 66.6 MG/DL (ref 0–100)
LIPID PANEL: ABNORMAL
LYMPHOCYTES # BLD: 1.5 K/UL (ref 0.9–3.6)
LYMPHOCYTES NFR BLD: 27 % (ref 21–52)
MCH RBC QN AUTO: 29 PG (ref 24–34)
MCHC RBC AUTO-ENTMCNC: 32.6 G/DL (ref 31–37)
MCV RBC AUTO: 89 FL (ref 78–100)
MICROALBUMIN UR-MCNC: 1.24 MG/DL (ref 0–3)
MICROALBUMIN/CREAT UR-RTO: 8 MG/G (ref 0–30)
MONOCYTES # BLD: 0.4 K/UL (ref 0.05–1.2)
MONOCYTES NFR BLD: 7 % (ref 3–10)
NEUTS SEG # BLD: 3.5 K/UL (ref 1.8–8)
NEUTS SEG NFR BLD: 63 % (ref 40–73)
NRBC # BLD: 0 K/UL (ref 0–0.01)
NRBC BLD-RTO: 0 PER 100 WBC
PHOSPHATE SERPL-MCNC: 3 MG/DL (ref 2.5–4.9)
PLATELET # BLD AUTO: 287 K/UL (ref 135–420)
PMV BLD AUTO: 10 FL (ref 9.2–11.8)
POTASSIUM SERPL-SCNC: 3.8 MMOL/L (ref 3.5–5.5)
POTASSIUM SERPL-SCNC: 3.8 MMOL/L (ref 3.5–5.5)
PROT SERPL-MCNC: 7.5 G/DL (ref 6.4–8.2)
RBC # BLD AUTO: 5 M/UL (ref 4.2–5.3)
SODIUM SERPL-SCNC: 136 MMOL/L (ref 136–145)
SODIUM SERPL-SCNC: 137 MMOL/L (ref 136–145)
TRIGL SERPL-MCNC: 42 MG/DL
VLDLC SERPL CALC-MCNC: 8.4 MG/DL
WBC # BLD AUTO: 5.6 K/UL (ref 4.6–13.2)

## 2024-10-15 PROCEDURE — 85025 COMPLETE CBC W/AUTO DIFF WBC: CPT

## 2024-10-15 PROCEDURE — 82570 ASSAY OF URINE CREATININE: CPT

## 2024-10-15 PROCEDURE — 3075F SYST BP GE 130 - 139MM HG: CPT | Performed by: INTERNAL MEDICINE

## 2024-10-15 PROCEDURE — 3078F DIAST BP <80 MM HG: CPT | Performed by: INTERNAL MEDICINE

## 2024-10-15 PROCEDURE — 36415 COLL VENOUS BLD VENIPUNCTURE: CPT

## 2024-10-15 PROCEDURE — 82043 UR ALBUMIN QUANTITATIVE: CPT

## 2024-10-15 PROCEDURE — 80069 RENAL FUNCTION PANEL: CPT

## 2024-10-15 PROCEDURE — 80053 COMPREHEN METABOLIC PANEL: CPT

## 2024-10-15 PROCEDURE — 93000 ELECTROCARDIOGRAM COMPLETE: CPT | Performed by: INTERNAL MEDICINE

## 2024-10-15 PROCEDURE — 99212 OFFICE O/P EST SF 10 MIN: CPT | Performed by: INTERNAL MEDICINE

## 2024-10-15 PROCEDURE — 80061 LIPID PANEL: CPT

## 2024-10-15 ASSESSMENT — ANXIETY QUESTIONNAIRES
5. BEING SO RESTLESS THAT IT IS HARD TO SIT STILL: NOT AT ALL
GAD7 TOTAL SCORE: 0
6. BECOMING EASILY ANNOYED OR IRRITABLE: NOT AT ALL
1. FEELING NERVOUS, ANXIOUS, OR ON EDGE: NOT AT ALL
7. FEELING AFRAID AS IF SOMETHING AWFUL MIGHT HAPPEN: NOT AT ALL
3. WORRYING TOO MUCH ABOUT DIFFERENT THINGS: NOT AT ALL
IF YOU CHECKED OFF ANY PROBLEMS ON THIS QUESTIONNAIRE, HOW DIFFICULT HAVE THESE PROBLEMS MADE IT FOR YOU TO DO YOUR WORK, TAKE CARE OF THINGS AT HOME, OR GET ALONG WITH OTHER PEOPLE: NOT DIFFICULT AT ALL
4. TROUBLE RELAXING: NOT AT ALL
2. NOT BEING ABLE TO STOP OR CONTROL WORRYING: NOT AT ALL

## 2024-10-15 ASSESSMENT — ENCOUNTER SYMPTOMS
BACK PAIN: 0
VOICE CHANGE: 0
SHORTNESS OF BREATH: 0
VOMITING: 0
ABDOMINAL PAIN: 0
BLOOD IN STOOL: 0
DIARRHEA: 0
WHEEZING: 0
CONSTIPATION: 0
COUGH: 0
NAUSEA: 0
SORE THROAT: 0
TROUBLE SWALLOWING: 0

## 2024-10-15 ASSESSMENT — PATIENT HEALTH QUESTIONNAIRE - PHQ9
2. FEELING DOWN, DEPRESSED OR HOPELESS: NOT AT ALL
SUM OF ALL RESPONSES TO PHQ9 QUESTIONS 1 & 2: 0
SUM OF ALL RESPONSES TO PHQ QUESTIONS 1-9: 0
1. LITTLE INTEREST OR PLEASURE IN DOING THINGS: NOT AT ALL
SUM OF ALL RESPONSES TO PHQ QUESTIONS 1-9: 0

## 2024-10-15 NOTE — PROGRESS NOTES
Tayler Pena presents today for   Chief Complaint   Patient presents with    Follow-up     1 year       Tayler Pena preferred language for health care discussion is english/other.    Is someone accompanying this pt? no    Is the patient using any DME equipment during OV? no    Depression Screening:  Depression: Not at risk (10/15/2024)    PHQ-2     PHQ-2 Score: 0        Learning Assessment:  No question data found.       Pt currently taking Anticoagulant therapy? no    Pt currently taking Antiplatelet therapy ? Aspirin 81 mg daily      Coordination of Care:  1. Have you been to the ER, urgent care clinic since your last visit? Hospitalized since your last visit? no    2. Have you seen or consulted any other health care providers outside of the [facility] since your last visit? Include any pap smears or colon screening. no    
   No convincing evidence for ischemia or infarction in the setting of significant chest wall artifact.  EF 62%.  No RWMA.  Neg EKG on max EST.  Ex time 9 min 50 sec.    HTN (hypertension)     Hyperlipidemia     IBS (irritable bowel syndrome)     Insulin resistance     follows with endocrinology for this    COCHRAN (nonalcoholic steatohepatitis)     Neuropathy     feet    Night sweats     PVC (premature ventricular contraction)     Right low back pain     S/P colonoscopy 2009, 2014    normal per patient    Subacromial bursitis     Unsteady gait     due to heel pain     Past Surgical History:   Procedure Laterality Date    D/C SUCTION  2011    OTHER SURGICAL HISTORY  2014    colonscopy    OTHER SURGICAL HISTORY      endoscopy    OTHER SURGICAL HISTORY      wisdom teeth removal x1     Current Outpatient Medications   Medication Sig Dispense Refill    estradiol (ESTRACE) 0.1 MG/GM vaginal cream Place 42.5 g vaginally daily      spironolactone (ALDACTONE) 25 MG tablet TAKE 1 TABLET DAILY 90 tablet 1    pravastatin (PRAVACHOL) 80 MG tablet Take 0.5 tablets by mouth daily 45 tablet 3    Omega-3 Fatty Acids (FISH OIL) 1200 MG CAPS Take 1,200 mg by mouth daily      Vitamin D3 125 MCG (5000 UT) TABS tablet Take 1 tablet by mouth every other day 2 tablets      Multiple Vitamins-Minerals (THERAPEUTIC MULTIVITAMIN-MINERALS) tablet Take 1 tablet by mouth daily      amLODIPine (NORVASC) 2.5 MG tablet Take 1 tablet by mouth daily 90 tablet 3    B Complex-C (SUPER B COMPLEX PO) Take by mouth      pantoprazole (PROTONIX) 40 MG tablet TAKE 1 TABLET BY MOUTH EVERY MORNING 30-60 MIN BEFORE BREAKFAST      potassium chloride (KLOR-CON) 20 MEQ packet Take 20 mEq by mouth daily 90 each 2    acarbose (PRECOSE) 50 MG tablet Take 1 tablet by mouth 3 times daily as needed      albuterol sulfate HFA (PROVENTIL;VENTOLIN;PROAIR) 108 (90 Base) MCG/ACT inhaler Inhale 2 puffs into the lungs every 6 hours as needed      aspirin 81 MG EC tablet Take 1

## 2024-10-16 ENCOUNTER — TELEPHONE (OUTPATIENT)
Age: 64
End: 2024-10-16

## 2024-10-16 NOTE — TELEPHONE ENCOUNTER
I called and talked to the patient about her laboratory work.  All her labs were excellent with her glucose actually been somewhat on the low side at 62 which she had been fasting for almost 24 hours.    Her CMP looked good except for slight elevation of total bilirubin at 1.2 but this is only mildly elevated and I would not recommend any further evaluation of this at this time.    Her lipid profile was also excellent with an HDL of 97 and an LDL of 66 with low triglycerides of 42, so this would suggest a very low likelihood of developing ischemic heart disease moving forward.    Sina Higuera JR, DO

## 2024-10-16 NOTE — TELEPHONE ENCOUNTER
----- Message from MARINA SERNA RN sent at 10/16/2024  7:40 AM EDT -----  Per your last note:    This patient's chest pain is always related to her palpitations and clearly appears to be noncardiac.  Her dizziness issues are very mild and related usually to position change and since they are so rare I do not think any workup is necessary for them at this juncture.  We have been following her for her cholesterol for which she is on Pravachol so I am going to get a lipid profile as well as CBC and CMP and since she is otherwise doing well I will simply plan to see her again in several months.

## 2024-10-23 ENCOUNTER — HOSPITAL ENCOUNTER (OUTPATIENT)
Facility: HOSPITAL | Age: 64
Discharge: HOME | End: 2024-10-26
Payer: COMMERCIAL

## 2024-10-23 LAB
25(OH)D3 SERPL-MCNC: 57.5 NG/ML (ref 30–100)
ALBUMIN SERPL-MCNC: 3.8 G/DL (ref 3.4–5)
ALBUMIN/GLOB SERPL: 1.2 (ref 0.8–1.7)
ALP SERPL-CCNC: 37 U/L (ref 45–117)
ALT SERPL-CCNC: 26 U/L (ref 13–56)
ANION GAP SERPL CALC-SCNC: 5 MMOL/L (ref 3–18)
AST SERPL-CCNC: 21 U/L (ref 10–38)
BILIRUB SERPL-MCNC: 0.6 MG/DL (ref 0.2–1)
BUN SERPL-MCNC: 25 MG/DL (ref 7–18)
BUN/CREAT SERPL: 24 (ref 12–20)
CALCIUM SERPL-MCNC: 10 MG/DL (ref 8.5–10.1)
CHLORIDE SERPL-SCNC: 110 MMOL/L (ref 100–111)
CHOLEST SERPL-MCNC: 173 MG/DL
CO2 SERPL-SCNC: 30 MMOL/L (ref 21–32)
CREAT SERPL-MCNC: 1.03 MG/DL (ref 0.6–1.3)
GLOBULIN SER CALC-MCNC: 3.3 G/DL (ref 2–4)
GLUCOSE SERPL-MCNC: 82 MG/DL (ref 74–99)
HBA1C MFR BLD: 5.4 % (ref 4.2–5.6)
HDLC SERPL-MCNC: 95 MG/DL (ref 40–60)
HDLC SERPL: 1.8 (ref 0–5)
LDLC SERPL CALC-MCNC: 70.6 MG/DL (ref 0–100)
LIPID PANEL: ABNORMAL
POTASSIUM SERPL-SCNC: 4.2 MMOL/L (ref 3.5–5.5)
PROT SERPL-MCNC: 7.1 G/DL (ref 6.4–8.2)
SODIUM SERPL-SCNC: 145 MMOL/L (ref 136–145)
TRIGL SERPL-MCNC: 37 MG/DL
VLDLC SERPL CALC-MCNC: 7.4 MG/DL

## 2024-10-23 PROCEDURE — 83036 HEMOGLOBIN GLYCOSYLATED A1C: CPT

## 2024-10-23 PROCEDURE — 36415 COLL VENOUS BLD VENIPUNCTURE: CPT

## 2024-10-23 PROCEDURE — 80053 COMPREHEN METABOLIC PANEL: CPT

## 2024-10-23 PROCEDURE — 80061 LIPID PANEL: CPT

## 2024-10-23 PROCEDURE — 82306 VITAMIN D 25 HYDROXY: CPT

## 2024-10-24 RX ORDER — PRAVASTATIN SODIUM 80 MG/1
80 TABLET ORAL DAILY
Qty: 90 TABLET | Refills: 2 | Status: SHIPPED | OUTPATIENT
Start: 2024-10-24

## 2024-11-25 ENCOUNTER — TELEPHONE (OUTPATIENT)
Facility: CLINIC | Age: 64
End: 2024-11-25

## 2024-11-25 RX ORDER — SPIRONOLACTONE 25 MG/1
25 TABLET ORAL DAILY
Qty: 90 TABLET | Refills: 1 | Status: SHIPPED | OUTPATIENT
Start: 2024-11-25

## 2024-11-25 NOTE — TELEPHONE ENCOUNTER
Refill request via fax     Medication: spironolactone   Quantity: 90  Pharmacy:   [facility] - [place] - [facility]      Last Fill: 7/29/2024    PCP: Roxana Morris MD    LAST OFFICE VISIT: 9/25/2024      Future Appointments   Date Time Provider Department Center   4/15/2025  3:00 PM Sina Higuera Jr., DO [facility]

## 2024-12-04 ENCOUNTER — TELEPHONE (OUTPATIENT)
Facility: CLINIC | Age: 64
End: 2024-12-04

## 2024-12-04 NOTE — TELEPHONE ENCOUNTER
Pt dropped off FMLA form she is asking for DR to update and fax   [phone]  Please call pt when completed      Original in Dr mail box   Copy in purple folder up front

## 2025-02-28 RX ORDER — SPIRONOLACTONE 25 MG/1
25 TABLET ORAL DAILY
Qty: 90 TABLET | Refills: 1 | Status: SHIPPED | OUTPATIENT
Start: 2025-02-28

## 2025-02-28 NOTE — TELEPHONE ENCOUNTER
PCP: Roxana Morris MD    LAST OFFICE VISIT: 09/25/2024    LAST REFILL PER CHART:  Medication:spironolactone (ALDACTONE) 25 MG tablet   Ordered On:11/25/2024  Instructions:Take 1 tablet by mouth daily   Dispense:90 tablets  Refills:1      Future Appointments   Date Time Provider Department Center   4/15/2025  2:40 PM Sina Higuera Jr., DO [facility]

## 2025-04-14 ENCOUNTER — TELEPHONE (OUTPATIENT)
Age: 65
End: 2025-04-14

## 2025-04-14 NOTE — TELEPHONE ENCOUNTER
Attempted to contact pt 3 time to r/s appt.  Number is temporarily  disconnected    This was last prescribed by other Provider : Authorized by: EARNESTINE JOSEPH MD

## 2025-05-01 ENCOUNTER — HOSPITAL ENCOUNTER (OUTPATIENT)
Facility: HOSPITAL | Age: 65
Discharge: HOME | End: 2025-05-01
Payer: COMMERCIAL

## 2025-05-01 LAB
25(OH)D3 SERPL-MCNC: 59.7 NG/ML (ref 30–100)
ALBUMIN SERPL-MCNC: 3.6 G/DL (ref 3.4–5)
ALBUMIN/GLOB SERPL: 1.1 (ref 0.8–1.7)
ALP SERPL-CCNC: 45 U/L (ref 45–117)
ALT SERPL-CCNC: 25 U/L (ref 10–35)
ANION GAP SERPL CALC-SCNC: 8 MMOL/L (ref 3–18)
AST SERPL-CCNC: 28 U/L (ref 10–38)
BILIRUB SERPL-MCNC: 0.4 MG/DL (ref 0.2–1)
BUN SERPL-MCNC: 25 MG/DL (ref 6–23)
BUN/CREAT SERPL: 23 (ref 12–20)
CALCIUM SERPL-MCNC: 9.4 MG/DL (ref 8.5–10.1)
CHLORIDE SERPL-SCNC: 106 MMOL/L (ref 98–107)
CO2 SERPL-SCNC: 27 MMOL/L (ref 21–32)
CREAT SERPL-MCNC: 1.09 MG/DL (ref 0.6–1.3)
EST. AVERAGE GLUCOSE BLD GHB EST-MCNC: 114 MG/DL
GLOBULIN SER CALC-MCNC: 3.2 G/DL (ref 2–4)
GLUCOSE SERPL-MCNC: 86 MG/DL (ref 74–108)
HBA1C MFR BLD: 5.6 % (ref 4.2–5.6)
POTASSIUM SERPL-SCNC: 4.3 MMOL/L (ref 3.5–5.5)
PROT SERPL-MCNC: 6.8 G/DL (ref 6.4–8.2)
SODIUM SERPL-SCNC: 141 MMOL/L (ref 136–145)
T4 FREE SERPL-MCNC: 1 NG/DL (ref 0.9–1.7)
TSH, 3RD GENERATION: 1.6 UIU/ML (ref 0.27–4.2)

## 2025-05-01 PROCEDURE — 84439 ASSAY OF FREE THYROXINE: CPT

## 2025-05-01 PROCEDURE — 82306 VITAMIN D 25 HYDROXY: CPT

## 2025-05-01 PROCEDURE — 84443 ASSAY THYROID STIM HORMONE: CPT

## 2025-05-01 PROCEDURE — 83036 HEMOGLOBIN GLYCOSYLATED A1C: CPT

## 2025-05-01 PROCEDURE — 80053 COMPREHEN METABOLIC PANEL: CPT

## 2025-05-12 ENCOUNTER — TELEPHONE (OUTPATIENT)
Age: 65
End: 2025-05-12

## 2025-05-12 DIAGNOSIS — E78.5 HYPERLIPIDEMIA, UNSPECIFIED HYPERLIPIDEMIA TYPE: Primary | ICD-10-CM

## 2025-05-12 NOTE — TELEPHONE ENCOUNTER
----- Message from Melissa UREÑA sent at 5/9/2025  3:02 PM EDT -----  Regarding: Davida Pt  Ms. Pena always gets her labs done up front before her appt. Could you please talk to Dr. Higuera on Monday about ordering her labs and he will know that you are talking about when you tell him her name.

## 2025-05-12 NOTE — TELEPHONE ENCOUNTER
Verbal order and read back per Sina Higuera JR, DO  Lipid panel.  Attempted to call patient regarding this. No response received.   Unable to leave a voicemail. Will reattempt.

## 2025-05-30 ENCOUNTER — HOSPITAL ENCOUNTER (OUTPATIENT)
Age: 65
Discharge: HOME | End: 2025-05-30
Payer: COMMERCIAL

## 2025-05-30 DIAGNOSIS — E78.5 HYPERLIPIDEMIA, UNSPECIFIED HYPERLIPIDEMIA TYPE: ICD-10-CM

## 2025-05-30 LAB
ALBUMIN SERPL-MCNC: 3.8 G/DL (ref 3.4–5)
ALBUMIN/GLOB SERPL: 1.2 (ref 0.8–1.7)
ALP SERPL-CCNC: 41 U/L (ref 45–117)
ALT SERPL-CCNC: 25 U/L (ref 10–35)
ANION GAP SERPL CALC-SCNC: 9 MMOL/L (ref 3–18)
AST SERPL-CCNC: 25 U/L (ref 10–38)
BILIRUB SERPL-MCNC: 0.8 MG/DL (ref 0.2–1)
BUN SERPL-MCNC: 22 MG/DL (ref 6–23)
BUN/CREAT SERPL: 19 (ref 12–20)
CALCIUM SERPL-MCNC: 10 MG/DL (ref 8.5–10.1)
CHLORIDE SERPL-SCNC: 103 MMOL/L (ref 98–107)
CHOLEST SERPL-MCNC: 183 MG/DL
CO2 SERPL-SCNC: 29 MMOL/L (ref 21–32)
CREAT SERPL-MCNC: 1.15 MG/DL (ref 0.6–1.3)
GLOBULIN SER CALC-MCNC: 3.1 G/DL (ref 2–4)
GLUCOSE SERPL-MCNC: 78 MG/DL (ref 74–108)
HDLC SERPL-MCNC: 83 MG/DL (ref 40–60)
HDLC SERPL: 2.2 (ref 0–5)
LDLC SERPL CALC-MCNC: 92 MG/DL (ref 0–100)
POTASSIUM SERPL-SCNC: 4.4 MMOL/L (ref 3.5–5.5)
PROT SERPL-MCNC: 6.9 G/DL (ref 6.4–8.2)
SODIUM SERPL-SCNC: 140 MMOL/L (ref 136–145)
TRIGL SERPL-MCNC: 37 MG/DL (ref 0–150)
VLDLC SERPL CALC-MCNC: 7 MG/DL

## 2025-05-30 PROCEDURE — 80053 COMPREHEN METABOLIC PANEL: CPT

## 2025-05-30 PROCEDURE — 36415 COLL VENOUS BLD VENIPUNCTURE: CPT

## 2025-05-30 PROCEDURE — 80061 LIPID PANEL: CPT

## 2025-05-30 NOTE — TELEPHONE ENCOUNTER
Attempted to call patient regarding this. No response received.   Unable to leave a voicemail. Patient has an appointment on 6/3 with Dr. Higuera.

## 2025-06-03 ENCOUNTER — OFFICE VISIT (OUTPATIENT)
Age: 65
End: 2025-06-03
Payer: COMMERCIAL

## 2025-06-03 VITALS
OXYGEN SATURATION: 97 % | HEART RATE: 67 BPM | DIASTOLIC BLOOD PRESSURE: 60 MMHG | HEIGHT: 62 IN | SYSTOLIC BLOOD PRESSURE: 104 MMHG | BODY MASS INDEX: 24.84 KG/M2 | WEIGHT: 135 LBS

## 2025-06-03 DIAGNOSIS — I10 ESSENTIAL HYPERTENSION: Primary | ICD-10-CM

## 2025-06-03 DIAGNOSIS — R73.03 PREDIABETES: ICD-10-CM

## 2025-06-03 DIAGNOSIS — E78.5 HYPERLIPIDEMIA, UNSPECIFIED HYPERLIPIDEMIA TYPE: ICD-10-CM

## 2025-06-03 DIAGNOSIS — K21.00 GASTROESOPHAGEAL REFLUX DISEASE WITH ESOPHAGITIS, UNSPECIFIED WHETHER HEMORRHAGE: ICD-10-CM

## 2025-06-03 DIAGNOSIS — K75.81 NASH (NONALCOHOLIC STEATOHEPATITIS): ICD-10-CM

## 2025-06-03 DIAGNOSIS — R07.89 OTHER CHEST PAIN: ICD-10-CM

## 2025-06-03 PROCEDURE — 93000 ELECTROCARDIOGRAM COMPLETE: CPT | Performed by: INTERNAL MEDICINE

## 2025-06-03 PROCEDURE — 3078F DIAST BP <80 MM HG: CPT | Performed by: INTERNAL MEDICINE

## 2025-06-03 PROCEDURE — 3074F SYST BP LT 130 MM HG: CPT | Performed by: INTERNAL MEDICINE

## 2025-06-03 PROCEDURE — 99213 OFFICE O/P EST LOW 20 MIN: CPT | Performed by: INTERNAL MEDICINE

## 2025-06-03 ASSESSMENT — PATIENT HEALTH QUESTIONNAIRE - PHQ9
SUM OF ALL RESPONSES TO PHQ QUESTIONS 1-9: 0
1. LITTLE INTEREST OR PLEASURE IN DOING THINGS: NOT AT ALL
SUM OF ALL RESPONSES TO PHQ QUESTIONS 1-9: 0
2. FEELING DOWN, DEPRESSED OR HOPELESS: NOT AT ALL

## 2025-06-03 NOTE — PROGRESS NOTES
Tayler Pena presents today for   Chief Complaint   Patient presents with    Follow-up     6 month       Tayler Pena preferred language for health care discussion is english/other.    Is someone accompanying this pt? no    Is the patient using any DME equipment during OV? no    Depression Screening:  Depression: Not at risk (6/3/2025)    PHQ-2     PHQ-2 Score: 0        Learning Assessment:  No question data found.       Pt currently taking Anticoagulant therapy? no    Pt currently taking Antiplatelet therapy ? Aspirin 81 mg daily      Coordination of Care:  1. Have you been to the ER, urgent care clinic since your last visit? Hospitalized since your last visit? no    2. Have you seen or consulted any other health care providers outside of the [facility] since your last visit? Include any pap smears or colon screening. no    
10/15/2024     10/15/2024       Lab Results   Component Value Date/Time     05/30/2025 03:35 PM    K 4.4 05/30/2025 03:35 PM     05/30/2025 03:35 PM    CO2 29 05/30/2025 03:35 PM    BUN 22 05/30/2025 03:35 PM    CREATININE 1.15 05/30/2025 03:35 PM    GLUCOSE 78 05/30/2025 03:35 PM    CALCIUM 10.0 05/30/2025 03:35 PM    LABGLOM 54 05/30/2025 03:35 PM    LABGLOM 55 02/23/2024 03:35 PM    LABGLOM 58 12/08/2022 02:59 PM        Lab Results   Component Value Date    CHOL 183 05/30/2025    TRIG 37 05/30/2025    HDL 83 (H) 05/30/2025    LDL 92 05/30/2025    VLDL 7 05/30/2025    CHOLHDLRATIO 2.2 05/30/2025 06/21/23    VAS DUP CAROTID BILATERAL 06/21/2023  3:23 PM (Final)    Interpretation Summary  •  No evidence of stenosis in the bilateral internal carotid arteries.  •  Normal antegrade flow involving both vertebral arteries.  •  Patent external carotid arteries bilaterally.  •  Subclavian arteries appear normal with multiphasic waveforms.    Signed by: Parag Alvarado MD on 6/21/2023  3:23 PM       /60 (BP Site: Left Upper Arm, Patient Position: Sitting, BP Cuff Size: Medium Adult)   Pulse 67   Ht 1.575 m (5' 2\")   Wt 61.2 kg (135 lb)   SpO2 97%   BMI 24.69 kg/m²     Objective:   Physical Exam  Constitutional:       Appearance: Normal appearance.   HENT:      Head: Normocephalic.      Nose: No congestion.      Mouth/Throat:      Pharynx: Oropharynx is clear.   Eyes:      Conjunctiva/sclera: Conjunctivae normal.      Pupils: Pupils are equal, round, and reactive to light.   Neck:      Vascular: No carotid bruit.   Cardiovascular:      Rate and Rhythm: Normal rate and regular rhythm.      Pulses: Normal pulses.      Heart sounds: Normal heart sounds.   Pulmonary:      Effort: Pulmonary effort is normal.      Breath sounds: Normal breath sounds.   Abdominal:      Palpations: Abdomen is soft.      Tenderness: There is no abdominal tenderness.   Musculoskeletal:         General: No

## 2025-08-20 ENCOUNTER — HOSPITAL ENCOUNTER (OUTPATIENT)
Facility: HOSPITAL | Age: 65
Discharge: HOME | End: 2025-08-23
Attending: OBSTETRICS & GYNECOLOGY
Payer: COMMERCIAL

## 2025-08-20 VITALS — BODY MASS INDEX: 24.69 KG/M2 | HEIGHT: 62 IN

## 2025-08-20 DIAGNOSIS — Z12.31 VISIT FOR SCREENING MAMMOGRAM: ICD-10-CM

## 2025-08-20 PROCEDURE — 77063 BREAST TOMOSYNTHESIS BI: CPT
